# Patient Record
Sex: MALE | Race: BLACK OR AFRICAN AMERICAN | Employment: OTHER | URBAN - METROPOLITAN AREA
[De-identification: names, ages, dates, MRNs, and addresses within clinical notes are randomized per-mention and may not be internally consistent; named-entity substitution may affect disease eponyms.]

---

## 2019-06-02 ENCOUNTER — HOSPITAL ENCOUNTER (EMERGENCY)
Facility: CLINIC | Age: 84
Discharge: HOME | End: 2019-06-02
Attending: EMERGENCY MEDICINE | Admitting: EMERGENCY MEDICINE
Payer: MEDICARE

## 2019-06-02 ENCOUNTER — APPOINTMENT (OUTPATIENT)
Dept: GENERAL RADIOLOGY | Facility: CLINIC | Age: 84
End: 2019-06-02
Attending: EMERGENCY MEDICINE
Payer: MEDICARE

## 2019-06-02 ENCOUNTER — APPOINTMENT (OUTPATIENT)
Dept: CT IMAGING | Facility: CLINIC | Age: 84
End: 2019-06-02
Attending: EMERGENCY MEDICINE
Payer: MEDICARE

## 2019-06-02 VITALS
TEMPERATURE: 98 F | DIASTOLIC BLOOD PRESSURE: 83 MMHG | SYSTOLIC BLOOD PRESSURE: 131 MMHG | OXYGEN SATURATION: 94 % | RESPIRATION RATE: 15 BRPM

## 2019-06-02 DIAGNOSIS — H93.13 TINNITUS, BILATERAL: ICD-10-CM

## 2019-06-02 LAB
ANION GAP SERPL CALCULATED.3IONS-SCNC: 2 MMOL/L (ref 3–14)
BASOPHILS # BLD AUTO: 0 10E9/L (ref 0–0.2)
BASOPHILS NFR BLD AUTO: 0.2 %
BUN SERPL-MCNC: 18 MG/DL (ref 7–30)
CALCIUM SERPL-MCNC: 9.1 MG/DL (ref 8.5–10.1)
CHLORIDE SERPL-SCNC: 106 MMOL/L (ref 94–109)
CO2 SERPL-SCNC: 30 MMOL/L (ref 20–32)
CREAT SERPL-MCNC: 1.65 MG/DL (ref 0.66–1.25)
DIFFERENTIAL METHOD BLD: ABNORMAL
EOSINOPHIL # BLD AUTO: 0.1 10E9/L (ref 0–0.7)
EOSINOPHIL NFR BLD AUTO: 0.7 %
ERYTHROCYTE [DISTWIDTH] IN BLOOD BY AUTOMATED COUNT: 17.3 % (ref 10–15)
GFR SERPL CREATININE-BSD FRML MDRD: 36 ML/MIN/{1.73_M2}
GLUCOSE SERPL-MCNC: 145 MG/DL (ref 70–99)
HCT VFR BLD AUTO: 34.4 % (ref 40–53)
HGB BLD-MCNC: 10.5 G/DL (ref 13.3–17.7)
IMM GRANULOCYTES # BLD: 0.1 10E9/L (ref 0–0.4)
IMM GRANULOCYTES NFR BLD: 0.4 %
INR PPP: 3.39 (ref 0.86–1.14)
LYMPHOCYTES # BLD AUTO: 1 10E9/L (ref 0.8–5.3)
LYMPHOCYTES NFR BLD AUTO: 8.1 %
MCH RBC QN AUTO: 27 PG (ref 26.5–33)
MCHC RBC AUTO-ENTMCNC: 30.5 G/DL (ref 31.5–36.5)
MCV RBC AUTO: 88 FL (ref 78–100)
MONOCYTES # BLD AUTO: 1 10E9/L (ref 0–1.3)
MONOCYTES NFR BLD AUTO: 8.2 %
NEUTROPHILS # BLD AUTO: 10.1 10E9/L (ref 1.6–8.3)
NEUTROPHILS NFR BLD AUTO: 82.4 %
NRBC # BLD AUTO: 0 10*3/UL
NRBC BLD AUTO-RTO: 0 /100
PLATELET # BLD AUTO: 189 10E9/L (ref 150–450)
POTASSIUM SERPL-SCNC: 4.4 MMOL/L (ref 3.4–5.3)
RBC # BLD AUTO: 3.89 10E12/L (ref 4.4–5.9)
SODIUM SERPL-SCNC: 138 MMOL/L (ref 133–144)
TROPONIN I SERPL-MCNC: <0.015 UG/L (ref 0–0.04)
WBC # BLD AUTO: 12.3 10E9/L (ref 4–11)

## 2019-06-02 PROCEDURE — 25000128 H RX IP 250 OP 636: Performed by: EMERGENCY MEDICINE

## 2019-06-02 PROCEDURE — 93005 ELECTROCARDIOGRAM TRACING: CPT

## 2019-06-02 PROCEDURE — 84484 ASSAY OF TROPONIN QUANT: CPT | Performed by: EMERGENCY MEDICINE

## 2019-06-02 PROCEDURE — 85610 PROTHROMBIN TIME: CPT | Performed by: EMERGENCY MEDICINE

## 2019-06-02 PROCEDURE — 99285 EMERGENCY DEPT VISIT HI MDM: CPT | Mod: 25

## 2019-06-02 PROCEDURE — 71046 X-RAY EXAM CHEST 2 VIEWS: CPT

## 2019-06-02 PROCEDURE — 70498 CT ANGIOGRAPHY NECK: CPT

## 2019-06-02 PROCEDURE — 85025 COMPLETE CBC W/AUTO DIFF WBC: CPT | Performed by: EMERGENCY MEDICINE

## 2019-06-02 PROCEDURE — 80048 BASIC METABOLIC PNL TOTAL CA: CPT | Performed by: EMERGENCY MEDICINE

## 2019-06-02 PROCEDURE — 96360 HYDRATION IV INFUSION INIT: CPT | Mod: 59

## 2019-06-02 RX ORDER — IOPAMIDOL 755 MG/ML
500 INJECTION, SOLUTION INTRAVASCULAR ONCE
Status: COMPLETED | OUTPATIENT
Start: 2019-06-02 | End: 2019-06-02

## 2019-06-02 RX ADMIN — SODIUM CHLORIDE 80 ML: 9 INJECTION, SOLUTION INTRAVENOUS at 16:44

## 2019-06-02 RX ADMIN — IOPAMIDOL 70 ML: 755 INJECTION, SOLUTION INTRAVENOUS at 16:44

## 2019-06-02 ASSESSMENT — ENCOUNTER SYMPTOMS
WEAKNESS: 0
SHORTNESS OF BREATH: 0
TROUBLE SWALLOWING: 0
LIGHT-HEADEDNESS: 0
NUMBNESS: 0
CHILLS: 0
HEADACHES: 0
RHINORRHEA: 0
NECK PAIN: 0
DIZZINESS: 0
FEVER: 0

## 2019-06-02 NOTE — ED TRIAGE NOTES
"Pt arrives with caregiver for \"rushing noise in ears\". Pt noticed it first today, [facility] who [facility] for \"heart stuff\". Takes warfarin, unsure what for or what other medications. ABCs intact, denies chest pain/ SOB.   "

## 2019-06-02 NOTE — ED PROVIDER NOTES
History     Chief Complaint:  Tinnitus    HPI   Hubert Tamayo is a 89 year old male who presents to the ED for evaluation of tinnitus.  The patient reports sudden onset of bilateral tinnitus that began today around noon.  He describes it as fluctuating while he is talking, he notes that it sounds like his ears are underwater.  He notes that this mid pitched.  He denies ever having this in the past.  He denies that it is pulsatile.  He denies that it is associated with position change.  He states that [facility] and was told to present to [facility] for further evaluation.  He denies any vision changes, numbness or tingling, chest pain, shortness of breath, or difficulty swallowing.    Allergies:  Loxapine  Nifedipine  TMPSMX    Medications:    Albuterol  Amlodipine  Symbicort  Donepezil  Haloperidol  Metoprolol  Mirtazapine  Memetasone  Nitroglycerin  Olanzapine  Omeprazole  Tamsulosin  Sertraline  Tiotropium  Warfarin    Past Medical History:    Keratoconjuctivitis  Corneal opacity  Retinal hemorrhage    Past Surgical History:    Cataract removal     Family History:    family history is not on file.    Social History:  [facility]    Review of Systems   Constitutional: Negative for chills and fever.   HENT: Positive for tinnitus. Negative for congestion, ear pain, rhinorrhea and trouble swallowing.    Respiratory: Negative for shortness of breath.    Cardiovascular: Negative for chest pain.   Musculoskeletal: Negative for neck pain.   Neurological: Negative for dizziness, weakness, light-headedness, numbness and headaches.   All other systems reviewed and are negative.        Physical Exam     Patient Vitals for the past 24 hrs:   BP Temp Temp src Heart Rate Resp SpO2   06/02/19 1600 -- -- -- 68 15 --   06/02/19 1545 -- -- -- 68 -- --   06/02/19 1501 131/83 98  F (36.7  C) Oral 76 18 94 %        Physical Exam  Constitutional: Pleasant. Cooperative.  Eyes: Pupils equally round and reactive  HENT: Head is  normal in appearance. Oropharynx is normal with moist mucus membranes. No carotid bruits.  Cardiovascular: Regular rate and rhythm and without murmurs.  Respiratory: Normal respiratory effort, lungs are clear bilaterally.  GI: Abdomen is soft, non-tender, non-distended. No guarding, rebound, or rigidity.  Skin: Normal, without rash.  Neurologic: Cranial nerves II-XII intact, nl cognition, no focal deficits. Alert and oriented x 3. Normal  strength. Normal leg raise. Sensation to light touch intact throughout all 4 extremities. 5/5 strength with dorsiflexion and plantarflexion bilaterally. No pronator drift. Normal finger nose finger.   Psychiatric: Normal affect.  Nursing notes and vital signs reviewed.    Emergency Department Course   ECG:  Indication: Rushing noise in ears   Time: 1532  Vent. Rate 71 bpm. TX interval 172. QRS duration 78. QT/QTc 420/456. P-R-T axis 73 -64 -40.  Normal sinus rhythm. Left axis deviation. Nonspecific T wave abnormality. Abnormal ECG. Read by Vlad Mccurdy MD on 6/2/19.     Imaging:  XR Chest 2 Views   Preliminary Result   IMPRESSION: No acute disease.      CTA Head Neck with Contrast   Preliminary Result   IMPRESSION:    1. Patent arteries in the neck without evidence of dissection. Mild   atherosclerotic disease in the carotid arteries bilaterally without   significant stenosis by NASCET criteria.   2. Patent proximal major intracranial arteries without vascular   cutoff. No significant stenosis. No aneurysm identified.    3. Severe emphysematous changes in the visualized lung apices.              Laboratory:  Labs Ordered and Resulted from Time of ED Arrival Up to the Time of Departure from the ED   CBC WITH PLATELETS DIFFERENTIAL - Abnormal; Notable for the following components:       Result Value    WBC 12.3 (*)     RBC Count 3.89 (*)     Hemoglobin 10.5 (*)     Hematocrit 34.4 (*)     MCHC 30.5 (*)     RDW 17.3 (*)     Absolute Neutrophil 10.1 (*)     All other components  within normal limits   INR - Abnormal; Notable for the following components:    INR 3.39 (*)     All other components within normal limits   BASIC METABOLIC PANEL - Abnormal; Notable for the following components:    Anion Gap 2 (*)     Glucose 145 (*)     Creatinine 1.65 (*)     GFR Estimate 36 (*)     GFR Estimate If Black 42 (*)     All other components within normal limits   TROPONIN I      Interventions:  Medications   iopamidol (ISOVUE-370) solution 500 mL (70 mLs Intravenous Given 6/2/19 1644)   0.9% sodium chloride BOLUS (80 mLs Intravenous New Bag 6/2/19 1644)        Emergency Department Course:  Past medical records, nursing notes, and vitals reviewed.  I performed an exam of the patient and obtained history, as documented above.  I ordered the above labs and imaging.  1800 I discussed results with patient and caregiver.  Patient discharged to home.    Impression & Plan      Medical Decision Making:  Hubert Tamayo is a 89 year old male who presents today for evaluation of tinnitus.  The patient reports sudden onset of bilateral tinnitus that began today around noon.  He describes it as a fluctuating when he is talking, as though his ears are underwater.  See HPI above for additional details.  On evaluation, the patient's vitals are within normal limits.  The patient's physical exam is completely unremarkable.  No carotid bruits.  Normal neurologic exam.  Differential diagnosis includes carotid bruit, eustachian tube dysfunction, TMJ, and acoustic schwannoma, among others.  The above work-up was pursued.  The patient had a leukocytosis, however this is chronic for him on chart review.  BMP revealed elevated creatinine, although this is chronic for him as well.  INR was elevated at 3.39.  Troponin negative. EKG unremarkable. Chest x-ray was unremarkable.  CTA of head and neck revealed patent arteries in the neck.  No evidence of acoustic schwannoma.  Discussed with patient the unclear etiology of his symptoms.   TMJ and eustachian tube dysfunction are a possibility, however patient denies any history of jaw discomfort, and patient denies any recent URI.  On reevaluation, the patient reports that the symptoms have resolved.  Advised patient to follow-up with PCP in the next few days, and patient reports that he has an appointment scheduled for Wednesday, and I advised him to keep this.  In light of the patient's elevated INR, advised the patient to skip his dose tomorrow and call his PCP tomorrow to discuss this.  All questions answered.  Patient was discharged home in stable condition.    Diagnosis:    ICD-10-CM    1. Tinnitus, bilateral H93.13         Discharge Medications:     Medication List      There are no discharge medications for this visit.        Sree Coffman PA-C  [facility] ED  June 2, 2019        Sree Coffman PA-C  06/02/19 2037

## 2019-06-02 NOTE — ED AVS SNAPSHOT
[facility] Emergency Department  [address]  [facility] [address]  Phone:  [phone]  Fax:  [phone]                                    Hubert Tamayo   MRN: [id number]    Department:  [facility] Emergency Department   Date of Visit:  6/2/2019           After Visit Summary Signature Page    I have received my discharge instructions, and my questions have been answered. I have discussed any challenges I see with this plan with the nurse or doctor.    ..........................................................................................................................................  Patient/Patient Representative Signature      ..........................................................................................................................................  Patient Representative Print Name and Relationship to Patient    ..................................................               ................................................  Date                                   Time    ..........................................................................................................................................  Reviewed by Signature/Title    ...................................................              ..............................................  Date                                               Time          22EPIC Rev 08/18

## 2019-06-03 LAB — INTERPRETATION ECG - MUSE: NORMAL

## 2019-08-25 ENCOUNTER — TRANSFERRED RECORDS (OUTPATIENT)
Dept: HEALTH INFORMATION MANAGEMENT | Facility: CLINIC | Age: 84
End: 2019-08-25

## 2019-09-14 ENCOUNTER — APPOINTMENT (OUTPATIENT)
Dept: GENERAL RADIOLOGY | Facility: CLINIC | Age: 84
DRG: 552 | End: 2019-09-14
Attending: EMERGENCY MEDICINE
Payer: MEDICARE

## 2019-09-14 ENCOUNTER — APPOINTMENT (OUTPATIENT)
Dept: CT IMAGING | Facility: CLINIC | Age: 84
DRG: 552 | End: 2019-09-14
Attending: EMERGENCY MEDICINE
Payer: MEDICARE

## 2019-09-14 ENCOUNTER — HOSPITAL ENCOUNTER (EMERGENCY)
Facility: CLINIC | Age: 84
Discharge: HOME | DRG: 552 | End: 2019-09-14
Attending: EMERGENCY MEDICINE | Admitting: EMERGENCY MEDICINE
Payer: MEDICARE

## 2019-09-14 ENCOUNTER — NURSE TRIAGE (OUTPATIENT)
Dept: NURSING | Facility: CLINIC | Age: 84
End: 2019-09-14

## 2019-09-14 VITALS
OXYGEN SATURATION: 96 % | DIASTOLIC BLOOD PRESSURE: 86 MMHG | RESPIRATION RATE: 18 BRPM | TEMPERATURE: 98.1 F | SYSTOLIC BLOOD PRESSURE: 187 MMHG | HEART RATE: 60 BPM

## 2019-09-14 DIAGNOSIS — S32.009A LUMBAR TRANSVERSE PROCESS FRACTURE, CLOSED, INITIAL ENCOUNTER (H): ICD-10-CM
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
## 2019-09-14 DIAGNOSIS — D63.8 ANEMIA DUE TO CHRONIC ILLNESS: ICD-10-CM

## 2019-09-14 DIAGNOSIS — S20.222A CONTUSION OF LEFT SIDE OF BACK, INITIAL ENCOUNTER: ICD-10-CM

## 2019-09-14 DIAGNOSIS — Z79.01 WARFARIN ANTICOAGULATION: ICD-10-CM

## 2019-09-14 LAB
ANION GAP SERPL CALCULATED.3IONS-SCNC: 3 MMOL/L (ref 3–14)
BUN SERPL-MCNC: 25 MG/DL (ref 7–30)
CALCIUM SERPL-MCNC: 9.2 MG/DL (ref 8.5–10.1)
CHLORIDE SERPL-SCNC: 105 MMOL/L (ref 94–109)
CO2 SERPL-SCNC: 30 MMOL/L (ref 20–32)
CREAT SERPL-MCNC: 1.51 MG/DL (ref 0.66–1.25)
ERYTHROCYTE [DISTWIDTH] IN BLOOD BY AUTOMATED COUNT: 18.3 % (ref 10–15)
GFR SERPL CREATININE-BSD FRML MDRD: 40 ML/MIN/{1.73_M2}
GLUCOSE SERPL-MCNC: 115 MG/DL (ref 70–99)
HCT VFR BLD AUTO: 28.8 % (ref 40–53)
HGB BLD-MCNC: 8.7 G/DL (ref 13.3–17.7)
INR PPP: 4.91 (ref 0.86–1.14)
MCH RBC QN AUTO: 26.2 PG (ref 26.5–33)
MCHC RBC AUTO-ENTMCNC: 30.2 G/DL (ref 31.5–36.5)
MCV RBC AUTO: 87 FL (ref 78–100)
PLATELET # BLD AUTO: 199 10E9/L (ref 150–450)
POTASSIUM SERPL-SCNC: 4.5 MMOL/L (ref 3.4–5.3)
RBC # BLD AUTO: 3.32 10E12/L (ref 4.4–5.9)
SODIUM SERPL-SCNC: 138 MMOL/L (ref 133–144)
WBC # BLD AUTO: 9.1 10E9/L (ref 4–11)

## 2019-09-14 PROCEDURE — 85027 COMPLETE CBC AUTOMATED: CPT | Performed by: EMERGENCY MEDICINE

## 2019-09-14 PROCEDURE — 70450 CT HEAD/BRAIN W/O DYE: CPT

## 2019-09-14 PROCEDURE — 72131 CT LUMBAR SPINE W/O DYE: CPT

## 2019-09-14 PROCEDURE — 96374 THER/PROPH/DIAG INJ IV PUSH: CPT

## 2019-09-14 PROCEDURE — 72128 CT CHEST SPINE W/O DYE: CPT

## 2019-09-14 PROCEDURE — 71101 X-RAY EXAM UNILAT RIBS/CHEST: CPT | Mod: LT

## 2019-09-14 PROCEDURE — 80048 BASIC METABOLIC PNL TOTAL CA: CPT | Performed by: EMERGENCY MEDICINE

## 2019-09-14 PROCEDURE — 25000128 H RX IP 250 OP 636: Performed by: EMERGENCY MEDICINE

## 2019-09-14 PROCEDURE — 96376 TX/PRO/DX INJ SAME DRUG ADON: CPT

## 2019-09-14 PROCEDURE — 25000132 ZZH RX MED GY IP 250 OP 250 PS 637: Mod: GY | Performed by: EMERGENCY MEDICINE

## 2019-09-14 PROCEDURE — 99285 EMERGENCY DEPT VISIT HI MDM: CPT | Mod: 25

## 2019-09-14 PROCEDURE — 85610 PROTHROMBIN TIME: CPT | Performed by: EMERGENCY MEDICINE

## 2019-09-14 RX ORDER — TRAMADOL HYDROCHLORIDE 50 MG/1
50 TABLET ORAL ONCE
Status: COMPLETED | OUTPATIENT
Start: 2019-09-14 | End: 2019-09-14

## 2019-09-14 RX ORDER — HALOPERIDOL 1 MG/1
1 TABLET ORAL 4 TIMES DAILY
Status: ON HOLD | COMMUNITY
End: 2019-09-15

## 2019-09-14 RX ORDER — TAMSULOSIN HYDROCHLORIDE 0.4 MG/1
0.4 CAPSULE ORAL DAILY
COMMUNITY

## 2019-09-14 RX ORDER — TRAMADOL HYDROCHLORIDE 50 MG/1
50 TABLET ORAL EVERY 6 HOURS PRN
Qty: 15 TABLET | Refills: 0 | Status: ON HOLD | OUTPATIENT
Start: 2019-09-14 | End: 2019-09-15

## 2019-09-14 RX ORDER — DONEPEZIL HYDROCHLORIDE 10 MG/1
5 TABLET, FILM COATED ORAL DAILY
Status: ON HOLD | COMMUNITY
End: 2020-11-18

## 2019-09-14 RX ORDER — MORPHINE SULFATE 2 MG/ML
2 INJECTION, SOLUTION INTRAMUSCULAR; INTRAVENOUS
Status: DISCONTINUED | OUTPATIENT
Start: 2019-09-14 | End: 2019-09-14 | Stop reason: HOSPADM

## 2019-09-14 RX ADMIN — MORPHINE SULFATE 2 MG: 2 INJECTION, SOLUTION INTRAMUSCULAR; INTRAVENOUS at 06:58

## 2019-09-14 RX ADMIN — TRAMADOL HYDROCHLORIDE 50 MG: 50 TABLET, FILM COATED ORAL at 07:39

## 2019-09-14 RX ADMIN — MORPHINE SULFATE 2 MG: 2 INJECTION, SOLUTION INTRAMUSCULAR; INTRAVENOUS at 05:54

## 2019-09-14 ASSESSMENT — ENCOUNTER SYMPTOMS: ABDOMINAL PAIN: 0

## 2019-09-14 NOTE — ED TRIAGE NOTES
Pt states fell out of bed tonight while having nightmare. C/o back pain. Currently on coumadin. ABCs intact

## 2019-09-14 NOTE — TELEPHONE ENCOUNTER
Niece/Roselia was the caller,[phone].  She stated several times that Hubert needs to be in the hospital.  Hubert just got home. He was seen in the  ER early this morning. His pain is so severe he can't move.  He's on his side in bed with part of him hanging off the bed.    I told Roselia Gaspar needs to return to the hospital. She stated understanding and agreement.  Harper WILL RN [facility]

## 2019-09-14 NOTE — ED AVS SNAPSHOT
[facility] Emergency Department  [address]  [facility] [address]  Phone:  [phone]  Fax:  [phone]                                    Hubert Tamayo   MRN: [id number]    Department:  [facility] Emergency Department   Date of Visit:  9/14/2019           After Visit Summary Signature Page    I have received my discharge instructions, and my questions have been answered. I have discussed any challenges I see with this plan with the nurse or doctor.    ..........................................................................................................................................  Patient/Patient Representative Signature      ..........................................................................................................................................  Patient Representative Print Name and Relationship to Patient    ..................................................               ................................................  Date                                   Time    ..........................................................................................................................................  Reviewed by Signature/Title    ...................................................              ..............................................  Date                                               Time          22EPIC Rev 08/18

## 2019-09-14 NOTE — ED PROVIDER NOTES
History     Chief Complaint:  Fall    The history is provided by the patient. History limited by: dementia.      Hubert Tamayo is a 89 year old male with a history of paranoid schizophrenia, dementia, hypertension, hyperlipidemia, and arrhythmia (on coumadin) who presents for evaluation after an unwitnessed fall. The patient reports falling out of bed while having a nightmare tonight and complains of associated left sided back pain. The patient denies loosing consciousness, abdominal pain, or leg pain.    Allergies:  Loxapine  Nifedipine  Shellfish Allergy  Sulfa Drugs    Medications:    Coumadin  Albuterol  Amlodipine  Symbicort IN  Haldol  Toprol-XL  Remeron  Zyprexa  Omeprazole  Zoloft  Flomax  Spiriva     Past Medical History:    CAD  Dementia  Diabetes mellitus type 2  Hypertension  Osteoporosis  Paranoid schizophrenia  Pulmonary emphysema  Hyperlipidemia     Past Surgical History:    Cataract removal     Family History:    No past pertinent family history.    Social History:  The patient was accompanied to the ED by his niece (he lives with his niece).  Smoking Status: Former Smoker  Smokeless Tobacco: Never Used  Marital Status:       Review of Systems   Unable to perform ROS: Dementia   Gastrointestinal: Negative for abdominal pain.   Neurological: Negative for syncope.       Physical Exam     Patient Vitals for the past 24 hrs:   BP Temp Temp src Pulse Resp SpO2   09/14/19 0600  173/84 -- -- 56 -- 100 %   09/14/19 0545 173/94 -- -- 60 -- 100 %   09/14/19 0515 199/101 98.1  F (36.7  C) Temporal 62 18 99 %       Physical Exam  Nursing note and vitals reviewed.  Constitutional: Cooperative.   HENT:   Mouth/Throat: Mucous membranes are normal.   Full ROM of neck, No SP tenderness.  Cardiovascular: Normal rate, Irregularly irregular rhythm and normal heart sounds.  No murmur.  Pulmonary/Chest: Effort normal and breath sounds normal. No respiratory distress. No wheezes. No rales.   Abdominal: Soft. Normal  appearance and bowel sounds are normal. No distension. There is no tenderness. There is no rigidity and no guarding.   Musculoskeletal: Normal range of motion of extremities. Tenderness to his posterior lower left ribs. Tenderness in upper lumbar and lower thoracic spine.   Neurological: Alert. Normal strength.  Moving all extremities  Skin: Skin is warm and dry.   Psychiatric: Normal mood and affect.     Emergency Department Course     Imaging:  Radiographic findings were communicated with the patient who voiced understanding of the findings.    Head CT w/o contrast  IMPRESSION:  1.  No acute intracranial injury.    Lumbar spine CT w/o contrast  1.  Acute fractures of the left L2 and L3 transverse processes.    CT Thoracic Spine w/o Contrast  1.  Mild superior endplate depression at T9 appears chronic. No definite acute fracture.    Ribs XR, unilat 3 views + PA chest, left  IMPRESSION: Stable cardia mediastinal silhouette. Basilar fibroatelectasis. Atherosclerotic aorta. No visible, displaced left rib fracture.    Laboratory:  CBC: WBC: 9.1, HGB 8.7(L), PLT: 199  BMP: Glucose 115 (H), Creatinine 1.51 (H), GFR Estimate If Black 46 (L), o/w WNL    INR: 4.91 (H)    Interventions:  0554 Morphine 2 mg IV    Emergency Department Course:   Past medical records, nursing notes, and vitals reviewed.  0517: I performed an exam of the patient and obtained history, as documented above.    The patient was sent for a Head CT, Lumbar spine CT, CT Thoracic spine, and Ribs XR, unilat 3 views + PA chest, left while in the emergency department, results above.     IV was inserted and blood was drawn for laboratory testing, results above.        Impression & Plan      Medical Decision Making:  Hubert Tamayo is a 89 year old male with paranoid schizophrenia and dementia who presents after an unwitnessed low level fall from his bed. He denies loss of consciousness, but is on coumadin so we will perform Head CT imaging. He has diffuse mid to  lower back pain with tenderness to his left rib cage, so additional views have been obtained. He is hemodynamically stable at this time. INR is slightly supra therapeutic.  This can be monitored. Head CT negative.  C-spine cleared clinically. L2 and L3 Left TP fractures noted.  Planned for pain control with PCP follow up. No neurologic deficit noted.       Diagnosis:    ICD-10-CM   1. Fall, initial encounter W19.XXXA       2. Anemia due to chronic illness D63.8   3. Warfarin anticoagulation Z79.01   4. Contusion of left side of back, initial encounter S20.222A   5.      Lumbar transverse process fractures (L2 & L3), closed, initial encounter  (S32.009A)       Disposition:  discharged to home with Family member    Discharge Medications:  New Prescriptions    TRAMADOL (ULTRAM) 50 MG TABLET    Take 1 tablet (50 mg) by mouth every 6 hours as needed for pain     Ruma NEVES, am serving as a scribe on 9/14/2019 at 6:18 AM to personally document services performed by Bradford Robles MD based on my observations and the provider's statements to me.       Ruma Jaar  9/14/2019   [facility] EMERGENCY DEPARTMENT       Bradford Robles MD  09/14/19 0734       Bradford Robles MD  09/14/19 0734

## 2019-09-15 ENCOUNTER — HOSPITAL ENCOUNTER (INPATIENT)
Facility: CLINIC | Age: 84
LOS: 1 days | Discharge: NURSING, REHAB OR LONG-TERM CARE | DRG: 552 | End: 2019-09-18
Attending: EMERGENCY MEDICINE | Admitting: HOSPITALIST
Payer: MEDICARE

## 2019-09-15 DIAGNOSIS — S32.009A CLOSED FRACTURE OF TRANSVERSE PROCESS OF LUMBAR VERTEBRA, INITIAL ENCOUNTER (H): ICD-10-CM

## 2019-09-15 DIAGNOSIS — F20.0 PARANOID SCHIZOPHRENIA (H): Primary | ICD-10-CM

## 2019-09-15 DIAGNOSIS — I10 BENIGN ESSENTIAL HYPERTENSION: ICD-10-CM

## 2019-09-15 DIAGNOSIS — K59.00 CONSTIPATION, UNSPECIFIED CONSTIPATION TYPE: ICD-10-CM

## 2019-09-15 LAB
ANION GAP SERPL CALCULATED.3IONS-SCNC: 3 MMOL/L (ref 3–14)
BASOPHILS # BLD AUTO: 0 10E9/L (ref 0–0.2)
BASOPHILS NFR BLD AUTO: 0.3 %
BUN SERPL-MCNC: 22 MG/DL (ref 7–30)
CALCIUM SERPL-MCNC: 9.1 MG/DL (ref 8.5–10.1)
CHLORIDE SERPL-SCNC: 106 MMOL/L (ref 94–109)
CO2 SERPL-SCNC: 29 MMOL/L (ref 20–32)
CREAT SERPL-MCNC: 1.55 MG/DL (ref 0.66–1.25)
DIFFERENTIAL METHOD BLD: ABNORMAL
EOSINOPHIL # BLD AUTO: 0.1 10E9/L (ref 0–0.7)
EOSINOPHIL NFR BLD AUTO: 1.1 %
ERYTHROCYTE [DISTWIDTH] IN BLOOD BY AUTOMATED COUNT: 18.7 % (ref 10–15)
GFR SERPL CREATININE-BSD FRML MDRD: 39 ML/MIN/{1.73_M2}
GLUCOSE BLDC GLUCOMTR-MCNC: 97 MG/DL (ref 70–99)
GLUCOSE SERPL-MCNC: 90 MG/DL (ref 70–99)
HCT VFR BLD AUTO: 29.3 % (ref 40–53)
HGB BLD-MCNC: 9.2 G/DL (ref 13.3–17.7)
IMM GRANULOCYTES # BLD: 0.1 10E9/L (ref 0–0.4)
IMM GRANULOCYTES NFR BLD: 0.6 %
INR PPP: 2.81 (ref 0.86–1.14)
LYMPHOCYTES # BLD AUTO: 1.1 10E9/L (ref 0.8–5.3)
LYMPHOCYTES NFR BLD AUTO: 11 %
MCH RBC QN AUTO: 27 PG (ref 26.5–33)
MCHC RBC AUTO-ENTMCNC: 31.4 G/DL (ref 31.5–36.5)
MCV RBC AUTO: 86 FL (ref 78–100)
MONOCYTES # BLD AUTO: 0.8 10E9/L (ref 0–1.3)
MONOCYTES NFR BLD AUTO: 8.3 %
NEUTROPHILS # BLD AUTO: 7.8 10E9/L (ref 1.6–8.3)
NEUTROPHILS NFR BLD AUTO: 78.7 %
NRBC # BLD AUTO: 0 10*3/UL
NRBC BLD AUTO-RTO: 0 /100
PLATELET # BLD AUTO: 171 10E9/L (ref 150–450)
POTASSIUM SERPL-SCNC: 4.7 MMOL/L (ref 3.4–5.3)
RBC # BLD AUTO: 3.41 10E12/L (ref 4.4–5.9)
SODIUM SERPL-SCNC: 138 MMOL/L (ref 133–144)
WBC # BLD AUTO: 9.9 10E9/L (ref 4–11)

## 2019-09-15 PROCEDURE — 25000132 ZZH RX MED GY IP 250 OP 250 PS 637: Mod: GY | Performed by: EMERGENCY MEDICINE

## 2019-09-15 PROCEDURE — 00000146 ZZHCL STATISTIC GLUCOSE BY METER IP

## 2019-09-15 PROCEDURE — 85025 COMPLETE CBC W/AUTO DIFF WBC: CPT | Performed by: EMERGENCY MEDICINE

## 2019-09-15 PROCEDURE — 36415 COLL VENOUS BLD VENIPUNCTURE: CPT | Performed by: HOSPITALIST

## 2019-09-15 PROCEDURE — 85610 PROTHROMBIN TIME: CPT | Performed by: HOSPITALIST

## 2019-09-15 PROCEDURE — 96374 THER/PROPH/DIAG INJ IV PUSH: CPT

## 2019-09-15 PROCEDURE — G0378 HOSPITAL OBSERVATION PER HR: HCPCS

## 2019-09-15 PROCEDURE — 25000132 ZZH RX MED GY IP 250 OP 250 PS 637: Mod: GY | Performed by: HOSPITALIST

## 2019-09-15 PROCEDURE — 80048 BASIC METABOLIC PNL TOTAL CA: CPT | Performed by: HOSPITALIST

## 2019-09-15 PROCEDURE — 25000128 H RX IP 250 OP 636: Performed by: EMERGENCY MEDICINE

## 2019-09-15 PROCEDURE — 93005 ELECTROCARDIOGRAM TRACING: CPT

## 2019-09-15 PROCEDURE — 25000132 ZZH RX MED GY IP 250 OP 250 PS 637: Mod: GY | Performed by: PHYSICIAN ASSISTANT

## 2019-09-15 PROCEDURE — 99285 EMERGENCY DEPT VISIT HI MDM: CPT | Mod: 25

## 2019-09-15 PROCEDURE — 99220 ZZC INITIAL OBSERVATION CARE,LEVL III: CPT | Performed by: HOSPITALIST

## 2019-09-15 RX ORDER — AMOXICILLIN 250 MG
1 CAPSULE ORAL 2 TIMES DAILY PRN
Status: DISCONTINUED | OUTPATIENT
Start: 2019-09-15 | End: 2019-09-18 | Stop reason: HOSPADM

## 2019-09-15 RX ORDER — TAMSULOSIN HYDROCHLORIDE 0.4 MG/1
0.4 CAPSULE ORAL DAILY
Status: DISCONTINUED | OUTPATIENT
Start: 2019-09-16 | End: 2019-09-18 | Stop reason: HOSPADM

## 2019-09-15 RX ORDER — NICOTINE POLACRILEX 4 MG
15-30 LOZENGE BUCCAL
Status: DISCONTINUED | OUTPATIENT
Start: 2019-09-15 | End: 2019-09-18 | Stop reason: HOSPADM

## 2019-09-15 RX ORDER — FLUORIDE TOOTHPASTE
5-10 TOOTHPASTE DENTAL 4 TIMES DAILY PRN
Status: ON HOLD | COMMUNITY
End: 2020-06-27

## 2019-09-15 RX ORDER — TRAMADOL HYDROCHLORIDE 50 MG/1
50 TABLET ORAL EVERY 6 HOURS PRN
Status: DISCONTINUED | OUTPATIENT
Start: 2019-09-15 | End: 2019-09-18 | Stop reason: HOSPADM

## 2019-09-15 RX ORDER — ATORVASTATIN CALCIUM 40 MG/1
80 TABLET, FILM COATED ORAL DAILY
Status: DISCONTINUED | OUTPATIENT
Start: 2019-09-16 | End: 2019-09-18 | Stop reason: HOSPADM

## 2019-09-15 RX ORDER — HYDROCODONE BITARTRATE AND ACETAMINOPHEN 5; 325 MG/1; MG/1
1-2 TABLET ORAL EVERY 4 HOURS PRN
Status: DISCONTINUED | OUTPATIENT
Start: 2019-09-15 | End: 2019-09-16

## 2019-09-15 RX ORDER — HYDROMORPHONE HYDROCHLORIDE 1 MG/ML
0.2 INJECTION, SOLUTION INTRAMUSCULAR; INTRAVENOUS; SUBCUTANEOUS EVERY 6 HOURS PRN
Status: DISCONTINUED | OUTPATIENT
Start: 2019-09-15 | End: 2019-09-16

## 2019-09-15 RX ORDER — NYSTATIN 100000 U/G
CREAM TOPICAL 2 TIMES DAILY PRN
Status: ON HOLD | COMMUNITY
End: 2020-11-18

## 2019-09-15 RX ORDER — OLANZAPINE 5 MG/1
10 TABLET, ORALLY DISINTEGRATING ORAL 2 TIMES DAILY PRN
Status: DISCONTINUED | OUTPATIENT
Start: 2019-09-15 | End: 2019-09-18 | Stop reason: HOSPADM

## 2019-09-15 RX ORDER — EMOLLIENT BASE
CREAM (GRAM) TOPICAL DAILY
Status: ON HOLD | COMMUNITY
End: 2020-06-27

## 2019-09-15 RX ORDER — ONDANSETRON 2 MG/ML
4 INJECTION INTRAMUSCULAR; INTRAVENOUS EVERY 6 HOURS PRN
Status: DISCONTINUED | OUTPATIENT
Start: 2019-09-15 | End: 2019-09-18 | Stop reason: HOSPADM

## 2019-09-15 RX ORDER — OXYCODONE HYDROCHLORIDE 5 MG/1
5 TABLET ORAL ONCE
Status: COMPLETED | OUTPATIENT
Start: 2019-09-15 | End: 2019-09-15

## 2019-09-15 RX ORDER — WARFARIN SODIUM 2 MG/1
2 TABLET ORAL
Status: COMPLETED | OUTPATIENT
Start: 2019-09-15 | End: 2019-09-15

## 2019-09-15 RX ORDER — MORPHINE SULFATE 4 MG/ML
4 INJECTION, SOLUTION INTRAMUSCULAR; INTRAVENOUS ONCE
Status: COMPLETED | OUTPATIENT
Start: 2019-09-15 | End: 2019-09-15

## 2019-09-15 RX ORDER — DEXTROSE MONOHYDRATE 25 G/50ML
25-50 INJECTION, SOLUTION INTRAVENOUS
Status: DISCONTINUED | OUTPATIENT
Start: 2019-09-15 | End: 2019-09-18 | Stop reason: HOSPADM

## 2019-09-15 RX ORDER — NALOXONE HYDROCHLORIDE 0.4 MG/ML
.1-.4 INJECTION, SOLUTION INTRAMUSCULAR; INTRAVENOUS; SUBCUTANEOUS
Status: DISCONTINUED | OUTPATIENT
Start: 2019-09-15 | End: 2019-09-18 | Stop reason: HOSPADM

## 2019-09-15 RX ORDER — CLOPIDOGREL BISULFATE 75 MG/1
75 TABLET ORAL DAILY
Status: DISCONTINUED | OUTPATIENT
Start: 2019-09-16 | End: 2019-09-18 | Stop reason: HOSPADM

## 2019-09-15 RX ORDER — OLANZAPINE 10 MG/1
20 TABLET ORAL AT BEDTIME
Status: DISCONTINUED | OUTPATIENT
Start: 2019-09-15 | End: 2019-09-16

## 2019-09-15 RX ORDER — MOMETASONE FUROATE MONOHYDRATE 50 UG/1
2 SPRAY, METERED NASAL DAILY
COMMUNITY
End: 2021-01-23

## 2019-09-15 RX ORDER — BUDESONIDE AND FORMOTEROL FUMARATE DIHYDRATE 160; 4.5 UG/1; UG/1
2 AEROSOL RESPIRATORY (INHALATION) 2 TIMES DAILY
COMMUNITY
End: 2021-01-23

## 2019-09-15 RX ORDER — ACETAMINOPHEN 650 MG/1
650 SUPPOSITORY RECTAL EVERY 4 HOURS PRN
Status: DISCONTINUED | OUTPATIENT
Start: 2019-09-15 | End: 2019-09-15

## 2019-09-15 RX ORDER — ACETAMINOPHEN 325 MG/1
650 TABLET ORAL EVERY 4 HOURS PRN
Status: DISCONTINUED | OUTPATIENT
Start: 2019-09-15 | End: 2019-09-18 | Stop reason: HOSPADM

## 2019-09-15 RX ORDER — POLYETHYLENE GLYCOL 3350 17 G/17G
17 POWDER, FOR SOLUTION ORAL DAILY PRN
Status: DISCONTINUED | OUTPATIENT
Start: 2019-09-15 | End: 2019-09-18 | Stop reason: HOSPADM

## 2019-09-15 RX ORDER — BENZOCAINE/MENTHOL 6 MG-10 MG
LOZENGE MUCOUS MEMBRANE 2 TIMES DAILY
Status: ON HOLD | COMMUNITY
End: 2020-11-18

## 2019-09-15 RX ORDER — MORPHINE SULFATE 2 MG/ML
2 INJECTION, SOLUTION INTRAMUSCULAR; INTRAVENOUS EVERY 6 HOURS PRN
Status: CANCELLED | OUTPATIENT
Start: 2019-09-15

## 2019-09-15 RX ORDER — ATORVASTATIN CALCIUM 80 MG/1
80 TABLET, FILM COATED ORAL DAILY
Status: ON HOLD | COMMUNITY
End: 2020-06-27

## 2019-09-15 RX ORDER — IPRATROPIUM BROMIDE AND ALBUTEROL SULFATE 2.5; .5 MG/3ML; MG/3ML
3 SOLUTION RESPIRATORY (INHALATION) EVERY 4 HOURS PRN
Status: DISCONTINUED | OUTPATIENT
Start: 2019-09-15 | End: 2019-09-17

## 2019-09-15 RX ORDER — NITROGLYCERIN 0.4 MG/1
0.4 TABLET SUBLINGUAL EVERY 5 MIN PRN
COMMUNITY

## 2019-09-15 RX ORDER — DONEPEZIL HYDROCHLORIDE 5 MG/1
5 TABLET, FILM COATED ORAL DAILY
Status: DISCONTINUED | OUTPATIENT
Start: 2019-09-16 | End: 2019-09-18 | Stop reason: HOSPADM

## 2019-09-15 RX ORDER — CLOPIDOGREL BISULFATE 75 MG/1
75 TABLET ORAL DAILY
Status: ON HOLD | COMMUNITY
End: 2019-11-01

## 2019-09-15 RX ORDER — RISPERIDONE 0.5 MG/1
1 TABLET ORAL 2 TIMES DAILY PRN
Status: DISCONTINUED | OUTPATIENT
Start: 2019-09-15 | End: 2019-09-18 | Stop reason: HOSPADM

## 2019-09-15 RX ORDER — METOPROLOL TARTRATE 50 MG
50 TABLET ORAL 2 TIMES DAILY
Status: ON HOLD | COMMUNITY
End: 2019-10-29

## 2019-09-15 RX ORDER — ALBUTEROL SULFATE 90 UG/1
2 AEROSOL, METERED RESPIRATORY (INHALATION) EVERY 4 HOURS PRN
COMMUNITY

## 2019-09-15 RX ORDER — ONDANSETRON 4 MG/1
4 TABLET, ORALLY DISINTEGRATING ORAL EVERY 6 HOURS PRN
Status: DISCONTINUED | OUTPATIENT
Start: 2019-09-15 | End: 2019-09-18 | Stop reason: HOSPADM

## 2019-09-15 RX ORDER — AMOXICILLIN 250 MG
2 CAPSULE ORAL 2 TIMES DAILY PRN
Status: DISCONTINUED | OUTPATIENT
Start: 2019-09-15 | End: 2019-09-18 | Stop reason: HOSPADM

## 2019-09-15 RX ORDER — METOPROLOL TARTRATE 50 MG
50 TABLET ORAL 2 TIMES DAILY
Status: DISCONTINUED | OUTPATIENT
Start: 2019-09-15 | End: 2019-09-17

## 2019-09-15 RX ADMIN — OXYCODONE HYDROCHLORIDE 5 MG: 5 TABLET ORAL at 12:16

## 2019-09-15 RX ADMIN — HALOPERIDOL 2.5 MG: 1 TABLET ORAL at 21:24

## 2019-09-15 RX ADMIN — OLANZAPINE 20 MG: 10 TABLET, FILM COATED ORAL at 21:24

## 2019-09-15 RX ADMIN — METOPROLOL TARTRATE 50 MG: 50 TABLET ORAL at 20:22

## 2019-09-15 RX ADMIN — HYDROCODONE BITARTRATE AND ACETAMINOPHEN 2 TABLET: 5; 325 TABLET ORAL at 20:22

## 2019-09-15 RX ADMIN — WARFARIN SODIUM 2 MG: 2 TABLET ORAL at 20:22

## 2019-09-15 RX ADMIN — MORPHINE SULFATE 4 MG: 4 INJECTION INTRAVENOUS at 13:57

## 2019-09-15 RX ADMIN — HYDROCODONE BITARTRATE AND ACETAMINOPHEN 2 TABLET: 5; 325 TABLET ORAL at 16:25

## 2019-09-15 ASSESSMENT — ENCOUNTER SYMPTOMS
NUMBNESS: 0
BACK PAIN: 1

## 2019-09-15 ASSESSMENT — MIFFLIN-ST. JEOR: SCORE: 1319.89

## 2019-09-15 NOTE — PHARMACY-ANTICOAGULATION SERVICE
Pharmacy to dose warfarin for DVT/PETx.   Goal INR 2-3.    Home regimen 3 mg T,Th and 4 mg ROW.   Interacting meds: tramadol.  Clinical Pharmacy - Warfarin Dosing Consult     Pharmacy has been consulted to manage this patient s warfarin therapy.       INR   Date Value Ref Range Status   09/15/2019 2.81 (H) 0.86 - 1.14 Final   09/14/2019 4.91 (H) 0.86 - 1.14 Final       Recommend warfarin 2 mg today.  Pharmacy will monitor Hubert Tamayo daily and order warfarin doses to achieve specified goal.      Please contact pharmacy as soon as possible if the warfarin needs to be held for a procedure or if the warfarin goals change.

## 2019-09-15 NOTE — ED NOTES
[facility]  ED Nurse Handoff Report    Hubert Tamayo is a 89 year old male   ED Chief complaint: Back Pain  . ED Diagnosis:   Final diagnoses:   Closed fracture of transverse process of lumbar vertebra, initial encounter (H)     Allergies:   Allergies   Allergen Reactions     Crustaceans      Loxapine      Nifedipine      Shellfish Allergy      Sulfa Drugs      Sulfamethoxazole-Trimethoprim        Code Status: Full Code  Activity level - Baseline/Home:  Independent with a cane. Activity Level - Current:   Total Care. Lift room needed: Yes. Bariatric: No   Needed: No   Isolation: No. Infection: Not Applicable.     Vital Signs:   Vitals:    09/15/19 1119   BP: 138/73   Pulse: 75   Resp: 16   Temp: 97.7  F (36.5  C)   TempSrc: Oral   SpO2: 91%       Cardiac Rhythm:  ,      Pain level: 0-10 Pain Scale: 8  Patient confused: Yes. Patient Falls Risk: Yes.   Elimination Status: Has voided   Patient Report - Initial Complaint: Patient here from home by EMS for complaint of back pain. He was seen here yesterday for back pain as well. Per EMS report, he had upper back pain this morning and told his niece, who then gave him a total of 3 nitroglycerin tablets. Per EMS, his pain is in his lower back and he denies chest pain. They did an EKG, NSR. He has low back pain with movement on arrival. Focused Assessment: low back pain, unable to get up do to pain   Tests Performed: none. Abnormal Results: none.   Treatments provided: pain control  Family Comments: niece Anny here, he lives with her, good historian and supportive.  OBS brochure/video discussed/provided to patient:  Yes  ED Medications:   Medications   oxyCODONE (ROXICODONE) tablet 5 mg (5 mg Oral Given 9/15/19 1216)   morphine (PF) injection 4 mg (4 mg Intravenous Given 9/15/19 2727)     Drips infusing:  No  For the majority of the shift, the patient's behavior Yellow. Easily agitated, calls care staff racist and is demanding of cares.  Interventions performed were calm reassurance, frequent checks, consistency in care.     Severe Sepsis OR Septic Shock Diagnosis Present: No      ED Nurse Name/Phone Number: Angely Joseph RN,   1:59 PM  RECEIVING UNIT ED HANDOFF REVIEW    Above ED Nurse Handoff Report was reviewed: YES  Reviewed by: Tameka Ly, RN on September 15, 2019 at 2:38 PM

## 2019-09-15 NOTE — H&P
[facility]    History and Physical - Hospitalist Service       Date of Admission:  9/15/2019    Assessment & Plan   Hubert aTmayo is a 89 year old male with paranoid schizophrenia, CAD (details not known), CKD, HTN, DM, DVT, COPD, dementia admitted on 9/15/2019 with acute fractures of the left L2 and L3 transverse processes after a fall out of bed.    Back pain due to acute fractures of the left L2 and L3 transverse processes    - patient was in ED yesterday after a fall out of bed due to a nightmare    - pain was uncontrolled today    - ED already spoke to Dr. Rivers (Neurosurg), who felt a brace might help    - pain control: Tylenol, Norco, IV dilaudid    - will have orthotics assess for a soft brace    - PT milaal (was able to get to the bathroom this am, but subsequently not able to walk with EMS, has lifts in home for stairs)    Paranoid schizophrenia    - cont home Haldol    - will have PRN Haldol available tonight as well as PRN zyprexa and risperidone    CAD    - we do not know details    - patient and niece do not know    [facility]    - pharmacy currently reconciling meds    COPD    - stable    - pharmacy currently reconciling meds    DM    - diabetic diet    - pharmacy currently reconciling meds (does not look like patient is on meds)    - TID BS testing, no ISS    HTN   - pharmacy currently reconciling meds    DVT    - cont coumadin    CKD    - at baseline    Dementia    - niece confirms    Niece in room and updated    Full code: reviewed        Diet: diabetic  DVT Prophylaxis: Warfarin  Lebron Catheter: not present  Code Status: Full    Disposition Plan   Expected discharge: Tomorrow, recommended to prior living arrangement once seen by PT.  Entered: Todd Velarde MD 09/15/2019, 2:44 PM     The patient's care was discussed with the Bedside Nurse, Patient, Patient's Family and ED physician.    Todd Velarde MD  [facility]    ______________________________________________________________________    Chief Complaint   Back pain    History is obtained from the patient and his niece    History of Present Illness   Hubert Tamayo is a 89 year old male who lives in his own home with his neice with paranoid schizophrenia, CAD (details not known), CKD, HTN, DM, DVT, COPD, dementia admitted on 9/15/2019 with acute fractures of the left L2 and L3 transverse processes after a fall out of bed. Patient cannot tell me details about why he is here. Niece states yesterday am he fell out of bed because he was having a nightmare about the war and bugs were crawling on him, so he jumped out of bed and fell. He had back pain and came to the ED. He was sent home. Later in the day she called EMS because he was having back pain. He declined to come to the hospital. This am his pain was worse. He was able to walk to the bathroom, but then had too much pain to ambulate. EMS was called. They could not get his stair lift to work so they brought him down in a chair. He denies chest pain, sob, abdo pain, n/v/d. No recent URI or urinary symptoms. No fever or chills. At rest without moving he has no pain. Pain increases with movement. It is in his left low back. While I am interviewing him he also spends much of the time complaining [facility]. Of note the ED note states patient c/o chest pain. Niece and patient explain it was chest pain and that the niece thought initially it was chest pain so gave him nitroglycerin. No current chest pain.      Review of Systems    The 10 point Review of Systems is negative other than noted in the HPI or here.     Past Medical History    I have reviewed this patient's medical history and updated it with pertinent information if needed.   Past Medical History:   Diagnosis Date     CAD (coronary artery disease)      Dementia      Diabetes mellitus type II      Essential hypertension      Osteoporosis       Paranoid schizophrenia      Pulmonary emphysema        Past Surgical History   I have reviewed this patient's surgical history and updated it with pertinent information if needed.  Past Surgical History:   Procedure Laterality Date     CATARACT REMOVAL Bilateral        Social History   I have reviewed this patient's social history and updated it with pertinent information if needed.  Social History     Tobacco Use     Smoking status: Not on file   Substance Use Topics     Alcohol use: Not on file     Drug use: Not on file       Family History   I have reviewed this patient's family history and updated it with pertinent information if needed.   Reviewed with patient and his niece. Neither could tell me family history    Prior to Admission Medications   Prior to Admission Medications   Prescriptions Last Dose Informant Patient Reported? Taking?   ALBUTEROL IN   Yes No   Budesonide-Formoterol Fumarate (SYMBICORT IN)   Yes No   Donepezil HCl (ARICEPT PO)   Yes No   Metoprolol Succinate (TOPROL XL PO)   Yes No   Mirtazapine (REMERON PO)   Yes No   OLANZapine (ZYPREXA PO)   Yes No   Omeprazole (PRILOSEC PO)   Yes No   Sertraline HCl (ZOLOFT PO)   Yes No   Sig: Take by mouth daily   Tiotropium Bromide Monohydrate (SPIRIVA HANDIHALER IN)   Yes No   WARFARIN SODIUM   Yes No   amLODIPine Besylate (NORVASC PO)   Yes No   haloperidol (HALDOL) 1 MG tablet   Yes No   Sig: Take 1 mg by mouth 4 times daily   tamsulosin (FLOMAX) 0.4 MG capsule   Yes No   Sig: Take 0.4 mg by mouth daily   traMADol (ULTRAM) 50 MG tablet   No No   Sig: Take 1 tablet (50 mg) by mouth every 6 hours as needed for pain      Facility-Administered Medications: None     Allergies   Allergies   Allergen Reactions     Crustaceans      Loxapine      Nifedipine      Shellfish Allergy      Sulfa Drugs      Sulfamethoxazole-Trimethoprim        Physical Exam   Vital Signs: Temp: 97.7  F (36.5  C) Temp src: Oral BP: 138/73 Pulse: 75 Heart Rate: 75 Resp: 16 SpO2: 91 %  O2 Device: None (Room air)    Weight: 0 lbs 0 oz    Constitutional: awake, alert, cooperative, no apparent distress, and appears stated age  Eyes: Lids and lashes normal, pupils equal, round and reactive to light, extra ocular muscles intact, sclera clear, conjunctiva normal  ENT: Normocephalic, without obvious abnormality, atraumatic, sinuses nontender on palpation, external ears without lesions, oral pharynx with moist mucous membranes, tonsils without erythema or exudates, gums normal and good dentition.  Respiratory: No increased work of breathing, good air exchange, clear to auscultation bilaterally, no crackles or wheezing  Cardiovascular: Normal apical impulse, regular rate and rhythm, normal S1 and S2, no S3 or S4, and no murmur noted  GI: No scars, normal bowel sounds, soft, non-distended, non-tender, no masses palpated, no hepatosplenomegally  Skin: no bruising or bleeding  Musculoskeletal: has low left sided back tenderness  Neurologic: can tell me he is in the hospital States he cannot remember the details as to why he is here    Data   Data reviewed today: I reviewed all medications, new labs and imaging results over the last 24 hours. I personally reviewed the EKG tracing showing NSR, no st/t changes.    Most Recent 3 CBC's:  Recent Labs   Lab Test 09/15/19  1359 09/14/19  0555 06/02/19  1605   WBC 9.9 9.1 12.3*   HGB 9.2* 8.7* 10.5*   MCV 86 87 88    199 189     Most Recent 3 BMP's:  Recent Labs   Lab Test 09/14/19  0555 06/02/19  1605    138   POTASSIUM 4.5 4.4   CHLORIDE 105 106   CO2 30 30   BUN 25 18   CR 1.51* 1.65*   ANIONGAP 3 2*   RAMON 9.2 9.1   * 145*     Most Recent 2 LFT's:No lab results found.  Most Recent 3 Troponin's:  Recent Labs   Lab Test 06/02/19  1605   TROPI <0.015     No results found for this or any previous visit (from the past 24 hour(s)).

## 2019-09-15 NOTE — ED NOTES
Bed: ED17  Expected date: 9/15/19  Expected time: 11:06 AM  Means of arrival: Ambulance  Comments:  Mala [phone] - 89 M, back pain

## 2019-09-15 NOTE — ED PROVIDER NOTES
History     Chief Complaint:  Back Pain      HPI   Hubert Tamayo is a 89 year old male with history of pulmonary embolism, paranoid schizophrenia, dementia, HTN, HLD, and arrhythmia on coumadin who presents to the emergency department today for evaluation of back pain. Per chart review, the patient was seen here in the ED following an unwitnessed fall yesterday morning around 0500. The patient fell out of bed after having a nightmare and injured his left sided back, pertinent imaging shown below. Here, the patient complains of the pain in his lower back. His niece states that he is unable to sit up because of the pain, ambulate, and that the patient cries out for pain. Patient did have chest pain as well but took 3 nitroglycerin which helped. He denies any chest pain here in the ED or any numbness in the feet.     Lumbar spine CT w/o contrast  1.  Acute fractures of the left L2 and L3 transverse processes.    Allergies:  Loxapine  Nifedipine  Shellfish Allergy  Sulfa Drugs     Medications:    Coumadin  Albuterol  Amlodipine  Symbicort IN  Haldol  Toprol-XL  Remeron  Zyprexa  Omeprazole  Zoloft  Flomax  Spiriva      Past Medical History:    CAD  Dementia  Diabetes mellitus type 2  Hypertension  Osteoporosis  Paranoid schizophrenia  Pulmonary emphysema  Hyperlipidemia      Past Surgical History:    Cataract removal      Family History:    No past pertinent family history.     Social History:  The patient was accompanied to the ED by his niece (he lives with his niece).  Smoking Status: Former Smoker  Smokeless Tobacco: Never Used  Marital Status:           Review of Systems   Cardiovascular: Negative for chest pain.   Musculoskeletal: Positive for back pain.   Neurological: Negative for numbness.   All other systems reviewed and are negative.    Physical Exam     Patient Vitals for the past 24 hrs:   BP Temp Temp src Pulse Heart Rate Resp SpO2   09/15/19 1119 138/73 97.7  F (36.5  C) Oral 75 75 16 91 %         Physical Exam  Constitutional: Vital signs reviewed as above.   Head: No external signs of trauma noted.  Eyes: Pupils are equal, round, and reactive to light.   Neck: No JVD noted  Cardiovascular: Normal rate, regular rhythm and normal heart sounds.  No murmur heard. Equal B/L peripheral pulses.  Pulmonary/Chest: Effort normal and breath sounds normal. No respiratory distress. Patient has no wheezes. Patient has no rales.   Gastrointestinal: Soft. There is no tenderness.   Musculoskeletal/Extremities: There is low back pain (L>R). No edema noted. Normal tone.  Neurological: Patient is alert. Knows name and birthday.   Skin: Skin is warm and dry. There is no diaphoresis noted.   Psychiatric: The patient appears calm.    Emergency Department Course     ECG:  ECG taken at 1128, ECG read at 1132  Normal sinus rhythm  Left axis deviation  Nonspecific T wave abormality  Abnormal ECG  No significant change compared to EKG dated 6/2/2019  Rate 72 bpm. TX interval 170 ms. QRS duration 76 ms. QT/QTc 414/453 ms. P-R-T axes 65 -52 -30.    Interventions:  1216 Roxicodone 5 mg PO    Emergency Department Course:    1113 Nursing notes and vitals reviewed.    1120 I performed an exam of the patient as documented above.     1128 EKG obtained as noted above.     1148 I spoke with Dr. Rivers of the Neurology service from [facility] regarding patient's presentation, findings, and plan of care.  If he notes that these fractures typically do not need bracing.     1310 Patient rechecked and updated.      1348 I spoke with Dr. Velarde of the Hospitalist service from [facility] regarding patient's presentation, findings, and plan of care.     I discussed the treatment plan with the patient. They expressed understanding of this plan and consented to admission. I discussed the patient with Dr. Velarde, who will admit the patient to a monitored bed for further evaluation and treatment.     Impression & Plan      Medical Decision  Making:  Hubert Tamayo is a 89 year old male who presents to the emergency department today for evaluation of back pain.  The patient had fallen and was seen here earlier and was able to be discharged with Ultram.  He states this is not working for his pain.  Neurosurgery stated that back bracing usually is not needed for these patients.  Given the patient's continued discomfort I wonder if an orthotics consult and discussion of bracing might be beneficial however.  Given his lack of good pain control in the ED he was then placed in observation for further monitoring and care.    Diagnosis:    ICD-10-CM    1. Closed fracture of transverse process of lumbar vertebra, initial encounter (H) S32.009A      Disposition:   The patient is admitted into the care of Dr. Velarde.     Scribe Disclosure:  Hang NEVES, am serving as a scribe at 11:17 AM on 9/15/2019 to document services personally performed by Milad Kaminski DO based on my observations and the provider's statements to me.     [facility] EMERGENCY DEPARTMENT       Milad Kaminski DO  09/15/19 1543

## 2019-09-15 NOTE — ED TRIAGE NOTES
Patient here from home by EMS for complaint of back pain. He was seen here yesterday for back pain as well. Per EMS report, he had upper back pain this morning and told his niece, who then gave him a total of 3 nitroglycerin tablets. Per EMS, his pain is in his lower back and he denies chest pain. They did an EKG, NSR. He has low back pain with movement on arrival.

## 2019-09-15 NOTE — PROGRESS NOTES
ROOM # [id number]    Living Situation (if not independent, order SW consult): home with niece (bernardo moved in 1 week ago)  Facility name: NA  : [facility] [phone]    Activity level at baseline: indep with cane and walker  Activity level on admit: Ax2/lift assist      Patient registered to observation; given Patient Bill of Rights; given the opportunity to ask questions about observation status and their plan of care.  Patient has been oriented to the observation room, bathroom and call light is in place.    Discussed discharge goals and expectations with patient/family.

## 2019-09-16 LAB
ANION GAP SERPL CALCULATED.3IONS-SCNC: 4 MMOL/L (ref 3–14)
BASOPHILS # BLD AUTO: 0 10E9/L (ref 0–0.2)
BASOPHILS NFR BLD AUTO: 0.3 %
BUN SERPL-MCNC: 25 MG/DL (ref 7–30)
CALCIUM SERPL-MCNC: 9.2 MG/DL (ref 8.5–10.1)
CHLORIDE SERPL-SCNC: 106 MMOL/L (ref 94–109)
CO2 SERPL-SCNC: 28 MMOL/L (ref 20–32)
CREAT SERPL-MCNC: 1.63 MG/DL (ref 0.66–1.25)
DIFFERENTIAL METHOD BLD: ABNORMAL
EOSINOPHIL # BLD AUTO: 0.2 10E9/L (ref 0–0.7)
EOSINOPHIL NFR BLD AUTO: 2.2 %
ERYTHROCYTE [DISTWIDTH] IN BLOOD BY AUTOMATED COUNT: 18.6 % (ref 10–15)
GFR SERPL CREATININE-BSD FRML MDRD: 36 ML/MIN/{1.73_M2}
GLUCOSE BLDC GLUCOMTR-MCNC: 122 MG/DL (ref 70–99)
GLUCOSE SERPL-MCNC: 91 MG/DL (ref 70–99)
HCT VFR BLD AUTO: 28.1 % (ref 40–53)
HGB BLD-MCNC: 8.4 G/DL (ref 13.3–17.7)
IMM GRANULOCYTES # BLD: 0 10E9/L (ref 0–0.4)
IMM GRANULOCYTES NFR BLD: 0.4 %
INR PPP: 2.32 (ref 0.86–1.14)
INTERPRETATION ECG - MUSE: NORMAL
LYMPHOCYTES # BLD AUTO: 2.1 10E9/L (ref 0.8–5.3)
LYMPHOCYTES NFR BLD AUTO: 18.9 %
MCH RBC QN AUTO: 25.8 PG (ref 26.5–33)
MCHC RBC AUTO-ENTMCNC: 29.9 G/DL (ref 31.5–36.5)
MCV RBC AUTO: 87 FL (ref 78–100)
MONOCYTES # BLD AUTO: 1.1 10E9/L (ref 0–1.3)
MONOCYTES NFR BLD AUTO: 9.8 %
NEUTROPHILS # BLD AUTO: 7.4 10E9/L (ref 1.6–8.3)
NEUTROPHILS NFR BLD AUTO: 68.4 %
NRBC # BLD AUTO: 0 10*3/UL
NRBC BLD AUTO-RTO: 0 /100
PLATELET # BLD AUTO: 193 10E9/L (ref 150–450)
POTASSIUM SERPL-SCNC: 4.4 MMOL/L (ref 3.4–5.3)
RBC # BLD AUTO: 3.25 10E12/L (ref 4.4–5.9)
SODIUM SERPL-SCNC: 138 MMOL/L (ref 133–144)
WBC # BLD AUTO: 10.9 10E9/L (ref 4–11)

## 2019-09-16 PROCEDURE — 00000146 ZZHCL STATISTIC GLUCOSE BY METER IP

## 2019-09-16 PROCEDURE — G0378 HOSPITAL OBSERVATION PER HR: HCPCS

## 2019-09-16 PROCEDURE — 25000128 H RX IP 250 OP 636: Performed by: HOSPITALIST

## 2019-09-16 PROCEDURE — 25000132 ZZH RX MED GY IP 250 OP 250 PS 637: Mod: GY | Performed by: PHYSICIAN ASSISTANT

## 2019-09-16 PROCEDURE — 85025 COMPLETE CBC W/AUTO DIFF WBC: CPT | Performed by: HOSPITALIST

## 2019-09-16 PROCEDURE — 25000132 ZZH RX MED GY IP 250 OP 250 PS 637: Mod: GY | Performed by: HOSPITALIST

## 2019-09-16 PROCEDURE — 36415 COLL VENOUS BLD VENIPUNCTURE: CPT | Performed by: HOSPITALIST

## 2019-09-16 PROCEDURE — 96375 TX/PRO/DX INJ NEW DRUG ADDON: CPT

## 2019-09-16 PROCEDURE — 80048 BASIC METABOLIC PNL TOTAL CA: CPT | Performed by: HOSPITALIST

## 2019-09-16 PROCEDURE — 85610 PROTHROMBIN TIME: CPT | Performed by: HOSPITALIST

## 2019-09-16 RX ORDER — LIDOCAINE 4 G/G
1 PATCH TOPICAL
Status: DISCONTINUED | OUTPATIENT
Start: 2019-09-17 | End: 2019-09-16

## 2019-09-16 RX ORDER — OLANZAPINE 10 MG/1
10 TABLET ORAL AT BEDTIME
Status: DISCONTINUED | OUTPATIENT
Start: 2019-09-16 | End: 2019-09-18 | Stop reason: HOSPADM

## 2019-09-16 RX ORDER — HYDROCODONE BITARTRATE AND ACETAMINOPHEN 5; 325 MG/1; MG/1
1 TABLET ORAL EVERY 4 HOURS PRN
Status: DISCONTINUED | OUTPATIENT
Start: 2019-09-16 | End: 2019-09-18 | Stop reason: HOSPADM

## 2019-09-16 RX ORDER — ACETAMINOPHEN 500 MG
1000 TABLET ORAL 3 TIMES DAILY
Status: DISCONTINUED | OUTPATIENT
Start: 2019-09-16 | End: 2019-09-16

## 2019-09-16 RX ORDER — HALOPERIDOL 1 MG/1
2 TABLET ORAL 2 TIMES DAILY
Status: DISCONTINUED | OUTPATIENT
Start: 2019-09-16 | End: 2019-09-18 | Stop reason: HOSPADM

## 2019-09-16 RX ORDER — WARFARIN SODIUM 3 MG/1
3 TABLET ORAL
Status: COMPLETED | OUTPATIENT
Start: 2019-09-16 | End: 2019-09-16

## 2019-09-16 RX ORDER — LIDOCAINE 4 G/G
1 PATCH TOPICAL
Status: DISCONTINUED | OUTPATIENT
Start: 2019-09-16 | End: 2019-09-18 | Stop reason: HOSPADM

## 2019-09-16 RX ORDER — OXYCODONE HYDROCHLORIDE 5 MG/1
5 TABLET ORAL EVERY 4 HOURS PRN
Status: DISCONTINUED | OUTPATIENT
Start: 2019-09-16 | End: 2019-09-16

## 2019-09-16 RX ADMIN — SODIUM CHLORIDE 1000 ML: 9 INJECTION, SOLUTION INTRAVENOUS at 13:27

## 2019-09-16 RX ADMIN — OLANZAPINE 10 MG: 10 TABLET, FILM COATED ORAL at 22:03

## 2019-09-16 RX ADMIN — ATORVASTATIN CALCIUM 80 MG: 40 TABLET, FILM COATED ORAL at 08:48

## 2019-09-16 RX ADMIN — HYDROCODONE BITARTRATE AND ACETAMINOPHEN 2 TABLET: 5; 325 TABLET ORAL at 12:44

## 2019-09-16 RX ADMIN — METOPROLOL TARTRATE 50 MG: 50 TABLET ORAL at 08:48

## 2019-09-16 RX ADMIN — ACETAMINOPHEN 1000 MG: 500 TABLET, FILM COATED ORAL at 14:37

## 2019-09-16 RX ADMIN — HALOPERIDOL 2 MG: 1 TABLET ORAL at 20:07

## 2019-09-16 RX ADMIN — SERTRALINE HYDROCHLORIDE 150 MG: 100 TABLET ORAL at 08:48

## 2019-09-16 RX ADMIN — DONEPEZIL HYDROCHLORIDE 5 MG: 5 TABLET ORAL at 08:48

## 2019-09-16 RX ADMIN — LIDOCAINE 1 PATCH: 560 PATCH PERCUTANEOUS; TOPICAL; TRANSDERMAL at 16:08

## 2019-09-16 RX ADMIN — ACETAMINOPHEN 650 MG: 325 TABLET, FILM COATED ORAL at 22:03

## 2019-09-16 RX ADMIN — HYDROCODONE BITARTRATE AND ACETAMINOPHEN 2 TABLET: 5; 325 TABLET ORAL at 04:00

## 2019-09-16 RX ADMIN — TAMSULOSIN HYDROCHLORIDE 0.4 MG: 0.4 CAPSULE ORAL at 08:48

## 2019-09-16 RX ADMIN — METOPROLOL TARTRATE 50 MG: 50 TABLET ORAL at 20:07

## 2019-09-16 RX ADMIN — ONDANSETRON HYDROCHLORIDE 4 MG: 2 INJECTION, SOLUTION INTRAMUSCULAR; INTRAVENOUS at 09:28

## 2019-09-16 RX ADMIN — CLOPIDOGREL BISULFATE 75 MG: 75 TABLET ORAL at 08:47

## 2019-09-16 RX ADMIN — HYDROCODONE BITARTRATE AND ACETAMINOPHEN 1 TABLET: 5; 325 TABLET ORAL at 22:03

## 2019-09-16 RX ADMIN — WARFARIN SODIUM 3 MG: 3 TABLET ORAL at 18:50

## 2019-09-16 RX ADMIN — HALOPERIDOL 2.5 MG: 1 TABLET ORAL at 08:47

## 2019-09-16 NOTE — PROGRESS NOTES
Patient was able to sit and stand for the corset fitting.  I used an extension panel with the orthosis.  Simón TIJERINA.

## 2019-09-16 NOTE — PLAN OF CARE
"PT: Pt and niece declining therapy despite education and encouragement. Pt and niece reporting too painful to even \"move his arms\" at this point. Will re-attempt as able, MD updated on refusal.   "

## 2019-09-16 NOTE — PLAN OF CARE
PRIMARY DIAGNOSIS: ACUTE PAIN DUE FRACTURE L2/L3  OUTPATIENT/OBSERVATION GOALS TO BE MET BEFORE DISCHARGE:  1. Pain Status: Improved-controlled with oral pain medications.    2. Return to near baseline physical activity: Yes    3. Cleared for discharge by consultants (if involved): No    Discharge Planner Nurse   Safe discharge environment identified: Yes  Barriers to discharge: No       Entered by: Naheed Delgadillo 09/16/2019   VSS stable and on room air. Patient was up SBA to the bathroom this morning. Patient alert and oriented x 4. Mod carb diet. Orthotics brought soft brace this morning and gave patient instruction on how to wear and when. IV is saline locked. Patient became nauseous after eating breakfast IV Zofran given, relief shown after antiemetic. Will continue to monitor and provide cares.   Please review provider order for any additional goals. Nurse to notify provider when observation goals have been met and patient is ready for discharge.

## 2019-09-16 NOTE — PROGRESS NOTES
[facility]    Medicine Progress Note - Hospitalist Service       Date of Admission:  9/15/2019  Assessment & Plan      Hubert Tamayo is a 89 year old male with paranoid schizophrenia, CAD (details not known), CKD, HTN, DM, DVT, COPD, dementia admitted on 9/15/2019 with acute fractures of the left L2 and L3 transverse processes after a fall out of bed.     Back pain due to acute fractures of the left L2 and L3 transverse processes    - patient was in ED Saturday after a fall out of bed due to a nightmare    - pain was uncontrolled and was admitted    - ED had already spoke to Dr. Rivers (Neurosurg), who felt a brace might help    - pain control: Tylenol, Norco, IV dilaudid: patient is sedated today. discontinue dilaudid    - will have orthotics assess for a soft brace: has seen patient and brace has been given    - PT eval: this am walked independently. Later in am was unable to ambulate and had to be lowered to floor by staff (was also sedated)    Sedation    - patient today seems sedated    - according to home caregiver patient has had issues with sedation in the past and his meds were lowered. He then had issues with aggressive behaviorm, so his meds have been titrated back up    - I will decrease his pm olanzapine and his scheduled haldol a little (20mg>>15mg and 2.5mg>>2mg)    Vomiting    - x 1 after breakfast    - will try lunch    - IVF until eating    Paranoid schizophrenia    - cont home Haldol    - will have PRN Haldol available tonight as well as PRN zyprexa and risperidone     CAD    - we do not know details ([facility])    - cont Plavix    - patient and niece do not know     COPD    - stable    - holding home symbicort    - prn nebs     DM    - diabetic diet    - not on meds    - TID BS testing, no ISS     HTN   - pharmacy currently reconciling meds     DVT    - cont coumadin     CKD    - at baseline     Dementia    - cont aricept    Home care giver was in  room and updated. Niece updated in person. Called son who is POA: left a message. Number in chart is wrong. Should be: [phone]    Diet: Moderate Consistent CHO Diet    DVT Prophylaxis: Warfarin  Lebron Catheter: not present  Code Status: Full Code      Disposition Plan   Expected discharge: 1-2 days, recommended to unclear once able to ambulate.  Entered: Todd Velarde MD 09/16/2019, 11:26 AM       The patient's care was discussed with the Bedside Nurse, Patient and Patient's Family.    Todd Velarde MD  Hospitalist Service  [facility]    ______________________________________________________________________    Interval History   Patient in bed. Sleepy today. Answers questions and then falls asleep. Denies chest pain, sob. States has back pain and pain in his side. Ate breakfast today but vomited afterwards    Data reviewed today: I reviewed all medications, new labs and imaging results over the last 24 hours. I personally reviewed no images or EKG's today.    Physical Exam   Vital Signs: Temp: 97.6  F (36.4  C) Temp src: Oral BP: 124/67   Heart Rate: 57 Resp: 18 SpO2: 92 % O2 Device: None (Room air)    Weight: 151 lbs 1.6 oz  Constitutional: sleepy. arouseable  Eyes: Lids and lashes normal, pupils equal, round and reactive to light, extra ocular muscles intact, sclera clear, conjunctiva normal  ENT: Normocephalic, without obvious abnormality, atraumatic, sinuses nontender on palpation, external ears without lesions, oral pharynx with moist mucous membranes, tonsils without erythema or exudates, gums normal and good dentition.  Respiratory: No increased work of breathing, good air exchange, clear to auscultation bilaterally, no crackles or wheezing  Cardiovascular: Normal apical impulse, regular rate and rhythm, normal S1 and S2, no S3 or S4, and no murmur noted  GI: No scars, normal bowel sounds, soft, non-distended, non-tender, no masses palpated, no hepatosplenomegally  Skin: no bruising  or bleeding  Musculoskeletal: no lower extremity pitting edema present    Data   Recent Labs   Lab 09/16/19  0623 09/15/19  1623 09/15/19  1603 09/15/19  1359 09/14/19  0555   WBC 10.9  --   --  9.9 9.1   HGB 8.4*  --   --  9.2* 8.7*   MCV 87  --   --  86 87     --   --  171 199   INR 2.32*  --  2.81*  --  4.91*    138  --   --  138   POTASSIUM 4.4 4.7  --   --  4.5   CHLORIDE 106 106  --   --  105   CO2 28 29  --   --  30   BUN 25 22  --   --  25   CR 1.63* 1.55*  --   --  1.51*   ANIONGAP 4 3  --   --  3   RAMON 9.2 9.1  --   --  9.2   GLC 91 90  --   --  115*     No results found for this or any previous visit (from the past 24 hour(s)).

## 2019-09-16 NOTE — PLAN OF CARE
PRIMARY DIAGNOSIS: ACUTE PAIN DUE FRACTURE L2/L3  OUTPATIENT/OBSERVATION GOALS TO BE MET BEFORE DISCHARGE:  1. Pain Status: Improved-controlled with oral pain medications.     2. Return to near baseline physical activity: Yes     3. Cleared for discharge by consultants (if involved): No     Discharge Planner Nurse   Safe discharge environment identified: Yes  Barriers to discharge: No       Entered by: Naheed Delgadillo 09/16/2019     VSS stable and on room air. Patient was up SBA to the bathroom this morning. Patient alert and oriented x 4. Mod carb diet. Orthotics brought soft brace this morning and gave patient instruction on how to wear and when. Patient will have some medication adjustments. Will continue to monitor and provide cares.     Please review provider order for any additional goals. Nurse to notify provider when observation goals have been met and patient is ready for discharge.

## 2019-09-16 NOTE — PLAN OF CARE
"PRIMARY DIAGNOSIS: ACUTE PAIN, Left Rib Fracture, L2 L3 fracture   OUTPATIENT/OBSERVATION GOALS TO BE MET BEFORE DISCHARGE:  1. Pain Status: Improved-controlled with oral pain medications.    2. Return to near baseline physical activity: No    3. Cleared for discharge by consultants (if involved): No    Blood pressure 106/55, pulse 75, temperature 97.9  F (36.6  C), temperature source Oral, resp. rate 18, height 1.727 m (5' 8\"), weight 68.5 kg (151 lb 1.6 oz), SpO2 90 %.    VSS.  LS clear. Patient c/o of low left sided back pain, improved after Norco, 2 tablets given x two.  Patient tolerating  Modified Carbohydrate evening meal tray.  Repeat INR 2.81 at 16:23 PM.  Scheduled Coumadin given.   FSG check per order at 21: 30 pm  97.  Patient attempting to use urinal, however patient incontinent.  Plan:  Pain control, PT to see, continue to provide supportive cares.         Discharge Planner Nurse   Safe discharge environment identified: No, PT to see and make appropriate recommendation  Barriers to discharge: Yes, unless medically cleared       Entered by: Karen M. Lesch 09/15/2019 9:28 PM     Please review provider order for any additional goals.   Nurse to notify provider when observation goals have been met and patient is ready for discharge.  "

## 2019-09-16 NOTE — PLAN OF CARE
Went in to walk patient in the rice. Got patient up to the bedside patient became very weak. Gait belt was on patient took a few steps. Patient backed up against bedside and became very weak. Nurse and NST both were holding gait belt and patient up, patient was lowered to the ground as we could no longer support his weight. Patient was placed in bed with ceiling lift and sling.

## 2019-09-16 NOTE — PLAN OF CARE
PRIMARY DIAGNOSIS: L2 & L3 Fx  OUTPATIENT/OBSERVATION GOALS TO BE MET BEFORE DISCHARGE:  1. Pain Status: Improved-controlled with oral pain medications.    2. Return to near baseline physical activity: No    3. Cleared for discharge by consultants (if involved): No    Discharge Planner Nurse   Safe discharge environment identified: No  Barriers to discharge: Yes       Entered by: Rafi Landry 09/16/2019 5:01 AM     Please review provider order for any additional goals.   Nurse to notify provider when observation goals have been met and patient is ready for discharge.  Temp: 97.5  F (36.4  C) Temp src: Oral BP: (!) 144/71 Pulse: 75 Heart Rate: 62 Resp: 18 SpO2: 90 % O2 Device: None (Room air)    Pt A&Ox4. Pt reports pain at a level of 8/10, 2 tabs narco given per request. When nurse returned to give pain medications the patient was sleeping. Pt used bedside urinal with assistance, however it appears the Pt had a episode of incontinence. Pt denies N/V/D, numbness or tingling. Plan: pain management, PT consult in AM, discharge in AM pending PT assessment.

## 2019-09-16 NOTE — PLAN OF CARE
PRIMARY DIAGNOSIS: L2 & L3 Fx  OUTPATIENT/OBSERVATION GOALS TO BE MET BEFORE DISCHARGE:  1. Pain Status: Improved-controlled with oral pain medications.    2. Return to near baseline physical activity: No    3. Cleared for discharge by consultants (if involved): No    Discharge Planner Nurse   Safe discharge environment identified: No  Barriers to discharge: Yes       Entered by: Rafi Landry 09/16/2019 4:47 AM     Please review provider order for any additional goals.   Nurse to notify provider when observation goals have been met and patient is ready for discharge.  Temp: 97.5  F (36.4  C) Temp src: Oral BP: (!) 144/71 Pulse: 75 Heart Rate: 62 Resp: 18 SpO2: 90 % O2 Device: None (Room air)    Pt A&Ox4. Pt reports pain at a level of 8/10, 2 tabs narco given per request. Pt used bedside urinal with assistance, however it appears the Pt had a episode of incontinence. Pt denies N/V/D, numbness or tingling. Plan: pain management, PT consult in AM, discharge in AM pending PT assessment

## 2019-09-16 NOTE — PHARMACY-ADMISSION MEDICATION HISTORY
Admission medication history interview status for this patient is complete. See [facility] admission navigator for allergy information, prior to admission medications and immunization status.     Medication history interview source(s):Patient  Medication history resources (including written lists, pill bottles, clinic record): [facility]  [facility]    Changes made to PTA medication list:  Added: cholecalciferol, clopidogrel, Vanicream, hydrocortisone 1% cream, Nasonex spray, nystatin cream, vaseline for dry lips,   Deleted: amlodipine, mirtazapine, omeprazole, tramadol  Changed: every remaining entry clarified or changed for formulation / dose / regimen (Symbicort, donepezil, haloperidol, metoprolol, olanzapine, sertraline, Spiriva, warfarin)    Actions taken by pharmacist (provider contacted, etc):None     Additional medication history information:   - Med list deemed up to date. However, it was not possible after multiple efforts to determine last doses of any meds. Pt w/ dementia, and family (niece Anny) did not have information regarding what particular meds pt takes in AM vs PM.   - It could reasonably be presumed last dose warfarin was yesterday, 9/14 pm    Medication reconciliation/reorder completed by provider prior to medication history? Yes    Do you take OTC medications (eg tylenol, ibuprofen, fish oil, eye/ear drops, etc)? N(Y/N)    For patients on insulin therapy: N (Y/N)    Prior to Admission medications    Medication Sig Last Dose Taking? Auth Provider   albuterol (PROAIR HFA/PROVENTIL HFA/VENTOLIN HFA) 108 (90 Base) MCG/ACT inhaler Inhale 2 puffs into the lungs every 4 hours as needed for shortness of breath / dyspnea or wheezing Unknown at Unknown time  Unknown, Entered By History   artificial saliva (BIOTENE DRY MOUTHWASH) LIQD liquid Swish and spit 5-10 mLs in mouth 4 times daily as needed for dry mouth Unknown at Unknown time  Unknown, Entered By History   atorvastatin (LIPITOR) 80  MG tablet Take 80 mg by mouth daily Unknown at Unknown time  Unknown, Entered By History   budesonide-formoterol (SYMBICORT) 160-4.5 MCG/ACT Inhaler Inhale 2 puffs into the lungs 2 times daily Unknown at Unknown time  Unknown, Entered By History   cholecalciferol 1000 units TABS Take 1,000 Units by mouth daily Unknown at Unknown time  Unknown, Entered By History   clopidogrel (PLAVIX) 75 MG tablet Take 75 mg by mouth daily Unknown at Unknown time  Unknown, Entered By History   Donepezil HCl (ARICEPT PO) Take 5 mg by mouth daily  Unknown at Unknown time  Reported, Patient   emollient (VANICREAM) external cream Apply topically daily Apply to dry skin Unknown at Unknown time  Unknown, Entered By History   haloperidol (HALDOL) 2.5 mg half-tab Take 2.5 mg by mouth 2 times daily Unknown at Unknown time  Unknown, Entered By History   hydrocortisone (CORTAID) 1 % external cream Apply topically 2 times daily Apply thin layer to itchy scaling on left inner elbow and nose Unknown at Unknown time  Unknown, Entered By History   metoprolol tartrate (LOPRESSOR) 50 MG tablet Take 50 mg by mouth 2 times daily Unknown at Unknown time  Unknown, Entered By History   mometasone (NASONEX) 50 MCG/ACT nasal spray Spray 2 sprays into both nostrils daily as needed (allergy symptoms / runny nose) Unknown at Unknown time  Unknown, Entered By History   nitroGLYcerin (NITROSTAT) 0.4 MG sublingual tablet Place 0.4 mg under the tongue every 5 minutes as needed for chest pain For chest pain place 1 tablet under the tongue every 5 minutes for 3 doses. If symptoms persist 5 minutes after 1st dose call 911. Unknown at Unknown time  Unknown, Entered By History   nystatin (MYCOSTATIN) 561924 UNIT/GM external cream Apply topically 2 times daily as needed (Intertrigo) Apply thin layer to groin folds Unknown at Unknown time  Unknown, Entered By History   OLANZapine (ZYPREXA PO) Take 20 mg by mouth At Bedtime  Unknown at Unknown time  Reported, Patient    Petrolatum OINT Externally apply topically 4 times daily as needed (dry lips) Unknown at Unknown time  Unknown, Entered By History   Sertraline HCl (ZOLOFT PO) Take 150 mg by mouth daily  Unknown at Unknown time  Reported, Patient   tamsulosin (FLOMAX) 0.4 MG capsule Take 0.4 mg by mouth daily Unknown at Unknown time  Reported, Patient   Tiotropium Bromide Monohydrate (SPIRIVA HANDIHALER IN) Inhale 1 capsule into the lungs daily  Unknown at Unknown time  Reported, Patient   WARFARIN SODIUM 3 mg T,Thu and 4 mg all other days, or as directed by anticoagulation clinic Unknown at Unknown time  Reported, Patient

## 2019-09-17 ENCOUNTER — APPOINTMENT (OUTPATIENT)
Dept: PHYSICAL THERAPY | Facility: CLINIC | Age: 84
DRG: 552 | End: 2019-09-17
Attending: HOSPITALIST
Payer: MEDICARE

## 2019-09-17 PROBLEM — S32.009A LUMBAR TRANSVERSE PROCESS FRACTURE (H): Status: ACTIVE | Noted: 2019-09-17

## 2019-09-17 LAB
GLUCOSE BLDC GLUCOMTR-MCNC: 87 MG/DL (ref 70–99)
INR PPP: 2.05 (ref 0.86–1.14)

## 2019-09-17 PROCEDURE — G0378 HOSPITAL OBSERVATION PER HR: HCPCS

## 2019-09-17 PROCEDURE — 12000011 ZZH R&B MS OVERFLOW

## 2019-09-17 PROCEDURE — 40000274 ZZH STATISTIC RCP CONSULT EA 30 MIN

## 2019-09-17 PROCEDURE — 36415 COLL VENOUS BLD VENIPUNCTURE: CPT | Performed by: HOSPITALIST

## 2019-09-17 PROCEDURE — 97161 PT EVAL LOW COMPLEX 20 MIN: CPT | Mod: GP | Performed by: PHYSICAL THERAPIST

## 2019-09-17 PROCEDURE — 00000146 ZZHCL STATISTIC GLUCOSE BY METER IP

## 2019-09-17 PROCEDURE — 99232 SBSQ HOSP IP/OBS MODERATE 35: CPT | Performed by: HOSPITALIST

## 2019-09-17 PROCEDURE — 25000132 ZZH RX MED GY IP 250 OP 250 PS 637: Mod: GY | Performed by: PHYSICIAN ASSISTANT

## 2019-09-17 PROCEDURE — 85610 PROTHROMBIN TIME: CPT | Performed by: HOSPITALIST

## 2019-09-17 PROCEDURE — 25000132 ZZH RX MED GY IP 250 OP 250 PS 637: Mod: GY | Performed by: HOSPITALIST

## 2019-09-17 PROCEDURE — 99207 ZZC CDG-CODE CATEGORY CHANGED: CPT | Performed by: HOSPITALIST

## 2019-09-17 RX ORDER — IPRATROPIUM BROMIDE AND ALBUTEROL SULFATE 2.5; .5 MG/3ML; MG/3ML
3 SOLUTION RESPIRATORY (INHALATION)
Status: DISCONTINUED | OUTPATIENT
Start: 2019-09-17 | End: 2019-09-17

## 2019-09-17 RX ORDER — WARFARIN SODIUM 2 MG/1
4 TABLET ORAL
Status: COMPLETED | OUTPATIENT
Start: 2019-09-17 | End: 2019-09-17

## 2019-09-17 RX ORDER — TIOTROPIUM BROMIDE 18 UG/1
18 CAPSULE ORAL; RESPIRATORY (INHALATION) DAILY
Status: DISCONTINUED | OUTPATIENT
Start: 2019-09-17 | End: 2019-09-17

## 2019-09-17 RX ORDER — BUDESONIDE AND FORMOTEROL FUMARATE DIHYDRATE 160; 4.5 UG/1; UG/1
2 AEROSOL RESPIRATORY (INHALATION) 2 TIMES DAILY
Status: DISCONTINUED | OUTPATIENT
Start: 2019-09-17 | End: 2019-09-18 | Stop reason: HOSPADM

## 2019-09-17 RX ORDER — IPRATROPIUM BROMIDE AND ALBUTEROL SULFATE 2.5; .5 MG/3ML; MG/3ML
3 SOLUTION RESPIRATORY (INHALATION) EVERY 4 HOURS PRN
Status: DISCONTINUED | OUTPATIENT
Start: 2019-09-17 | End: 2019-09-18 | Stop reason: HOSPADM

## 2019-09-17 RX ADMIN — HYDROCODONE BITARTRATE AND ACETAMINOPHEN 1 TABLET: 5; 325 TABLET ORAL at 15:44

## 2019-09-17 RX ADMIN — SERTRALINE HYDROCHLORIDE 150 MG: 100 TABLET ORAL at 09:59

## 2019-09-17 RX ADMIN — WARFARIN SODIUM 4 MG: 2 TABLET ORAL at 19:19

## 2019-09-17 RX ADMIN — HYDROCODONE BITARTRATE AND ACETAMINOPHEN 1 TABLET: 5; 325 TABLET ORAL at 20:24

## 2019-09-17 RX ADMIN — DONEPEZIL HYDROCHLORIDE 5 MG: 5 TABLET ORAL at 09:59

## 2019-09-17 RX ADMIN — OLANZAPINE 10 MG: 10 TABLET, FILM COATED ORAL at 22:12

## 2019-09-17 RX ADMIN — ATORVASTATIN CALCIUM 80 MG: 40 TABLET, FILM COATED ORAL at 09:58

## 2019-09-17 RX ADMIN — CLOPIDOGREL BISULFATE 75 MG: 75 TABLET ORAL at 09:58

## 2019-09-17 RX ADMIN — TAMSULOSIN HYDROCHLORIDE 0.4 MG: 0.4 CAPSULE ORAL at 09:59

## 2019-09-17 RX ADMIN — HALOPERIDOL 2 MG: 1 TABLET ORAL at 09:59

## 2019-09-17 RX ADMIN — Medication 12.5 MG: at 20:23

## 2019-09-17 RX ADMIN — HALOPERIDOL 2 MG: 1 TABLET ORAL at 20:24

## 2019-09-17 NOTE — PLAN OF CARE
PRIMARY DIAGNOSIS: ACUTE PAIN DUE FRACTURE L2/L3  OUTPATIENT/OBSERVATION GOALS TO BE MET BEFORE DISCHARGE:  1. Pain Status: Improved-controlled with oral pain medications.     2. Return to near baseline physical activity: Yes     3. Cleared for discharge by consultants (if involved): No     Discharge Planner Nurse   Safe discharge environment identified: Yes  Barriers to discharge: No     VSS. Assist x1 with walker. Patient has been up twice to the bathroom with assistance of two and walker and gait belt, also wearing soft brace while up. Patient is up in the recliner with chair pad alarm on. Applied cream to patients arms, legs and hands and feet for dry and scaly skin. Held metoprolol due to low heart rate and soft BP this morning. Patient has been alert and pleasant all morning. Will continue to monitor and provide cares.      Please review provider order for any additional goals. Nurse to notify provider when observation goals have been met and patient is ready for discharge.

## 2019-09-17 NOTE — PLAN OF CARE
Family has called and visited today multiple times referring to a when patient was lowered to the ground. It has been explained numerous times by 3 different nurses that he did not fall. He was gently lowered to the ground when he was weak.

## 2019-09-17 NOTE — PROGRESS NOTES
SW following for discharge planning. Voicemail left for Stephanie Bowles, co-director for [facility], [phone], to verify service connection and benefits for SNF. PT has evaluated pt and recommended TCU. Awaiting return call. [facility] contracted SNF list available to provide to pt/family. SW to continue following.     1600: Voicemail left for [facility] to discuss discharge recommendations and facility preferences for TCU that [facility]. Awaiting return call.

## 2019-09-17 NOTE — PROGRESS NOTES
09/17/19 1135   Quick Adds   Type of Visit Initial PT Evaluation   Living Environment   Lives With other relative(s)   Living Arrangements house   Home Accessibility stairs to enter home;stairs within home   Number of Stairs, Main Entrance 2   Number of Stairs, Within Home, Primary   (full flight)   Transportation Anticipated family or friend will provide   Living Environment Comment Pt reports completing stairs at baseline, but pt is not accurate historian. Unsure if he completes/or how.    Self-Care   Usual Activity Tolerance moderate   Current Activity Tolerance fair   Regular Exercise No   Equipment Currently Used at Home walker, rolling   Activity/Exercise/Self-Care Comment also owns a cane   Functional Level Prior   Ambulation 1-->assistive equipment   Transferring 1-->assistive equipment   Fall history within last six months yes   Number of times patient has fallen within last six months 2   Which of the above functional risks had a recent onset or change? fall history;ambulation;transferring;toileting;bathing;dressing   General Information   Onset of Illness/Injury or Date of Surgery - Date 09/15/19   Referring Physician Todd Velarde MD   Patient/Family Goals Statement Discharge home   Pertinent History of Current Problem (include personal factors and/or comorbidities that impact the POC) Pt admitted 2/2 a fall resulting in L transverse spinous fractures.    Precautions/Limitations fall precautions;spinal precautions   Weight-Bearing Status - LLE full weight-bearing   Weight-Bearing Status - RLE full weight-bearing   General Observations Pt in bedside recliner, agreeable to very minimal mobility   Cognitive Status Examination   Orientation person;place   Level of Consciousness alert   Follows Commands and Answers Questions 75% of the time   Personal Safety and Judgment at risk behaviors demonstrated   Memory impaired   Pain Assessment   Patient Currently in Pain Yes, see Vital Sign flowsheet  "  Integumentary/Edema   Integumentary/Edema no deficits were identifed   Posture    Posture Forward head position;Protracted shoulders   Range of Motion (ROM)   ROM Comment WFL   Strength   Strength Comments Decreased functional strength noted by pt requiring assist for basic transfers   Bed Mobility   Bed Mobility Comments Not assessed as session begins/ends in chair   Transfer Skills   Transfer Comments sit<>stand with Viky   Gait   Gait Comments Viky with FWW   Balance   Balance Comments Requires B UE support for dynamic mobility, somewhat unsteady even with support   Coordination   Coordination no deficits were identified   Muscle Tone   Muscle Tone no deficits were identified   General Therapy Interventions   Planned Therapy Interventions bed mobility training;gait training;ROM;strengthening;stretching;transfer training;home program guidelines;progressive activity/exercise   Clinical Impression   Criteria for Skilled Therapeutic Intervention yes, treatment indicated   PT Diagnosis Impaired functional mobility   Influenced by the following impairments Pain, weakness, impaired balance, cognition   Functional limitations due to impairments Difficulty with bed mobility, transfers, ambulation, stairs   Clinical Presentation Stable/Uncomplicated   Clinical Presentation Rationale medically stable   Clinical Decision Making (Complexity) Low complexity   Therapy Frequency 5x/week   Predicted Duration of Therapy Intervention (days/wks) 5 days   Anticipated Discharge Disposition Transitional Care Facility   Risk & Benefits of therapy have been explained Yes   Patient, Family & other staff in agreement with plan of care Yes   [facility] [employer] TM \"6 Clicks\"   2016, Trustees of [facility], under license to [employer].  All rights reserved.   6 Clicks Short Forms Basic Mobility Inpatient Short Form   [facility] [employer]  \"6 Clicks\" V.2 Basic Mobility Inpatient Short Form   1. Turning from your back to " your side while in a flat bed without using bedrails? 2 - A Lot   2. Moving from lying on your back to sitting on the side of a flat bed without using bedrails? 2 - A Lot   3. Moving to and from a bed to a chair (including a wheelchair)? 3 - A Little   4. Standing up from a chair using your arms (e.g., wheelchair, or bedside chair)? 3 - A Little   5. To walk in hospital room? 3 - A Little   6. Climbing 3-5 steps with a railing? 2 - A Lot   Basic Mobility Raw Score (Score out of 24.Lower scores equate to lower levels of function) 15   Total Evaluation Time   Total Evaluation Time (Minutes) 8

## 2019-09-17 NOTE — PLAN OF CARE
PRIMARY DIAGNOSIS: ACUTE PAIN DUE FRACTURE L2/L3  OUTPATIENT/OBSERVATION GOALS TO BE MET BEFORE DISCHARGE:  1. Pain Status: Improved-controlled with oral pain medications.     2. Return to near baseline physical activity: Yes     3. Cleared for discharge by consultants (if involved): No     Discharge Planner Nurse   Safe discharge environment identified: Yes  Barriers to discharge: No       Entered by: Naheed Delgadillo 09/16/2019      VSS stable and on room air. Patient was up SBA to the bathroom this morning. Patient alert and oriented x 4. Mod carb diet. Patient needed help eating supper. IVF running at 100/hr. Patient denied physical therapy earlier today. Will continue to monitor and provide cares.     Please review provider order for any additional goals. Nurse to notify provider when observation goals have been met and patient is ready for discharge.

## 2019-09-17 NOTE — PLAN OF CARE
PRIMARY DIAGNOSIS: ACUTE PAIN DUE FRACTURE L2/L3  OUTPATIENT/OBSERVATION GOALS TO BE MET BEFORE DISCHARGE:  1. Pain Status: Improved-controlled with oral pain medications.     2. Return to near baseline physical activity: Yes     3. Cleared for discharge by consultants (if involved): No     Discharge Planner Nurse   Safe discharge environment identified: Yes  Barriers to discharge: No      VSS. Assist x1 with walker. Denies pain at rest but worse with movement. Tolerating diet, denies nausea at this time. IVF infusing. Family requesting for pt to received nebs and ensure drinks BID. PT will reassess in AM. Will continue to monitor.        Please review provider order for any additional goals. Nurse to notify provider when observation goals have been met and patient is ready for discharge.

## 2019-09-17 NOTE — PLAN OF CARE
"PT: Orders received. PT evaluation completed. Pt reports living in a house. Pt ambulates with either a walker or cane at baseline (per his report). Pt denies falls at home, but admitted 2/2 a fall and had an assisted fall during this admission. Pt receives services at home (RN, PCA, etc). Pt somewhat confused during subjective history, unsure of accuracy. Ex: When this writer asks \"do you live in a house, condo, or apartment?\" the pt replies \"I live here\". Quickly reorients to place with assist.     Discharge Planner PT   Patient plan for discharge: Home  Current status: Pt initially declining PT at this time as he is \"resting\". Pt does note he wants to reposition in chair, agreeable to minimal session in order to reposition in recliner. Pt completes sit<>stand with Viky and cues for technique. Pt ambulates 10' with use of FWW and close CGA/Viky with cues for walker management. Pt with pain while turning around, cued to reduce twisting while turning to reduce pain. Pt demonstrates impaired balance with all dynamic mobility. Pt returns to sitting in bedside chair at end of session, all needs in reach. Alarm on. Pt refusing further mobility at this time.   Barriers to return to prior living situation: Requires Ax1-2 for all basic mobility skills, high falls risk/impaired balance, cognition, lacks insight into own deficits  Recommendations for discharge: TCU  Rationale for recommendations: Pt is not currently at baseline for mobility, and is unsafe to discharge home. With continued PT in the TCU setting pt is likely to improve safety and indep with mobility, therefore reducing his likelihood of future falls and hospitalizations.         Entered by: Ximena Allen 09/17/2019 11:18 AM       "

## 2019-09-17 NOTE — PROGRESS NOTES
Hospitalist Cross Cover    Asked by my colleague Dr. Velarde to change this patient to inpatient status. He may remain on the current unit at this time.    Katalina Cash PA-C  Hospitalist [facility]  Pager: [phone]

## 2019-09-17 NOTE — PLAN OF CARE
PRIMARY DIAGNOSIS: ACUTE PAIN DUE FRACTURE L2/L3  OUTPATIENT/OBSERVATION GOALS TO BE MET BEFORE DISCHARGE:  1. Pain Status: Improved-controlled with oral pain medications.     2. Return to near baseline physical activity: Yes     3. Cleared for discharge by consultants (if involved): No     Discharge Planner Nurse   Safe discharge environment identified: Yes  Barriers to discharge: No      VSS. Assist x1 with walker. PRN tylenol and norco x1 given for pain. Tolerating diet, denies nausea at this time. IVF infusing. PT will reassess in AM. Will continue to monitor.        Please review provider order for any additional goals. Nurse to notify provider when observation goals have been met and patient is ready for discharge.

## 2019-09-17 NOTE — PROGRESS NOTES
"[facility] RCAT     Date: 9-  Admission Dx:fell  Pulmonary History copd  Home Nebulizer/MDI Use: Duoneb prn  Home Oxygen: n/a  Acuity Level (RCAT flow sheet): 4  Aerosol Therapy initiated: Duoneb Q4 prn      Pulmonary Hygiene initiated: encourage deep breathing and coughing    Volume Expansion initiated: incentive spirometry      Current Oxygen Requirements:Room Air  Current SpO2: cdob=834%    Re-evaluation date: 9/20/19    Patient Education:importance of deep breathing and coughing      See \"RT Assessments\" flow sheet for patient assessment scoring and Acuity Level Details.           "

## 2019-09-17 NOTE — PLAN OF CARE
PRIMARY DIAGNOSIS: ACUTE PAIN DUE FRACTURE L2/L3  OUTPATIENT/OBSERVATION GOALS TO BE MET BEFORE DISCHARGE:  1. Pain Status: Improved-controlled with oral pain medications.     2. Return to near baseline physical activity: Yes     3. Cleared for discharge by consultants (if involved): No     Discharge Planner Nurse   Safe discharge environment identified: Yes  Barriers to discharge: No      VSS. Assist x1 with walker. PRN tylenol given for back pain. Tolerating diet, denies nausea at this time. IVF infusing. PT will reassess in AM. Will continue to monitor.        Please review provider order for any additional goals. Nurse to notify provider when observation goals have been met and patient is ready for discharge.

## 2019-09-17 NOTE — PROGRESS NOTES
[facility]    Medicine Progress Note - Hospitalist Service       Date of Admission:  9/15/2019  Assessment & Plan      Hubert Tamayo is a 89 year old male with paranoid schizophrenia, CAD (details not known), CKD, HTN, DM, DVT, COPD, dementia admitted on 9/15/2019 with acute fractures of the left L2 and L3 transverse processes after a fall out of bed.     Back pain due to acute fractures of the left L2 and L3 transverse processes    - patient was in ED Saturday after a fall out of bed due to a nightmare    - pain was uncontrolled and was admitted    - ED had already spoke to Dr. Rivers (Neurosurg), who felt a brace might help    - pain control: Tylenol, Norco:  patient had no pain this am and no pain with ambulation. Had some sharp pain with quick turns that would resolve quickly   - Has soft brace     - PT eval: TCU    Sedation    - patient yesterday seemed sedated    - according to home caregiver patient has had issues with sedation in the past and his meds were lowered. He then had issues with aggressive behavior, so his meds have been titrated back up    - I decreased his pm olanzapine from 20mg to 10mg and his scheduled haldol from 2.5mg to 2mg    Vomiting    - x 1 after breakfast yesterday    -has been tolerating PO    - likely was due to taking pills without food (son states this happens)    Paranoid schizophrenia    - cont home Haldol (lower dose)    - will have PRN Haldol available tonight as well as PRN zyprexa and risperidone     CAD    - we do not know details ([facility])    - cont Plavix    - can follow-up as outpt       COPD    - stable    - ordered home inhalers, can have niece bring them    - prn nebs    - will also schedule nebs as per son request (BID)     DM    - diabetic diet    - not on meds    - TID BS testing, no ISS     HTN   - cont on lower dose metoprolol (decreased to 12.5mg BID from 50mg BID)     DVT    - cont coumadin     CKD    - at  "baseline     Dementia    - cont aricept    I spoke with his son today for an extended period of time. Reviewed his hospital stay and plan. Answered all questions.   I spoke with his niece on the phone who is angry because she thinks he fell in the hospital. He did not fall while here. I explained this to her. Yesterday he was lowered to the ground because he did not want to walk with the staff after walking independently earlier in the am.   Addendum: I saw the patient again. I asked if he told his niece he had pain in his side. He said \"yes\". He denies any pain currently. He has no tenderness on palpation. He states the pain is gone.    Diet: Moderate Consistent CHO Diet  Snacks/Supplements Pediatric: Ensure Plus; Between Meals    DVT Prophylaxis: Warfarin  Lebron Catheter: not present  Code Status: Full Code      Disposition Plan   Expected discharge: 1-2 days, recommended to unclear once able to ambulate.  Entered: Todd Velarde MD 09/17/2019, 2:27 PM       The patient's care was discussed with the Bedside Nurse, Patient and Patient's Family.    Todd Velarde MD  Hospitalist Service  [facility]    ______________________________________________________________________    Interval History   Patient ambulated to bathroom. Initially got out of bed on his own. I was there in the room with him. We ambulated with him in the halls. He denied any pain. He was able to ambulate, but was unsteady. He told me he was ambulating at his baseline. He did have some pain with quick turning that would resolve quickly. He denies chest pain, sob, abdo pain, n/v/d    Data reviewed today: I reviewed all medications, new labs and imaging results over the last 24 hours. I personally reviewed no images or EKG's today.    Physical Exam   Vital Signs: Temp: 98.1  F (36.7  C) Temp src: Oral BP: 128/62 Pulse: 64 Heart Rate: 63 Resp: 18 SpO2: 91 % O2 Device: None (Room air) Oxygen Delivery: 1 LPM  Weight: 151 lbs 1.6 " oz  Constitutional: sleepy. arouseable  Eyes: Lids and lashes normal, pupils equal, round and reactive to light, extra ocular muscles intact, sclera clear, conjunctiva normal  ENT: Normocephalic, without obvious abnormality, atraumatic, sinuses nontender on palpation, external ears without lesions, oral pharynx with moist mucous membranes, tonsils without erythema or exudates, gums normal and good dentition.  Respiratory: No increased work of breathing, good air exchange, clear to auscultation bilaterally, no crackles or wheezing  Cardiovascular: Normal apical impulse, regular rate and rhythm, normal S1 and S2, no S3 or S4, and no murmur noted  GI: No scars, normal bowel sounds, soft, non-distended, non-tender, no masses palpated, no hepatosplenomegally  Skin: no bruising or bleeding  Musculoskeletal: no lower extremity pitting edema present    Data   Recent Labs   Lab 09/17/19  0615 09/16/19  0623 09/15/19  1623 09/15/19  1603 09/15/19  1359 09/14/19  0555   WBC  --  10.9  --   --  9.9 9.1   HGB  --  8.4*  --   --  9.2* 8.7*   MCV  --  87  --   --  86 87   PLT  --  193  --   --  171 199   INR 2.05* 2.32*  --  2.81*  --  4.91*   NA  --  138 138  --   --  138   POTASSIUM  --  4.4 4.7  --   --  4.5   CHLORIDE  --  106 106  --   --  105   CO2  --  28 29  --   --  30   BUN  --  25 22  --   --  25   CR  --  1.63* 1.55*  --   --  1.51*   ANIONGAP  --  4 3  --   --  3   RAMON  --  9.2 9.1  --   --  9.2   GLC  --  91 90  --   --  115*     No results found for this or any previous visit (from the past 24 hour(s)).

## 2019-09-18 VITALS
TEMPERATURE: 97.9 F | SYSTOLIC BLOOD PRESSURE: 133 MMHG | HEART RATE: 64 BPM | HEIGHT: 68 IN | OXYGEN SATURATION: 93 % | WEIGHT: 151.1 LBS | DIASTOLIC BLOOD PRESSURE: 74 MMHG | BODY MASS INDEX: 22.9 KG/M2 | RESPIRATION RATE: 18 BRPM

## 2019-09-18 LAB
ANION GAP SERPL CALCULATED.3IONS-SCNC: 3 MMOL/L (ref 3–14)
BASOPHILS # BLD AUTO: 0 10E9/L (ref 0–0.2)
BASOPHILS NFR BLD AUTO: 0.2 %
BUN SERPL-MCNC: 27 MG/DL (ref 7–30)
CALCIUM SERPL-MCNC: 9.5 MG/DL (ref 8.5–10.1)
CHLORIDE SERPL-SCNC: 107 MMOL/L (ref 94–109)
CO2 SERPL-SCNC: 29 MMOL/L (ref 20–32)
CREAT SERPL-MCNC: 1.59 MG/DL (ref 0.66–1.25)
DIFFERENTIAL METHOD BLD: ABNORMAL
EOSINOPHIL # BLD AUTO: 0.2 10E9/L (ref 0–0.7)
EOSINOPHIL NFR BLD AUTO: 2.1 %
ERYTHROCYTE [DISTWIDTH] IN BLOOD BY AUTOMATED COUNT: 18.7 % (ref 10–15)
GFR SERPL CREATININE-BSD FRML MDRD: 38 ML/MIN/{1.73_M2}
GLUCOSE BLDC GLUCOMTR-MCNC: 103 MG/DL (ref 70–99)
GLUCOSE BLDC GLUCOMTR-MCNC: 94 MG/DL (ref 70–99)
GLUCOSE SERPL-MCNC: 91 MG/DL (ref 70–99)
HCT VFR BLD AUTO: 26.5 % (ref 40–53)
HGB BLD-MCNC: 8.2 G/DL (ref 13.3–17.7)
IMM GRANULOCYTES # BLD: 0 10E9/L (ref 0–0.4)
IMM GRANULOCYTES NFR BLD: 0.3 %
INR PPP: 1.95 (ref 0.86–1.14)
LYMPHOCYTES # BLD AUTO: 1.5 10E9/L (ref 0.8–5.3)
LYMPHOCYTES NFR BLD AUTO: 16.1 %
MCH RBC QN AUTO: 26.6 PG (ref 26.5–33)
MCHC RBC AUTO-ENTMCNC: 30.9 G/DL (ref 31.5–36.5)
MCV RBC AUTO: 86 FL (ref 78–100)
MONOCYTES # BLD AUTO: 1.1 10E9/L (ref 0–1.3)
MONOCYTES NFR BLD AUTO: 11.9 %
NEUTROPHILS # BLD AUTO: 6.5 10E9/L (ref 1.6–8.3)
NEUTROPHILS NFR BLD AUTO: 69.4 %
NRBC # BLD AUTO: 0 10*3/UL
NRBC BLD AUTO-RTO: 0 /100
PLATELET # BLD AUTO: 174 10E9/L (ref 150–450)
POTASSIUM SERPL-SCNC: 4.1 MMOL/L (ref 3.4–5.3)
RBC # BLD AUTO: 3.08 10E12/L (ref 4.4–5.9)
SODIUM SERPL-SCNC: 139 MMOL/L (ref 133–144)
WBC # BLD AUTO: 9.4 10E9/L (ref 4–11)

## 2019-09-18 PROCEDURE — 36415 COLL VENOUS BLD VENIPUNCTURE: CPT | Performed by: HOSPITALIST

## 2019-09-18 PROCEDURE — 25000132 ZZH RX MED GY IP 250 OP 250 PS 637: Mod: GY | Performed by: PHYSICIAN ASSISTANT

## 2019-09-18 PROCEDURE — 82947 ASSAY GLUCOSE BLOOD QUANT: CPT | Performed by: HOSPITALIST

## 2019-09-18 PROCEDURE — 80048 BASIC METABOLIC PNL TOTAL CA: CPT | Performed by: HOSPITALIST

## 2019-09-18 PROCEDURE — 85025 COMPLETE CBC W/AUTO DIFF WBC: CPT | Performed by: HOSPITALIST

## 2019-09-18 PROCEDURE — 99239 HOSP IP/OBS DSCHRG MGMT >30: CPT | Performed by: HOSPITALIST

## 2019-09-18 PROCEDURE — 25000132 ZZH RX MED GY IP 250 OP 250 PS 637: Mod: GY | Performed by: HOSPITALIST

## 2019-09-18 PROCEDURE — 00000146 ZZHCL STATISTIC GLUCOSE BY METER IP

## 2019-09-18 PROCEDURE — 85610 PROTHROMBIN TIME: CPT | Performed by: HOSPITALIST

## 2019-09-18 RX ORDER — POLYETHYLENE GLYCOL 3350 17 G/17G
17 POWDER, FOR SOLUTION ORAL DAILY PRN
Status: ON HOLD | DISCHARGE
Start: 2019-09-18 | End: 2019-11-12

## 2019-09-18 RX ORDER — HALOPERIDOL 2 MG/1
2 TABLET ORAL 2 TIMES DAILY
Qty: 40 TABLET | Refills: 0 | Status: ON HOLD | OUTPATIENT
Start: 2019-09-18 | End: 2019-11-12

## 2019-09-18 RX ORDER — AMOXICILLIN 250 MG
1 CAPSULE ORAL 2 TIMES DAILY PRN
Status: ON HOLD | DISCHARGE
Start: 2019-09-18 | End: 2019-11-12

## 2019-09-18 RX ORDER — METOPROLOL TARTRATE 25 MG/1
25 TABLET, FILM COATED ORAL 2 TIMES DAILY
Status: DISCONTINUED | OUTPATIENT
Start: 2019-09-18 | End: 2019-09-18 | Stop reason: HOSPADM

## 2019-09-18 RX ORDER — SERTRALINE HYDROCHLORIDE 100 MG/1
150 TABLET, FILM COATED ORAL DAILY
Qty: 30 TABLET | Refills: 0 | Status: SHIPPED | OUTPATIENT
Start: 2019-09-18

## 2019-09-18 RX ORDER — METOPROLOL TARTRATE 50 MG
25 TABLET ORAL 2 TIMES DAILY
Status: ON HOLD | DISCHARGE
Start: 2019-09-18 | End: 2019-11-01

## 2019-09-18 RX ORDER — HYDROCODONE BITARTRATE AND ACETAMINOPHEN 5; 325 MG/1; MG/1
1 TABLET ORAL EVERY 4 HOURS PRN
Qty: 20 TABLET | Refills: 0 | Status: ON HOLD | OUTPATIENT
Start: 2019-09-18 | End: 2019-11-12

## 2019-09-18 RX ORDER — OLANZAPINE 10 MG/1
10 TABLET ORAL AT BEDTIME
Qty: 20 TABLET | Refills: 0 | Status: ON HOLD | OUTPATIENT
Start: 2019-09-18 | End: 2019-11-12

## 2019-09-18 RX ORDER — LIDOCAINE 4 G/G
1 PATCH TOPICAL
Status: ON HOLD | DISCHARGE
Start: 2019-09-18 | End: 2019-11-01

## 2019-09-18 RX ADMIN — METOPROLOL TARTRATE 25 MG: 25 TABLET ORAL at 09:17

## 2019-09-18 RX ADMIN — HALOPERIDOL 2 MG: 1 TABLET ORAL at 09:18

## 2019-09-18 RX ADMIN — HYDROCODONE BITARTRATE AND ACETAMINOPHEN 1 TABLET: 5; 325 TABLET ORAL at 00:08

## 2019-09-18 RX ADMIN — ATORVASTATIN CALCIUM 80 MG: 40 TABLET, FILM COATED ORAL at 09:17

## 2019-09-18 RX ADMIN — DONEPEZIL HYDROCHLORIDE 5 MG: 5 TABLET ORAL at 09:18

## 2019-09-18 RX ADMIN — CLOPIDOGREL BISULFATE 75 MG: 75 TABLET ORAL at 09:17

## 2019-09-18 RX ADMIN — HYDROCODONE BITARTRATE AND ACETAMINOPHEN 1 TABLET: 5; 325 TABLET ORAL at 13:59

## 2019-09-18 RX ADMIN — OLANZAPINE 10 MG: 5 TABLET, ORALLY DISINTEGRATING ORAL at 00:08

## 2019-09-18 RX ADMIN — TAMSULOSIN HYDROCHLORIDE 0.4 MG: 0.4 CAPSULE ORAL at 09:17

## 2019-09-18 RX ADMIN — SERTRALINE HYDROCHLORIDE 150 MG: 100 TABLET ORAL at 09:17

## 2019-09-18 ASSESSMENT — ACTIVITIES OF DAILY LIVING (ADL)
RETIRED_COMMUNICATION: 0-->UNDERSTANDS/COMMUNICATES WITHOUT DIFFICULTY
RETIRED_EATING: 0-->INDEPENDENT
BATHING: 1-->ASSISTIVE EQUIPMENT
TOILETING: 1-->ASSISTIVE EQUIPMENT
DRESS: 1-->ASSISTIVE EQUIPMENT
COGNITION: 2 - DIFFICULTY WITH ORGANIZING THOUGHTS
SWALLOWING: 0-->SWALLOWS FOODS/LIQUIDS WITHOUT DIFFICULTY

## 2019-09-18 NOTE — PROGRESS NOTES
Discharge Planner   Discharge Plans in progress: [facility], prvt room, no additional fee.  Barriers to discharge plan: None anticipated.  Follow up plan: [facility]. Updated pt and left vm for chemo [facility] with update. Spoke with kira Glass via phone to update her on discharge plans. PAS being completed. Orders to be faxed. SW to continue following. [facility] WC transport arranged for 1400 today.       Entered by: Krystle Penny 09/18/2019 12:04 PM

## 2019-09-18 NOTE — PLAN OF CARE
Physical Therapy Discharge Summary     Reason for therapy discharge:    Discharged to transitional care facility.     Progress towards therapy goal(s). See goals on Care Plan in [facility] electronic health record for goal details.  Goals not met.  Barriers to achieving goals:   needing min A for mobility items     Therapy recommendation(s):    Continued therapy is recommended.  Rationale/Recommendations:  Pt is below baseline with functional mobility and strength and would benefit from continued PT to progress skills.

## 2019-09-18 NOTE — PROGRESS NOTES
Your information has been submitted on September 18th, 2019 at 01:21:19 PM CDT. The confirmation number is [id number]

## 2019-09-18 NOTE — PLAN OF CARE
VSS w/ ex htn. Pt disoriented x 2, anxious during shift. Pt attempted to get out of bed without calling x 2. Incontinent of urine x 1, voided in urinal x 1. Voiding adequately. Pt received PRN norco for pain in L hip which was somewhat effective. Pt confused- redirectable with verbal redirection. Discharge plan pending.

## 2019-09-18 NOTE — PHARMACY-ANTICOAGULATION SERVICE
Clinical Pharmacy- Warfarin Discharge Note      Anticoagulation Dose History     Recent Dosing and Labs Latest Ref Rng & Units 11/21/2010 6/2/2019 9/14/2019 9/15/2019 9/16/2019 9/17/2019 9/18/2019    Warfarin 2 mg - - - - 2 mg - 4 mg -    Warfarin 3 mg - - - - - 3 mg - -    INR 0.86 - 1.14 2.76(H) 3.39(H) 4.91(H) 2.81(H) 2.32(H) 2.05(H) 1.95(H)        Patient admitted with warfarin as pta med.   Agree with plan to discharge on  pta dose with frequent monitoring of INR   Note INR was elevated on admission .      The patient should have an INR checked daily.

## 2019-09-18 NOTE — PLAN OF CARE
RN - BP improved post increased metoprolol dose. Other vitals stable. Pt denying pain throughout the morning. Primarily sleeping throughout the morning. Tolerating PO intake. Voiding via condom catheter. No concerns with impulsiveness and behavior. Pt receiving discharge orders to TCU. Orders faxed. Pt discharged and transported by [facility] at 1400.    Per pt request pt did receive 1x norco prior to departure - will be communicated to discharging TCU.    *1408 - Messaged left with [facility] admissions director regarding receiving pain medication.

## 2019-09-18 NOTE — PLAN OF CARE
Vital signs:  Temp: 97.6  F (36.4  C) Temp src: Oral BP: (!) 173/81 Pulse: 64 Heart Rate: 77 Resp: 20 SpO2: 98 %    Neuros: unable to determine. confused  GI: bowel sounds active  : Incontinent  SKIN: Intact  Activity: Assist of 2 wagner stedady  Diet: Moderate carb  Pain:  shoulder pain , taking norco  Plan: discharge 1-2 days TCU

## 2019-09-18 NOTE — CONSULTS
Care Transition Initial Assessment - SW     Met with: Patient. Spoke with son Aldair via phone.   Active Problems:    Closed fracture of transverse process of lumbar vertebra (H)    Lumbar transverse process fracture (H)       DATA  Lives With: facility resident   Living Arrangements: house  Quality of Family Relationships: involved  Description of Support System: Supportive, Involved  Who is your support system?: Children  Support Assessment: Lacks necessary supervision and assistance.   Identified issues/concerns regarding health management: Pt admitted under observation 9/15 for a lumbar vertebra fracture. Flipped to inpt 9/17. Pt resides with a niece, and has reportedly lived with her for approximately one week. Per MD discussion with family, pt has some services at home. At baseline, pt ambulates with a cane or a walker. PT evaluated pt and recommended TCU. Voicemail left for Stephanie Bowles, [facility] co-director for nursing home [facility] contracts, to verify SNF benefits. Spoke with son Aldair via phone who is agreeable to pt discharging to TCU. Referrals sent to VA contracted facilities: [facility], [facility], [facility], and [facility].        Quality of Family Relationships: involved  Transportation Anticipated: family or friend will provide    ASSESSMENT  Cognitive Status:  Disoriented and confused.  Concerns to be addressed: Discharge planning. TCU referrals sent. Awaiting determination of SNF benefits through [facility].      PLAN  Financial costs for the patient includes: TBD.  Patient given options and choices for discharge: Yes.  Patient/family is agreeable to the plan?  Yes: Family agreeable.   Transportation/person available to transport on day of discharge  is TBD.  Patient Goals and Preferences: Return home.  Patient anticipates discharging to: TCU-facility TBD. Referrals sent. PAS needed. Transport TBD. SW to continue following.     1100: Spoke with Stephanie from the [facility] who  confirms that pt is 100% service connected and has SNF benefits. Awaiting calls from TCU facilities to determine bed availability.

## 2019-09-18 NOTE — DISCHARGE SUMMARY
[facility]  Hospitalist Discharge Summary       Date of Admission:  9/15/2019  Date of Discharge:  9/18/2019  Discharging Provider: Todd Velarde MD      Discharge Diagnoses   Fall with l2/l3 transverse processes fractures    Follow-ups Needed After Discharge   Follow-up Appointments     Follow Up and recommended labs and tests      Follow up with residential physician.  The following labs/tests are   recommended: INR check daily until therapeutic.  Follow up with primary care provider in 7-10 days.  No follow up labs or   test are needed. Patient will need to review all medications and dosages   with primary care provider to assess for behavior issues and sedation             Unresulted Labs Ordered in the Past 30 Days of this Admission     No orders found from 8/16/2019 to 9/16/2019.      These results will be followed up by NA    Discharge Disposition   Discharged to short-term care facility  Condition at discharge: Stable    Hospital Course   Hubert Tamayo is a 89 year old male who lives in his own home with his neice with paranoid schizophrenia, CAD (details not known), CKD, HTN, DM, DVT, COPD, dementia admitted on 9/15/2019 with acute fractures of the left L2 and L3 transverse processes after a fall out of bed. Patient cannot tell me details about why he is here. Niece states yesterday am he fell out of bed because he was having a nightmare about the war and bugs were crawling on him, so he jumped out of bed and fell. He had back pain and came to the ED. He was sent home. Later in the day she called EMS because he was having back pain. He declined to come to the hospital. This am his pain was worse. He was able to walk to the bathroom, but then had too much pain to ambulate. EMS was called. They could not get his stair lift to work so they brought him down in a chair. He denies chest pain, sob, abdo pain, n/v/d. No recent URI or urinary symptoms. No fever or chills. At rest without moving he  has no pain. Pain increases with movement. It is in his left low back. While I am interviewing him he also spends much of the time complaining to me about how the [facility] is racist. Of note the ED note states patient c/o chest pain. Niece and patient explain it was chest pain and that the niece thought initially it was chest pain so gave him nitroglycerin. No current chest pain.    Patient was admitted to the hospital. He actually had minimal complaints of pain. None at rest. Would get sharp stabs of pain on occasion with movement. Controlled with Norco. I was able to get some information from the VA. The patient is 100% service connected and has a lot of home services in place. His providers all see him in his home and he has home care/social work that follow with him. Apparently he is always unhappy with his [facility] care. They have tried to send him to rehab before and have tried to help him change his living situation in the past, but he has always refuse.   Here he was seen by PT who recommends TCU. Patient had two events in the hospital where once he had just ambulated earlier in the am and then when was being taken for another walk stated he could not do it and tried to sit. Staff had to slowly lower him to the floor. THere was no trauma. Last night his bed alarm went off and when staff entered the room patient was facing the bed with his hands on the bed and was in the process of lowering himself to his knees. He does not remember this event today. He currently denies any pain. Had breakfast this am.  Patient has also had issues with sedation while here. I had spoken to her home care provider. Apparently in the past his providers have tried to decrease the doses of some of his meds due to sedation, but the result was an increase in his behavioral issues. THerefore they had increased his med doses again. While here he was often sedated as well. I have decreased his haldol, seroquel and zyprexa while he has been here. I  have indicated that further titration of his meds can be done as an outpt. I had left messages and/or spoken to his son daily and have kept his niece up to date as well. All questions answered.      Consultations This Hospital Stay   ORTHOSIS BRACE IP CONSULT  PHARMACY TO DOSE WARFARIN  PHARMACY TO DOSE WARFARIN  PHYSICAL THERAPY ADULT IP CONSULT  SOCIAL WORK IP CONSULT  PHYSICAL THERAPY ADULT IP CONSULT  OCCUPATIONAL THERAPY ADULT IP CONSULT    Code Status   Full Code    Time Spent on this Encounter   I, Todd Velarde MD, personally saw the patient today and spent greater than 30 minutes discharging this patient.       Todd Velarde MD  [facility]  ______________________________________________________________________    Physical Exam   Vital Signs: Temp: 98  F (36.7  C) Temp src: Oral BP: 115/63 Pulse: 64 Heart Rate: 75 Resp: 18 SpO2: 93 % O2 Device: None (Room air)    Weight: 151 lbs 1.6 oz  Constitutional: asleep. Arouses to voice and answers questions  Eyes: Lids and lashes normal, pupils equal, round and reactive to light, extra ocular muscles intact, sclera clear, conjunctiva normal  ENT: Normocephalic, without obvious abnormality, atraumatic, sinuses nontender on palpation, external ears without lesions, oral pharynx with moist mucous membranes, tonsils without erythema or exudates, gums normal and good dentition.  Respiratory: No increased work of breathing, good air exchange, clear to auscultation bilaterally, no crackles or wheezing  Cardiovascular: Normal apical impulse, regular rate and rhythm, normal S1 and S2, no S3 or S4, and no murmur noted  GI: No scars, normal bowel sounds, soft, non-distended, non-tender, no masses palpated, no hepatosplenomegally  Skin: no bruising or bleeding  Musculoskeletal: no lower extremity pitting edema present  I palpate all over his left side/flank without tenderness. No tenderness/swelling of knees       Primary Care Physician   Saige  Green    Discharge Orders      General info for SNF    Length of Stay Estimate: Short Term Care: Estimated # of Days 31-90  Condition at Discharge: Stable  Level of care:skilled   Rehabilitation Potential: Fair  Admission H&P remains valid and up-to-date: Yes  Recent Chemotherapy: N/A  Use Nursing Home Standing Orders: Yes     Mantoux instructions    Give two-step Mantoux (PPD) Per Facility Policy Yes     Reason for your hospital stay    Fall out of bed from nightmare with subsequent fractures of L2/L3 transverse processes on left     Follow Up and recommended labs and tests    Follow up with [facility] physician.  The following labs/tests are recommended: INR check daily until therapeutic.  Follow up with primary care provider in 7-10 days.  No follow up labs or test are needed. Patient will need to review all medications and dosages with primary care provider to assess for behavior issues and sedation     Activity - Up with nursing assistance     Additional Discharge Instructions    Wear soft lumbar brace when ambulating     Glucose monitor nursing POCT    Before meals and at bedtime     Full Code     Physical Therapy Adult Consult    Evaluate and treat as clinically indicated.    Reason:  Weakness, transverse process fractures     Occupational Therapy Adult Consult    Evaluate and treat as clinically indicated.    Reason:  Weakness, transverse process fractures     Fall precautions     Advance Diet as Tolerated    Follow this diet upon discharge: Moderate consistent carbohydrate (5215-0065 pearl / 4-6 CHO units per meal)       Significant Results and Procedures   Most Recent 3 CBC's:  Recent Labs   Lab Test 09/18/19  0645 09/16/19  0623 09/15/19  1359   WBC 9.4 10.9 9.9   HGB 8.2* 8.4* 9.2*   MCV 86 87 86    193 171     Most Recent 3 BMP's:  Recent Labs   Lab Test 09/18/19  0645 09/16/19  0623 09/15/19  1623    138 138   POTASSIUM 4.1 4.4 4.7   CHLORIDE 107 106 106   CO2 29 28 29   BUN 27 25 22   CR  1.59* 1.63* 1.55*   ANIONGAP 3 4 3   RAMON 9.5 9.2 9.1   GLC 91 91 90     Most Recent 2 LFT's:No lab results found.,   Results for orders placed or performed during the hospital encounter of 09/14/19   Head CT w/o contrast    Narrative    EXAM: CT HEAD W/O CONTRAST  LOCATION: [facility]  DATE/TIME: 9/14/2019 6:15 AM    INDICATION: Fall.  COMPARISON: None.  TECHNIQUE: Routine without IV contrast. Multiplanar reformats. Dose reduction techniques were used.    FINDINGS:  INTRACRANIAL CONTENTS: No intracranial hemorrhage, extraaxial collection, or mass effect.  No CT evidence of acute infarct. Mild to moderate presumed chronic small vessel ischemic changes. Mild generalized volume loss. No hydrocephalus.     VISUALIZED ORBITS/SINUSES/MASTOIDS: No intraorbital abnormality. Mucosal thickening primarily involving the ethmoid air cells. No middle ear or mastoid effusion.    BONES/SOFT TISSUES: No acute abnormality.      Impression    IMPRESSION:  1.  No acute intracranial injury.   Ribs XR, unilat 3 views + PA chest,  left    Narrative    EXAM: XR RIBS and CHEST LT 3VW  LOCATION: [facility]  DATE/TIME: 9/14/2019 6:39 AM    INDICATION: Fall, posterior left rib pain.  COMPARISON: 6/2/2019      Impression    IMPRESSION: Stable cardia mediastinal silhouette. Basilar fibroatelectasis. Atherosclerotic aorta. No visible, displaced left rib fracture.   Lumbar spine CT w/o contrast    Addendum: 9/17/2019    Additional history: Traumatic injury from fall.      Narrative    EXAM: CT THORACIC SPINE WITHOUT CONTRAST, CT LUMBAR SPINE WITHOUT CONTRAST  LOCATION: [facility]  DATE/TIME: 09/14/2019, 6:12 AM    INDICATION: See the clinical information for interpreting provider.  COMPARISON: Chest x-ray 06/02/2019, chest CT 10/25/2008, abdomen and pelvis CT 07/05/2006.  TECHNIQUE: Routine without IV contrast. Multiplanar reformats.  Dose reduction techniques were used.     FINDINGS:  CT THORACIC SPINE:  Normal thoracic vertebral body alignment. No subluxation. There is a mild superior endplate depression at T9 without definite acute fracture line that is suspected to be chronic. No acute thoracic spine fracture is evident. Mild   degenerative changes. Grossly adequate spinal canal and neural foramina by CT. Hiatal hernia. Coronary artery atherosclerotic calcification. Emphysema.    CT LUMBAR SPINE: Five lumbar vertebral body heights. Normal vertebral body heights and alignment. No subluxation. Acute slightly displaced fractures of the left L2 and L3 transverse processes. Mild degenerative changes without high-grade spinal canal or   neural foraminal narrowing.      Impression    IMPRESSION:  CT THORACIC SPINE:  1.  Mild superior endplate depression at T9 appears chronic. No definite acute fracture.    CT LUMBAR SPINE:  1.  Acute fractures of the left L2 and L3 transverse processes.       CT Thoracic Spine w/o Contrast    Addendum: 9/17/2019    Additional history: Traumatic injury from fall.      Narrative    EXAM: CT THORACIC SPINE WITHOUT CONTRAST, CT LUMBAR SPINE WITHOUT CONTRAST  LOCATION: [facility]  DATE/TIME: 09/14/2019, 6:12 AM    INDICATION: See the clinical information for interpreting provider.  COMPARISON: Chest x-ray 06/02/2019, chest CT 10/25/2008, abdomen and pelvis CT 07/05/2006.  TECHNIQUE: Routine without IV contrast. Multiplanar reformats.  Dose reduction techniques were used.     FINDINGS:  CT THORACIC SPINE: Normal thoracic vertebral body alignment. No subluxation. There is a mild superior endplate depression at T9 without definite acute fracture line that is suspected to be chronic. No acute thoracic spine fracture is evident. Mild   degenerative changes. Grossly adequate spinal canal and neural foramina by CT. Hiatal hernia. Coronary artery atherosclerotic calcification. Emphysema.    CT LUMBAR SPINE: Five lumbar vertebral body heights. Normal vertebral body heights and  alignment. No subluxation. Acute slightly displaced fractures of the left L2 and L3 transverse processes. Mild degenerative changes without high-grade spinal canal or   neural foraminal narrowing.      Impression    IMPRESSION:  CT THORACIC SPINE:  1.  Mild superior endplate depression at T9 appears chronic. No definite acute fracture.    CT LUMBAR SPINE:  1.  Acute fractures of the left L2 and L3 transverse processes.             Discharge Medications   Current Discharge Medication List      START taking these medications    Details   HYDROcodone-acetaminophen (NORCO) 5-325 MG tablet Take 1 tablet by mouth every 4 hours as needed for moderate to severe pain  Qty: 20 tablet, Refills: 0    Associated Diagnoses: Closed fracture of transverse process of lumbar vertebra, initial encounter (H)      Lidocaine (LIDOCARE) 4 % Patch Place 1 patch onto the skin q12h (2 syringes) for 2 doses To prevent lidocaine toxicity, patient should be patch free for 12 hrs daily.    Associated Diagnoses: Closed fracture of transverse process of lumbar vertebra, initial encounter (H)      polyethylene glycol (MIRALAX/GLYCOLAX) packet Take 17 g by mouth daily as needed for constipation    Associated Diagnoses: Constipation, unspecified constipation type      senna-docusate (SENOKOT-S/PERICOLACE) 8.6-50 MG tablet Take 1 tablet by mouth 2 times daily as needed for constipation    Associated Diagnoses: Constipation, unspecified constipation type         CONTINUE these medications which have CHANGED    Details   haloperidol (HALDOL) 2 MG tablet Take 1 tablet (2 mg) by mouth 2 times daily  Qty: 40 tablet, Refills: 0    Associated Diagnoses: Paranoid schizophrenia (H)      metoprolol tartrate (LOPRESSOR) 50 MG tablet Take 0.5 tablets (25 mg) by mouth 2 times daily    Associated Diagnoses: Benign essential hypertension      OLANZapine (ZYPREXA) 10 MG tablet Take 1 tablet (10 mg) by mouth At Bedtime  Qty: 20 tablet, Refills: 0    Associated  Diagnoses: Paranoid schizophrenia (H)      sertraline (ZOLOFT) 100 MG tablet Take 1.5 tablets (150 mg) by mouth daily  Qty: 30 tablet, Refills: 0    Associated Diagnoses: Paranoid schizophrenia (H)         CONTINUE these medications which have NOT CHANGED    Details   albuterol (PROAIR HFA/PROVENTIL HFA/VENTOLIN HFA) 108 (90 Base) MCG/ACT inhaler Inhale 2 puffs into the lungs every 4 hours as needed for shortness of breath / dyspnea or wheezing    Comments: Pharmacy may dispense brand covered by insurance (Proair, or proventil or ventolin or generic albuterol inhaler)      artificial saliva (BIOTENE DRY MOUTHWASH) LIQD liquid Swish and spit 5-10 mLs in mouth 4 times daily as needed for dry mouth      atorvastatin (LIPITOR) 80 MG tablet Take 80 mg by mouth daily      budesonide-formoterol (SYMBICORT) 160-4.5 MCG/ACT Inhaler Inhale 2 puffs into the lungs 2 times daily      cholecalciferol 1000 units TABS Take 1,000 Units by mouth daily      clopidogrel (PLAVIX) 75 MG tablet Take 75 mg by mouth daily      Donepezil HCl (ARICEPT PO) Take 5 mg by mouth daily       emollient (VANICREAM) external cream Apply topically daily Apply to dry skin      hydrocortisone (CORTAID) 1 % external cream Apply topically 2 times daily Apply thin layer to itchy scaling on left inner elbow and nose      mometasone (NASONEX) 50 MCG/ACT nasal spray Spray 2 sprays into both nostrils daily as needed (allergy symptoms / runny nose)      nitroGLYcerin (NITROSTAT) 0.4 MG sublingual tablet Place 0.4 mg under the tongue every 5 minutes as needed for chest pain For chest pain place 1 tablet under the tongue every 5 minutes for 3 doses. If symptoms persist 5 minutes after 1st dose call 911.      nystatin (MYCOSTATIN) 497099 UNIT/GM external cream Apply topically 2 times daily as needed (Intertrigo) Apply thin layer to groin folds      Petrolatum OINT Externally apply topically 4 times daily as needed (dry lips)      tamsulosin (FLOMAX) 0.4 MG capsule  Take 0.4 mg by mouth daily      Tiotropium Bromide Monohydrate (SPIRIVA HANDIHALER IN) Inhale 1 capsule into the lungs daily       WARFARIN SODIUM 3 mg T,Thu and 4 mg all other days, or as directed by anticoagulation clinic         STOP taking these medications       traMADol (ULTRAM) 50 MG tablet Comments:   Reason for Stopping:             Allergies   Allergies   Allergen Reactions     Crustaceans      Loxapine      Nifedipine      Shellfish Allergy      Sulfa Drugs      Sulfamethoxazole-Trimethoprim

## 2019-09-18 NOTE — SIGNIFICANT EVENT
FALL:  Pt bed alarm went on and staff went in and found pt on his knees by his bedside. No injury noted. VSS. Pain meds administered. Post fall huddle completed. Supervisor notified. MD paged  Pt alarm went on. Staff went in and found pt kneeling on the floor by the bed side. No injury. Assisted back to bed with 3 persons. VSS except BP elevated. Pain meds administered. Pt comfortable.

## 2019-10-01 ENCOUNTER — TRANSFERRED RECORDS (OUTPATIENT)
Dept: HEALTH INFORMATION MANAGEMENT | Facility: CLINIC | Age: 84
End: 2019-10-01

## 2019-10-29 ENCOUNTER — APPOINTMENT (OUTPATIENT)
Dept: GENERAL RADIOLOGY | Facility: CLINIC | Age: 84
DRG: 811 | End: 2019-10-29
Attending: EMERGENCY MEDICINE
Payer: MEDICARE

## 2019-10-29 ENCOUNTER — HOSPITAL ENCOUNTER (INPATIENT)
Facility: CLINIC | Age: 84
LOS: 3 days | Discharge: HOME | DRG: 811 | End: 2019-11-01
Attending: EMERGENCY MEDICINE | Admitting: INTERNAL MEDICINE
Payer: MEDICARE

## 2019-10-29 ENCOUNTER — TRANSFERRED RECORDS (OUTPATIENT)
Dept: HEALTH INFORMATION MANAGEMENT | Facility: CLINIC | Age: 84
End: 2019-10-29

## 2019-10-29 DIAGNOSIS — D64.9 ANEMIA, UNSPECIFIED TYPE: ICD-10-CM

## 2019-10-29 DIAGNOSIS — I21.4 NSTEMI (NON-ST ELEVATED MYOCARDIAL INFARCTION) (H): Primary | ICD-10-CM

## 2019-10-29 DIAGNOSIS — R19.5 GUAIAC POSITIVE STOOLS: ICD-10-CM

## 2019-10-29 DIAGNOSIS — R06.02 SHORTNESS OF BREATH: ICD-10-CM

## 2019-10-29 DIAGNOSIS — R07.2 PRECORDIAL PAIN: ICD-10-CM

## 2019-10-29 PROBLEM — R07.9 CHEST PAIN: Status: ACTIVE | Noted: 2019-10-29

## 2019-10-29 LAB
ANION GAP SERPL CALCULATED.3IONS-SCNC: 4 MMOL/L (ref 3–14)
ANION GAP SERPL CALCULATED.3IONS-SCNC: ABNORMAL MMOL/L (ref 3–14)
ANISOCYTOSIS BLD QL SMEAR: ABNORMAL
BASOPHILS # BLD AUTO: 0 10E9/L (ref 0–0.2)
BASOPHILS NFR BLD AUTO: 0.2 %
BUN SERPL-MCNC: 24 MG/DL (ref 7–30)
BUN SERPL-MCNC: ABNORMAL MG/DL (ref 7–30)
BURR CELLS BLD QL SMEAR: SLIGHT
CALCIUM SERPL-MCNC: 9.2 MG/DL (ref 8.5–10.1)
CALCIUM SERPL-MCNC: ABNORMAL MG/DL (ref 8.5–10.1)
CHLORIDE SERPL-SCNC: 109 MMOL/L (ref 94–109)
CHLORIDE SERPL-SCNC: ABNORMAL MMOL/L (ref 94–109)
CO2 SERPL-SCNC: 27 MMOL/L (ref 20–32)
CO2 SERPL-SCNC: ABNORMAL MMOL/L (ref 20–32)
CREAT SERPL-MCNC: 1.64 MG/DL (ref 0.66–1.25)
CREAT SERPL-MCNC: ABNORMAL MG/DL (ref 0.66–1.25)
DIFFERENTIAL METHOD BLD: ABNORMAL
EOSINOPHIL # BLD AUTO: 0.1 10E9/L (ref 0–0.7)
EOSINOPHIL NFR BLD AUTO: 1 %
ERYTHROCYTE [DISTWIDTH] IN BLOOD BY AUTOMATED COUNT: 22.6 % (ref 10–15)
GFR SERPL CREATININE-BSD FRML MDRD: 36 ML/MIN/{1.73_M2}
GFR SERPL CREATININE-BSD FRML MDRD: 41 ML/MIN/{1.73_M2}
GLUCOSE SERPL-MCNC: 93 MG/DL (ref 70–99)
GLUCOSE SERPL-MCNC: ABNORMAL MG/DL (ref 70–99)
HCT VFR BLD AUTO: 25.2 % (ref 40–53)
HEMOCCULT STL QL: POSITIVE
HGB BLD-MCNC: 7.1 G/DL (ref 13.3–17.7)
HYPOCHROMIA BLD QL: PRESENT
IMM GRANULOCYTES # BLD: 0.1 10E9/L (ref 0–0.4)
IMM GRANULOCYTES NFR BLD: 0.7 %
INR PPP: >10 (ref 0.86–1.14)
LYMPHOCYTES # BLD AUTO: 1.6 10E9/L (ref 0.8–5.3)
LYMPHOCYTES NFR BLD AUTO: 15.3 %
MAGNESIUM SERPL-MCNC: 2.2 MG/DL (ref 1.6–2.3)
MCH RBC QN AUTO: 25.4 PG (ref 26.5–33)
MCHC RBC AUTO-ENTMCNC: 28.2 G/DL (ref 31.5–36.5)
MCV RBC AUTO: 90 FL (ref 78–100)
MICROCYTES BLD QL SMEAR: PRESENT
MONOCYTES # BLD AUTO: 0.9 10E9/L (ref 0–1.3)
MONOCYTES NFR BLD AUTO: 8.6 %
NEUTROPHILS # BLD AUTO: 7.9 10E9/L (ref 1.6–8.3)
NEUTROPHILS NFR BLD AUTO: 74.2 %
NRBC # BLD AUTO: 0 10*3/UL
NRBC BLD AUTO-RTO: 0 /100
NT-PROBNP SERPL-MCNC: 944 PG/ML (ref 0–1800)
OVALOCYTES BLD QL SMEAR: ABNORMAL
PLATELET # BLD AUTO: 144 10E9/L (ref 150–450)
PLATELET # BLD EST: ABNORMAL 10*3/UL
POIKILOCYTOSIS BLD QL SMEAR: ABNORMAL
POTASSIUM SERPL-SCNC: 3.8 MMOL/L (ref 3.4–5.3)
POTASSIUM SERPL-SCNC: ABNORMAL MMOL/L (ref 3.4–5.3)
RBC # BLD AUTO: 2.79 10E12/L (ref 4.4–5.9)
RBC INCLUSIONS BLD: ABNORMAL
SODIUM SERPL-SCNC: 140 MMOL/L (ref 133–144)
SODIUM SERPL-SCNC: ABNORMAL MMOL/L (ref 133–144)
TROPONIN I SERPL-MCNC: 0.02 UG/L (ref 0–0.04)
TROPONIN I SERPL-MCNC: 0.25 UG/L (ref 0–0.04)
WBC # BLD AUTO: 10.6 10E9/L (ref 4–11)

## 2019-10-29 PROCEDURE — 25000128 H RX IP 250 OP 636: Performed by: INTERNAL MEDICINE

## 2019-10-29 PROCEDURE — 99221 1ST HOSP IP/OBS SF/LOW 40: CPT | Mod: AI | Performed by: INTERNAL MEDICINE

## 2019-10-29 PROCEDURE — 25000125 ZZHC RX 250: Performed by: INTERNAL MEDICINE

## 2019-10-29 PROCEDURE — 93005 ELECTROCARDIOGRAM TRACING: CPT

## 2019-10-29 PROCEDURE — 84484 ASSAY OF TROPONIN QUANT: CPT | Performed by: EMERGENCY MEDICINE

## 2019-10-29 PROCEDURE — 83880 ASSAY OF NATRIURETIC PEPTIDE: CPT | Performed by: EMERGENCY MEDICINE

## 2019-10-29 PROCEDURE — 85025 COMPLETE CBC W/AUTO DIFF WBC: CPT | Performed by: EMERGENCY MEDICINE

## 2019-10-29 PROCEDURE — 40000275 ZZH STATISTIC RCP TIME EA 10 MIN

## 2019-10-29 PROCEDURE — 86923 COMPATIBILITY TEST ELECTRIC: CPT | Performed by: EMERGENCY MEDICINE

## 2019-10-29 PROCEDURE — 94640 AIRWAY INHALATION TREATMENT: CPT

## 2019-10-29 PROCEDURE — 86901 BLOOD TYPING SEROLOGIC RH(D): CPT | Performed by: EMERGENCY MEDICINE

## 2019-10-29 PROCEDURE — 71046 X-RAY EXAM CHEST 2 VIEWS: CPT

## 2019-10-29 PROCEDURE — 93010 ELECTROCARDIOGRAM REPORT: CPT | Performed by: INTERNAL MEDICINE

## 2019-10-29 PROCEDURE — 99285 EMERGENCY DEPT VISIT HI MDM: CPT | Mod: 25

## 2019-10-29 PROCEDURE — 86900 BLOOD TYPING SEROLOGIC ABO: CPT | Performed by: EMERGENCY MEDICINE

## 2019-10-29 PROCEDURE — G0378 HOSPITAL OBSERVATION PER HR: HCPCS

## 2019-10-29 PROCEDURE — 12000011 ZZH R&B MS OVERFLOW

## 2019-10-29 PROCEDURE — 99207 ZZC CDG-CODE CATEGORY CHANGED: CPT | Performed by: INTERNAL MEDICINE

## 2019-10-29 PROCEDURE — 25000132 ZZH RX MED GY IP 250 OP 250 PS 637: Mod: GY | Performed by: INTERNAL MEDICINE

## 2019-10-29 PROCEDURE — 84484 ASSAY OF TROPONIN QUANT: CPT | Performed by: INTERNAL MEDICINE

## 2019-10-29 PROCEDURE — 82272 OCCULT BLD FECES 1-3 TESTS: CPT | Performed by: EMERGENCY MEDICINE

## 2019-10-29 PROCEDURE — 25000132 ZZH RX MED GY IP 250 OP 250 PS 637: Mod: GY | Performed by: EMERGENCY MEDICINE

## 2019-10-29 PROCEDURE — 85610 PROTHROMBIN TIME: CPT | Performed by: INTERNAL MEDICINE

## 2019-10-29 PROCEDURE — 86850 RBC ANTIBODY SCREEN: CPT | Performed by: EMERGENCY MEDICINE

## 2019-10-29 PROCEDURE — 83735 ASSAY OF MAGNESIUM: CPT | Performed by: EMERGENCY MEDICINE

## 2019-10-29 PROCEDURE — 40000274 ZZH STATISTIC RCP CONSULT EA 30 MIN

## 2019-10-29 PROCEDURE — 36415 COLL VENOUS BLD VENIPUNCTURE: CPT | Performed by: EMERGENCY MEDICINE

## 2019-10-29 PROCEDURE — 96374 THER/PROPH/DIAG INJ IV PUSH: CPT

## 2019-10-29 PROCEDURE — 80048 BASIC METABOLIC PNL TOTAL CA: CPT | Performed by: EMERGENCY MEDICINE

## 2019-10-29 PROCEDURE — 25000125 ZZHC RX 250: Performed by: EMERGENCY MEDICINE

## 2019-10-29 PROCEDURE — 96375 TX/PRO/DX INJ NEW DRUG ADDON: CPT

## 2019-10-29 PROCEDURE — 36415 COLL VENOUS BLD VENIPUNCTURE: CPT | Performed by: INTERNAL MEDICINE

## 2019-10-29 RX ORDER — VITAMIN B COMPLEX
1000 TABLET ORAL DAILY
Status: DISCONTINUED | OUTPATIENT
Start: 2019-10-29 | End: 2019-11-01 | Stop reason: HOSPADM

## 2019-10-29 RX ORDER — FLUTICASONE PROPIONATE 50 MCG
2 SPRAY, SUSPENSION (ML) NASAL DAILY PRN
Status: DISCONTINUED | OUTPATIENT
Start: 2019-10-29 | End: 2019-11-01 | Stop reason: HOSPADM

## 2019-10-29 RX ORDER — ACETAMINOPHEN 325 MG/1
650 TABLET ORAL EVERY 4 HOURS PRN
Status: DISCONTINUED | OUTPATIENT
Start: 2019-10-29 | End: 2019-11-01 | Stop reason: HOSPADM

## 2019-10-29 RX ORDER — OLANZAPINE 10 MG/1
10 TABLET ORAL AT BEDTIME
Status: DISCONTINUED | OUTPATIENT
Start: 2019-10-29 | End: 2019-11-01 | Stop reason: HOSPADM

## 2019-10-29 RX ORDER — METOPROLOL TARTRATE 25 MG/1
25 TABLET, FILM COATED ORAL 2 TIMES DAILY
Status: DISCONTINUED | OUTPATIENT
Start: 2019-10-29 | End: 2019-11-01

## 2019-10-29 RX ORDER — FLUORIDE TOOTHPASTE
5-10 TOOTHPASTE DENTAL 4 TIMES DAILY PRN
Status: DISCONTINUED | OUTPATIENT
Start: 2019-10-29 | End: 2019-11-01 | Stop reason: HOSPADM

## 2019-10-29 RX ORDER — AMOXICILLIN 250 MG
1 CAPSULE ORAL 2 TIMES DAILY PRN
Status: DISCONTINUED | OUTPATIENT
Start: 2019-10-29 | End: 2019-11-01 | Stop reason: HOSPADM

## 2019-10-29 RX ORDER — ACETAMINOPHEN 650 MG/1
650 SUPPOSITORY RECTAL EVERY 4 HOURS PRN
Status: DISCONTINUED | OUTPATIENT
Start: 2019-10-29 | End: 2019-11-01 | Stop reason: HOSPADM

## 2019-10-29 RX ORDER — HYDRALAZINE HYDROCHLORIDE 20 MG/ML
10 INJECTION INTRAMUSCULAR; INTRAVENOUS EVERY 6 HOURS PRN
Status: DISCONTINUED | OUTPATIENT
Start: 2019-10-29 | End: 2019-11-01 | Stop reason: HOSPADM

## 2019-10-29 RX ORDER — ATORVASTATIN CALCIUM 40 MG/1
80 TABLET, FILM COATED ORAL DAILY
Status: DISCONTINUED | OUTPATIENT
Start: 2019-10-29 | End: 2019-10-30

## 2019-10-29 RX ORDER — HYDROMORPHONE HYDROCHLORIDE 1 MG/ML
0.2 INJECTION, SOLUTION INTRAMUSCULAR; INTRAVENOUS; SUBCUTANEOUS
Status: DISCONTINUED | OUTPATIENT
Start: 2019-10-29 | End: 2019-11-01 | Stop reason: HOSPADM

## 2019-10-29 RX ORDER — NALOXONE HYDROCHLORIDE 0.4 MG/ML
.1-.4 INJECTION, SOLUTION INTRAMUSCULAR; INTRAVENOUS; SUBCUTANEOUS
Status: DISCONTINUED | OUTPATIENT
Start: 2019-10-29 | End: 2019-11-01 | Stop reason: HOSPADM

## 2019-10-29 RX ORDER — BUDESONIDE AND FORMOTEROL FUMARATE DIHYDRATE 160; 4.5 UG/1; UG/1
2 AEROSOL RESPIRATORY (INHALATION) 2 TIMES DAILY
Status: DISCONTINUED | OUTPATIENT
Start: 2019-10-29 | End: 2019-11-01 | Stop reason: HOSPADM

## 2019-10-29 RX ORDER — DONEPEZIL HYDROCHLORIDE 5 MG/1
5 TABLET, FILM COATED ORAL DAILY
Status: DISCONTINUED | OUTPATIENT
Start: 2019-10-29 | End: 2019-11-01 | Stop reason: HOSPADM

## 2019-10-29 RX ORDER — ONDANSETRON 4 MG/1
4 TABLET, ORALLY DISINTEGRATING ORAL EVERY 6 HOURS PRN
Status: DISCONTINUED | OUTPATIENT
Start: 2019-10-29 | End: 2019-11-01 | Stop reason: HOSPADM

## 2019-10-29 RX ORDER — IPRATROPIUM BROMIDE AND ALBUTEROL SULFATE 2.5; .5 MG/3ML; MG/3ML
3 SOLUTION RESPIRATORY (INHALATION) EVERY 4 HOURS PRN
Status: DISCONTINUED | OUTPATIENT
Start: 2019-10-29 | End: 2019-11-01 | Stop reason: HOSPADM

## 2019-10-29 RX ORDER — METOPROLOL TARTRATE 25 MG/1
25 TABLET, FILM COATED ORAL 2 TIMES DAILY
Status: ON HOLD | COMMUNITY
End: 2019-11-01

## 2019-10-29 RX ORDER — IPRATROPIUM BROMIDE AND ALBUTEROL SULFATE 2.5; .5 MG/3ML; MG/3ML
3 SOLUTION RESPIRATORY (INHALATION)
Status: DISCONTINUED | OUTPATIENT
Start: 2019-10-29 | End: 2019-10-29

## 2019-10-29 RX ORDER — ASPIRIN 81 MG/1
324 TABLET, CHEWABLE ORAL ONCE
Status: COMPLETED | OUTPATIENT
Start: 2019-10-29 | End: 2019-10-29

## 2019-10-29 RX ORDER — HYDROMORPHONE HYDROCHLORIDE 1 MG/ML
0.2 INJECTION, SOLUTION INTRAMUSCULAR; INTRAVENOUS; SUBCUTANEOUS ONCE
Status: COMPLETED | OUTPATIENT
Start: 2019-10-29 | End: 2019-10-29

## 2019-10-29 RX ORDER — CLOPIDOGREL BISULFATE 75 MG/1
75 TABLET ORAL DAILY
Status: DISCONTINUED | OUTPATIENT
Start: 2019-10-29 | End: 2019-10-29

## 2019-10-29 RX ORDER — NITROGLYCERIN 0.4 MG/1
0.4 TABLET SUBLINGUAL EVERY 5 MIN PRN
Status: DISCONTINUED | OUTPATIENT
Start: 2019-10-29 | End: 2019-11-01 | Stop reason: HOSPADM

## 2019-10-29 RX ORDER — ONDANSETRON 2 MG/ML
4 INJECTION INTRAMUSCULAR; INTRAVENOUS EVERY 6 HOURS PRN
Status: DISCONTINUED | OUTPATIENT
Start: 2019-10-29 | End: 2019-11-01 | Stop reason: HOSPADM

## 2019-10-29 RX ORDER — AMOXICILLIN 250 MG
2 CAPSULE ORAL 2 TIMES DAILY PRN
Status: DISCONTINUED | OUTPATIENT
Start: 2019-10-29 | End: 2019-11-01 | Stop reason: HOSPADM

## 2019-10-29 RX ORDER — IPRATROPIUM BROMIDE AND ALBUTEROL SULFATE 2.5; .5 MG/3ML; MG/3ML
3 SOLUTION RESPIRATORY (INHALATION) ONCE
Status: COMPLETED | OUTPATIENT
Start: 2019-10-29 | End: 2019-10-29

## 2019-10-29 RX ORDER — BENZONATATE 100 MG/1
100 CAPSULE ORAL 3 TIMES DAILY PRN
Status: DISCONTINUED | OUTPATIENT
Start: 2019-10-29 | End: 2019-11-01 | Stop reason: HOSPADM

## 2019-10-29 RX ADMIN — ACETAMINOPHEN 650 MG: 325 TABLET, FILM COATED ORAL at 20:36

## 2019-10-29 RX ADMIN — DONEPEZIL HYDROCHLORIDE 5 MG: 5 TABLET ORAL at 20:36

## 2019-10-29 RX ADMIN — ASPIRIN 81 MG 324 MG: 81 TABLET ORAL at 16:44

## 2019-10-29 RX ADMIN — FAMOTIDINE 20 MG: 10 INJECTION INTRAVENOUS at 18:58

## 2019-10-29 RX ADMIN — METOPROLOL TARTRATE 25 MG: 25 TABLET ORAL at 20:36

## 2019-10-29 RX ADMIN — IPRATROPIUM BROMIDE AND ALBUTEROL SULFATE 3 ML: .5; 3 SOLUTION RESPIRATORY (INHALATION) at 20:21

## 2019-10-29 RX ADMIN — PHYTONADIONE 5 MG: 10 INJECTION, EMULSION INTRAMUSCULAR; INTRAVENOUS; SUBCUTANEOUS at 20:37

## 2019-10-29 RX ADMIN — ATORVASTATIN CALCIUM 80 MG: 40 TABLET, FILM COATED ORAL at 20:36

## 2019-10-29 RX ADMIN — HYDROMORPHONE HYDROCHLORIDE 0.2 MG: 1 INJECTION, SOLUTION INTRAMUSCULAR; INTRAVENOUS; SUBCUTANEOUS at 22:40

## 2019-10-29 RX ADMIN — OLANZAPINE 10 MG: 10 TABLET, FILM COATED ORAL at 22:06

## 2019-10-29 RX ADMIN — IPRATROPIUM BROMIDE AND ALBUTEROL SULFATE 3 ML: .5; 3 SOLUTION RESPIRATORY (INHALATION) at 14:59

## 2019-10-29 RX ADMIN — MELATONIN 1000 UNITS: at 20:36

## 2019-10-29 ASSESSMENT — MIFFLIN-ST. JEOR: SCORE: 1283.14

## 2019-10-29 ASSESSMENT — ENCOUNTER SYMPTOMS
COUGH: 1
RHINORRHEA: 1
SHORTNESS OF BREATH: 1

## 2019-10-29 NOTE — ED TRIAGE NOTES
"[place] [facility] [place]/mucus, productive cough, exertional SOB and chest wall pain for 2 days. Per EMS, pt took 5 of his own sublingual nitroglycerin for his chest wall pain before [place] [facility] Lungs \"junkie\" per meds, sating well on RA. Pt given duo neb by meds, pt reports improvement in breathing. Pt denies CP at this time. EKG SR. No IV established. Pt A&O x4. ABC intact.     "

## 2019-10-29 NOTE — ED PROVIDER NOTES
History     Chief Complaint:  Shortness of Breath    HPI  Hubert Tamayo is a 89 year old anticoagulated male with a history of CAD, dementia, and diabetes who presents to the emergency department today for evaluation of exertional shortness of breath. He is brought in by EMS with this shortness of breath, a productive cough, and chest wall pain which have been occurring for 2 days and have remained constant. Per EMS he took 5 of his own sublingual nitroglycerin for his chest discomfort which did help. He was given a Duoneb en route and reports improved breathing from this. Currently he denies any chest pain and states it is more of a discomfort. The patient reports baseline rhinorrhea which has not changed. He denies history of asthma or COPD but is prescribed inhalers and has h/o emphysema.  Quit smoking 1954.     Allergies:  Loxapine  Nifedipine  Sulfa Drugs  Sulfamethoxazole-Trimethoprim    Medications:    Albuterol   Atorvastatin   Symbicort  Cholecalciferol   Plavix  Aricept   Haldol  Norco  Lopressor  Nasonex  Nitrostat  Mycostatin  Zyprexa  Miralax  Senokot  Zoloft  Flomax  Spiriva handihaler   Warfarin    Past Medical History:    CAD  Dementia  Diabetes type 2  HTN  Osteoporosis  Paranoid schizophrenia  Pulmonary emphysema   Closed fracture of transverse process of lumbar     Past Surgical History:    Cataract removal     Family History:    History reviewed. No pertinent family history.     Social History:  The patient arrives alone  PCP: Saige Cortez  Marital Status:  [2]  Lives in a senior facility but no nurse help    Review of Systems   HENT: Positive for rhinorrhea.    Respiratory: Positive for cough and shortness of breath.    Cardiovascular: Positive for chest pain.        (resolving)   All other systems reviewed and are negative.    Physical Exam     Patient Vitals for the past 24 hrs:   BP Pulse SpO2   10/29/19 1436 -- -- 100 %   10/29/19 1435 137/76 77 100 %     Physical Exam  Eyes:                Sclera white; Pupils are equal and round  ENT:                External ears and nares normal  CV:                  Rate as above with regular rhythm                           Symmetric radial pulses                          No BLE edema  Resp:               Bibasilar crackles, no wheeze  GI:                   Abdomen is soft, non-tender  MS:                  Moves all extremities  Skin:                Warm and dry  Neuro:             Speech is normal and fluent. Alert.    Emergency Department Course     ECG:  ECG taken at 1440, ECG read at 1445  Normal sinus rhythm. ST & T wave abnormality, consider anterolateral ischemia. TWI also present at OSH, not present 9/15/19.   Abnormal ECG  Rate 75 bpm. TX interval 156 ms. QRS duration 82 ms. QT/QTc 400/446 ms. P-R-T axes 56 -17 -53.    Imaging:  Radiology findings were communicated with the patient who voiced understanding of the findings.    X-ray Chest, 2 views:  Trace atelectasis and/or fibrosis at the lung bases. There  are no acute infiltrates. The cardiac silhouette is not enlarged.  Pulmonary vasculature is unremarkable.  Result per radiology.     Laboratory:  Laboratory findings were communicated with the patient who voiced understanding of the findings.    CBC: WBC 10.6, HGB 7.1 (L),  (L)   BMP in progress  BNP: 944  Magnesium: 2.2  1521Troponin:  <0.015   Guaiac positive    Interventions:  1459 Duoneb, 3 mL, nebulization     Emergency Department Course:  Past medical records, nursing notes, and vitals reviewed.  1438: I performed an exam of the patient and obtained history, as documented above.     IV was inserted and blood was drawn for laboratory testing, results above.    1530: I rechecked the patient. Explained findings to patient.    I personally reviewed the laboratory/imaging results with the Patient and answered all related questions prior to signout    Report called to Dr. Lu.    Impression & Plan      Medical Decision Making:  Hubert JACOBS  Roni is a 89 year old male who presents to the emergency department today for evaluation of progressive shortness of breath and chest discomfort. He has some bibasilar crackles on exam with concern for CHF. Duoneb was tried pre-hospital and again here, and he states the second did not improve his symptoms.  With no wheezing I do not think this is clearly emphysema and steroids were not ordered.  EKG showed no evidence of dysrhythmia or ischemia. Chest XR was negative for pleural effusion, pneumothorax, or pulmonary edema. Blood work was notable for more than 1g drop in hemoglobin to 7.1.  Guaiac checked and returned positive on brown stool.  I had tried to start discussing transfusion but patient was fixated on what he should eat to fix it.  Exertional symptoms seem multi-factorial and CAD remains on the differential.  Hospitalist aware.    Diagnosis:    ICD-10-CM    1. Shortness of breath R06.02 CBC with platelets differential     Basic metabolic panel     Occult blood stool     ABO/Rh type and screen   2. Precordial pain R07.2    3. Anemia, unspecified type D64.9    4. Guaiac positive stools R19.5        Disposition:   Signed out to Dr. Conti    Discharge Medications:     Medication List      ASK your doctor about these medications    Lidocaine 4 % Patch  Commonly known as:  LIDOCARE  1 patch, Transdermal, .q12h chest tube alteplase (2 syringes), To prevent lidocaine toxicity, patient should be patch free for 12 hrs daily.  Ask about: Should I take this medication?            Scribe Disclosure:  I, Skylar Greenfeng, am serving as a scribe at 2:38 PM on 10/29/2019 to document services personally performed by Cecille Ralph MD based on my observations and the provider's statements to me.    [facility] EMERGENCY DEPARTMENT       Cecille Ralph MD  10/29/19 9630

## 2019-10-29 NOTE — H&P
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    1. Chest pain  2. SOB/cough  3. Possible bronchitis  4. Hx of COPD on inhalers/ emphysema  5. Hypertension  6. Chronically anticoagulated state on warfarin for hx of DVT  7. Recent back pain hospitalization with  acute  L2 and L3 transverse fractures  8. Hx of dementia  9. Paranoid schizophrenia  10. Worsening anemia of chronic disease  11. Hx of CAD    [place] under observation.  Serial troponin  Cardiac telemonitor  Received ASA,continued on plavix.  Also on warfarin. Watch for bleeding tendencies given worsening hgb of 7.1 today.  No indication yet for PRBC transfusion.  Check INR.  Warfarin as per Pharm protocol.  Resume metoprolol and statins.  On zyprexa/donapezil  As needed anti-tussive  Resume COPD medications. No steroids yet, did not hear any prominent wheezes      Code status: Full code as expressed by patient and family    Prophylaxis: on warfarin  Disposition: home in 1-2 days          Chief Complaint:   Cough, chest discomfort, SOB of 1- 2days duration       Source of Information:   Patient with poor to fair reliability with hx of dementia, niece/ health care giver at home with fair reliability  Discussion with ED physician  Review of E chart records         History of Present Illness:   Hubert Tamayo is a 89 year old male with hx of CAD, hypertension, COPD on inhalers, Dementia,  paranoid schizophrenia, recent hospitalization for acute  L2 and L3 transverse fractures, 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 No recent fall events, family endorses no changes on his baseline mental state, no nausea/vomiting or diarrhea. He denies any bleeding tendencies such as hematuria, melena nor BRBPR.  Upon presentation in the ED he is not hypoxic, afebrile, stable hemodynamics and no wheezing heard.  Further investigation showed worsening anemia at 7.1 g, EKG showed NSR, non specific ST changes at lateral leads. Troponin normal but elevated at 0.024. he has hx of CKD.  He was provided with ASA and duoneb.  During the time of my exam Hubert remained very pleasant, conversant and denies any new complaints.          Past Medical History:     Past Medical History:   Diagnosis Date     CAD (coronary artery disease)      Dementia      Diabetes mellitus type II      Essential hypertension      Osteoporosis      Paranoid schizophrenia      Pulmonary emphysema              Past Surgical History:     Past Surgical History:   Procedure Laterality Date     CATARACT REMOVAL Bilateral              Social History:     Social History     Tobacco Use     Smoking status: Not on file   Substance Use Topics     Alcohol use: Not on file             Family History:   Family history was fully reviewed and non-contributory in this case.         Allergies:     Allergies   Allergen Reactions     Crustaceans      Loxapine      Nifedipine      Shellfish Allergy      Sulfa Drugs      Sulfamethoxazole-Trimethoprim      Trimethoprim              Medications:     Prior to Admission medications    Medication Sig Last Dose Taking? Auth Provider   albuterol (PROAIR HFA/PROVENTIL HFA/VENTOLIN HFA) 108 (90 Base) MCG/ACT inhaler Inhale 2 puffs into the lungs every 4 hours as needed  for shortness of breath / dyspnea or wheezing   Unknown, Entered By History   artificial saliva (BIOTENE DRY MOUTHWASH) LIQD liquid Swish and spit 5-10 mLs in mouth 4 times daily as needed for dry mouth   Unknown, Entered By History   atorvastatin (LIPITOR) 80 MG tablet Take 80 mg by mouth daily   Unknown, Entered By History   budesonide-formoterol (SYMBICORT) 160-4.5 MCG/ACT Inhaler Inhale 2 puffs into the lungs 2 times daily   Unknown, Entered By History   cholecalciferol 1000 units TABS Take 1,000 Units by mouth daily   Unknown, Entered By History   clopidogrel (PLAVIX) 75 MG tablet Take 75 mg by mouth daily   Unknown, Entered By History   Donepezil HCl (ARICEPT PO) Take 5 mg by mouth daily    Reported, Patient   emollient (VANICREAM) external cream Apply topically daily Apply to dry skin   Unknown, Entered By History   haloperidol (HALDOL) 2 MG tablet Take 1 tablet (2 mg) by mouth 2 times daily   Todd Velarde MD   HYDROcodone-acetaminophen (NORCO) 5-325 MG tablet Take 1 tablet by mouth every 4 hours as needed for moderate to severe pain   Todd Velarde MD   hydrocortisone (CORTAID) 1 % external cream Apply topically 2 times daily Apply thin layer to itchy scaling on left inner elbow and nose   Unknown, Entered By History   metoprolol tartrate (LOPRESSOR) 50 MG tablet Take 0.5 tablets (25 mg) by mouth 2 times daily   Todd Velarde MD   mometasone (NASONEX) 50 MCG/ACT nasal spray Spray 2 sprays into both nostrils daily as needed (allergy symptoms / runny nose)   Unknown, Entered By History   nitroGLYcerin (NITROSTAT) 0.4 MG sublingual tablet Place 0.4 mg under the tongue every 5 minutes as needed for chest pain For chest pain place 1 tablet under the tongue every 5 minutes for 3 doses. If symptoms persist 5 minutes after 1st dose call 911.   Unknown, Entered By History   nystatin (MYCOSTATIN) 690127 UNIT/GM external cream Apply topically 2 times daily as needed (Intertrigo) Apply thin layer to groin  folds   Unknown, Entered By History   OLANZapine (ZYPREXA) 10 MG tablet Take 1 tablet (10 mg) by mouth At Bedtime   Todd Velarde MD   Petrolatum OINT Externally apply topically 4 times daily as needed (dry lips)   Unknown, Entered By History   polyethylene glycol (MIRALAX/GLYCOLAX) packet Take 17 g by mouth daily as needed for constipation   Todd Velarde MD   senna-docusate (SENOKOT-S/PERICOLACE) 8.6-50 MG tablet Take 1 tablet by mouth 2 times daily as needed for constipation   Todd Velarde MD   sertraline (ZOLOFT) 100 MG tablet Take 1.5 tablets (150 mg) by mouth daily   Todd Velarde MD   tamsulosin (FLOMAX) 0.4 MG capsule Take 0.4 mg by mouth daily   Reported, Patient   Tiotropium Bromide Monohydrate (SPIRIVA HANDIHALER IN) Inhale 1 capsule into the lungs daily    Reported, Patient   WARFARIN SODIUM 3 mg T,Thu and 4 mg all other days, or as directed by anticoagulation clinic   Reported, Patient             Review of Systems:   A Comprehensive greater than 10 system review of systems was carried out.  Pertinent positives and negatives are noted above.  Otherwise negative for contributory information.           Physical Exam:   Blood pressure (!) 147/83, pulse 67, temperature 97.7  F (36.5  C), temperature source Oral, resp. rate 19, SpO2 95 %.  Wt Readings from Last 1 Encounters:   09/15/19 68.5 kg (151 lb 1.6 oz)     Exam:  GENERAL: No apparent distress. Awake, alert, and fully oriented.  HEENT: Normocephalic, atraumatic. Extraocular movements intact.  CARDIOVASCULAR: Regular rate and rhythm, S1 and S2, no pedal edema, no JVD  PULMONARY: Clear to auscultation, no wheezes, crackles  ABDOMINAL: Soft, non-tender, non-distended. Bowel sounds normoactive. No hepatosplenomegaly.  EXTREMITIES: No cyanosis or clubbing.   NEUROLOGICAL: CN 2-12 grossly intact, awake and alert x3, spontaneous and coherent speech. no focal neurological deficits.  DERMATOLOGICAL: No rash, ulcer, ecchymoses, jaundice.  Psych:  not agitation, not combative, pleasant mood           Data:   EKG:  As described above    Imaging:  Recent Results (from the past 48 hour(s))   XR Chest 2 Views    Narrative    CHEST TWO VIEWS 10/29/2019 3:35 PM     HISTORY: Short of breath with exertion.    COMPARISON: September 14, 2019       Impression    IMPRESSION: Trace atelectasis and/or fibrosis at the lung bases. There  are no acute infiltrates. The cardiac silhouette is not enlarged.  Pulmonary vasculature is unremarkable.    ALEX AGUIRRE MD       Labs:  No results for input(s): CULT in the last 168 hours.  Recent Labs   Lab 10/29/19  1521   WBC 10.6   HGB 7.1*   HCT 25.2*   MCV 90   *     Recent Labs   Lab 10/29/19  1521   NA Canceled, Test credited   POTASSIUM Canceled, Test credited   CHLORIDE Canceled, Test credited   CO2 Canceled, Test credited   ANIONGAP Not Calculated   GLC Canceled, Test credited   BUN Canceled, Test credited   CR Canceled, Test credited   GFRESTIMATED 41*   GFRESTBLACK 47*   RAMON Canceled, Test credited   MAG 2.2     Recent Labs   Lab 10/29/19  1521   GLC Canceled, Test credited     No results for input(s): INR in the last 168 hours.  No results for input(s): CHOL, HDL, LDL, TRIG, CHOLHDLRATIO in the last 168 hours.  Recent Labs   Lab 10/29/19  1521   TROPI 0.024     No results for input(s): COLOR, APPEARANCE, URINEGLC, URINEBILI, URINEKETONE, SG, UBLD, URINEPH, PROTEIN, UROBILINOGEN, NITRITE, LEUKEST, RBCU, WBCU in the last 168 hours.

## 2019-10-29 NOTE — PLAN OF CARE
ROOM # 229    Living Situation (if not independent, order SW consult):  Facility name:Lives at home with niece  : [place] (son)     Activity level at baseline: Independent with walker  Activity level on admit: SBA with walker and gait belt      Patient registered to observation; given Patient Bill of Rights; given the opportunity to ask questions about observation status and their plan of care.  Patient has been oriented to the observation room, bathroom and call light is in place.    Discussed discharge goals and expectations with patient/family.

## 2019-10-29 NOTE — PHARMACY-ADMISSION MEDICATION HISTORY
Admission medication history interview status for this patient is complete. See [facility] admission navigator for allergy information, prior to admission medications and immunization status.     Medication history interview source(s):Family and Caregiver - niece Anny is primary caregiver  Medication history resources (including written lists, pill bottles, clinic record): SureScripts, previous admit notes, Care Everywhere  [facility]    Changes made to PTA medication list:  Added: None  Deleted: None  Changed: None    Actions taken by pharmacist (provider contacted, etc):None     Additional medication history information: Per patient's niece, Anny, there have been no changes to his medication list since last admit (9/15/19) and patient last had his medication on 10/28/19.    Medication reconciliation/reorder completed by provider prior to medication history?  Yes     Do you take OTC medications (eg tylenol, ibuprofen, fish oil, eye/ear drops, etc)? Yes     Prior to Admission medications    Medication Sig Last Dose Taking? Auth Provider   albuterol (PROAIR HFA/PROVENTIL HFA/VENTOLIN HFA) 108 (90 Base) MCG/ACT inhaler Inhale 2 puffs into the lungs every 4 hours as needed for shortness of breath / dyspnea or wheezing Past Month at Unknown time Yes Unknown, Entered By History   artificial saliva (BIOTENE DRY MOUTHWASH) LIQD liquid Swish and spit 5-10 mLs in mouth 4 times daily as needed for dry mouth Past Month at Unknown time Yes Unknown, Entered By History   atorvastatin (LIPITOR) 80 MG tablet Take 80 mg by mouth daily 10/28/2019 at pm Yes Unknown, Entered By History   budesonide-formoterol (SYMBICORT) 160-4.5 MCG/ACT Inhaler Inhale 2 puffs into the lungs 2 times daily 10/28/2019 at Unknown time Yes Unknown, Entered By History   cholecalciferol 1000 units TABS Take 1,000 Units by mouth daily 10/28/2019 at am Yes Unknown, Entered By History   clopidogrel (PLAVIX) 75 MG tablet Take 75 mg by mouth daily  10/28/2019 at Unknown time Yes Unknown, Entered By History   Donepezil HCl (ARICEPT PO) Take 5 mg by mouth daily  10/28/2019 at Unknown time Yes Reported, Patient   emollient (VANICREAM) external cream Apply topically daily Apply to dry skin Past Month at Unknown time Yes Unknown, Entered By History   haloperidol (HALDOL) 2 MG tablet Take 1 tablet (2 mg) by mouth 2 times daily  Yes Todd Velarde MD   HYDROcodone-acetaminophen (NORCO) 5-325 MG tablet Take 1 tablet by mouth every 4 hours as needed for moderate to severe pain  Yes Todd Velarde MD   hydrocortisone (CORTAID) 1 % external cream Apply topically 2 times daily Apply thin layer to itchy scaling on left inner elbow and nose Past Month at Unknown time Yes Unknown, Entered By History   metoprolol tartrate (LOPRESSOR) 25 MG tablet Take 25 mg by mouth 2 times daily 10/28/2019 at Unknown time Yes Unknown, Entered By History   metoprolol tartrate (LOPRESSOR) 50 MG tablet Take 0.5 tablets (25 mg) by mouth 2 times daily  Yes Todd Velarde MD   nitroGLYcerin (NITROSTAT) 0.4 MG sublingual tablet Place 0.4 mg under the tongue every 5 minutes as needed for chest pain For chest pain place 1 tablet under the tongue every 5 minutes for 3 doses. If symptoms persist 5 minutes after 1st dose call 911. 10/29/2019 at Unknown time Yes Unknown, Entered By History   nystatin (MYCOSTATIN) 150087 UNIT/GM external cream Apply topically 2 times daily as needed (Intertrigo) Apply thin layer to groin folds Past Month at Unknown time Yes Unknown, Entered By History   OLANZapine (ZYPREXA) 10 MG tablet Take 1 tablet (10 mg) by mouth At Bedtime  Yes Todd Velarde MD   Petrolatum OINT Externally apply topically 4 times daily as needed (dry lips) Past Month at Unknown time Yes Unknown, Entered By History   polyethylene glycol (MIRALAX/GLYCOLAX) packet Take 17 g by mouth daily as needed for constipation  Yes Todd Velarde MD   senna-docusate (SENOKOT-S/PERICOLACE) 8.6-50 MG  tablet Take 1 tablet by mouth 2 times daily as needed for constipation  Yes Todd Velarde MD   sertraline (ZOLOFT) 100 MG tablet Take 1.5 tablets (150 mg) by mouth daily  Yes Todd Velarde MD   tamsulosin (FLOMAX) 0.4 MG capsule Take 0.4 mg by mouth daily 10/28/2019 at Unknown time Yes Reported, Patient   Tiotropium Bromide Monohydrate (SPIRIVA HANDIHALER IN) Inhale 1 capsule into the lungs daily  10/28/2019 at Unknown time Yes Reported, Patient   WARFARIN SODIUM 3 mg T,Thu and 4 mg all other days, or as directed by anticoagulation clinic 10/28/2019 at Unknown time Yes Reported, Patient   mometasone (NASONEX) 50 MCG/ACT nasal spray Spray 2 sprays into both nostrils daily as needed (allergy symptoms / runny nose) More than a month at Unknown time  Unknown, Entered By History

## 2019-10-29 NOTE — ED NOTES
Bed: ED19  Expected date: 10/29/19  Expected time: 2:23 PM  Means of arrival: Ambulance  Comments:  A [id number]

## 2019-10-29 NOTE — ED NOTES
[facility]  ED Nurse Handoff Report    Hubert Tamayo is a 89 year old male   ED Chief complaint: Shortness of Breath  . ED Diagnosis:   Final diagnoses:   Shortness of breath   Precordial pain   Anemia, unspecified type     Allergies:   Allergies   Allergen Reactions     Crustaceans      Loxapine      Nifedipine      Shellfish Allergy      Sulfa Drugs      Sulfamethoxazole-Trimethoprim      Trimethoprim        Code Status: Full Code  Activity level - Baseline/Home:  Independent. With walker Activity Level - Current:   Stand by Assist. Lift room needed: No. Bariatric: No   Needed: No   Isolation: No. Infection: Not Applicable.     Vital Signs:   Vitals:    10/29/19 1436 10/29/19 1500 10/29/19 1600 10/29/19 1638   BP:  126/67 (!) 147/83    Pulse:  73 67    Resp:  15 19    Temp:    97.7  F (36.5  C)   TempSrc:    Oral   SpO2: 100% 97% 95%        Cardiac Rhythm:  ,      Pain level:    Patient confused: No. Patient Falls Risk: Yes.   Elimination Status: Has voided   Patient Report - Initial Complaint: SOB. Focused Assessment:  Hubert Tamayo is a 89 year old anticoagulated male with a history of CAD, dementia, and diabetes who presents to the emergency department today for evaluation of exertional shortness of breath. He is brought in by EMS with this shortness of breath, a productive cough, and chest wall pain which have been occurring for 2 days and have remained constant. Per EMS he took 5 of his own sublingual nitroglycerin for his chest wall pain which did help. He was given a Duoneb en route and reports improved breathing from this. Currently he denies any chest pain and states it is more of a discomfort. The patient reports baseline rhinorrhea which has not changed. He denies history of asthma or COPD but is prescribed inhalers and has h/o emphysema.  Quit smoking 1954.   Tests Performed:   Labs Ordered and Resulted from Time of ED Arrival Up to the Time of Departure from the ED   BASIC  METABOLIC PANEL - Abnormal; Notable for the following components:       Result Value    GFR Estimate 41 (*)     GFR Estimate If Black 47 (*)     All other components within normal limits   CBC WITH PLATELETS DIFFERENTIAL - Abnormal; Notable for the following components:    RBC Count 2.79 (*)     Hemoglobin 7.1 (*)     Hematocrit 25.2 (*)     MCH 25.4 (*)     MCHC 28.2 (*)     RDW 22.6 (*)     Platelet Count 144 (*)     All other components within normal limits   OCCULT BLOOD STOOL - Abnormal; Notable for the following components:    Occult Blood Positive (*)     All other components within normal limits   MAGNESIUM   NT PROBNP INPATIENT   TROPONIN I   BASIC METABOLIC PANEL   PERIPHERAL IV CATHETER   ABO/RH TYPE AND SCREEN     XR Chest 2 Views   Final Result   IMPRESSION: Trace atelectasis and/or fibrosis at the lung bases. There   are no acute infiltrates. The cardiac silhouette is not enlarged.   Pulmonary vasculature is unremarkable.      ALEX AGUIRRE MD        Treatments provided: see MAR  Family Comments: Niece at bedside  OBS brochure/video discussed/provided to patient:  Yes  ED Medications:   Medications   ipratropium - albuterol 0.5 mg/2.5 mg/3 mL (DUONEB) neb solution 3 mL (3 mLs Nebulization Given 10/29/19 2932)   aspirin (ASA) chewable tablet 324 mg (324 mg Oral Given 10/29/19 1644)     Drips infusing:  No  For the majority of the shift, the patient's behavior Green. Interventions performed were none.     Severe Sepsis OR Septic Shock Diagnosis Present: No      ED Nurse Name/Phone Number: Angely English RN,   4:46 PM    RECEIVING UNIT ED HANDOFF REVIEW    Above ED Nurse Handoff Report was reviewed: Yes  Reviewed by: Stephanie Conner, JOHNNY on October 29, 2019 at 5:35 PM   Did you vocera the ED RN:

## 2019-10-30 ENCOUNTER — APPOINTMENT (OUTPATIENT)
Dept: CARDIOLOGY | Facility: CLINIC | Age: 84
DRG: 811 | End: 2019-10-30
Attending: INTERNAL MEDICINE
Payer: MEDICARE

## 2019-10-30 LAB
ABO + RH BLD: NORMAL
ABO + RH BLD: NORMAL
BLD GP AB SCN SERPL QL: NORMAL
BLD PROD TYP BPU: NORMAL
BLD UNIT ID BPU: 0
BLD UNIT ID BPU: 0
BLOOD BANK CMNT PATIENT-IMP: NORMAL
BLOOD PRODUCT CODE: NORMAL
BLOOD PRODUCT CODE: NORMAL
BPU ID: NORMAL
BPU ID: NORMAL
ERYTHROCYTE [DISTWIDTH] IN BLOOD BY AUTOMATED COUNT: 18.5 % (ref 10–15)
HCT VFR BLD AUTO: 22.9 % (ref 40–53)
HGB BLD-MCNC: 6.8 G/DL (ref 13.3–17.7)
INR PPP: 3.08 (ref 0.86–1.14)
INTERPRETATION ECG - MUSE: NORMAL
MCH RBC QN AUTO: 25 PG (ref 26.5–33)
MCHC RBC AUTO-ENTMCNC: 29.7 G/DL (ref 31.5–36.5)
MCV RBC AUTO: 84 FL (ref 78–100)
NUM BPU REQUESTED: 2
PLATELET # BLD AUTO: 173 10E9/L (ref 150–450)
RBC # BLD AUTO: 2.72 10E12/L (ref 4.4–5.9)
SPECIMEN EXP DATE BLD: NORMAL
TRANSFUSION STATUS PATIENT QL: NORMAL
TROPONIN I SERPL-MCNC: 10.77 UG/L (ref 0–0.04)
TROPONIN I SERPL-MCNC: 4.04 UG/L (ref 0–0.04)
TROPONIN I SERPL-MCNC: 6.2 UG/L (ref 0–0.04)
TROPONIN I SERPL-MCNC: 7.67 UG/L (ref 0–0.04)
TROPONIN I SERPL-MCNC: 8.22 UG/L (ref 0–0.04)
TROPONIN I SERPL-MCNC: 9.36 UG/L (ref 0–0.04)
WBC # BLD AUTO: 11 10E9/L (ref 4–11)

## 2019-10-30 PROCEDURE — 99221 1ST HOSP IP/OBS SF/LOW 40: CPT | Mod: 25 | Performed by: INTERNAL MEDICINE

## 2019-10-30 PROCEDURE — 25000132 ZZH RX MED GY IP 250 OP 250 PS 637: Mod: GY | Performed by: INTERNAL MEDICINE

## 2019-10-30 PROCEDURE — 93306 TTE W/DOPPLER COMPLETE: CPT

## 2019-10-30 PROCEDURE — 85610 PROTHROMBIN TIME: CPT | Performed by: INTERNAL MEDICINE

## 2019-10-30 PROCEDURE — 84484 ASSAY OF TROPONIN QUANT: CPT | Performed by: INTERNAL MEDICINE

## 2019-10-30 PROCEDURE — P9016 RBC LEUKOCYTES REDUCED: HCPCS | Performed by: EMERGENCY MEDICINE

## 2019-10-30 PROCEDURE — 12000011 ZZH R&B MS OVERFLOW

## 2019-10-30 PROCEDURE — 99233 SBSQ HOSP IP/OBS HIGH 50: CPT | Performed by: INTERNAL MEDICINE

## 2019-10-30 PROCEDURE — 93306 TTE W/DOPPLER COMPLETE: CPT | Mod: 26 | Performed by: INTERNAL MEDICINE

## 2019-10-30 PROCEDURE — 36415 COLL VENOUS BLD VENIPUNCTURE: CPT | Performed by: INTERNAL MEDICINE

## 2019-10-30 PROCEDURE — 85027 COMPLETE CBC AUTOMATED: CPT | Performed by: INTERNAL MEDICINE

## 2019-10-30 RX ORDER — ASPIRIN 325 MG
325 TABLET ORAL ONCE
Status: COMPLETED | OUTPATIENT
Start: 2019-10-30 | End: 2019-10-30

## 2019-10-30 RX ORDER — ASPIRIN 325 MG
325 TABLET ORAL DAILY
Status: DISCONTINUED | OUTPATIENT
Start: 2019-10-30 | End: 2019-10-30

## 2019-10-30 RX ORDER — WARFARIN SODIUM 1 MG/1
1 TABLET ORAL
Status: COMPLETED | OUTPATIENT
Start: 2019-10-30 | End: 2019-10-30

## 2019-10-30 RX ORDER — ASPIRIN 81 MG/1
81 TABLET, CHEWABLE ORAL DAILY
Status: DISCONTINUED | OUTPATIENT
Start: 2019-10-31 | End: 2019-11-01

## 2019-10-30 RX ORDER — ATORVASTATIN CALCIUM 20 MG/1
20 TABLET, FILM COATED ORAL EVERY EVENING
Status: DISCONTINUED | OUTPATIENT
Start: 2019-10-30 | End: 2019-10-30

## 2019-10-30 RX ORDER — ATORVASTATIN CALCIUM 40 MG/1
40 TABLET, FILM COATED ORAL EVERY EVENING
Status: DISCONTINUED | OUTPATIENT
Start: 2019-10-30 | End: 2019-11-01 | Stop reason: HOSPADM

## 2019-10-30 RX ADMIN — ASPIRIN 325 MG ORAL TABLET 325 MG: 325 PILL ORAL at 14:08

## 2019-10-30 RX ADMIN — METOPROLOL TARTRATE 25 MG: 25 TABLET ORAL at 08:36

## 2019-10-30 RX ADMIN — SENNOSIDES AND DOCUSATE SODIUM 2 TABLET: 8.6; 5 TABLET ORAL at 14:34

## 2019-10-30 RX ADMIN — OLANZAPINE 10 MG: 10 TABLET, FILM COATED ORAL at 21:58

## 2019-10-30 RX ADMIN — ATORVASTATIN CALCIUM 40 MG: 40 TABLET, FILM COATED ORAL at 19:46

## 2019-10-30 RX ADMIN — METOPROLOL TARTRATE 25 MG: 25 TABLET ORAL at 19:46

## 2019-10-30 RX ADMIN — MELATONIN 1000 UNITS: at 08:36

## 2019-10-30 RX ADMIN — WARFARIN SODIUM 1 MG: 1 TABLET ORAL at 18:18

## 2019-10-30 RX ADMIN — DONEPEZIL HYDROCHLORIDE 5 MG: 5 TABLET ORAL at 19:46

## 2019-10-30 ASSESSMENT — ACTIVITIES OF DAILY LIVING (ADL)
RETIRED_EATING: 0-->INDEPENDENT
DRESS: 0-->INDEPENDENT
FALL_HISTORY_WITHIN_LAST_SIX_MONTHS: NO
COGNITION: 1 - ATTENTION OR MEMORY DEFICITS
TOILETING: 1-->ASSISTIVE EQUIPMENT
AMBULATION: 1-->ASSISTIVE EQUIPMENT
TRANSFERRING: 1-->ASSISTIVE EQUIPMENT
SWALLOWING: 0-->SWALLOWS FOODS/LIQUIDS WITHOUT DIFFICULTY
RETIRED_COMMUNICATION: 2-->DIFFICULTY SPEAKING (NOT RELATED TO LANGUAGE BARRIER)
BATHING: 2-->ASSISTIVE PERSON

## 2019-10-30 NOTE — PLAN OF CARE
VSS, A/O. Assist x1 with walker and gait belt. Pt has been somnolent today, more alert as day went on. Denies CP/dizziness/nausea.  Reported to niece today that he was having chest pain but denied when I went to assess him. Some RAMESH when ambulating to bathroom.  2 units PRBC ordered for hgb 6.8 today.  Transfused 1 unit. Troponin elevated, ordered q4h. Trops trending down now, 8.221 at 1430.  Cardiology consulted, recommending conservative management at this time.        3486 text page to MD: Order to transfuse 2 units of blood but per order only 1 unit was prepared. Can you please confirm 2nd unit should be given and will need prepare order. Please advise. thank you     2787 spoke to MD, confirmed 2 units to be transfused, will add prepare order.

## 2019-10-30 NOTE — PROGRESS NOTES
"SP[facility]UAL [facility] Progress Note  [facility] Obs 2nd floor    SH consult for assistance with ACP/HCD conversation.   Chart reviewed, indicating pt has hx of dementia. Oriented pt, Hubert, to role of SH and he welcomes visit.   Hubert shares that he is \"almost blind and very tired.\" He acknowledges request for HCD information noting \"I want as much as I can get.\" He then fell asleep and did not wake to voice. I touched his hand and he woke back up briefly, but after sharing that he is tired, fell back asleep. Unable to assess cognition and appropriateness for HCD conversation at this time. Per his request, left written information bedside.     Updated nursing regarding above. Will attempt to f/u again tomorrow to re-assess for HCD conversation.     SH remains available.     SUSAN Hernandez.  Staff    Pager #[phone]   Pronouns: he/him/his    "

## 2019-10-30 NOTE — PROGRESS NOTES
Notified of troponin value of > 4 without chest pain. Continue present management. Hold heparin due to markedly elevated INR.

## 2019-10-30 NOTE — PLAN OF CARE
PRIMARY DIAGNOSIS: CHEST PAIN/SOB  OUTPATIENT/OBSERVATION GOALS TO BE MET BEFORE DISCHARGE:  1. Ruled out acute coronary syndrome (negative or stable Troponin):  Yes  2. Pain Status: Improved-controlled with oral pain medications.  3. Appropriate provocative testing performed: N/A  - Stress Test Procedure: N/A  - Interpretation of cardiac rhythm per telemetry tech: SR/ Sinus tach.    4. Cleared by Consultants (if applicable):N/A  5. Return to near baseline physical activity: Yes  Discharge Planner Nurse   Safe discharge environment identified: No  Barriers to discharge: Yes- elevated INR, CP, SOB       Entered by: Juan Abdi 10/29/2019 9:49 PM     Please review provider order for any additional goals.   Nurse to notify provider when observation goals have been met and patient is ready for discharge.

## 2019-10-30 NOTE — PROGRESS NOTES
I was informed regarding supratherapeutic INR of more than 10.  No bleeding symptoms  Hold warfarin  Hold Plavix  No further aspirin for now  Administer 5 mg of oral vitamin K as per recommendations from warfarin reversal protocol  Daily INR

## 2019-10-30 NOTE — PROGRESS NOTES
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    On admit pt was noted to have anemia in the setting of supratherapeutic INR.  Since admit troponin has elevated to 9 c/w NSTEMI.  Pt denies CP; states he does have RAMESH in the recent past.  Denies hematemsis or melena    Plans today  Cards consult - ? Med management  Start asa  Continue metoprolol and statin  INR has been corrected and is 3 today  2 unit PRBC tx  TTE ordered  Positive stool guaiac; not surprising with INR >10 on admit.  No active blood loss this admit.  Can f/u with primary MD to discuss further work up for this   I attempted to contact son Aldair today - did not answer phone but I did leave brief message on POC and provided him call back number to discuss plans further       1. NSTEMI   2. SOB likely related to above and progressive anemia  3. Possible bronchitis  4. Hx of COPD on inhalers/ emphysema  5. Hypertension  6. Chronically anticoagulated state on warfarin for hx of DVT with supratherapeutic INR on admit; now corrected  7. Recent back pain hospitalization with  acute  L2 and L3 transverse fractures  8. Hx of dementia  9. Paranoid schizophrenia  10. Worsening anemia of chronic disease  11. Hx of CAD         DVT Prophylaxis: Warfarin  Code Status: Full Code  Discharge Dispo: home  Estimated Disch Date / # of Days until Disch: 2-3 days likely        Interval History (Subjective):      Denies CP.  Having RAMESH.  Denies melena           "         Physical Exam:      Last Vital Signs:  BP (!) 155/88   Pulse 78   Temp 96.6  F (35.9  C) (Oral)   Resp 16   Ht 1.727 m (5' 8\")   Wt 64.9 kg (143 lb)   SpO2 96%   BMI 21.74 kg/m      No intake or output data in the 24 hours ending 10/30/19 1015    Constitutional: Awake, alert, cooperative, no apparent distress   Respiratory: Clear to auscultation bilaterally, no crackles or wheezing   Cardiovascular: Regular rate and rhythm, normal S1 and S2, and no murmur noted   Abdomen: Normal bowel sounds, soft, non-distended, non-tender   Skin: No rashes, no cyanosis, dry to touch   Neuro: Alert and oriented x3, no weakness, numbness, memory loss   Extremities: No edema, normal range of motion   Other(s):        All other systems: Negative          Medications:      All current medications were reviewed with changes reflected in problem list.         Data:      All new lab and imaging data was reviewed.   Labs:  Recent Labs   Lab 10/30/19  0618 10/29/19  1521   WBC 11.0 10.6   HGB 6.8* 7.1*   HCT 22.9* 25.2*   MCV 84 90    144*      Imaging:   Recent Results (from the past 24 hour(s))   XR Chest 2 Views    Narrative    CHEST TWO VIEWS 10/29/2019 3:35 PM     HISTORY: Short of breath with exertion.    COMPARISON: September 14, 2019       Impression    IMPRESSION: Trace atelectasis and/or fibrosis at the lung bases. There  are no acute infiltrates. The cardiac silhouette is not enlarged.  Pulmonary vasculature is unremarkable.    ALEX AGUIRRE MD      "

## 2019-10-30 NOTE — PLAN OF CARE
PRIMARY DIAGNOSIS: CHEST PAIN  OUTPATIENT/OBSERVATION GOALS TO BE MET BEFORE DISCHARGE:  1. Ruled out acute coronary syndrome (negative or stable Troponin):  No  2. Pain Status: Pain free.  3. Appropriate provocative testing performed: Yes  - Stress Test Procedure: Echo  - Interpretation of cardiac rhythm per telemetry tech: SR    4. Cleared by Consultants (if applicable):No  5. Return to near baseline physical activity: Yes  Discharge Planner Nurse   Safe discharge environment identified: Yes  Barriers to discharge: No       Entered by: Stephanie Conner 10/30/2019 8:43 AM     Please review provider order for any additional goals.   Nurse to notify provider when observation goals have been met and patient is ready for discharge.  VSS except BP slightly elevated, scheduled meds given.  Somnolent this am. Hgb 6.8. Pt denies CP/dizziness/SOB/nausea at this time.  Trops q4h.  Cardiology consulted.  Assist x1 w/walker and gait belt.   INR improved today 3.08.  Echo done this am. Tele- SR w/ST depression/inverted Ts.     5576 text page to MD: Hgb down to 6.8 td from 7.1.  Last trop with morning labs was 9.355. Pt asymptomatic, somnolent this am. INR improved at 3.08. Any new orders? Please advise. Thank you

## 2019-10-30 NOTE — PHARMACY-ADMISSION MEDICATION HISTORY
Admission medication history interview status for this patient is complete. See [facility] admission navigator for allergy information, prior to admission medications and immunization status.     Medication history interview source(s):Family and Caregiver - niece Anny is primary caregiver  Medication history resources (including written lists, pill bottles, clinic record): SureScripts, previous admit notes, Care Everywhere  [facility]    Changes made to PTA medication list:  Added: None  Deleted: None  Changed: None    Actions taken by pharmacist (provider contacted, etc):None     Additional medication history information: Per patient's niece, Anny, there have been no changes to his medication list since last admit (9/15/19) and patient last had his medication on 10/28/19.    Medication reconciliation/reorder completed by provider prior to medication history?  Yes     Do you take OTC medications (eg tylenol, ibuprofen, fish oil, eye/ear drops, etc)? Yes     Prior to Admission medications    Medication Sig Last Dose Taking? Auth Provider   albuterol (PROAIR HFA/PROVENTIL HFA/VENTOLIN HFA) 108 (90 Base) MCG/ACT inhaler Inhale 2 puffs into the lungs every 4 hours as needed for shortness of breath / dyspnea or wheezing Past Month at Unknown time Yes Unknown, Entered By History   artificial saliva (BIOTENE DRY MOUTHWASH) LIQD liquid Swish and spit 5-10 mLs in mouth 4 times daily as needed for dry mouth Past Month at Unknown time Yes Unknown, Entered By History   atorvastatin (LIPITOR) 80 MG tablet Take 80 mg by mouth daily 10/28/2019 at pm Yes Unknown, Entered By History   budesonide-formoterol (SYMBICORT) 160-4.5 MCG/ACT Inhaler Inhale 2 puffs into the lungs 2 times daily 10/28/2019 at Unknown time Yes Unknown, Entered By History   cholecalciferol 1000 units TABS Take 1,000 Units by mouth daily 10/28/2019 at am Yes Unknown, Entered By History   clopidogrel (PLAVIX) 75 MG tablet Take 75 mg by mouth daily  10/28/2019 at Unknown time Yes Unknown, Entered By History   Donepezil HCl (ARICEPT PO) Take 5 mg by mouth daily  10/28/2019 at Unknown time Yes Reported, Patient   emollient (VANICREAM) external cream Apply topically daily Apply to dry skin Past Month at Unknown time Yes Unknown, Entered By History   haloperidol (HALDOL) 2 MG tablet Take 1 tablet (2 mg) by mouth 2 times daily  Yes Todd Velarde MD   HYDROcodone-acetaminophen (NORCO) 5-325 MG tablet Take 1 tablet by mouth every 4 hours as needed for moderate to severe pain  Yes Todd Velarde MD   hydrocortisone (CORTAID) 1 % external cream Apply topically 2 times daily Apply thin layer to itchy scaling on left inner elbow and nose Past Month at Unknown time Yes Unknown, Entered By History   metoprolol tartrate (LOPRESSOR) 25 MG tablet Take 25 mg by mouth 2 times daily 10/28/2019 at Unknown time Yes Unknown, Entered By History   metoprolol tartrate (LOPRESSOR) 50 MG tablet Take 0.5 tablets (25 mg) by mouth 2 times daily  Yes Todd Velarde MD   nitroGLYcerin (NITROSTAT) 0.4 MG sublingual tablet Place 0.4 mg under the tongue every 5 minutes as needed for chest pain For chest pain place 1 tablet under the tongue every 5 minutes for 3 doses. If symptoms persist 5 minutes after 1st dose call 911. 10/29/2019 at Unknown time Yes Unknown, Entered By History   nystatin (MYCOSTATIN) 979795 UNIT/GM external cream Apply topically 2 times daily as needed (Intertrigo) Apply thin layer to groin folds Past Month at Unknown time Yes Unknown, Entered By History   OLANZapine (ZYPREXA) 10 MG tablet Take 1 tablet (10 mg) by mouth At Bedtime  Yes Todd Velarde MD   Petrolatum OINT Externally apply topically 4 times daily as needed (dry lips) Past Month at Unknown time Yes Unknown, Entered By History   polyethylene glycol (MIRALAX/GLYCOLAX) packet Take 17 g by mouth daily as needed for constipation  Yes Todd Velarde MD   senna-docusate (SENOKOT-S/PERICOLACE) 8.6-50 MG  tablet Take 1 tablet by mouth 2 times daily as needed for constipation  Yes Todd Velarde MD   sertraline (ZOLOFT) 100 MG tablet Take 1.5 tablets (150 mg) by mouth daily  Yes Todd Velarde MD   tamsulosin (FLOMAX) 0.4 MG capsule Take 0.4 mg by mouth daily 10/28/2019 at Unknown time Yes Reported, Patient   Tiotropium Bromide Monohydrate (SPIRIVA HANDIHALER IN) Inhale 1 capsule into the lungs daily  10/28/2019 at Unknown time Yes Reported, Patient   WARFARIN SODIUM 3 mg T,Thu and 4 mg all other days, or as directed by anticoagulation clinic 10/28/2019 at Unknown time Yes Reported, Patient   mometasone (NASONEX) 50 MCG/ACT nasal spray Spray 2 sprays into both nostrils daily as needed (allergy symptoms / runny nose) More than a month at Unknown time  Unknown, Entered By History

## 2019-10-30 NOTE — CONSULTS
CTS identifies pt as high risk due to Elevated DANIELE. Patient was admitted on 10/29/19 with chest pain. Patient has a history of COPD, CAD, HTN, Diabetes, dementia, Paranoid schizophrenia, and CKD.   Patient has had one hospital admission within the previous six months in September after a fall resulting in a fracture. Patient was discharged to a TCU at that time. Patient has been seen in the ED 3 times within the last 6 months.   Patient lives at home with his niece. [facility].     Hospital follow up appointment was not scheduled for the patient at this time.    No handoff will be given to PCP clinic care coordinator at discharge as no gap in care has been identified.      CM will continue to follow patient for any additional discharge needs.     Frida LINRN  Care Transition Services  [facility]   [phone]

## 2019-10-30 NOTE — PLAN OF CARE
PRIMARY DIAGNOSIS: CHEST PAIN  OUTPATIENT/OBSERVATION GOALS TO BE MET BEFORE DISCHARGE:  1. Ruled out acute coronary syndrome (negative or stable Troponin):  No  2. Pain Status: Pain free.  3. Appropriate provocative testing performed: N/A  - Stress Test Procedure: N/A  - Interpretation of cardiac rhythm per telemetry tech: SR 60s    4. Cleared by Consultants (if applicable):N/A  5. Return to near baseline physical activity: Yes  Discharge Planner Nurse   Safe discharge environment identified: No  Barriers to discharge: Yes- troponin elevated.    Pt troponin elevated at 4.040- MD was notified and pt not having any current CP. No further events overnight.         Entered by: Juan Abdi 10/30/2019 3:56 AM     Please review provider order for any additional goals.   Nurse to notify provider when observation goals have been met and patient is ready for discharge.

## 2019-10-30 NOTE — CONSULTS
Consult Date:  10/30/2019      Hubert is an 89-year-old gentleman with past medical history significant for paranoid schizophrenia, dementia, coronary artery disease, hypertension, COPD, diabetes mellitus, chronic renal insufficiency, distant history of a DVT for which he has been on chronic warfarin therapy and a recent L2-L5 fracture.  The patient was brought to the emergency room yesterday because of complaints of coughing spells, chest discomfort and shortness of breath.  He reportedly took 4 or 5 nitroglycerin at home, which appeared to improve his symptoms.  In the emergency room, he was found to be profoundly anemic with hemoglobin dropping all the way to 6.8, although hemoglobin as of last month was in the 9 range.  INR was greater than 10.  Troponins returned at 9.  EKG demonstrated nonspecific ST-T wave changes anteriorly and laterally.  An echocardiogram demonstrated ejection fraction of 55% -60% without focal wall motion abnormalities or any significant valvular pathology.      It appears, through the patient, who is extremely tangential and not a very reliable historian, cannot keep him on track and I do not know how reliable he is even when he is discussing things.  He comes with his niece who has been living with him for the last month.  Between them, he reports that he has had some chest pressure, chest discomfort intermittently over the last month, for which he takes nitroglycerin.  Over the last 3 days, this got significantly worse and he was having more problems with shortness of breath.  He notes no change in his stools.  No palpitations.  It is very hard to pin him down and get a reliable history.      He knows no details of any past cardiac history, although his chart refers to a history of coronary artery disease.  It is referred to multiple times and previous [facility] admissions and is found in Care Everywhere, but again no details or cardiac testing are found.      PAST MEDICAL HISTORY:   Significant for:   1.  Coronary artery disease, without details.   2.  Dementia.   3.  Paranoid schizophrenia.   4.  Diabetes mellitus.   5.  Essential hypertension.   6.  Osteoporosis.   7.  History of deep venous thrombosis, on chronic warfarin therapy.   8.  Emphysema.      PAST SURGICAL HISTORY:  Includes cataract surgery.      HABITS:  He quit smoking in .  Prior to that, about a pack and a half a day for 20 years.  He also stopped drinking all alcohol years ago.      FAMILY HISTORY:  Appears to be significant for his mother dying of a myocardial infarction at age 89.  Father  at 73 of unknown reasons.      MEDICATIONS:  On admission, include:   1.  Haldol 2 mg b.i.d.   2.  Norco p.r.n. q.4 hours    3.  Cortaid 1% external cream, apply twice a day.   4.  Lidocaine patch q.12 hours.   5.  Metoprolol tartrate 25 mg b.i.d.   6.  Nitroglycerin 0.4 mg sublingual p.r.n.   7.  Mycostatin applied topically b.i.d.   8.  MiraLax 17 grams by mouth daily p.r.n.   9.  Senokot 1 tablet b.i.d. p.r.n.   10.  Zoloft 100 mg 1-1/2 tablets daily.   11.  Flomax 0.4 mg daily.   12.  Spiriva inhaler p.r.n.   13.  Warfarin, reportedly 3 mg Tuesday, Thursday and 4 mg other days of the week.      ALLERGIES:  INCLUDE LOXAPINE, NIFEDIPINE, SULFA DRUGS, TRIMETHOPRIM.      REVIEW OF SYSTEMS:  Not reliable.      PHYSICAL EXAMINATION:   GENERAL:  Demonstrates an elderly gentleman in no acute distress.   HEENT:  Pupils are equal and round.  Sclerae anicteric, conjunctivae not injected.  Oral mucosa is pink and moist without lesion or cyanosis.   NECK:  Supple.  He has no jugular venous distention, no carotid bruits.   CHEST:  Clear to auscultation.   CARDIAC:  Reveals regular rate and rhythm.  I hear no murmur, rub or gallop.   ABDOMEN:  Soft, nontender with normoactive bowel sounds.   EXTREMITIES:  Without edema, he has 1+ pulses.   SKIN:  Warm and dry without clubbing, cyanosis or rash.   NEUROLOGIC:  Appears to be nonfocal other  than he is very tangential.      LABORATORY DATA:  As outlined above.      ASSESSMENT AND PLAN:  Veda has had angina that appears to be chronic; however, clearly appears to have become more unstable over the last 3 days, I suspect in parallel to his worsening anemia.  He is receiving blood right now and appears to be more comfortable.  As stated, echocardiogram appears to demonstrate ejection fraction of 55% -60% without focal wall motion abnormalities.  He only has grade I diastolic dysfunction.  He has no significant valvular pathology.  Pulmonary pressures are estimated to be 28 mmHg plus the right atrial pressure.      At this time, I would treat this conservatively, given his guaiac-positive stools and anemia.  He has been started on metoprolol and aspirin.  I would plan a conservative course.  [facility] will [facility] obtain [facility].  I will discuss with the patient and family whether we want to consider coronary angiography, but at this time, given his GI bleed, I do not think we want to pursue any stenting because this would require dual antiplatelet therapy, which he very well may not tolerate.  We will see how he does with blood transfusion and empiric medical management.  Blood pressure is more than adequate, so I will start him on Imdur.      I will check a fasting lipid profile and start him on a statin.     Does he even need anticoagulation with his history of DVTs?  And if so should he be on a NOAC instead of warfarin.     Further evaluation and treatment depends upon the above results.         STEPHANIE MARQUEZ MD, [facility]             D: 10/30/2019   T: 10/30/2019   MT: KATIE      Name:     VEDA RUSHING   MRN:      [id number]        Account:       [id number]   :      1929           Consult Date:  10/30/2019      Document: [id number]

## 2019-10-30 NOTE — PROGRESS NOTES
"Former[facility] RCAT  Date: October 29, 2019  Admission Dx: Chest Pain  Pulmonary History: COPD  Home Nebulizer/MDI Use: Symbicort BID, Albuterol MDI prn  Home Oxygen: None  Acuity Level (RCAT flow sheet): Level 4    Aerosol Therapy initiated: Duoneb prn    Pulmonary Hygiene initiated: Coughing and deep breathing techniques    Volume Expansion initiated: Incentive spirometer    Current Oxygen Requirements: None (room air)  Current SpO2: 98%    Re-evaluation date: 11/1/2019    Patient Education: Education was provided on RCAT process. Will continue to do education with patient.    See \"RT Assessments\" flow sheet for patient assessment scoring and Acuity Level Details.     Power Morejon, RT on 10/29/2019 at 8:40 PM      "

## 2019-10-30 NOTE — PROGRESS NOTES
Addendum:    I was informed about increasing troponin levels. Ruled in for NSTEMI  Earlier had some chest discomfort.  No IV heparin given supra therapeutic INR. Given vitamin K earlier.  Received ASA in ED. Likely had plavix at home.  Hgb at 7  Repeat Hgb in AM.  Echo. Formal cardiology evaluation  NTG SL prn  Low threshold for nitro drip if with increasing chest pain.  Metoprolol given. On Lipitor of 80 mg

## 2019-10-30 NOTE — PHARMACY-ANTICOAGULATION SERVICE
Clinical Pharmacy - Warfarin Dosing Consult     Pharmacy has been consulted to manage this patient s warfarin therapy.  Indication: DVT/ PE Treatment  Therapy Goal: INR 2-3  Warfarin Prior to Admission: Yes  Warfarin PTA Regimen: 3 mg T,Thu and 4 mg all other days, or as directed    INR   Date Value Ref Range Status   10/30/2019 3.08 (H) 0.86 - 1.14 Final   10/29/2019 >10.00 (HH) 0.86 - 1.14 Final     Comment:     Critical Value called to and read back by  KRZYSZTOF ARRIAGA (OBS) 10.29.19 AT 2007 BY GALILEO         No warfarin dose given 10/29, INR > 10.   Pharmacy will monitor Hubert Tamayo daily and order warfarin doses to achieve specified goal.      Please contact pharmacy as soon as possible if the warfarin needs to be held for a procedure or if the warfarin goals change.

## 2019-10-31 LAB
ANION GAP SERPL CALCULATED.3IONS-SCNC: 4 MMOL/L (ref 3–14)
BASOPHILS # BLD AUTO: 0 10E9/L (ref 0–0.2)
BASOPHILS NFR BLD AUTO: 0.4 %
BUN SERPL-MCNC: 25 MG/DL (ref 7–30)
CALCIUM SERPL-MCNC: 9.2 MG/DL (ref 8.5–10.1)
CHLORIDE SERPL-SCNC: 109 MMOL/L (ref 94–109)
CHOLEST SERPL-MCNC: 77 MG/DL
CO2 SERPL-SCNC: 25 MMOL/L (ref 20–32)
CREAT SERPL-MCNC: 1.4 MG/DL (ref 0.66–1.25)
DIFFERENTIAL METHOD BLD: ABNORMAL
EOSINOPHIL # BLD AUTO: 0.3 10E9/L (ref 0–0.7)
EOSINOPHIL NFR BLD AUTO: 2.6 %
ERYTHROCYTE [DISTWIDTH] IN BLOOD BY AUTOMATED COUNT: 16.6 % (ref 10–15)
GFR SERPL CREATININE-BSD FRML MDRD: 44 ML/MIN/{1.73_M2}
GLUCOSE SERPL-MCNC: 93 MG/DL (ref 70–99)
HCT VFR BLD AUTO: 31.1 % (ref 40–53)
HDLC SERPL-MCNC: 42 MG/DL
HGB BLD-MCNC: 10 G/DL (ref 13.3–17.7)
IMM GRANULOCYTES # BLD: 0.1 10E9/L (ref 0–0.4)
IMM GRANULOCYTES NFR BLD: 0.6 %
INR PPP: 1.23 (ref 0.86–1.14)
INTERPRETATION ECG - MUSE: NORMAL
LDLC SERPL CALC-MCNC: 14 MG/DL
LYMPHOCYTES # BLD AUTO: 1.9 10E9/L (ref 0.8–5.3)
LYMPHOCYTES NFR BLD AUTO: 18.2 %
MCH RBC QN AUTO: 27 PG (ref 26.5–33)
MCHC RBC AUTO-ENTMCNC: 32.2 G/DL (ref 31.5–36.5)
MCV RBC AUTO: 84 FL (ref 78–100)
MONOCYTES # BLD AUTO: 1.4 10E9/L (ref 0–1.3)
MONOCYTES NFR BLD AUTO: 13.6 %
NEUTROPHILS # BLD AUTO: 6.6 10E9/L (ref 1.6–8.3)
NEUTROPHILS NFR BLD AUTO: 64.6 %
NONHDLC SERPL-MCNC: 35 MG/DL
NRBC # BLD AUTO: 0 10*3/UL
NRBC BLD AUTO-RTO: 0 /100
PLATELET # BLD AUTO: 168 10E9/L (ref 150–450)
POTASSIUM SERPL-SCNC: 4.4 MMOL/L (ref 3.4–5.3)
RBC # BLD AUTO: 3.71 10E12/L (ref 4.4–5.9)
SODIUM SERPL-SCNC: 138 MMOL/L (ref 133–144)
TRIGL SERPL-MCNC: 103 MG/DL
TROPONIN I SERPL-MCNC: 4.32 UG/L (ref 0–0.04)
TROPONIN I SERPL-MCNC: 5.25 UG/L (ref 0–0.04)
WBC # BLD AUTO: 10.2 10E9/L (ref 4–11)

## 2019-10-31 PROCEDURE — 85610 PROTHROMBIN TIME: CPT | Performed by: INTERNAL MEDICINE

## 2019-10-31 PROCEDURE — 25000132 ZZH RX MED GY IP 250 OP 250 PS 637: Mod: GY | Performed by: INTERNAL MEDICINE

## 2019-10-31 PROCEDURE — 80061 LIPID PANEL: CPT | Performed by: INTERNAL MEDICINE

## 2019-10-31 PROCEDURE — 80048 BASIC METABOLIC PNL TOTAL CA: CPT | Performed by: INTERNAL MEDICINE

## 2019-10-31 PROCEDURE — 85025 COMPLETE CBC W/AUTO DIFF WBC: CPT | Performed by: INTERNAL MEDICINE

## 2019-10-31 PROCEDURE — 25000128 H RX IP 250 OP 636: Performed by: INTERNAL MEDICINE

## 2019-10-31 PROCEDURE — 36415 COLL VENOUS BLD VENIPUNCTURE: CPT | Performed by: INTERNAL MEDICINE

## 2019-10-31 PROCEDURE — 99232 SBSQ HOSP IP/OBS MODERATE 35: CPT | Performed by: INTERNAL MEDICINE

## 2019-10-31 PROCEDURE — 12000000 ZZH R&B MED SURG/OB

## 2019-10-31 PROCEDURE — 84484 ASSAY OF TROPONIN QUANT: CPT | Performed by: INTERNAL MEDICINE

## 2019-10-31 PROCEDURE — 99233 SBSQ HOSP IP/OBS HIGH 50: CPT | Performed by: INTERNAL MEDICINE

## 2019-10-31 RX ORDER — ISOSORBIDE MONONITRATE 30 MG/1
30 TABLET, EXTENDED RELEASE ORAL DAILY
Status: DISCONTINUED | OUTPATIENT
Start: 2019-10-31 | End: 2019-11-01 | Stop reason: HOSPADM

## 2019-10-31 RX ORDER — AMLODIPINE BESYLATE 5 MG/1
5 TABLET ORAL DAILY
Status: DISCONTINUED | OUTPATIENT
Start: 2019-10-31 | End: 2019-11-01 | Stop reason: HOSPADM

## 2019-10-31 RX ADMIN — ISOSORBIDE MONONITRATE 30 MG: 30 TABLET, EXTENDED RELEASE ORAL at 13:57

## 2019-10-31 RX ADMIN — RIVAROXABAN 15 MG: 15 TABLET, FILM COATED ORAL at 17:35

## 2019-10-31 RX ADMIN — MELATONIN 1000 UNITS: at 10:21

## 2019-10-31 RX ADMIN — ASPIRIN 81 MG 81 MG: 81 TABLET ORAL at 10:20

## 2019-10-31 RX ADMIN — METOPROLOL TARTRATE 25 MG: 25 TABLET ORAL at 10:21

## 2019-10-31 RX ADMIN — ATORVASTATIN CALCIUM 40 MG: 40 TABLET, FILM COATED ORAL at 21:26

## 2019-10-31 RX ADMIN — SENNOSIDES AND DOCUSATE SODIUM 2 TABLET: 8.6; 5 TABLET ORAL at 21:26

## 2019-10-31 RX ADMIN — METOPROLOL TARTRATE 25 MG: 25 TABLET ORAL at 21:26

## 2019-10-31 RX ADMIN — OLANZAPINE 10 MG: 10 TABLET, FILM COATED ORAL at 21:26

## 2019-10-31 RX ADMIN — OMEPRAZOLE 20 MG: 20 CAPSULE, DELAYED RELEASE ORAL at 10:21

## 2019-10-31 RX ADMIN — DONEPEZIL HYDROCHLORIDE 5 MG: 5 TABLET ORAL at 21:27

## 2019-10-31 RX ADMIN — SENNOSIDES AND DOCUSATE SODIUM 2 TABLET: 8.6; 5 TABLET ORAL at 13:38

## 2019-10-31 RX ADMIN — HYDRALAZINE HYDROCHLORIDE 10 MG: 20 INJECTION INTRAMUSCULAR; INTRAVENOUS at 10:47

## 2019-10-31 ASSESSMENT — ACTIVITIES OF DAILY LIVING (ADL)
ADLS_ACUITY_SCORE: 26
ADLS_ACUITY_SCORE: 25
ADLS_ACUITY_SCORE: 25

## 2019-10-31 NOTE — CONSULTS
"CLINICAL NUTRITION SERVICES  -  ASSESSMENT NOTE      MALNUTRITION:  % Weight Loss:  Weight loss does not meet criteria for malnutrition --> 5% w/in 1.5 months  % Intake: Unable to determine given limited nutrition history provided, at risk given degree of wt loss PTA  Subcutaneous Fat Loss:  Orbital region mild depletion and Upper arm region moderate depletion  Muscle Loss:  Temporal region mild to moderate depletion, Clavicle bone region moderate depletion, Acromion bone region moderate depletion, Dorsal hand region moderate depletion, Patellar region mild to moderate depletion, Anterior thigh region mild to moderate depletion and Posterior calf region mild to moderate depletion  Fluid Retention:  None noted    Malnutrition Diagnosis: Non-Severe malnutrition  In Context of:  Chronic illness or disease        REASON FOR ASSESSMENT  Hubert Tamayo is a 89 year old male seen by Registered Dietitian for Admission Nutrition Risk Screen for reduced oral intake over the last month.      NUTRITION HISTORY  - Information obtained from patient, most chart.  Patient awoke to sound of voice, though falling back asleep during assessment, constantly reporting he is \"tired\".    - Patient with a h/o COPD, dementia, paranoid schizophrenia, DMII (diet controlled), recent admit for fractures.  - Admit 2/2 SOB, also found to have NSTEMI.  - Care Coordinator documentation reveals patient lives at home with his niece.    - Patient describes eating less than usual PTA d/t decreased appetite and being tired.  Unable to provide complete nutrition history at this time (?baseline diet - many missing teeth, usual number of meals/day).   - He does report consuming Ensure at home, likes strawberry or chocolate.  - Shellfish allergy interfaced with meal ordering system.      CURRENT NUTRITION ORDERS  Diet Order:     Regular    Current Intake/Tolerance:  Limited PO trends given recent admit.  Reports he did not yet eat breakfast this AM.  " "      NUTRITION FOCUSED PHYSICAL ASSESSMENT FOR DIAGNOSING MALNUTRITION)  Completed:  Yes Full assessment         Observed:    Muscle wasting (refer to documentation in Malnutrition section) and Subcutaneous fat loss (refer to documentation in Malnutrition section)    Obtained from Chart/Interdisciplinary Team:  Forgetful    ANTHROPOMETRICS  Height: 5' 8\"  Weight: 143#  Body mass index is 21.74 kg/m .  Weight Status:  Normal BMI  IBW: 154#  % IBW: 93%  Weight History:  Wt Readings from Last 10 Encounters:   10/29/19 64.9 kg (143 lb)   09/15/19 68.5 kg (151 lb 1.6 oz)   - 5% wt loss over the past 1.5 months?  Accuracy of admit wt?  - UBW closer to 150-155# per review of care everywhere trending.      LABS  Labs reviewed:  No results found for: A1C    MEDICATIONS  Medications reviewed      ASSESSED NUTRITION NEEDS PER APPROVED PRACTICE GUIDELINES:    Dosing Weight 64.9 kg  Estimated Energy Needs: 7460-0193+ kcals (25-30+ Kcal/Kg)  Justification: repletion  Estimated Protein Needs: 78-97+ grams protein (1.2-1.5+ g pro/Kg)  Justification: Repletion and preservation of lean body mass  Estimated Fluid Needs: per MD    MALNUTRITION:  % Weight Loss:  Weight loss does not meet criteria for malnutrition --> 5% w/in 1.5 months  % Intake: Unable to determine given limited nutrition history provided, at risk given degree of wt loss PTA  Subcutaneous Fat Loss:  Orbital region mild depletion and Upper arm region moderate depletion  Muscle Loss:  Temporal region mild to moderate depletion, Clavicle bone region moderate depletion, Acromion bone region moderate depletion, Dorsal hand region moderate depletion, Patellar region mild to moderate depletion, Anterior thigh region mild to moderate depletion and Posterior calf region mild to moderate depletion  Fluid Retention:  None noted    Malnutrition Diagnosis: Non-Severe malnutrition  In Context of:  Chronic illness or disease    NUTRITION DIAGNOSIS:  Predicted suboptimal nutrient " intake (energy/protein) related to degree of wt loss PTA, meets malnutrition criteria based on NFPE, potential for decline in PO intakes during admit.      NUTRITION INTERVENTIONS  Recommendations / Nutrition Prescription  Continue diet as ordered.  Self-selection of soft solids in the setting of poor dentition as able.  Need for diet downgrade per MD/RN discretion.    Add Boost BID between meals.      Implementation  Nutrition education: Provided education on addition of boost as above.    Medical Food Supplement: As above.      Nutrition Goals  Patient to consume % of meals or supplements TID.      MONITORING AND EVALUATION:  Progress towards goals will be monitored and evaluated per protocol and Practice Guidelines            Julia Glasgow RD, LD  Clinical Dietitian  3rd floor/ICU: [phone]  All other floors: [phone]  Weekend/holiday: [phone]

## 2019-10-31 NOTE — PROGRESS NOTES
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     On admit pt was noted to have anemia in the setting of supratherapeutic INR.  Since admit troponin has elevated to 9 c/w NSTEMI.  Pt denies CP; states he does have RAMESH in the recent past.  Denies hematemsis or melena     Plans today  Start xarelto - suspect will be safer alternative that warfarin given he presented with INR > 10  Stop warfarin  PPI in setting of anemia and AC use  Recommend that pt/family review with his primary MD to see if AC can be stopped for distant DVT hx  Med management of NSTEMI - d/w Dr. Mendoza today and appreciate his assistance  Anticipate discharge tomorrow  I called and talked with pt son Kumar today and updated him on POC and likely discharge tomorrow       1. NSTEMI   2. SOB likely related to above and progressive anemia.  resolved  4. Hx of COPD on inhalers/ emphysema  5. Hypertension  6. Chronically anticoagulated state on warfarin for hx of DVT with supratherapeutic INR on admit; now corrected.  Replacing with NOAC  7. Recent back pain hospitalization with  acute  L2 and L3 transverse fractures  8. Hx of dementia  9. Paranoid schizophrenia  10. Worsening anemia of chronic disease  11. Hx of CAD      I called and updated son Kumar today.  I also spoke with Dr. Mendoza of cardiology today     DVT Prophylaxis: Warfarin  Code Status: Full Code  Discharge Dispo: home  Estimated Disch Date / # of Days until Disch:  "tomorrow           Interval History (Subjective):      Pt feels well.  No complaints.  No cp/sob.  No abd pain.  Has chronic swallow difficulty related to distant XRT therapy that is unchanged.                    Physical Exam:      Last Vital Signs:  /69 (BP Location: Right arm)   Pulse 59   Temp 95  F (35  C) (Oral)   Resp 20   Ht 1.727 m (5' 8\")   Wt 64.9 kg (143 lb)   SpO2 93%   BMI 21.74 kg/m        Intake/Output Summary (Last 24 hours) at 10/31/2019 1400  Last data filed at 10/31/2019 1324  Gross per 24 hour   Intake 1362.5 ml   Output --   Net 1362.5 ml       Constitutional: Awake, alert, cooperative, no apparent distress   Respiratory: Clear to auscultation bilaterally, no crackles or wheezing   Cardiovascular: Regular rate and rhythm, normal S1 and S2, and no murmur noted   Abdomen: Normal bowel sounds, soft, non-distended, non-tender   Skin: No rashes, no cyanosis, dry to touch   Neuro: Alert and oriented x3, no weakness, numbness, memory loss   Extremities: No edema, normal range of motion   Other(s):        All other systems: Negative          Medications:      All current medications were reviewed with changes reflected in problem list.         Data:      All new lab and imaging data was reviewed.   Labs:  Recent Labs   Lab 10/31/19  0611   WBC 10.2   HGB 10.0*   HCT 31.1*   MCV 84         Imaging:   No results found for this or any previous visit (from the past 24 hour(s)).   "

## 2019-10-31 NOTE — PHARMACY
Anticoagulation coverage check.  Patient has no prescription insurance, likely because [facility] uses the [facility] to [facility].    Xarelto and Eliquis are both on the VA formulary, and patient could get these at the [facility] pharmacy if his [facility] physician ordered them.    Discharge Pharmacy can provide 1 month free of either of these at discharge.  In addition, we can provide a free 10 day supply of any other discharge meds (excluding OTCs) he may need, in accordance with our [facility] contract.      TAYLOR Jauregui, Pharmacy Technician/Liaison, Discharge Pharmacy [phone]

## 2019-10-31 NOTE — PLAN OF CARE
"BP (!) 167/91 (BP Location: Left arm)   Pulse 59   Temp 97.5  F (36.4  C) (Oral)   Resp 16   Ht 1.727 m (5' 8\")   Wt 64.9 kg (143 lb)   SpO2 98%   BMI 21.74 kg/m        Neuro: Ex. Orientated x3. Unable to tell me the situation.   Cardiac: WNL. Tele on.   Lungs: Ex. Diminished. Fine Crackles. Productive infrequent cough.   GI: WNL   : WNL   Pain: Pain Free.   IV: SL.   Meds: Tolerating PO meds.   Labs/tests: See results review.   Diet: Regular diet.   Activity: Assist of 1 with gait belt and walker.   Misc: Cardiology consult. Called son Aldair regarding [facility] records.   Plan: Transferred to room 301. Family updated.     Will continue to monitor and provide cares.    "

## 2019-10-31 NOTE — PLAN OF CARE
"  Inpatient Progress Note:    /73 (BP Location: Left arm)   Pulse 79   Temp 97.6  F (36.4  C) (Oral)   Resp 18   Ht 1.727 m (5' 8\")   Wt 64.9 kg (143 lb)   SpO2 97%   BMI 21.74 kg/m         Orientation: A x O x 3, forgetful to time  Neuro:   Pain status: Denied pain  Activity: Assist of 1 with walker and gait belt, up to bathroom, needs bed alarm on, forgetful  Peripheral edema: None  Resp: Productive, intermittent cough, has blood tinged sputum  Cardiac: ST depression with inverted T waves, 60's.  GI: WNL  : WNL, up to BR and voided x3     Skin: None  LDA: R arm PIV, Saline locked  Infusions: Finished 2nd unit of PRBC's   Pertinent Labs: BMP, CBS and Lipids in the AM, Trop trending down 5.249, rechecking every 4 hours   .  Diet: Regular  Consults: Cardiology and Hospitalists following  Discharge Plan: Discharge home with Niece when ready      Son Aldair called. Wanted to make sure he was a FULL code status while admitted. Also stated he has to eat  Breakfast with his morning medications or he will vomit.   Jason Ling RN         "

## 2019-10-31 NOTE — PLAN OF CARE
VSS. Tele - SR w/ ST depression and inverted Ts. Denies pain. Parameters added for norvasc. Up SBA with walker. Plan: possibly discharge home tomorrow.

## 2019-10-31 NOTE — PROGRESS NOTES
[facility]  Cardiology Progress Note    Date of Service (when I saw the patient): 10/31/2019    Summary: Hubert Tamayo is a 89 year old male with history of paranoid schizophrenia, dementia, coronary artery disease, hypertension, COPD, diabetes mellitus, chronic renal insufficiency, distant history of a DVT for which he has been on chronic warfarin therapy and a recent L2-L5 fracture.  He presented to ER on 10/29/2019 due to coughing spells, chest discomfort and shortness of breath.  He reportedly took 4 or 5 nitroglycerin at home, which appeared to improve his symptoms.  In the emergency room, he was found to be profoundly anemic with hemoglobin dropping all the way to 6.8, although hemoglobin as of last month was in the 9 range.  INR was greater than 10.  Troponins returned at 9.  EKG demonstrated nonspecific ST-T wave changes anteriorly and laterally.  An echocardiogram demonstrated ejection fraction of 55% -60% without focal wall motion abnormalities or any significant valvular pathology.        Interval History   He says he is tired, but no complaints of chest pain.        Assessment & Plan   Elevated troponin  - Chronic angina.  CAD history is mentioned in the record, but no details known.  Trying to get [facility] records  - Now more unstable over the last 3 days, in the setting of his worsening anemia/GIB with hgb down to 6.8  - Trop to 10.7, now downward trending  - Echocardiogram appears to demonstrate ejection fraction of 55% -60% without focal wall motion abnormalities.  He only has grade I diastolic dysfunction.  He has no significant valvular pathology.  Pulmonary pressures are estimated to be 28 mmHg plus the right atrial pressure.   - For now, we are treating this conservatively given his guaiac-positive stools and anemia and dementia.  He has been transfused and hgb up to 10.  He has no chest pain.  Would continue to treat conservatively.   - Continue ASA, metoprolol, statin.  Will add Imdur 30  mg  - If he has recurrent anginal sxs after transfusion and on meds, we could consider cardiac cath      H/o DVTs  Now with GIB  - Does he even need anticoagulation with his history of DVTs?  And if so should he be on a NOAC instead of warfarin.  Defer this to primary team      Rashaad Crook PA-C      Patient Active Problem List   Diagnosis     Closed fracture of transverse process of lumbar vertebra (H)     Lumbar transverse process fracture (H)     Chest pain     NSTEMI (non-ST elevated myocardial infarction) (H)       Physical Exam   Temp: 97.5  F (36.4  C) Temp src: Oral BP: (!) 167/91 Pulse: 59 Heart Rate: 72 Resp: 16 SpO2: 98 % O2 Device: None (Room air)    Vitals:    10/29/19 1814   Weight: 64.9 kg (143 lb)     Vital Signs with Ranges  Temp:  [96.3  F (35.7  C)-98.5  F (36.9  C)] 97.5  F (36.4  C)  Pulse:  [59-79] 59  Heart Rate:  [70-82] 72  Resp:  [16-24] 16  BP: (117-167)/(53-91) 167/91  SpO2:  [95 %-99 %] 98 %  I/O last 3 completed shifts:  In: 882.5 [P.O.:240]  Out: -     Constitutional: NAD.   Respiratory: CTAB.   Cardiovascular: RRR, s1s2, no sig murmur  GI: soft, BS+  Skin: warm, no rashes  Musculoskeletal: Moving all extremities  Neurologic: Alert   Neuropsychiatric: Normal affect       Data   Results for orders placed or performed during the hospital encounter of 10/29/19 (from the past 24 hour(s))   Care Coordinator IP Consult    Narrative    Frida Montemayor RN     10/30/2019  1:25 PM  CTS identifies pt as high risk due to Elevated DANIELE. Patient was   admitted on 10/29/19 with chest pain. Patient has a history of   COPD, CAD, HTN, Diabetes, dementia, Paranoid schizophrenia, and   CKD.   Patient has had one hospital admission within the previous six   months in September after a fall resulting in a fracture. Patient   was discharged to a TCU at that time. Patient has been seen in   the ED 3 times within the last 6 months.   Patient lives at home with his niece. Patient has benefits   through the  [facility].     Hospital follow up appointment was not scheduled for the patient   at this time.    No handoff will be given to PCP clinic care coordinator at   discharge as no gap in care has been identified.      CM will continue to follow patient for any additional discharge   needs.     Frida SMITH  [facility]   [phone]     Troponin I   Result Value Ref Range    Troponin I ES 8.221 (HH) 0.000 - 0.045 ug/L   Troponin I   Result Value Ref Range    Troponin I ES 7.665 (HH) 0.000 - 0.045 ug/L   Troponin I   Result Value Ref Range    Troponin I ES 6.200 (HH) 0.000 - 0.045 ug/L   Troponin I   Result Value Ref Range    Troponin I ES 5.249 (HH) 0.000 - 0.045 ug/L   INR   Result Value Ref Range    INR 1.23 (H) 0.86 - 1.14   Troponin I   Result Value Ref Range    Troponin I ES 4.319 (HH) 0.000 - 0.045 ug/L   Lipid Profile   Result Value Ref Range    Cholesterol 77 <200 mg/dL    Triglycerides 103 <150 mg/dL    HDL Cholesterol 42 >39 mg/dL    LDL Cholesterol Calculated 14 <100 mg/dL    Non HDL Cholesterol 35 <130 mg/dL   CBC with platelets differential   Result Value Ref Range    WBC 10.2 4.0 - 11.0 10e9/L    RBC Count 3.71 (L) 4.4 - 5.9 10e12/L    Hemoglobin 10.0 (L) 13.3 - 17.7 g/dL    Hematocrit 31.1 (L) 40.0 - 53.0 %    MCV 84 78 - 100 fl    MCH 27.0 26.5 - 33.0 pg    MCHC 32.2 31.5 - 36.5 g/dL    RDW 16.6 (H) 10.0 - 15.0 %    Platelet Count 168 150 - 450 10e9/L    Diff Method Automated Method     % Neutrophils 64.6 %    % Lymphocytes 18.2 %    % Monocytes 13.6 %    % Eosinophils 2.6 %    % Basophils 0.4 %    % Immature Granulocytes 0.6 %    Nucleated RBCs 0 0 /100    Absolute Neutrophil 6.6 1.6 - 8.3 10e9/L    Absolute Lymphocytes 1.9 0.8 - 5.3 10e9/L    Absolute Monocytes 1.4 (H) 0.0 - 1.3 10e9/L    Absolute Eosinophils 0.3 0.0 - 0.7 10e9/L    Absolute Basophils 0.0 0.0 - 0.2 10e9/L    Abs Immature Granulocytes 0.1 0 - 0.4 10e9/L    Absolute Nucleated RBC 0.0     Basic metabolic panel   Result Value Ref Range    Sodium 138 133 - 144 mmol/L    Potassium 4.4 3.4 - 5.3 mmol/L    Chloride 109 94 - 109 mmol/L    Carbon Dioxide 25 20 - 32 mmol/L    Anion Gap 4 3 - 14 mmol/L    Glucose 93 70 - 99 mg/dL    Urea Nitrogen 25 7 - 30 mg/dL    Creatinine 1.40 (H) 0.66 - 1.25 mg/dL    GFR Estimate 44 (L) >60 mL/min/[1.73_m2]    GFR Estimate If Black 51 (L) >60 mL/min/[1.73_m2]    Calcium 9.2 8.5 - 10.1 mg/dL     Echo 10/30/19:  Interpretation Summary  Left ventricular systolic function is normal.  The visual ejection fraction is estimated at 55-60%.  The study was technically difficult. Subcostal views were not interpretable.  There is no comparison study available.      Recent Labs   Lab 10/31/19  0611 10/31/19  0158 10/30/19  2239  10/30/19  0618  10/29/19  1925 10/29/19  1637 10/29/19  1521   WBC 10.2  --   --   --  11.0  --   --   --  10.6   HGB 10.0*  --   --   --  6.8*  --   --   --  7.1*   MCV 84  --   --   --  84  --   --   --  90     --   --   --  173  --   --   --  144*   INR 1.23*  --   --   --  3.08*  --  >10.00*  --   --      --   --   --   --   --   --  140 Canceled, Test credited   POTASSIUM 4.4  --   --   --   --   --   --  3.8 Canceled, Test credited   CHLORIDE 109  --   --   --   --   --   --  109 Canceled, Test credited   CO2 25  --   --   --   --   --   --  27 Canceled, Test credited   BUN 25  --   --   --   --   --   --  24 Canceled, Test credited   CR 1.40*  --   --   --   --   --   --  1.64* Canceled, Test credited   ANIONGAP 4  --   --   --   --   --   --  4 Not Calculated   RAMON 9.2  --   --   --   --   --   --  9.2 Canceled, Test credited   GLC 93  --   --   --   --   --   --  93 Canceled, Test credited   TROPI 4.319* 5.249* 6.200*   < > 9.355*   < >  --   --  0.024    < > = values in this interval not displayed.       Medications     Warfarin Therapy Reminder         aspirin  81 mg Oral Daily     atorvastatin  40 mg Oral QPM      budesonide-formoterol  2 puff Inhalation BID     donepezil  5 mg Oral Daily     metoprolol tartrate  25 mg Oral BID     OLANZapine  10 mg Oral At Bedtime     omeprazole  20 mg Oral QAM AC     Vitamin D3  1,000 Units Oral Daily

## 2019-10-31 NOTE — PLAN OF CARE
Presentation/Diagnosis: Pt admitted 10/29 due to SOB and chest discomfort. Pt diagnosed with NSTEMI. Pt INR 10 on admit. Pt given vitamin K and 2 units RBCs since admission.   History: CAD, dementia, emphysema, paranoid schizophrenia, DM 2, HTN, osteoporosis and DVT  Labs/Protocols: K: 4.4, Creatinine: 1.4, Hgb: 10 (up from 6.8-10/30). Platelets: 168, INR: 1.23  Vitals: BP elevated, PRN hydralazine for SBP>160 or DBP >110, given once this shift. Other VSS on RA. Pt denies pain.   Cardiac: Troponin peak: 10.765-10/30, troponin today: 4.319. Echo done 10/30: EF: 55-60%. Cardiology following. Imdur and norvasc started today.   Telemetry: SR with ST depression and inverted Ts.   Respiratory: Diminished lung sounds with fine crackles. Dyspnea on exertion. Productive cough.  Neuro: Alert. Oriented to person and place. Disoriented to time and situation. Forgetful, but cooperative with cares. Impulsive, alarms in place for safety.  GI/: WDL ex pt reports not having BM for a few days. PRN senna given this shift.   Skin: Bruising from venipuncture.  LDAs: PIV to R arm, SL.   Diet: Regular diet. Per pt, pt has had difficult swallowing since 1958. Soft foods/liquids preferred.   Activity: Ax1 with GB and FWW   Teaching: Pt educated on plan of care. Pt son Kumar updated per MD.   Plan: Continue to monitor overnight. Plan for discharge tomorrow. Start Eliquis (discontinue coumadin) tonight.     Will continue to monitor.

## 2019-11-01 VITALS
DIASTOLIC BLOOD PRESSURE: 61 MMHG | HEART RATE: 59 BPM | RESPIRATION RATE: 20 BRPM | OXYGEN SATURATION: 96 % | SYSTOLIC BLOOD PRESSURE: 113 MMHG | TEMPERATURE: 96.1 F | HEIGHT: 68 IN | WEIGHT: 143 LBS | BODY MASS INDEX: 21.67 KG/M2

## 2019-11-01 LAB
BILIRUB DIRECT SERPL-MCNC: 0.1 MG/DL (ref 0–0.2)
BILIRUB SERPL-MCNC: 0.6 MG/DL (ref 0.2–1.3)
CREAT SERPL-MCNC: 1.68 MG/DL (ref 0.66–1.25)
GFR SERPL CREATININE-BSD FRML MDRD: 35 ML/MIN/{1.73_M2}
HGB BLD-MCNC: 8.6 G/DL (ref 13.3–17.7)

## 2019-11-01 PROCEDURE — 25000132 ZZH RX MED GY IP 250 OP 250 PS 637: Mod: GY | Performed by: INTERNAL MEDICINE

## 2019-11-01 PROCEDURE — 99238 HOSP IP/OBS DSCHRG MGMT 30/<: CPT | Performed by: INTERNAL MEDICINE

## 2019-11-01 PROCEDURE — 85018 HEMOGLOBIN: CPT | Performed by: INTERNAL MEDICINE

## 2019-11-01 PROCEDURE — 82247 BILIRUBIN TOTAL: CPT | Performed by: INTERNAL MEDICINE

## 2019-11-01 PROCEDURE — 82248 BILIRUBIN DIRECT: CPT | Performed by: INTERNAL MEDICINE

## 2019-11-01 PROCEDURE — 36415 COLL VENOUS BLD VENIPUNCTURE: CPT | Performed by: INTERNAL MEDICINE

## 2019-11-01 PROCEDURE — 99232 SBSQ HOSP IP/OBS MODERATE 35: CPT | Performed by: INTERNAL MEDICINE

## 2019-11-01 PROCEDURE — 82565 ASSAY OF CREATININE: CPT | Performed by: INTERNAL MEDICINE

## 2019-11-01 RX ORDER — AMLODIPINE BESYLATE 5 MG/1
5 TABLET ORAL DAILY
Qty: 30 TABLET | Refills: 1 | Status: ON HOLD | OUTPATIENT
Start: 2019-11-02 | End: 2019-11-12

## 2019-11-01 RX ORDER — CLOPIDOGREL BISULFATE 75 MG/1
75 TABLET ORAL DAILY
Status: ON HOLD | OUTPATIENT
Start: 2019-11-01 | End: 2020-11-18

## 2019-11-01 RX ORDER — CLOPIDOGREL BISULFATE 75 MG/1
75 TABLET ORAL DAILY
Status: SHIPPED | OUTPATIENT
Start: 2019-11-01 | End: 2019-11-01

## 2019-11-01 RX ORDER — METOPROLOL TARTRATE 50 MG
50 TABLET ORAL 2 TIMES DAILY
Status: DISCONTINUED | OUTPATIENT
Start: 2019-11-01 | End: 2019-11-01 | Stop reason: HOSPADM

## 2019-11-01 RX ORDER — METOPROLOL TARTRATE 50 MG
50 TABLET ORAL 2 TIMES DAILY
Qty: 60 TABLET | Refills: 1 | Status: SHIPPED | OUTPATIENT
Start: 2019-11-01

## 2019-11-01 RX ADMIN — AMLODIPINE BESYLATE 5 MG: 5 TABLET ORAL at 08:13

## 2019-11-01 RX ADMIN — Medication 1 MG: at 00:59

## 2019-11-01 RX ADMIN — METOPROLOL TARTRATE 25 MG: 25 TABLET ORAL at 08:13

## 2019-11-01 RX ADMIN — ISOSORBIDE MONONITRATE 30 MG: 30 TABLET, EXTENDED RELEASE ORAL at 08:13

## 2019-11-01 RX ADMIN — OMEPRAZOLE 20 MG: 20 CAPSULE, DELAYED RELEASE ORAL at 08:13

## 2019-11-01 RX ADMIN — ASPIRIN 81 MG 81 MG: 81 TABLET ORAL at 08:13

## 2019-11-01 RX ADMIN — MELATONIN 1000 UNITS: at 08:13

## 2019-11-01 ASSESSMENT — ACTIVITIES OF DAILY LIVING (ADL)
ADLS_ACUITY_SCORE: 25

## 2019-11-01 NOTE — PHARMACY-ANTICOAGULATION SERVICE
Clinical Pharmacy- Warfarin Discharge Note    Warfarin was discontinued and new anticoagulant started. Xarelto was started.  Patient no longer on warfarin    Warfarin PTA Regimen: 3 mg T,Thu and 4 mg all other days, or as directed      Anticoagulation Dose History     Recent Dosing and Labs Latest Ref Rng & Units 9/15/2019 9/16/2019 9/17/2019 9/18/2019 10/29/2019 10/30/2019 10/31/2019    Warfarin 1 mg - - - - - - 1 mg -    Warfarin 2 mg - 2 mg - 4 mg - - - -    Warfarin 3 mg - - 3 mg - - - - -    INR 0.86 - 1.14 2.81(H) 2.32(H) 2.05(H) 1.95(H) >10.00(HH) 3.08(H) 1.23(H)

## 2019-11-01 NOTE — PLAN OF CARE
VSS. Tele - SR with ST depression and inverted Ts. Denies pain. Tolerating diet. BM this morning. Up SBA. Plan: discharging this afternoon home with niece.

## 2019-11-01 NOTE — PLAN OF CARE
VS stable. RAMESH. Diminished LS with crackles. No complaint of pain.  Alert to person and place.  Tele is sinus rhythm with ST depression and  inverted T waves. Slept on and off throughout the night.

## 2019-11-01 NOTE — PROGRESS NOTES
AVS reviewed with patient and niece\. Questions asked and answered at this time. IV and telemetry removed. Belongings packed up and given to patient. Discharge medications sent home with patient. Patient discharged home with niece.

## 2019-11-01 NOTE — PLAN OF CARE
RN from [id number]:  VSS. Remains on tele. Oriented ex forgetful. Reg diet. Up with SBA. Voiding adequately. No bowel movement since before admission, feels constipated, prn senna given. Parameters added to scheduled metoprolol. Continues to have productive cough with tan/white sputum. Plan for home possibly tomorrow.

## 2019-11-01 NOTE — PROGRESS NOTES
Pt seen and examined.  Feels well.  No complaints.  No CP.  Wants to leave the hospital    Transitioned from warfarin to NOAC.      Home today    Addendum:  I attempted to call pt son Kumar but no answer and I was unable to leave a voicemail message

## 2019-11-01 NOTE — PROGRESS NOTES
[facility]  Cardiology Progress Note    Shawn Mendoza MD   11/01/2019          Assessment and Plan:   Hubert Tamayo is a 89 year old male with history of paranoid schizophrenia, dementia, coronary artery disease, hypertension, COPD, diabetes mellitus, chronic renal insufficiency, distant history of a DVT for which he has been on chronic warfarin therapy and a recent L2-L5 fracture.  He presented to ER on 10/29/2019 due to coughing spells, chest discomfort and shortness of breath.  He reportedly took 4 or 5 nitroglycerin at home, which appeared to improve his symptoms.  In the emergency room, he was found to be profoundly anemic with hemoglobin dropping all the way to 6.8, although hemoglobin as of last month was in the 9 range.  INR was greater than 10.  Troponins returned at 9.  EKG demonstrated nonspecific ST-T wave changes anteriorly and laterally.  An echocardiogram demonstrated ejection fraction of 55% -60% without focal wall motion abnormalities or any significant valvular pathology.     It appears patient had a non-ST segment elevation myocardial infarction secondary to profound anemia.  It appears he has chronic stable exertional angina.  Given his guaiac positive stools and his recent bleed I think we need to pursue conservative management.  Fortunately he appears to be working.  Patient relates he has not had chest discomfort since his transfusion.  I have empirically started Imdur.  If he does have breakthrough chest pain we could consider coronary angiography.  Although I would be hesitant with a recent bleed.  Granted this in the face of an INR greater than 10.  Although hemoglobin dropped again today to 8.6.     Blood pressure is not well controlled I have added amlodipine 5 mg daily.  I will increase metoprolol to 50 twice daily.     I question whether he needs to be on chronic anticoagulation with his history of DVTs.  I do not have any details.  If he is going to start on a  "NOAC I would discontinue aspirin.    I will sign off at this time.  [facility]  [facility] with [facility].             Interval History:   Patient remains pain-free and anxious for discharge.              Medications:       ACE/ARB/ARNI NOT PRESCRIBED       ASPIRIN NOT PRESCRIBED         amLODIPine  5 mg Oral Daily     atorvastatin  40 mg Oral QPM     budesonide-formoterol  2 puff Inhalation BID     donepezil  5 mg Oral Daily     isosorbide mononitrate  30 mg Oral Daily     metoprolol tartrate  50 mg Oral BID     OLANZapine  10 mg Oral At Bedtime     omeprazole  20 mg Oral QAM AC     rivaroxaban ANTICOAGULANT  15 mg Oral Daily with supper     Vitamin D3  1,000 Units Oral Daily                   Physical Exam:   Blood pressure 113/61, pulse 59, temperature 96.1  F (35.6  C), temperature source Oral, resp. rate 20, height 1.727 m (5' 8\"), weight 64.9 kg (143 lb), SpO2 96 %.  Vitals:    10/29/19 1814   Weight: 64.9 kg (143 lb)     Vital Signs with Ranges  Temp:  [96.1  F (35.6  C)-98.2  F (36.8  C)] 96.1  F (35.6  C)  Heart Rate:  [67-92] 67  Resp:  [20] 20  BP: (104-150)/(59-86) 113/61  SpO2:  [94 %-98 %] 96 %  I/O's Last 24 hours  I/O last 3 completed shifts:  In: 720 [P.O.:720]  Out: -        General:  Patient comfortable, in no apparent distress.  Awake, alert,.  Neck:  No JVD, no carotid bruits.  Lungs:  Clear to auscultation bilaterally.  Cardiac:  Regular rate and rhythm, no murmurs, rub, or gallops.  Abdomen:  Soft, nontender.  Extremities:  Without edema.  2+ pulses.  Skin:  Warm, dry.  Neurologic: Nonfocal         Data:        Recent Labs   Lab Test 11/01/19  0623 10/31/19  0611 10/29/19  1637 10/29/19  1521   NA  --  138 140 Canceled, Test credited   POTASSIUM  --  4.4 3.8 Canceled, Test credited   CHLORIDE  --  109 109 Canceled, Test credited   CO2  --  25 27 Canceled, Test credited   BUN  --  25 24 Canceled, Test credited   CR 1.68* 1.40* 1.64* Canceled, " Test credited   ANIONGAP  --  4 4 Not Calculated   RAMON  --  9.2 9.2 Canceled, Test credited   GLC  --  93 93 Canceled, Test credited   GFRESTIMATED 35* 44* 36* 41*   GFRESTBLACK 41* 51* 42* 47*   NTBNPI  --   --   --  944     Recent Labs   Lab Test 11/01/19  0623 10/31/19  0611 10/30/19  0618   HGB 8.6* 10.0* 6.8*     No lab results found.  Recent Labs   Lab Test 10/31/19  0611 10/31/19  0158 10/30/19  2239   TROPI 4.319* 5.249* 6.200*     No lab results found.    Invalid input(s): [id number]  Recent Labs   Lab Test 10/31/19  0611   CHOL 77   HDL 42   LDL 14   TRIG 103

## 2019-11-02 NOTE — DISCHARGE SUMMARY
Admit Date:     10/29/2019   Discharge Date:     11/01/2019      PRINCIPAL FINAL DIAGNOSES:   1.  Non-ST elevation myocardial infarction.  Suspect related to anemia.   2.  Anemia with hemoglobin less than 7 on presentation.  This was in the setting of a supratherapeutic INR of greater than 10.  The patient received 2 units packed red blood cell transfusion this admission.   3.  Supratherapeutic INR on presentation.  INR was greater than 10.  Patient was taking warfarin prior to admission.  It was changed to Xarelto on discharge from the hospital.   4.  Distant history of deep venous thrombosis for which he was taking warfarin as above.      PAST MEDICAL HISTORY:   1.  History of chronic obstructive pulmonary disease.   2.  Coronary artery disease.   3.  Hypertension.   4.  Lumbar fracture.   5.  Dementia history.   6.  Paranoid schizophrenia.   7.  Anemia history.   8.  Chronic kidney disease.  Baseline creatinine appears to be near 1.6.      PRINCIPAL PROCEDURES THIS ADMISSION:   1.  Cardiology consultation.   2.  Echocardiogram showing normal ejection fraction 55-60%.  Regional wall motion abnormalities could not be excluded due to limited visualization.   3.  Chest x-ray showing no acute findings.   4.  Serial troponin enzymes.  Troponin level elma to 10.      REASON FOR ADMISSION:  Please see dictated history and physical.  In brief, Mr. Tamayo is a pleasant 89-year-old male with a history of coronary artery disease, COPD, dementia, schizophrenia and history of DVT for which he takes warfarin.  [facility]  [facility] to [facility] ER for evaluation in the setting of chest pain and shortness of breath.  On presentation, he was noted to be anemic with a hemoglobin of 6.8 and had a slight troponin elevation.  INR was greater than 10 on presentation.  He was admitted to the Hospitalist Service.      HOSPITAL COURSE:   1.  Non-ST elevation myocardial infarction:  The patient was seen by Cardiology.   Cardiology felt that the patient's myocardial infarction likely in part was related to his anemia.  He was transfused 2 units packed red blood cell transfusion.  Chest pain symptoms resolved.  Troponin got as high as 10.  Cardiology recommended medical management therapy at this point.  Given his INR was greater than 10 on presentation, we opted to change him to Xarelto, feeling that this would be a safer medication for him in the setting of a supratherapeutic INR on admission.  Cardiology also recommended stopping his aspirin in the setting of his anemia.  He is on chronic Plavix therapy, which was continued in the setting of his coronary artery disease history.  I also did start him on a PPI in the setting of his anemia.  His stool guaiac was positive, although not surprising in the setting of his severely elevated INR on presentation.  However, there was no evidence of any active GI bleeding while hospitalized.  This patient was actually constipated while in the hospital.      I spoke to the patient's son, Kumar, while the patient was hospitalized and updated him on plan of care and medication change from warfarin to Xarelto, which he was in agreement with.  The patient can follow up with the [facility] for further refills of Xarelto.  Would also question if perhaps anticoagulation could be discontinued given the distant history of DVT.  I brought up the family, but they would like to continue anticoagulation for now, until they can further discuss that with his primary care provider at the [facility].      The patient was discharged today.      DISCHARGE MEDICATIONS:   1.  Albuterol as needed.   2.  Amlodipine 5 mg daily.  New medication this admission.   3.  Aricept 5 mg daily.   4.  Artificial saliva as needed.   5.  Atorvastatin 80 mg daily.   6.  Symbicort 2 puffs twice a day.   7.  Cholecalciferol.   8.  Clopidogrel 75 mg daily.   9.  Emollient external cream as needed.   10.  Flomax 0.4 mg  daily.   11.  Haldol 2 mg twice a day.   12.  Norco 1 tablet every 4 hours as needed for pain.   13.  Hydrocortisone cream.   14.  Metoprolol 50 mg twice a day.  Dose increased from 25 mg previously.   15.  Nasonex.   16.  Nitroglycerin as needed.   17.  Nystatin.   18.  Olanzapine 10 mg at bedtime.   19.  Omeprazole 20 mg daily.   20.  MiraLax 17 grams daily as needed for constipation.   21.  Xarelto 15 mg daily.  New medication this admission, replaces warfarin.   22.  Senokot 1 tablet twice a day.   23.  Zoloft 150 mg daily.   24.  Spiriva capsule inhaled daily.      FOLLOWUP INSTRUCTIONS:   1.  Medication changes include warfarin and aspirin being discontinued.  Xarelto was started, which replaces warfarin.  Warfarin was stopped as INR level was greater than 10 on presentation.  We feel Xarelto will be safer medication for you to take going forward.  Aspirin was stopped due to anemia and to avoid bleeding.  For now, continue Plavix as you were taking at home.   2.  [facility] in 1-[facility] weeks.  Recommend to recheck a hemoglobin at that visit, which was 8.5 on discharge from the hospital.  You can obtain Xarelto refills through [facility] at the [facility].   3.  Discuss with primary MD to see if anticoagulation may be discontinued given a distant history of DVT.      The patient was examined on day of discharge.         ALESSANDRA CHANG MD             D: 2019   T: 2019   MT: MIKA      Name:     VEDA RUSHING   MRN:      [id number]        Account:        [id number]   :      1929           Admit Date:     10/29/2019                                  Discharge Date: 2019      Document: [id number]       cc: Saige Cortez NP

## 2019-11-04 ENCOUNTER — CARE COORDINATION (OUTPATIENT)
Dept: CARDIOLOGY | Facility: CLINIC | Age: 84
End: 2019-11-04

## 2019-11-04 ENCOUNTER — TRANSFERRED RECORDS (OUTPATIENT)
Dept: HEALTH INFORMATION MANAGEMENT | Facility: CLINIC | Age: 84
End: 2019-11-04

## 2019-11-04 NOTE — PROGRESS NOTES
Patient was evaluated by cardiology while inpatient for coughing spells, anemia, elevated troponin. Called and spoke with patient and his care giver to discuss any post hospital d/c questions , review medication changes, and confirm f/u appts. Reviewed new medications and meds that changed. Patient denied any SOB, chest pain, or light headedness. Advised pt and care giver to arrange follow up with [facility].. Patient advised to call clinic with any cardiac related questions or concerns. Patient verbalized understanding and agreed with plan.     Cardiology Hospital Progress Note 11/1/19:    Hubert Tamayo is a 89 year old male with history of paranoid schizophrenia, dementia, coronary artery disease, hypertension, COPD, diabetes mellitus, chronic renal insufficiency, distant history of a DVT for which he has been on chronic warfarin therapy and a recent L2-L5 fracture.  He presented to ER on 10/29/2019 due to coughing spells, chest discomfort and shortness of breath.  He reportedly took 4 or 5 nitroglycerin at home, which appeared to improve his symptoms.  In the emergency room, he was found to be profoundly anemic with hemoglobin dropping all the way to 6.8, although hemoglobin as of last month was in the 9 range.  INR was greater than 10.  Troponins returned at 9.  EKG demonstrated nonspecific ST-T wave changes anteriorly and laterally.  An echocardiogram demonstrated ejection fraction of 55% -60% without focal wall motion abnormalities or any significant valvular pathology.      It appears patient had a non-ST segment elevation myocardial infarction secondary to profound anemia.  It appears he has chronic stable exertional angina.  Given his guaiac positive stools and his recent bleed I think we need to pursue conservative management.  Fortunately he appears to be working.  Patient relates he has not had chest discomfort since his transfusion.  I have empirically started Imdur.  If he does have  breakthrough chest pain we could consider coronary angiography.  Although I would be hesitant with a recent bleed.  Granted this in the face of an INR greater than 10.  Although hemoglobin dropped again today to 8.6.     Blood pressure is not well controlled I have added amlodipine 5 mg daily.  I will increase metoprolol to 50 twice daily.     I question whether he needs to be on chronic anticoagulation with his history of DVTs.  I do not have any details.  If he is going to start on a NOAC I would discontinue aspirin.     I will sign off at this time.  [facility]  He can follow-up with them.

## 2019-11-10 ENCOUNTER — APPOINTMENT (OUTPATIENT)
Dept: CT IMAGING | Facility: CLINIC | Age: 84
DRG: 177 | End: 2019-11-10
Attending: EMERGENCY MEDICINE
Payer: MEDICARE

## 2019-11-10 ENCOUNTER — HOSPITAL ENCOUNTER (INPATIENT)
Facility: CLINIC | Age: 84
LOS: 8 days | Discharge: HOME HEALTH | DRG: 177 | End: 2019-11-18
Attending: EMERGENCY MEDICINE | Admitting: INTERNAL MEDICINE
Payer: MEDICARE

## 2019-11-10 DIAGNOSIS — J18.9 HCAP (HEALTHCARE-ASSOCIATED PNEUMONIA): ICD-10-CM

## 2019-11-10 DIAGNOSIS — Z79.01 ANTICOAGULATED: ICD-10-CM

## 2019-11-10 DIAGNOSIS — J18.9 PNEUMONIA OF RIGHT LOWER LOBE DUE TO INFECTIOUS ORGANISM: ICD-10-CM

## 2019-11-10 DIAGNOSIS — J96.01 ACUTE RESPIRATORY FAILURE WITH HYPOXIA (H): ICD-10-CM

## 2019-11-10 LAB
ANION GAP SERPL CALCULATED.3IONS-SCNC: 6 MMOL/L (ref 3–14)
BASOPHILS # BLD AUTO: 0 10E9/L (ref 0–0.2)
BASOPHILS NFR BLD AUTO: 0.1 %
BUN SERPL-MCNC: 23 MG/DL (ref 7–30)
CALCIUM SERPL-MCNC: 9.7 MG/DL (ref 8.5–10.1)
CHLORIDE SERPL-SCNC: 104 MMOL/L (ref 94–109)
CO2 BLDCOV-SCNC: 27 MMOL/L (ref 21–28)
CO2 SERPL-SCNC: 24 MMOL/L (ref 20–32)
CREAT BLD-MCNC: 1.7 MG/DL (ref 0.66–1.25)
CREAT SERPL-MCNC: 1.64 MG/DL (ref 0.66–1.25)
DIFFERENTIAL METHOD BLD: ABNORMAL
EOSINOPHIL # BLD AUTO: 0 10E9/L (ref 0–0.7)
EOSINOPHIL NFR BLD AUTO: 0.1 %
ERYTHROCYTE [DISTWIDTH] IN BLOOD BY AUTOMATED COUNT: 17 % (ref 10–15)
GFR SERPL CREATININE-BSD FRML MDRD: 36 ML/MIN/{1.73_M2}
GFR SERPL CREATININE-BSD FRML MDRD: 38 ML/MIN/{1.73_M2}
GLUCOSE BLDC GLUCOMTR-MCNC: 177 MG/DL (ref 70–99)
GLUCOSE SERPL-MCNC: 193 MG/DL (ref 70–99)
HCT VFR BLD AUTO: 31.6 % (ref 40–53)
HGB BLD-MCNC: 9.3 G/DL (ref 13.3–17.7)
IMM GRANULOCYTES # BLD: 0.2 10E9/L (ref 0–0.4)
IMM GRANULOCYTES NFR BLD: 0.8 %
INR PPP: 1.77 (ref 0.86–1.14)
LACTATE BLD-SCNC: 2 MMOL/L (ref 0.7–2.1)
LYMPHOCYTES # BLD AUTO: 0.6 10E9/L (ref 0.8–5.3)
LYMPHOCYTES NFR BLD AUTO: 3.1 %
MCH RBC QN AUTO: 26.2 PG (ref 26.5–33)
MCHC RBC AUTO-ENTMCNC: 29.4 G/DL (ref 31.5–36.5)
MCV RBC AUTO: 89 FL (ref 78–100)
MONOCYTES # BLD AUTO: 1.5 10E9/L (ref 0–1.3)
MONOCYTES NFR BLD AUTO: 8.2 %
NEUTROPHILS # BLD AUTO: 16.6 10E9/L (ref 1.6–8.3)
NEUTROPHILS NFR BLD AUTO: 87.7 %
NRBC # BLD AUTO: 0 10*3/UL
NRBC BLD AUTO-RTO: 0 /100
NT-PROBNP SERPL-MCNC: 976 PG/ML (ref 0–1800)
PCO2 BLDV: 51 MM HG (ref 40–50)
PH BLDV: 7.34 PH (ref 7.32–7.43)
PLATELET # BLD AUTO: 161 10E9/L (ref 150–450)
PO2 BLDV: 19 MM HG (ref 25–47)
POTASSIUM SERPL-SCNC: 4.5 MMOL/L (ref 3.4–5.3)
RBC # BLD AUTO: 3.55 10E12/L (ref 4.4–5.9)
SAO2 % BLDV FROM PO2: 27 %
SODIUM SERPL-SCNC: 134 MMOL/L (ref 133–144)
TROPONIN I SERPL-MCNC: <0.015 UG/L (ref 0–0.04)
WBC # BLD AUTO: 18.9 10E9/L (ref 4–11)

## 2019-11-10 PROCEDURE — 96375 TX/PRO/DX INJ NEW DRUG ADDON: CPT

## 2019-11-10 PROCEDURE — 85025 COMPLETE CBC W/AUTO DIFF WBC: CPT | Performed by: EMERGENCY MEDICINE

## 2019-11-10 PROCEDURE — 99285 EMERGENCY DEPT VISIT HI MDM: CPT | Mod: 25

## 2019-11-10 PROCEDURE — 99223 1ST HOSP IP/OBS HIGH 75: CPT | Mod: AI | Performed by: INTERNAL MEDICINE

## 2019-11-10 PROCEDURE — 96365 THER/PROPH/DIAG IV INF INIT: CPT | Mod: 59

## 2019-11-10 PROCEDURE — 12000000 ZZH R&B MED SURG/OB

## 2019-11-10 PROCEDURE — 82565 ASSAY OF CREATININE: CPT

## 2019-11-10 PROCEDURE — 85610 PROTHROMBIN TIME: CPT | Performed by: EMERGENCY MEDICINE

## 2019-11-10 PROCEDURE — 25000132 ZZH RX MED GY IP 250 OP 250 PS 637: Mod: GY | Performed by: INTERNAL MEDICINE

## 2019-11-10 PROCEDURE — 83605 ASSAY OF LACTIC ACID: CPT

## 2019-11-10 PROCEDURE — 93005 ELECTROCARDIOGRAM TRACING: CPT

## 2019-11-10 PROCEDURE — 82803 BLOOD GASES ANY COMBINATION: CPT

## 2019-11-10 PROCEDURE — 25000128 H RX IP 250 OP 636: Performed by: EMERGENCY MEDICINE

## 2019-11-10 PROCEDURE — 25000125 ZZHC RX 250: Performed by: EMERGENCY MEDICINE

## 2019-11-10 PROCEDURE — 71260 CT THORAX DX C+: CPT

## 2019-11-10 PROCEDURE — 83880 ASSAY OF NATRIURETIC PEPTIDE: CPT | Performed by: EMERGENCY MEDICINE

## 2019-11-10 PROCEDURE — 80048 BASIC METABOLIC PNL TOTAL CA: CPT | Performed by: EMERGENCY MEDICINE

## 2019-11-10 PROCEDURE — 74177 CT ABD & PELVIS W/CONTRAST: CPT

## 2019-11-10 PROCEDURE — 00000146 ZZHCL STATISTIC GLUCOSE BY METER IP

## 2019-11-10 PROCEDURE — 96367 TX/PROPH/DG ADDL SEQ IV INF: CPT

## 2019-11-10 PROCEDURE — 87040 BLOOD CULTURE FOR BACTERIA: CPT | Performed by: EMERGENCY MEDICINE

## 2019-11-10 PROCEDURE — 84484 ASSAY OF TROPONIN QUANT: CPT | Performed by: EMERGENCY MEDICINE

## 2019-11-10 RX ORDER — ATORVASTATIN CALCIUM 40 MG/1
80 TABLET, FILM COATED ORAL DAILY
Status: DISCONTINUED | OUTPATIENT
Start: 2019-11-11 | End: 2019-11-18 | Stop reason: HOSPADM

## 2019-11-10 RX ORDER — METOPROLOL TARTRATE 50 MG
50 TABLET ORAL 2 TIMES DAILY
Status: DISCONTINUED | OUTPATIENT
Start: 2019-11-10 | End: 2019-11-18 | Stop reason: HOSPADM

## 2019-11-10 RX ORDER — AMLODIPINE BESYLATE 5 MG/1
5 TABLET ORAL DAILY
Status: DISCONTINUED | OUTPATIENT
Start: 2019-11-11 | End: 2019-11-18 | Stop reason: HOSPADM

## 2019-11-10 RX ORDER — AMOXICILLIN 250 MG
2 CAPSULE ORAL 2 TIMES DAILY PRN
Status: DISCONTINUED | OUTPATIENT
Start: 2019-11-10 | End: 2019-11-18 | Stop reason: HOSPADM

## 2019-11-10 RX ORDER — HALOPERIDOL 1 MG/1
2 TABLET ORAL 2 TIMES DAILY
Status: DISCONTINUED | OUTPATIENT
Start: 2019-11-10 | End: 2019-11-12

## 2019-11-10 RX ORDER — NICOTINE POLACRILEX 4 MG
15-30 LOZENGE BUCCAL
Status: DISCONTINUED | OUTPATIENT
Start: 2019-11-10 | End: 2019-11-16

## 2019-11-10 RX ORDER — MAGNESIUM SULFATE HEPTAHYDRATE 40 MG/ML
4 INJECTION, SOLUTION INTRAVENOUS EVERY 4 HOURS PRN
Status: DISCONTINUED | OUTPATIENT
Start: 2019-11-10 | End: 2019-11-18 | Stop reason: HOSPADM

## 2019-11-10 RX ORDER — DONEPEZIL HYDROCHLORIDE 5 MG/1
5 TABLET, FILM COATED ORAL DAILY
Status: DISCONTINUED | OUTPATIENT
Start: 2019-11-10 | End: 2019-11-18 | Stop reason: HOSPADM

## 2019-11-10 RX ORDER — ONDANSETRON 2 MG/ML
4 INJECTION INTRAMUSCULAR; INTRAVENOUS EVERY 6 HOURS PRN
Status: DISCONTINUED | OUTPATIENT
Start: 2019-11-10 | End: 2019-11-18 | Stop reason: HOSPADM

## 2019-11-10 RX ORDER — BISACODYL 10 MG
10 SUPPOSITORY, RECTAL RECTAL DAILY PRN
Status: DISCONTINUED | OUTPATIENT
Start: 2019-11-10 | End: 2019-11-18 | Stop reason: HOSPADM

## 2019-11-10 RX ORDER — NALOXONE HYDROCHLORIDE 0.4 MG/ML
.1-.4 INJECTION, SOLUTION INTRAMUSCULAR; INTRAVENOUS; SUBCUTANEOUS
Status: DISCONTINUED | OUTPATIENT
Start: 2019-11-10 | End: 2019-11-18 | Stop reason: HOSPADM

## 2019-11-10 RX ORDER — PROCHLORPERAZINE 25 MG
12.5 SUPPOSITORY, RECTAL RECTAL EVERY 12 HOURS PRN
Status: DISCONTINUED | OUTPATIENT
Start: 2019-11-10 | End: 2019-11-18 | Stop reason: HOSPADM

## 2019-11-10 RX ORDER — PROCHLORPERAZINE MALEATE 5 MG
5 TABLET ORAL EVERY 6 HOURS PRN
Status: DISCONTINUED | OUTPATIENT
Start: 2019-11-10 | End: 2019-11-18 | Stop reason: HOSPADM

## 2019-11-10 RX ORDER — TIOTROPIUM BROMIDE 18 UG/1
18 CAPSULE ORAL; RESPIRATORY (INHALATION) DAILY
Status: DISCONTINUED | OUTPATIENT
Start: 2019-11-11 | End: 2019-11-11 | Stop reason: CLARIF

## 2019-11-10 RX ORDER — OLANZAPINE 2.5 MG/1
10 TABLET, FILM COATED ORAL AT BEDTIME
Status: DISCONTINUED | OUTPATIENT
Start: 2019-11-10 | End: 2019-11-12

## 2019-11-10 RX ORDER — AMOXICILLIN 250 MG
1 CAPSULE ORAL 2 TIMES DAILY PRN
Status: DISCONTINUED | OUTPATIENT
Start: 2019-11-10 | End: 2019-11-10

## 2019-11-10 RX ORDER — LIDOCAINE 40 MG/G
CREAM TOPICAL
Status: DISCONTINUED | OUTPATIENT
Start: 2019-11-10 | End: 2019-11-18 | Stop reason: HOSPADM

## 2019-11-10 RX ORDER — POLYETHYLENE GLYCOL 3350 17 G/17G
17 POWDER, FOR SOLUTION ORAL DAILY PRN
Status: DISCONTINUED | OUTPATIENT
Start: 2019-11-10 | End: 2019-11-18 | Stop reason: HOSPADM

## 2019-11-10 RX ORDER — TAMSULOSIN HYDROCHLORIDE 0.4 MG/1
0.4 CAPSULE ORAL DAILY
Status: DISCONTINUED | OUTPATIENT
Start: 2019-11-11 | End: 2019-11-18 | Stop reason: HOSPADM

## 2019-11-10 RX ORDER — ACETAMINOPHEN 325 MG/1
650 TABLET ORAL EVERY 4 HOURS PRN
Status: DISCONTINUED | OUTPATIENT
Start: 2019-11-10 | End: 2019-11-18 | Stop reason: HOSPADM

## 2019-11-10 RX ORDER — POTASSIUM CHLORIDE 1.5 G/1.58G
20-40 POWDER, FOR SOLUTION ORAL
Status: DISCONTINUED | OUTPATIENT
Start: 2019-11-10 | End: 2019-11-18 | Stop reason: HOSPADM

## 2019-11-10 RX ORDER — CLOPIDOGREL BISULFATE 75 MG/1
75 TABLET ORAL DAILY
Status: DISCONTINUED | OUTPATIENT
Start: 2019-11-11 | End: 2019-11-12

## 2019-11-10 RX ORDER — NITROGLYCERIN 0.4 MG/1
0.4 TABLET SUBLINGUAL EVERY 5 MIN PRN
Status: DISCONTINUED | OUTPATIENT
Start: 2019-11-10 | End: 2019-11-18 | Stop reason: HOSPADM

## 2019-11-10 RX ORDER — POTASSIUM CHLORIDE 29.8 MG/ML
20 INJECTION INTRAVENOUS
Status: DISCONTINUED | OUTPATIENT
Start: 2019-11-10 | End: 2019-11-18 | Stop reason: HOSPADM

## 2019-11-10 RX ORDER — BUDESONIDE AND FORMOTEROL FUMARATE DIHYDRATE 160; 4.5 UG/1; UG/1
2 AEROSOL RESPIRATORY (INHALATION) 2 TIMES DAILY
Status: DISCONTINUED | OUTPATIENT
Start: 2019-11-10 | End: 2019-11-11 | Stop reason: CLARIF

## 2019-11-10 RX ORDER — POTASSIUM CL/LIDO/0.9 % NACL 10MEQ/0.1L
10 INTRAVENOUS SOLUTION, PIGGYBACK (ML) INTRAVENOUS
Status: DISCONTINUED | OUTPATIENT
Start: 2019-11-10 | End: 2019-11-18 | Stop reason: HOSPADM

## 2019-11-10 RX ORDER — MORPHINE SULFATE 4 MG/ML
4 INJECTION, SOLUTION INTRAMUSCULAR; INTRAVENOUS ONCE
Status: COMPLETED | OUTPATIENT
Start: 2019-11-10 | End: 2019-11-10

## 2019-11-10 RX ORDER — DEXTROSE MONOHYDRATE 25 G/50ML
25-50 INJECTION, SOLUTION INTRAVENOUS
Status: DISCONTINUED | OUTPATIENT
Start: 2019-11-10 | End: 2019-11-16

## 2019-11-10 RX ORDER — IOPAMIDOL 755 MG/ML
500 INJECTION, SOLUTION INTRAVASCULAR ONCE
Status: COMPLETED | OUTPATIENT
Start: 2019-11-10 | End: 2019-11-10

## 2019-11-10 RX ORDER — ONDANSETRON 4 MG/1
4 TABLET, ORALLY DISINTEGRATING ORAL EVERY 6 HOURS PRN
Status: DISCONTINUED | OUTPATIENT
Start: 2019-11-10 | End: 2019-11-18 | Stop reason: HOSPADM

## 2019-11-10 RX ORDER — POTASSIUM CHLORIDE 1500 MG/1
20-40 TABLET, EXTENDED RELEASE ORAL
Status: DISCONTINUED | OUTPATIENT
Start: 2019-11-10 | End: 2019-11-18 | Stop reason: HOSPADM

## 2019-11-10 RX ORDER — FLUORIDE TOOTHPASTE
5-10 TOOTHPASTE DENTAL 4 TIMES DAILY PRN
Status: DISCONTINUED | OUTPATIENT
Start: 2019-11-10 | End: 2019-11-17

## 2019-11-10 RX ORDER — AMOXICILLIN 250 MG
1 CAPSULE ORAL 2 TIMES DAILY PRN
Status: DISCONTINUED | OUTPATIENT
Start: 2019-11-10 | End: 2019-11-18 | Stop reason: HOSPADM

## 2019-11-10 RX ORDER — POTASSIUM CHLORIDE 7.45 MG/ML
10 INJECTION INTRAVENOUS
Status: DISCONTINUED | OUTPATIENT
Start: 2019-11-10 | End: 2019-11-18 | Stop reason: HOSPADM

## 2019-11-10 RX ORDER — ALBUTEROL SULFATE 0.83 MG/ML
3 SOLUTION RESPIRATORY (INHALATION)
Status: DISCONTINUED | OUTPATIENT
Start: 2019-11-10 | End: 2019-11-18 | Stop reason: HOSPADM

## 2019-11-10 RX ORDER — HYDROCODONE BITARTRATE AND ACETAMINOPHEN 5; 325 MG/1; MG/1
1 TABLET ORAL EVERY 4 HOURS PRN
Status: DISCONTINUED | OUTPATIENT
Start: 2019-11-10 | End: 2019-11-18 | Stop reason: HOSPADM

## 2019-11-10 RX ORDER — VANCOMYCIN HYDROCHLORIDE 1 G/200ML
1000 INJECTION, SOLUTION INTRAVENOUS ONCE
Status: COMPLETED | OUTPATIENT
Start: 2019-11-10 | End: 2019-11-10

## 2019-11-10 RX ADMIN — METOPROLOL TARTRATE 50 MG: 50 TABLET, FILM COATED ORAL at 22:56

## 2019-11-10 RX ADMIN — TAZOBACTAM SODIUM AND PIPERACILLIN SODIUM 3.38 G: 375; 3 INJECTION, SOLUTION INTRAVENOUS at 19:44

## 2019-11-10 RX ADMIN — OLANZAPINE 10 MG: 2.5 TABLET, FILM COATED ORAL at 22:56

## 2019-11-10 RX ADMIN — DONEPEZIL HYDROCHLORIDE 5 MG: 5 TABLET ORAL at 22:56

## 2019-11-10 RX ADMIN — VANCOMYCIN HYDROCHLORIDE 1000 MG: 1 INJECTION, SOLUTION INTRAVENOUS at 20:53

## 2019-11-10 RX ADMIN — IOPAMIDOL 72 ML: 755 INJECTION, SOLUTION INTRAVENOUS at 17:22

## 2019-11-10 RX ADMIN — MORPHINE SULFATE 4 MG: 4 INJECTION INTRAVENOUS at 19:01

## 2019-11-10 RX ADMIN — SODIUM CHLORIDE 58 ML: 9 INJECTION, SOLUTION INTRAVENOUS at 17:22

## 2019-11-10 RX ADMIN — HALOPERIDOL 2 MG: 1 TABLET ORAL at 22:56

## 2019-11-10 ASSESSMENT — ENCOUNTER SYMPTOMS
FEVER: 0
COUGH: 1
DIARRHEA: 1
FLANK PAIN: 1
SHORTNESS OF BREATH: 1

## 2019-11-10 NOTE — ED TRIAGE NOTES
Pt here with c/o CP, SOB and coughing up blood. Pt admitted 10/29 for NSTEMI. States he's had this CP and SOB since discharge. Hasn't followed up with cardiology yet. ABC intact. A&O X4.

## 2019-11-10 NOTE — ED PROVIDER NOTES
"  History     Chief Complaint:  Chest Pain     The history is provided by the patient, a relative and medical records. The history is limited by the condition of the patient (Dementia, schizophrenia).      Hubert Tamayo is a 89 year old male with history of schizophrenia, CAD, and DVT who presents with chest pain and shortness of breath. Nine days ago, Hubert was discharged after a five day admission at this hospital for an NSTEMI thought to be due to anemia and supratherapeutic INR. He received transfusions and INR was reversed and he was discharged home (where he lives with his niece). His Coumadin was changed to Xarelto. He has been compliant with his medications per his niece who administers these.    Since he has been home, he has not been doing well. He has had worsening chest congestion, shortness of breath, and cough particularly over the last couple of days and has not have much relief with home nebulizers. He has been weaker than normal and had intermittent episodes centralized chest pain that \"come on suddenly.\" Last night he also began complaining of right sided upper abdominal/flank pain. He also had hemoptysis at home with thick, red tinged yellow sputum upon arrival per nursing. His niece reports a dark brown diarrheal bowel movement last night without jono blood or melena. He has had no fever.    Hubert has no chest pain currently but is more concerned with the right \"side pain\".    Allergies:  Crustaceans  Loxapine  Nifedipine  Shellfish allergy  Sulfa drugs  Sulfamethoxazole-trimethoprim  Trimethoprim      Medications:    Albuterol  Norvasc  Lipitor  Symbicort inhaler  Xarelto  Aricept  Plavix  Prilosec  Lopressor  Senokot  Zyprexa  Haldol  Zoloft     Past Medical History:    CAD  DVT  Dementia  Type 2 diabetes  Hypertension  Osteoporosis  Paranoid schizophrenia  Pulmonary emphysema  NSTEMI    Past Surgical History:    Cataract removal - bilateral     Family History:    No past pertinent family " history.    Social History:  Former tobacco user.  Presents with his niece with whom he lives.  Marital Status:  Single      Review of Systems   Unable to perform ROS: Dementia   Constitutional: Negative for fever.   HENT: Positive for congestion.    Respiratory: Positive for cough and shortness of breath.    Cardiovascular: Positive for chest pain.   Gastrointestinal: Positive for abdominal distention (right upper abdomen) and diarrhea. Negative for anal bleeding and blood in stool.   Genitourinary: Positive for flank pain.   Neurological: Positive for weakness (generalized).   All other systems reviewed and are negative.    Physical Exam     Patient Vitals for the past 24 hrs:   BP Temp Temp src Pulse Heart Rate Resp SpO2   11/10/19 1900 (!) 176/97 -- -- 80 -- -- 98 %   11/10/19 1827 -- -- -- -- -- -- 100 %   11/10/19 1800 122/60 -- -- 62 -- -- 100 %   11/10/19 1645 114/56 -- -- 72 74 23 100 %   11/10/19 1643 -- -- -- -- 75 25 99 %   11/10/19 1630 122/64 -- -- 78 79 18 --   11/10/19 1600 (!) 149/73 -- -- 93 87 28 98 %   11/10/19 1557 -- -- -- -- -- -- 90 %   11/10/19 1554 (!) 160/77 97.8  F (36.6  C) Oral -- 97 20 (!) 86 %     Physical Exam  General: Well-developed and well-nourished. Well appearing elderly  man. Cooperative.  Head:  Atraumatic.  Eyes:  Conjunctivae, lids, and sclerae are normal.  ENT:    Normal nose. Moist mucous membranes.  Neck:  Supple. Normal range of motion.  CV:  Regular rate and rhythm. Normal heart sounds with no murmurs, rubs, or gallops detected.  Resp:  No respiratory distress.  Diminished breath sounds without wheezing, rales, or rhonchi.  GI:  Soft. Non-distended. Non-tender including no right upper quadrant tenderness.  Negative Navarrete's.    MS:  Normal ROM. No bilateral lower extremity edema.  Skin:  Warm. Non-diaphoretic. No pallor.  Neuro:  Awake and alert. Normal strength.  Psych: Normal mood and affect. Normal speech.  Vitals reviewed.    Emergency Department  Course   EKG  Indication: Chest Pain  Time: 1556  Rate 94 bpm. ND interval 164. QRS duration 74. QT/QTc 354/440.   Normal sinus rhythm. Left axis deviation. Nonspecific ST and T wave abnormalities.   No acute ST changes.  Anterolateral ST depressions have improved when compared to prior, dated 10/29/19.    Imaging:  CT Chest/Abdomen/Pelvis with IV contrast:   1.  Subpleural consolidation in the right lower lobe with a small right pleural effusion. Increased interstitial change in the subpleural lung bases. Underlying emphysematous change.  2.  Cholelithiasis.  3.  Prostatic enlargement. Diffuse urinary bladder wall thickening likely due to outlet obstruction.  4.  No appendicitis.  5.  Colonic diverticula without CT evidence for diverticulitis.  6.  Subacute to chronic left L1-L3 transverse process fractures.  7.  Moderate-sized esophageal hiatal hernia. As per radiology.    Laboratory:  CBC: WBC: 18.9 (H), HGB: 9.3 (L), PLT: 161  BMP: Glucose 193 (H), Creatinine: 1.64 (H), GFR Estimate: 42 (L), o/w WNL     BNP: 976  INR: 1.77 (H)    1630 Troponin: <0.015    1628 Creatinine POCT: Creatinine: 1.7 (H), GFR Estimate: 46 (L)  1629 ISTAT gases Lactate cabrera POCT: PCO2: 51 (H), PO2: 19 (L), o/w WNL    Blood Culture x2: Pending    Interventions:  1901 Morphine 4 mg IV  1944 Zosyn 3.375 g IV   2053 Vancocin 1,000 mg IV at 200 mL/hr    Emergency Department Course:  Nursing notes and vitals reviewed. 1600 I performed an exam of the patient as documented above.     IV inserted. Medicine administered as documented above. Blood drawn. This was sent to the lab for further testing, results above.    The patient was sent for a chest/abdomen/pelvis CT while in the emergency department, findings above.     EKG obtained in the ED, see results above.     1920 I rechecked the patient and discussed the results of his workup thus far. The patient is compliant with plan for admission. His niece is no longer at bedside. He thinks the morphine  helped his right sided pain.     2009  I consulted with Dr. Bal of the hospitalist service. He is in agreement to accept the patient for admission.    Findings and plan explained to the patient who consents to admission. Discussed the patient with Dr. Bal, who will admit the patient to a med/surg bed for further monitoring, evaluation, and treatment.    Impression & Plan    Medical Decision Making:  Hubert is a 89-year-old man who was recently admitted for NSTEMI thought to be due to anemia with a supratherapeutic INR.  He felt improved after transfusion and he was transitioned from Coumadin to Xarelto upon discharge.  However, since discharge he has had persistent intermittent shortness of breath and chest pain, though symptoms seem to worsen over the last couple of days with cough, which his niece describes as jono hemoptysis though nursing staff here visualize this as thick yellow sputum with light pink tinge.  He also complains of right-sided abdominal pain, which he poorly localizes, without fever.  He appears well on exam with diminished breath sounds and hypoxia to 86% on RA responding nicelt to 2L NC.  I am unable to reproduce any abdominal pain.  However, given his advanced age and complaints of pain as well as chest symptoms, he was sent for CT scan of the chest, abdomen, and pelvis.  This reveals a right lower lobe consolidation with associated small pleural effusion.  Because he has been recently hospitalized this is most consistent with a healthcare associated pneumonia.  There is no acute findings within the abdomen and I suspect his right-sided abdominal pain is related to his pneumonia.  This is likely the cause for his blood-tinged sputum as well. Blood cultures were obtained and he was treated with vancomycin and Zosyn. Lactate is normal at 2.0 and he has no hypotension or evidence of severe sepsis.  He does have leukocytosis of 18.9.  No other significant cause for shortness of breath and  hypoxia is identified including EKG without arrhythmias or ischemic changes. Indeed, ST depressions are improved compared to prior.  Troponin is also undetectable.  Hemoglobin is stable at 9.3 and INR has improved since last visit at 1.77.  Creatinine of 1.64 is his baseline and he has no significant electrolyte disturbances.  He does not appear volume overloaded on exam without peripheral edema and BNP is normal.  PE is considered highly unlikely as he has been compliant with Xarelto.  He will require admission for IV antibiotics for his pneumonia and, in addition, he is still requiring oxygen as his initial room air O2 saturation was only 86%.  I updated Hubert on the results and plan for admission and answered all his questions.  He is much more comfortable after a dose of morphine for his right-sided pain and is amenable to admission.  All questions answered.  I discussed the patient's case with the , hospitalist, who accepts admission and has no further orders.    Diagnosis:    ICD-10-CM    1. Acute respiratory failure with hypoxia (H) J96.01 Blood culture     Blood culture   2. HCAP (healthcare-associated pneumonia) J18.9    3. Anticoagulated Z79.01    4. Pneumonia of right lower lobe due to infectious organism (H) J18.1        Disposition:  Admitted to Dr. Bal    Scribe Disclosure:  I, Sofía Medina, am serving as a scribe on 11/10/2019 at 4:13 PM to personally document services performed by Florence Garcia MD based on my observations and the provider's statements to me.     Sofía Medina  11/10/2019   [facility] EMERGENCY DEPARTMENT       Florence Garcia MD  11/11/19 0117

## 2019-11-10 NOTE — LETTER
Transition Communication Hand-off for Care Transitions to Next Level of Care Provider    Name: Hubert Tamayo  : 1929  MRN #: [id number]  Primary Care Provider: Saige Cortez  Primary Care MD Name: Dr Coleman  Primary Clinic: [facility] [address] [place] [address]  Primary Care Clinic Name: [facility]  Reason for Hospitalization:  Acute respiratory failure with hypoxia (H) [J96.01]  Admit Date/Time: 11/10/2019  3:46 PM  Discharge Date:    Payor Source: Payor: MEDICARE / Plan: MEDICARE / Product Type: Medicare /     Readmission Assessment Measure (DANIELE) Risk Score/category: elevated    Reason for Communication Hand-off Referral: Admission diagnoses: PN    Discharge Plan: home with continued services    Discharge Needs Assessment:  Needs      Most Recent Value   Equipment Currently Used at Home  walker, rolling, cane, straight [reports use of walker in home and cane in community]   # of Referrals Placed by [facility]  Homecare, Communication hand-offs to next level of Care Providers        Follow-up plan:  No future appointments.    Any outstanding tests or procedures:        Referrals     Future Labs/Procedures    Medication Therapy Management Referral     Process Instructions:        This referral will be filtered to a centralized scheduling office at [facility] Therapy [facility] and the patient will receive a call to schedule an appointment at a [facility] location most convenient for them.    Comments:    MTM referral reason            Patient had a hospital or ED visit in last 6 months and has more than 10   PTA or Discharge medications       This service is designed to help you get the most from your medications.  A specially trained pharmacist will work closely with you and your doctors  to solve any problems related to your medications and to help you get the   best results from taking them.      The Medication Therapy Management staff will call you to schedule an  appointment.                     Key Recommendations:  FYI of pt admission:  CTS following for Elevated DANIELE and PNA diagnosis. Per chart review from last admission, pt is [facility]% [facility]. Pt was last admitted from 10/29-11/1 with NSTEMI and is now admitted with PNA. He has had 3 admits and 3 ED visits in the last 6 months. He has a history of paranoid schitzophrenia, COPD, HTN, DM, Dementia, MI. He is currently being treated with IV Vanco and zosyn. Met with pt, kira Mccormick and  from [facility] regarding plan of care. They confirm that pt has 24/7 care at home with his services.     discharge recommendations are: Pt has orders for discharge home today with kira Mccormick and resumption of home services.   1. [facility] [phone] has been notified that pt will be discharging home on 11/18. They will continue to follow for home services.     2. Call placed to Chuyita TURNER [phone] home care nurse. Discharge orders were faxed to her at fax # [phone] and [facility] will [facility] as prior to admission.     3. [facility] [phone] was notified of pt discharge and will resume services for personal care    He was discharged with new antibiotics. All other meds remain the same. He should continue to be followed closely by [facility] in his home.    Cherry Gardner RN    AVS/Discharge Summary is the source of truth; this is a helpful guide for improved communication of patient story

## 2019-11-10 NOTE — LETTER
Key Recommendations:  FYI of pt admission:  CTS following for Elevated DANIELE and PNA diagnosis. Per chart review from last admission, pt is 100% service [facility]. Pt was last admitted from 10/29-11/1 with NSTEMI and is now admitted with PNA. He has had 3 admits and 3 ED visits in the last 6 months. He has a history of paranoid schitzophrenia, COPD, HTN, DM, Dementia, MI. He is currently being treated with IV Vanco and zosyn. Met with pt, kira Mccormick and  from [facility] regarding plan of care. They confirm that pt has 24/7 care at home with his services. Anticipate pt will need PT/OT recommendations prior to discharge.    [facility].  discharge recommendations are:    Cherry Gardner RN    AVS/Discharge Summary is the source of truth; this is a helpful guide for improved communication of patient story

## 2019-11-11 LAB
ANION GAP SERPL CALCULATED.3IONS-SCNC: 5 MMOL/L (ref 3–14)
BUN SERPL-MCNC: 23 MG/DL (ref 7–30)
CALCIUM SERPL-MCNC: 9.4 MG/DL (ref 8.5–10.1)
CHLORIDE SERPL-SCNC: 104 MMOL/L (ref 94–109)
CO2 SERPL-SCNC: 24 MMOL/L (ref 20–32)
CREAT SERPL-MCNC: 1.64 MG/DL (ref 0.66–1.25)
ERYTHROCYTE [DISTWIDTH] IN BLOOD BY AUTOMATED COUNT: 17 % (ref 10–15)
GFR SERPL CREATININE-BSD FRML MDRD: 36 ML/MIN/{1.73_M2}
GLUCOSE BLDC GLUCOMTR-MCNC: 109 MG/DL (ref 70–99)
GLUCOSE BLDC GLUCOMTR-MCNC: 113 MG/DL (ref 70–99)
GLUCOSE BLDC GLUCOMTR-MCNC: 137 MG/DL (ref 70–99)
GLUCOSE BLDC GLUCOMTR-MCNC: 137 MG/DL (ref 70–99)
GLUCOSE BLDC GLUCOMTR-MCNC: 160 MG/DL (ref 70–99)
GLUCOSE SERPL-MCNC: 102 MG/DL (ref 70–99)
HCT VFR BLD AUTO: 27.7 % (ref 40–53)
HGB BLD-MCNC: 8.4 G/DL (ref 13.3–17.7)
INTERPRETATION ECG - MUSE: NORMAL
MCH RBC QN AUTO: 26.5 PG (ref 26.5–33)
MCHC RBC AUTO-ENTMCNC: 30.3 G/DL (ref 31.5–36.5)
MCV RBC AUTO: 87 FL (ref 78–100)
PLATELET # BLD AUTO: 151 10E9/L (ref 150–450)
POTASSIUM SERPL-SCNC: 4.4 MMOL/L (ref 3.4–5.3)
RBC # BLD AUTO: 3.17 10E12/L (ref 4.4–5.9)
SODIUM SERPL-SCNC: 133 MMOL/L (ref 133–144)
VANCOMYCIN SERPL-MCNC: 7.3 MG/L
WBC # BLD AUTO: 16.7 10E9/L (ref 4–11)

## 2019-11-11 PROCEDURE — 40000275 ZZH STATISTIC RCP TIME EA 10 MIN

## 2019-11-11 PROCEDURE — 36415 COLL VENOUS BLD VENIPUNCTURE: CPT | Performed by: INTERNAL MEDICINE

## 2019-11-11 PROCEDURE — 00000146 ZZHCL STATISTIC GLUCOSE BY METER IP

## 2019-11-11 PROCEDURE — 80202 ASSAY OF VANCOMYCIN: CPT | Performed by: INTERNAL MEDICINE

## 2019-11-11 PROCEDURE — 80048 BASIC METABOLIC PNL TOTAL CA: CPT | Performed by: INTERNAL MEDICINE

## 2019-11-11 PROCEDURE — 94640 AIRWAY INHALATION TREATMENT: CPT | Mod: 76

## 2019-11-11 PROCEDURE — 40000274 ZZH STATISTIC RCP CONSULT EA 30 MIN

## 2019-11-11 PROCEDURE — 25000128 H RX IP 250 OP 636: Performed by: INTERNAL MEDICINE

## 2019-11-11 PROCEDURE — 99232 SBSQ HOSP IP/OBS MODERATE 35: CPT | Performed by: INTERNAL MEDICINE

## 2019-11-11 PROCEDURE — 27211406 ZZ H KIT CATH IV 18 OR 20G CM, POWERGLIDE W MAX BARRIER

## 2019-11-11 PROCEDURE — 25000125 ZZHC RX 250: Performed by: INTERNAL MEDICINE

## 2019-11-11 PROCEDURE — 85027 COMPLETE CBC AUTOMATED: CPT | Performed by: INTERNAL MEDICINE

## 2019-11-11 PROCEDURE — 36569 INSJ PICC 5 YR+ W/O IMAGING: CPT

## 2019-11-11 PROCEDURE — 25000132 ZZH RX MED GY IP 250 OP 250 PS 637: Mod: GY | Performed by: INTERNAL MEDICINE

## 2019-11-11 PROCEDURE — 94640 AIRWAY INHALATION TREATMENT: CPT

## 2019-11-11 PROCEDURE — 12000000 ZZH R&B MED SURG/OB

## 2019-11-11 RX ORDER — LIDOCAINE 40 MG/G
CREAM TOPICAL
Status: DISCONTINUED | OUTPATIENT
Start: 2019-11-11 | End: 2019-11-11

## 2019-11-11 RX ORDER — IPRATROPIUM BROMIDE AND ALBUTEROL SULFATE 2.5; .5 MG/3ML; MG/3ML
3 SOLUTION RESPIRATORY (INHALATION)
Status: DISCONTINUED | OUTPATIENT
Start: 2019-11-11 | End: 2019-11-17

## 2019-11-11 RX ORDER — VANCOMYCIN HYDROCHLORIDE 1 G/200ML
1000 INJECTION, SOLUTION INTRAVENOUS ONCE
Status: COMPLETED | OUTPATIENT
Start: 2019-11-11 | End: 2019-11-11

## 2019-11-11 RX ADMIN — HALOPERIDOL 2 MG: 1 TABLET ORAL at 10:38

## 2019-11-11 RX ADMIN — IPRATROPIUM BROMIDE AND ALBUTEROL SULFATE 3 ML: .5; 3 SOLUTION RESPIRATORY (INHALATION) at 20:47

## 2019-11-11 RX ADMIN — IPRATROPIUM BROMIDE AND ALBUTEROL SULFATE 3 ML: .5; 3 SOLUTION RESPIRATORY (INHALATION) at 07:56

## 2019-11-11 RX ADMIN — IPRATROPIUM BROMIDE AND ALBUTEROL SULFATE 3 ML: .5; 3 SOLUTION RESPIRATORY (INHALATION) at 15:35

## 2019-11-11 RX ADMIN — SENNOSIDES AND DOCUSATE SODIUM 1 TABLET: 8.6; 5 TABLET ORAL at 15:07

## 2019-11-11 RX ADMIN — APIXABAN 5 MG: 5 TABLET, FILM COATED ORAL at 21:45

## 2019-11-11 RX ADMIN — IPRATROPIUM BROMIDE AND ALBUTEROL SULFATE 3 ML: .5; 3 SOLUTION RESPIRATORY (INHALATION) at 11:30

## 2019-11-11 RX ADMIN — TAZOBACTAM SODIUM AND PIPERACILLIN SODIUM 3.38 G: 375; 3 INJECTION, SOLUTION INTRAVENOUS at 17:25

## 2019-11-11 RX ADMIN — OMEPRAZOLE 20 MG: 20 CAPSULE, DELAYED RELEASE ORAL at 10:39

## 2019-11-11 RX ADMIN — AMLODIPINE BESYLATE 5 MG: 5 TABLET ORAL at 10:37

## 2019-11-11 RX ADMIN — GUAIFENESIN 10 ML: 100 SOLUTION ORAL at 17:25

## 2019-11-11 RX ADMIN — TAZOBACTAM SODIUM AND PIPERACILLIN SODIUM 3.38 G: 375; 3 INJECTION, SOLUTION INTRAVENOUS at 22:06

## 2019-11-11 RX ADMIN — METOPROLOL TARTRATE 50 MG: 50 TABLET, FILM COATED ORAL at 10:38

## 2019-11-11 RX ADMIN — TAZOBACTAM SODIUM AND PIPERACILLIN SODIUM 3.38 G: 375; 3 INJECTION, SOLUTION INTRAVENOUS at 10:39

## 2019-11-11 RX ADMIN — TAMSULOSIN HYDROCHLORIDE 0.4 MG: 0.4 CAPSULE ORAL at 10:38

## 2019-11-11 RX ADMIN — VANCOMYCIN HYDROCHLORIDE 1000 MG: 1 INJECTION, SOLUTION INTRAVENOUS at 22:55

## 2019-11-11 RX ADMIN — ATORVASTATIN CALCIUM 80 MG: 40 TABLET, FILM COATED ORAL at 10:38

## 2019-11-11 RX ADMIN — DONEPEZIL HYDROCHLORIDE 5 MG: 5 TABLET ORAL at 21:45

## 2019-11-11 RX ADMIN — CLOPIDOGREL BISULFATE 75 MG: 75 TABLET ORAL at 17:25

## 2019-11-11 RX ADMIN — HALOPERIDOL 2 MG: 1 TABLET ORAL at 21:44

## 2019-11-11 RX ADMIN — METOPROLOL TARTRATE 50 MG: 50 TABLET, FILM COATED ORAL at 21:45

## 2019-11-11 RX ADMIN — SERTRALINE HYDROCHLORIDE 150 MG: 100 TABLET ORAL at 10:38

## 2019-11-11 RX ADMIN — OLANZAPINE 10 MG: 2.5 TABLET, FILM COATED ORAL at 21:44

## 2019-11-11 ASSESSMENT — ENCOUNTER SYMPTOMS
ANAL BLEEDING: 0
BLOOD IN STOOL: 0
ABDOMINAL DISTENTION: 1
WEAKNESS: 1

## 2019-11-11 ASSESSMENT — ACTIVITIES OF DAILY LIVING (ADL)
ADLS_ACUITY_SCORE: 30
ADLS_ACUITY_SCORE: 30
ADLS_ACUITY_SCORE: 28
ADLS_ACUITY_SCORE: 30
ADLS_ACUITY_SCORE: 28
ADLS_ACUITY_SCORE: 28

## 2019-11-11 NOTE — CONSULTS
Care Transition Initial Assessment - RN        Met with: Patient.  DATA   Active Problems:    HCAP (healthcare-associated pneumonia)    Acute respiratory failure with hypoxia (H)       Cognitive Status: awake, alert and oriented.  Primary Care Clinic Name: [facility]  Primary Care MD Name: Dr Coleman  Contact information and PCP information verified: Yes  Lives With: other relative(s)(Nibebeto Mccormick)      Quality of Family Relationships: helpful, involved, supportive  Description of Support System: Supportive, Involved   Who is your support system?: Other (specify)(niece Anny and care services)   Support Assessment: Adequate family and caregiver support   Insurance concerns: No Insurance issues identified  ASSESSMENT  Patient currently receives the following services:  Pt lives in his own home in [place]. His niece, Anny, lives with him in his home. He receives services that provide him 24/7 care at home.   His services include:   1. [facility] [phone] for home services 10hrs/week. They provide assistance with homemaking, cooking, cleaning, transportation.    2. [facility] [phone] for personal care services. He receives bathing assistance 3x week for 2hrs. Please contact Sydney at above number to update her on discharge date so pt can get continued services.    3. [facility] team [facility] for RN/SW services. Call placed and message left with [facility]'s SW [facility], Lali [phone]. Awaiting return call.       Identified issues/concerns regarding health management: family requesting additional hours for personal care services     CTS following for Elevated DANIELE and PNA diagnosis. Per chart review from last admission, pt is 100% service connected with [facility]. Pt was last admitted from 10/29-11/1 with NSTEMI and is now admitted with PNA. He has had 3 admits and 3 ED visits in the last 6 months. He has a history of paranoid schitzophrenia, COPD, HTN, DM, Dementia, MI. He is  currently being treated with IV Vanco and zosyn. Met with pt, kira Mccormick and  from [facility] regarding plan of care. Services above were verified. They confirm that pt has 24/7 care at home with his services. Anticipate pt will need PT/OT recommendations prior to discharge. Pt has supportive family. Anny confirms she will be available to transport pt back home on discharge. [facility]. Anny will schedule follow up as recommended. She states the [facility] sets up transportation to and from appts.    PLAN  Patient/family is agreeable to the plan?  Yes  Patient anticipates discharging to home with current services.        Patient anticipates needs for home equipment: No  Transportation/person available to transport on day of discharge  is kira Mccormick.    Plan/Disposition: Home pending PT consult recommendation  Appointments: Kira will schedule follow up as recommended      Care  (CTS) will continue to follow as needed. [facility] will be [facility] to [facility] on discharge. Will cont to follow for discharge plan. Will follow for PT/OT recommendations for discharge.    Cherry Gardner RN BSN CM  Inpatient Care Coordination  [facility]  [phone]

## 2019-11-11 NOTE — PLAN OF CARE
"/54   Pulse 65   Temp 97.5  F (36.4  C) (Oral)   Resp 16   SpO2 100%     -Admitted patient  -VSS  -Patient alert but forgetful, also rambles. Somewhat paranoid thinking, saying things like \"I will never trust a [facility] doctor\"  -Very dry skin, lotion given  -Activity is lift currently, we used sliding board to get him to the bed.  -Talked to son, Aldair Tamayo who explained that his father must have breakfast before taking any morning medications or he could throw up.  -Turkey sandwich and ice cream given  -Vancomycin dose complete.  -2 RN skin check complete with Theresa TARIQ RN  --Skin looks good, except dryness.    "

## 2019-11-11 NOTE — PROVIDER NOTIFICATION
Paged md: flyer unable to obtain IV access, can we get order for vascular access consult on day shift?

## 2019-11-11 NOTE — H&P
[facility]  History and Physical   Hospitalist Service    Derek Bal MD    Hubert Tamayo MRN# [id number]   YOB: 1929 Age: 89 year old      Date of Admission:  11/10/2019           Assessment and Plan:   Hubert Tamayo is a 89-year-old man with history of paranoid schizophrenia, COPD, hypertension, osteoporosis, type 2 diabetes, dementia, remote history of DVT for which he is on chronic anticoagulation, chronic kidney disease with baseline creatinine of approximately 1.6, cataracts with surgical treatment, and recent admission here from 10/29 through 11/1 for non-ST elevation myocardial infarction due to anemia.  At the time of this recent admission he was on Coumadin.  He presented with INR greater than 10 and anemia with hemoglobin of 6.8.  He had a non-ST elevation myocardial infarction that was thought to be related to severe anemia.  He was treated with transfusion of 2 units of red blood cells.  He was seen by cardiology.  Prior to admission Coumadin was changed to Xarelto prior to discharge.  After discharge Hubert initially did okay.  After a day or 2 at home, however, he began to develop shortness of breath.  He presented to the emergency department today with progressive shortness of breath, chest discomfort, cough, and blood tinged sputum.  Emergency department evaluation showed hypoxia with oxygen saturations of 86% on room air.  CBC showed white blood cell count of 18.9 and hemoglobin of 9.3.  Basic metabolic panel showed creatinine of 1.64 which seems to be near baseline.  Glucose was 193.  Lactic acid was 2.  Troponin was negative.  BNP was normal at 976.  CT of chest, abdomen, and pelvis was obtained and showed right lower lung consolidation with associated pleural effusion.  Gallstones were noted in the gallbladder. Colonic diverticulosis without diverticulitis was noted.  There were fractures of the left transverse processes of vertebrae L1 through L3.  Hiatal hernia  was noted.  Hubert was given Zosyn and vancomycin for healthcare associated pneumonia.  He was given supplemental oxygen.  I was asked to admit him to the hospital for further cares.    Problem list:    1.  Acute hypoxic respiratory failure.  This is likely due to right lower lung healthcare associated pneumonia.  Supplemental oxygen will be provided weaned as tolerated.  Pneumonia will be treated as discussed below.    2.  Healthcare associated pneumonia with associated chest discomfort and leukocytosis.  As noted above, Hubert was hospitalized here from 10/29/2019 through 11/1/2019.  Continue Zosyn and vancomycin as started in the emergency department.  Monitor clinically.  Repeat CBC tomorrow.    3.  Chest discomfort, likely due to pneumonia.  Troponin was normal.  EKG showed no ischemic changes.  Pneumonia was noted on CT.    4.  Recent hospitalization for non-ST elevation myocardial infarction due to severe anemia related to supratherapeutic INR.    5.  Blood tinged sputum.  This is likely due to pneumonia.  Monitor.  Continue prior to admission Plavix and Xarelto for now.  If hemoglobin drops significantly or if there is jono hemoptysis then Plavix and Xarelto should be held.    6.  Incidental finding of gallstones.    7.  Incidental finding of diverticulosis without diverticulitis.    8.  Incidental finding of hiatal hernia.    9.  Type 2 diabetes.  This appears to be diet controlled.  NovoLog sliding scale insulin will be available for use as needed.    10.  COPD without acute exacerbation.  Continue prior to admission Symbicort and Spiriva inhalers.  Albuterol nebs will be available for use as needed.    11.  Paranoid schizophrenia.  Continue prior to admission olanzapine and Haldol.    12.  Dementia.  Continue prior to admission Aricept.    13.  Remote history of DVT.  He was chronically anticoagulated with Coumadin until a week and a half ago.  He is been on Xarelto for about a week after presenting with  supratherapeutic INR, severe anemia, and non-ST elevation myocardial infarction.  Continue Xarelto for now.  Outpatient primary care follow-up once discharged to determine if ongoing anticoagulation is needed.    14.  Hypertension.  Continue prior to admission amlodipine and metoprolol.    Full code  Xarelto will cover DVT prophylaxis  Disposition: Admit as inpatient as 2 nights of hospitalization are expected             Code Status:   Full Code         Primary Care Physician:   Saige Cortez [phone]         Chief Complaint:   Shortness of breath, cough with bloody sputum, and chest discomfort    History is obtained from Hubert, his niece, Dr. Garcia, and the medical record         History of Present Illness:   Hubert Tamayo is a 89-year-old man with history of paranoid schizophrenia, COPD, hypertension, osteoporosis, type 2 diabetes, dementia, remote history of DVT for which he is on chronic anticoagulation, chronic kidney disease with baseline creatinine of approximately 1.6, cataracts with surgical treatment, and recent admission here from 10/29 through 11/1 for non-ST elevation myocardial infarction due to anemia.  At the time of this recent admission he was on Coumadin.  He presented with INR greater than 10 and anemia with hemoglobin of 6.8.  He had a non-ST elevation myocardial infarction that was thought to be related to severe anemia.  He was treated with transfusion of 2 units of red blood cells.  He was seen by cardiology.  Prior to admission Coumadin was changed to Xarelto prior to discharge.  After discharge Hubert initially did okay.  After a day or 2 at home, however, he began to develop shortness of breath.  He presented to the emergency department today with progressive shortness of breath, chest discomfort, cough, and blood tinged sputum.  Emergency department evaluation showed hypoxia with oxygen saturations of 86% on room air.  CBC showed white blood cell count of 18.9 and hemoglobin of 9.3.   Basic metabolic panel showed creatinine of 1.64 which seems to be near baseline.  Glucose was 193.  Lactic acid was 2.  Troponin was negative.  BNP was normal at 976.  CT of chest, abdomen, and pelvis was obtained and showed right lower lung consolidation with associated pleural effusion.  Gallstones were noted in the gallbladder. Colonic diverticulosis without diverticulitis was noted.  There were fractures of the left transverse processes of vertebrae L1 through L3.  Hiatal hernia was noted.  Hubert was given Zosyn and vancomycin for healthcare associated pneumonia.  He was given supplemental oxygen.  I was asked to admit him to the hospital for further cares.         Past Medical History:     Patient Active Problem List   Diagnosis     Closed fracture of transverse process of lumbar vertebra (H)     Lumbar transverse process fracture (H)     Chest pain     NSTEMI (non-ST elevated myocardial infarction) (H)     HCAP (healthcare-associated pneumonia)     Acute respiratory failure with hypoxia (H)      Past Medical History:   Diagnosis Date     CAD (coronary artery disease)      Dementia (H)      Diabetes mellitus type II      Essential hypertension      Osteoporosis      Paranoid schizophrenia      Pulmonary emphysema              Past Surgical History:     Past Surgical History:   Procedure Laterality Date     CATARACT REMOVAL Bilateral             Home Medications:     Prior to Admission medications    Medication Sig Last Dose Taking? Auth Provider   albuterol (PROAIR HFA/PROVENTIL HFA/VENTOLIN HFA) 108 (90 Base) MCG/ACT inhaler Inhale 2 puffs into the lungs every 4 hours as needed for shortness of breath / dyspnea or wheezing   Unknown, Entered By History   amLODIPine (NORVASC) 5 MG tablet Take 1 tablet (5 mg) by mouth daily   Vamshi Fischer MD   artificial saliva (BIOTENE DRY MOUTHWASH) LIQD liquid Swish and spit 5-10 mLs in mouth 4 times daily as needed for dry mouth   Unknown, Entered By History    atorvastatin (LIPITOR) 80 MG tablet Take 80 mg by mouth daily   Unknown, Entered By History   budesonide-formoterol (SYMBICORT) 160-4.5 MCG/ACT Inhaler Inhale 2 puffs into the lungs 2 times daily   Unknown, Entered By History   cholecalciferol 1000 units TABS Take 1,000 Units by mouth daily   Unknown, Entered By History   clopidogrel (PLAVIX) 75 MG tablet Take 1 tablet (75 mg) by mouth daily   Vamshi Fischer MD   Donepezil HCl (ARICEPT PO) Take 5 mg by mouth daily    Reported, Patient   emollient (VANICREAM) external cream Apply topically daily Apply to dry skin   Unknown, Entered By History   haloperidol (HALDOL) 2 MG tablet Take 1 tablet (2 mg) by mouth 2 times daily   Todd Velarde MD   HYDROcodone-acetaminophen (NORCO) 5-325 MG tablet Take 1 tablet by mouth every 4 hours as needed for moderate to severe pain   Todd Velarde MD   hydrocortisone (CORTAID) 1 % external cream Apply topically 2 times daily Apply thin layer to itchy scaling on left inner elbow and nose   Unknown, Entered By History   metoprolol tartrate (LOPRESSOR) 50 MG tablet Take 1 tablet (50 mg) by mouth 2 times daily   Vamshi Fischer MD   mometasone (NASONEX) 50 MCG/ACT nasal spray Spray 2 sprays into both nostrils daily as needed (allergy symptoms / runny nose)   Unknown, Entered By History   nitroGLYcerin (NITROSTAT) 0.4 MG sublingual tablet Place 0.4 mg under the tongue every 5 minutes as needed for chest pain For chest pain place 1 tablet under the tongue every 5 minutes for 3 doses. If symptoms persist 5 minutes after 1st dose call 911.   Unknown, Entered By History   nystatin (MYCOSTATIN) 338190 UNIT/GM external cream Apply topically 2 times daily as needed (Intertrigo) Apply thin layer to groin folds   Unknown, Entered By History   OLANZapine (ZYPREXA) 10 MG tablet Take 1 tablet (10 mg) by mouth At Bedtime   Todd Velarde MD   omeprazole (PRILOSEC) 20 MG DR capsule Take 1 capsule (20 mg) by mouth every morning  (before breakfast)   Vamshi Fischer MD   Petrolatum OINT Externally apply topically 4 times daily as needed (dry lips)   Unknown, Entered By History   polyethylene glycol (MIRALAX/GLYCOLAX) packet Take 17 g by mouth daily as needed for constipation   Todd Velarde MD   rivaroxaban ANTICOAGULANT (XARELTO) 15 MG TABS tablet Take 1 tablet (15 mg) by mouth daily (with dinner)   Vamshi Fischer MD   senna-docusate (SENOKOT-S/PERICOLACE) 8.6-50 MG tablet Take 1 tablet by mouth 2 times daily as needed for constipation   Todd Velarde MD   sertraline (ZOLOFT) 100 MG tablet Take 1.5 tablets (150 mg) by mouth daily   Todd Velarde MD   tamsulosin (FLOMAX) 0.4 MG capsule Take 0.4 mg by mouth daily   Reported, Patient   Tiotropium Bromide Monohydrate (SPIRIVA HANDIHALER IN) Inhale 1 capsule into the lungs daily    Reported, Patient            Allergies:     Allergies   Allergen Reactions     Crustaceans      Loxapine      Nifedipine      Shellfish Allergy      Sulfa Drugs      Sulfamethoxazole-Trimethoprim      Trimethoprim             Social History:     Social History     Tobacco Use     Smoking status: Former Smoker     Packs/day: 0.00     Smokeless tobacco: Never Used   Substance Use Topics     Alcohol use: Not Currently             Family History:   History reviewed. No pertinent family history.           Review of Systems:   The 10 point Review of Systems is negative other than as noted in the HPI.           Physical Exam:   Blood pressure 122/66, pulse 65, temperature 97.8  F (36.6  C), temperature source Oral, resp. rate 23, SpO2 100 %.  0 lbs 0 oz      GENERAL: Pleasant and cooperative. No acute distress.  EYES: Pupils equal and round. No scleral erythema or icterus.  ENT: External ears are normal without deformity. Posterior oropharynx is without erythem, swelling, or exudate.  NECK: Supple. No masses or swelling. No tenderness. Thyroid is normal without mass or tenderness.  CHEST: Clear to  auscultation. Normal breath sounds. No retractions.   CV: Regular rate and rhythm. No JVD. Pulses normal.  ABDOMEN: Bowel sounds present. No tenderness. No masses or hernia.  EXTREMETIES: No clubbing, cyanosis, or ischemia.  SKIN: Warm and dry to touch. No wounds or rashes.  NEUROLOGIC: Strength and sensation are normal. Deep tendon reflexes are normal. Cranial nerves are normal.             Data:   All new lab and imaging data was reviewed.     Results for orders placed or performed during the hospital encounter of 11/10/19 (from the past 24 hour(s))   EKG 12 lead   Result Value Ref Range    Interpretation ECG Click View Image link to view waveform and result    Creatinine POCT   Result Value Ref Range    Creatinine 1.7 (H) 0.66 - 1.25 mg/dL    GFR Estimate 38 (L) >60 mL/min/[1.73_m2]    GFR Estimate If Black 46 (L) >60 mL/min/[1.73_m2]   ISTAT gases lactate cabrera POCT   Result Value Ref Range    Ph Venous 7.34 7.32 - 7.43 pH    PCO2 Venous 51 (H) 40 - 50 mm Hg    PO2 Venous 19 (L) 25 - 47 mm Hg    Bicarbonate Venous 27 21 - 28 mmol/L    O2 Sat Venous 27 %    Lactic Acid 2.0 0.7 - 2.1 mmol/L   Blood culture   Result Value Ref Range    Specimen Description Blood Left Arm     Special Requests Aerobic and anaerobic bottles received     Culture Micro PENDING    CBC with platelets differential   Result Value Ref Range    WBC 18.9 (H) 4.0 - 11.0 10e9/L    RBC Count 3.55 (L) 4.4 - 5.9 10e12/L    Hemoglobin 9.3 (L) 13.3 - 17.7 g/dL    Hematocrit 31.6 (L) 40.0 - 53.0 %    MCV 89 78 - 100 fl    MCH 26.2 (L) 26.5 - 33.0 pg    MCHC 29.4 (L) 31.5 - 36.5 g/dL    RDW 17.0 (H) 10.0 - 15.0 %    Platelet Count 161 150 - 450 10e9/L    Diff Method Automated Method     % Neutrophils 87.7 %    % Lymphocytes 3.1 %    % Monocytes 8.2 %    % Eosinophils 0.1 %    % Basophils 0.1 %    % Immature Granulocytes 0.8 %    Nucleated RBCs 0 0 /100    Absolute Neutrophil 16.6 (H) 1.6 - 8.3 10e9/L    Absolute Lymphocytes 0.6 (L) 0.8 - 5.3 10e9/L     Absolute Monocytes 1.5 (H) 0.0 - 1.3 10e9/L    Absolute Eosinophils 0.0 0.0 - 0.7 10e9/L    Absolute Basophils 0.0 0.0 - 0.2 10e9/L    Abs Immature Granulocytes 0.2 0 - 0.4 10e9/L    Absolute Nucleated RBC 0.0    INR   Result Value Ref Range    INR 1.77 (H) 0.86 - 1.14   Basic metabolic panel   Result Value Ref Range    Sodium 134 133 - 144 mmol/L    Potassium 4.5 3.4 - 5.3 mmol/L    Chloride 104 94 - 109 mmol/L    Carbon Dioxide 24 20 - 32 mmol/L    Anion Gap 6 3 - 14 mmol/L    Glucose 193 (H) 70 - 99 mg/dL    Urea Nitrogen 23 7 - 30 mg/dL    Creatinine 1.64 (H) 0.66 - 1.25 mg/dL    GFR Estimate 36 (L) >60 mL/min/[1.73_m2]    GFR Estimate If Black 42 (L) >60 mL/min/[1.73_m2]    Calcium 9.7 8.5 - 10.1 mg/dL   Troponin I   Result Value Ref Range    Troponin I ES <0.015 0.000 - 0.045 ug/L   Nt probnp inpatient (BNP)   Result Value Ref Range    N-Terminal Pro BNP Inpatient 976 0 - 1,800 pg/mL   Blood culture   Result Value Ref Range    Specimen Description Blood Right Arm     Special Requests Aerobic and anaerobic bottles received     Culture Micro PENDING    CT Chest/Abdomen/Pelvis w Contrast    Narrative    EXAM: CT CHEST/ABDOMEN/PELVIS W CONTRAST  LOCATION: [facility]  DATE/TIME: 11/10/2019 5:19 PM    INDICATION: Hemoptysis and hypoxia.  COMPARISON: None.  TECHNIQUE: CT scan of the chest, abdomen, and pelvis was performed without IV contrast. Multiplanar reformats were obtained. Dose reduction techniques were used.   CONTRAST: None.    FINDINGS:   LUNGS AND PLEURA: Emphysema. Linear atelectasis in the right upper lobe. Small right pleural effusion with subpleural consolidation and interstitial change within the right lower lobe. Mild reticular interstitial change in both lower lobes.    MEDIASTINUM/AXILLAE: Coronary artery calcification. Moderate-sized esophageal hiatal hernia.    HEPATOBILIARY: Small hepatic cysts. Gallstones.    PANCREAS: Normal.    SPLEEN: Normal.    ADRENAL GLANDS:  Normal.    KIDNEYS/BLADDER: Simple cyst right kidney.    BOWEL: Normal appendix. Colonic diverticula without CT evidence for diverticulitis.    LYMPH NODES: Normal.    VASCULATURE: 2.8 cm infrarenal abdominal aortic ectasia.    PELVIC ORGANS: Prostatic enlargement. Urinary bladder wall thickening likely due to outlet obstruction.    OTHER: None.    MUSCULOSKELETAL: Subacute to old left L1-L3 transverse process fractures.      Impression    IMPRESSION:  1.  Subpleural consolidation in the right lower lobe with a small right pleural effusion. Increased interstitial change in the subpleural lung bases. Underlying emphysematous change.    2.  Cholelithiasis.    3.  Prostatic enlargement. Diffuse urinary bladder wall thickening likely due to outlet obstruction.    4.  No appendicitis.    5.  Colonic diverticula without CT evidence for diverticulitis.    6.  Subacute to chronic left L1-L3 transverse process fractures.    7.  Moderate-sized esophageal hiatal hernia.

## 2019-11-11 NOTE — PROGRESS NOTES
"[facility] RCAT      Date: 11/11/2019     Admission Dx: Community acquired pneumonia     Pulmonary History: COPD     Home Nebulizer/MDI Use: Budesonide MDI BID, Tiotropium Once Daily      Home Oxygen: None     Acuity Level (RCAT flow sheet): 3     Aerosol Therapy initiated: Duoneb QID, Albuterol Q2 prn     Pulmonary Hygiene initiated: Deep breathe and cough TID     Volume Expansion initiated: Incentive Spirometry TID     Current Oxygen Requirements: 1L     Current SpO2: 94%     Re-evaluation date: 11/14/2019     Patient Education: Practiced deep breathing and coughing techniques     See \"RT Assessments\" flow sheet for patient assessment scoring and Acuity Level Details.                                          "

## 2019-11-11 NOTE — PHARMACY-VANCOMYCIN DOSING SERVICE
Pharmacy Vancomycin Initial Note  Date of Service November 10, 2019  Patient's  1929  89 year old, male    Indication: Healthcare-Associated Pneumonia    Current estimated CrCl = Estimated Creatinine Clearance: 27.5 mL/min (A) (based on SCr of 1.64 mg/dL (H)).    Creatinine for last 3 days  11/10/2019:  4:30 PM Creatinine 1.64 mg/dL    Recent Vancomycin Level(s) for last 3 days  No results found for requested labs within last 72 hours.      Vancomycin IV Administrations (past 72 hours)                   vancomycin (VANCOCIN) 1000 mg in dextrose 5% 200 mL PREMIX (mg) 1,000 mg New Bag 11/10/19 2053                Nephrotoxins and other renal medications (From now, onward)    Start     Dose/Rate Route Frequency Ordered Stop    19 0100  piperacillin-tazobactam (ZOSYN) infusion 3.375 g      3.375 g  100 mL/hr over 30 Minutes Intravenous EVERY 6 HOURS 11/10/19 2139      11/10/19 220  vancomycin place carvalho - receiving intermittent dosing      1 each Intravenous SEE ADMIN INSTRUCTIONS 11/10/19 220            Contrast Orders - past 72 hours (72h ago, onward)    Start     Dose/Rate Route Frequency Ordered Stop    11/10/19 1720  iopamidol (ISOVUE-370) solution 500 mL      500 mL Intravenous ONCE 11/10/19 1719 11/10/19 1722                Plan:  1.  Start vancomycin  1000 mg IV once in ED. Due to Poor RF intermittent dosing for now  2.  Goal Trough Level: 15-20 mg/L   3.  Pharmacy will check trough levels as appropriate in 1-3 Days.    4. Serum creatinine levels will be ordered daily for the first week of therapy and at least twice weekly for subsequent weeks.    5. Dorsey method utilized to dose vancomycin therapy: Intermittent dosing    Vikash Parnell [facility]

## 2019-11-11 NOTE — PLAN OF CARE
A&Ox4 but very forgetful. Up - Ax1 with a gaitbelt and walker. Abx - Zosyn. Midline in left arm. /113. Denies pain. Possible discharge in 2 days. Updated kira Glass.

## 2019-11-11 NOTE — PROGRESS NOTES
[facility]  Hospitalist Progress Note  Patient Name: Hubert Tamayo    MRN: [id number]  Provider: Derek Bal MD  11/11/19    Initial presenting complaint/issue to hospital (Diagnosis): shortness of breath         Assessment and Plan:      Hubert Tamayo is a 89-year-old man with history of paranoid schizophrenia, COPD, hypertension, osteoporosis, type 2 diabetes, dementia, remote history of DVT for which he is on chronic anticoagulation, chronic kidney disease with baseline creatinine of approximately 1.6, cataracts with surgical treatment, and recent admission here from 10/29 through 11/1 for non-ST elevation myocardial infarction due to anemia.  At the time of this recent admission he was on Coumadin.  He presented with INR greater than 10 and anemia with hemoglobin of 6.8.  He had a non-ST elevation myocardial infarction that was thought to be related to severe anemia.  He was treated with transfusion of 2 units of red blood cells.  He was seen by cardiology.  Prior to admission Coumadin was changed to Xarelto prior to discharge.  After discharge Hubert initially did okay.  After a day or 2 at home, however, he began to develop shortness of breath.  He presented to the emergency department on 11/10/19 with progressive shortness of breath, chest discomfort, cough, and blood tinged sputum.  Emergency department evaluation showed hypoxia with oxygen saturations of 86% on room air.  CBC showed white blood cell count of 18.9 and hemoglobin of 9.3.  Basic metabolic panel showed creatinine of 1.64 which seems to be near baseline.  Glucose was 193.  Lactic acid was 2.  Troponin was negative.  BNP was normal at 976.  CT of chest, abdomen, and pelvis was obtained and showed right lower lung consolidation with associated pleural effusion.  Gallstones were noted in the gallbladder. Colonic diverticulosis without diverticulitis was noted.  There were fractures of the left transverse processes of vertebrae L1  through L3.  Hiatal hernia was noted.  Hubert was given Zosyn and vancomycin for healthcare associated pneumonia.  He was given supplemental oxygen.  He was admitted to the hospital for further cares.     Problem list:     1.  Acute hypoxic respiratory failure.  This is likely due to right lower lung healthcare associated pneumonia. Clinically better today- has weaned off of oxygen, WBC is lower, and he is less short of breath. Hubert still feels weak.       2.  Healthcare associated pneumonia with associated chest discomfort and leukocytosis.  As noted above, Hubert was hospitalized here from 10/29/2019 through 11/1/2019.  Continue Zosyn and vancomycin. Monitor clinically.  Repeat CBC tomorrow.     3.  Chest discomfort, likely due to pneumonia.  Troponin was normal.  EKG showed no ischemic changes.  Pneumonia was noted on CT.     4.  Recent hospitalization for non-ST elevation myocardial infarction due to severe anemia related to supratherapeutic INR.     5.  Blood tinged sputum.  This is likely due to pneumonia.  Monitor.  Continue prior to admission Plavix and Xarelto for now.  If hemoglobin drops significantly or if there is jono hemoptysis then Plavix and Xarelto should be held.     6.  Incidental finding of gallstones.     7.  Incidental finding of diverticulosis without diverticulitis.     8.  Incidental finding of hiatal hernia.     9.  Type 2 diabetes.  This appears to be diet controlled.  NovoLog sliding scale insulin will be available for use as needed.     10.  COPD without acute exacerbation.  Continue prior to admission Symbicort and Spiriva inhalers.  Albuterol nebs are available for use as needed.     11.  Paranoid schizophrenia.  Continue prior to admission olanzapine and Haldol.     12.  Dementia.  Continue prior to admission Aricept.     13.  Remote history of DVT.  He was chronically anticoagulated with Coumadin until a week and a half prior to presentation.  He had been on Xarelto for about a week  after presenting with supratherapeutic INR, severe anemia, and non-ST elevation myocardial infarction.  Continue Xarelto for now.  Outpatient primary care follow-up once discharged to determine if ongoing anticoagulation is needed.    ADDENDUM: discussed with Pharmacy. [facility] With CKD this is better than Xarelto so will change Xarelto to Eliquis 5 mg po BID.     14.  Hypertension.  Continue prior to admission amlodipine and metoprolol.    15.  Deconditioning. PT consult.      Full code  Xarelto or Eliquis will cover DVT prophylaxis  Disposition: Continue inpatient cares- likely here for 2-3 more days.         Interval History:      Feeling a little better. Still weak. Still some cough but less short of breath.                   Physical Exam:      Last Vital Signs:  /61 (BP Location: Left arm)   Pulse 65   Temp 99.2  F (37.3  C) (Oral)   Resp 18   SpO2 92%     Intake/Output Summary (Last 24 hours) at 11/11/2019 1620  Last data filed at 11/11/2019 0400  Gross per 24 hour   Intake 740 ml   Output 250 ml   Net 490 ml       GENERAL:  Comfortable. Cooperative.  PSYCH: pleasant, oriented, No acute distress.  EYES: PERRLA, Normal conjunctiva.  HEART:  Regular rate and rhythm. No JVD. Pulses normal. No edema.  LUNGS:  Clear to auscultation, normal Respiratory effort.  ABDOMEN:  Soft, no hepatosplenomegaly, normal bowel sounds.  EXTREMETIES: No clubbing, cyanosis or ischemia  SKIN:  Dry to touch, No rash.           Medications:      All current medications were reviewed.         Data:      All new lab and imaging data was reviewed.   Labs:  Recent Labs   Lab 11/10/19  1641 11/10/19  1630   CULT No growth after 18 hours No growth after 18 hours          Lab Results   Component Value Date     11/11/2019     11/10/2019     10/31/2019    Lab Results   Component Value Date    CHLORIDE 104 11/11/2019    CHLORIDE 104 11/10/2019    CHLORIDE 109 10/31/2019    Lab Results    Component Value Date    BUN 23 11/11/2019    BUN 23 11/10/2019    BUN 25 10/31/2019      Lab Results   Component Value Date    POTASSIUM 4.4 11/11/2019    POTASSIUM 4.5 11/10/2019    POTASSIUM 4.4 10/31/2019    Lab Results   Component Value Date    CO2 24 11/11/2019    CO2 24 11/10/2019    CO2 25 10/31/2019    Lab Results   Component Value Date    CR 1.64 11/11/2019    CR 1.64 11/10/2019    CR 1.68 11/01/2019        Recent Labs   Lab 11/11/19  0720 11/10/19  1630   WBC 16.7* 18.9*   HGB 8.4* 9.3*   HCT 27.7* 31.6*   MCV 87 89    161

## 2019-11-11 NOTE — ED NOTES
[facility]  ED Nurse Handoff Report    Hubert Tamayo is a 89 year old male   ED Chief complaint: Shortness of Breath and Chest Pain  . ED Diagnosis:   Final diagnoses:   None     Allergies:   Allergies   Allergen Reactions     Crustaceans      Loxapine      Nifedipine      Shellfish Allergy      Sulfa Drugs      Sulfamethoxazole-Trimethoprim      Trimethoprim        Code Status: Full Code  Activity level - Baseline/Home:  Stand by Assist. Activity Level - Current:   Assist X 2. Lift room needed: No. Bariatric: No   Needed: No   Isolation: No. Infection: Not Applicable.     Vital Signs:   Vitals:    11/10/19 1630 11/10/19 1643 11/10/19 1645 11/10/19 1800   BP: 122/64  114/56 122/60   Pulse: 78  72 62   Resp: 18 25 23    Temp:       TempSrc:       SpO2:  99% 100% 100%       Cardiac Rhythm:  ,   Cardiac  Cardiac Rhythm: Normal sinus rhythm  Pain level: 0-10 Pain Scale: 2  Patient confused: Yes., minimal according to niece baselinePatient Falls Risk: No.   Elimination Status: has voided and BM in ed , voids at baseline, did not in ED  Patient Report - Initial Complaint: SOB, CP. Focused Assessment:   15:54 ED Triage Notes Filed Pt here with c/o CP, SOB and coughing up blood. Pt admitted 10/29 for NSTEMI. States he's had this CP and SOB since discharge. Hasn't followed up with cardiology yet. ABC intact. A&O X4.      16:50 Gastrointestinal Gastrointestinal - GI WDL: -WDL except (C/O RLQ to R lateral side pain) JJ      16:50 Respiratory Respiratory - Respiratory WDL: -WDL except (foaming white/pink tinged sputum, productive. Frequent loose cough, fair. LS Diminished) JJ     16:49 Cardiac Cardiac - Cardiac WDL: -WDL except (C/O CP intermittent, HX MI, anemia with transfusion. C/O SOB. )  Review of Systems (Cardiac) - Cardiovascular Symptoms/Conditions: chest pain; hypertension; myocardial infarction   Chest Pain Assessment - Precipitating Factors: unknown   Cardiac Monitoring - EKG Monitoring: Yes   Cardiac Regularity: Regular  Cardiac Rhythm: NSR        Denies O2 use at home. 86% RA on arrival. O2 initiated 2 LPM and O2 increased mid 90s. Pt A/O but some confusion, pt mumbles and is hard to understand.  Tests Performed: labs, imaging. Abnormal Results:   Labs Ordered and Resulted from Time of ED Arrival Up to the Time of Departure from the ED   CBC WITH PLATELETS DIFFERENTIAL - Abnormal; Notable for the following components:       Result Value    WBC 18.9 (*)     RBC Count 3.55 (*)     Hemoglobin 9.3 (*)     Hematocrit 31.6 (*)     MCH 26.2 (*)     MCHC 29.4 (*)     RDW 17.0 (*)     Absolute Neutrophil 16.6 (*)     Absolute Lymphocytes 0.6 (*)     Absolute Monocytes 1.5 (*)     All other components within normal limits   INR - Abnormal; Notable for the following components:    INR 1.77 (*)     All other components within normal limits   BASIC METABOLIC PANEL - Abnormal; Notable for the following components:    Glucose 193 (*)     Creatinine 1.64 (*)     GFR Estimate 36 (*)     GFR Estimate If Black 42 (*)     All other components within normal limits   CREATININE POCT - Abnormal; Notable for the following components:    Creatinine 1.7 (*)     GFR Estimate 38 (*)     GFR Estimate If Black 46 (*)     All other components within normal limits   ISTAT  GASES LACTATE ERROL POCT - Abnormal; Notable for the following components:    PCO2 Venous 51 (*)     PO2 Venous 19 (*)     All other components within normal limits   TROPONIN I   NT PROBNP INPATIENT   PERIPHERAL IV CATHETER   PULSE OXIMETRY NURSING   CARDIAC CONTINUOUS MONITORING   ISTAT CREATININE NURSING POCT   ISTAT CG4 GASES LACTATE ERROL NURSING POCT   BLOOD CULTURE   BLOOD CULTURE     CT Chest/Abdomen/Pelvis w Contrast   Final Result   IMPRESSION:   1.  Subpleural consolidation in the right lower lobe with a small right pleural effusion. Increased interstitial change in the subpleural lung bases. Underlying emphysematous change.      2.  Cholelithiasis.      3.   Prostatic enlargement. Diffuse urinary bladder wall thickening likely due to outlet obstruction.      4.  No appendicitis.      5.  Colonic diverticula without CT evidence for diverticulitis.      6.  Subacute to chronic left L1-L3 transverse process fractures.      7.  Moderate-sized esophageal hiatal hernia.        .   Treatments provided: monitoring  Family Comments: niece at bedside  OBS brochure/video discussed/provided to patient:  No  ED Medications:   Medications   iopamidol (ISOVUE-370) solution 500 mL (72 mLs Intravenous Given 11/10/19 1722)   Saline Flush (58 mLs Other Given 11/10/19 1722)     Drips infusing:  No  For the majority of the shift, the patient's behavior Green. Interventions performed were n/a.     Severe Sepsis OR Septic Shock Diagnosis Present: No      ED Nurse Name/Phone Number: Fanta Kam RN,   6:04 PM    RECEIVING UNIT ED HANDOFF REVIEW    Above ED Nurse Handoff Report was reviewed: Yes  Reviewed by: Andrea Farley RN on November 10, 2019 at 8:53 PM

## 2019-11-12 ENCOUNTER — APPOINTMENT (OUTPATIENT)
Dept: PHYSICAL THERAPY | Facility: CLINIC | Age: 84
DRG: 177 | End: 2019-11-12
Attending: INTERNAL MEDICINE
Payer: MEDICARE

## 2019-11-12 LAB
ABO + RH BLD: NORMAL
ABO + RH BLD: NORMAL
ANION GAP SERPL CALCULATED.3IONS-SCNC: 7 MMOL/L (ref 3–14)
BLD GP AB SCN SERPL QL: NORMAL
BLOOD BANK CMNT PATIENT-IMP: NORMAL
BUN SERPL-MCNC: 30 MG/DL (ref 7–30)
CALCIUM SERPL-MCNC: 8.5 MG/DL (ref 8.5–10.1)
CHLORIDE SERPL-SCNC: 102 MMOL/L (ref 94–109)
CO2 SERPL-SCNC: 25 MMOL/L (ref 20–32)
CREAT SERPL-MCNC: 1.88 MG/DL (ref 0.66–1.25)
ERYTHROCYTE [DISTWIDTH] IN BLOOD BY AUTOMATED COUNT: 16.7 % (ref 10–15)
GFR SERPL CREATININE-BSD FRML MDRD: 31 ML/MIN/{1.73_M2}
GLUCOSE BLDC GLUCOMTR-MCNC: 111 MG/DL (ref 70–99)
GLUCOSE BLDC GLUCOMTR-MCNC: 120 MG/DL (ref 70–99)
GLUCOSE BLDC GLUCOMTR-MCNC: 125 MG/DL (ref 70–99)
GLUCOSE BLDC GLUCOMTR-MCNC: 135 MG/DL (ref 70–99)
GLUCOSE BLDC GLUCOMTR-MCNC: 203 MG/DL (ref 70–99)
GLUCOSE SERPL-MCNC: 110 MG/DL (ref 70–99)
GRAM STN SPEC: ABNORMAL
HCT VFR BLD AUTO: 23.9 % (ref 40–53)
HGB BLD-MCNC: 7.4 G/DL (ref 13.3–17.7)
HGB BLD-MCNC: 7.8 G/DL (ref 13.3–17.7)
Lab: ABNORMAL
MCH RBC QN AUTO: 26.5 PG (ref 26.5–33)
MCHC RBC AUTO-ENTMCNC: 31 G/DL (ref 31.5–36.5)
MCV RBC AUTO: 86 FL (ref 78–100)
PLATELET # BLD AUTO: 157 10E9/L (ref 150–450)
POTASSIUM SERPL-SCNC: 4.3 MMOL/L (ref 3.4–5.3)
RBC # BLD AUTO: 2.79 10E12/L (ref 4.4–5.9)
SODIUM SERPL-SCNC: 134 MMOL/L (ref 133–144)
SPECIMEN EXP DATE BLD: NORMAL
SPECIMEN SOURCE: ABNORMAL
WBC # BLD AUTO: 12.4 10E9/L (ref 4–11)

## 2019-11-12 PROCEDURE — 25000132 ZZH RX MED GY IP 250 OP 250 PS 637: Mod: GY | Performed by: INTERNAL MEDICINE

## 2019-11-12 PROCEDURE — 87205 SMEAR GRAM STAIN: CPT | Performed by: INTERNAL MEDICINE

## 2019-11-12 PROCEDURE — 94640 AIRWAY INHALATION TREATMENT: CPT

## 2019-11-12 PROCEDURE — 25000131 ZZH RX MED GY IP 250 OP 636 PS 637: Mod: GY | Performed by: INTERNAL MEDICINE

## 2019-11-12 PROCEDURE — 85018 HEMOGLOBIN: CPT | Performed by: INTERNAL MEDICINE

## 2019-11-12 PROCEDURE — 97530 THERAPEUTIC ACTIVITIES: CPT | Mod: GP | Performed by: PHYSICAL THERAPIST

## 2019-11-12 PROCEDURE — 87106 FUNGI IDENTIFICATION YEAST: CPT | Performed by: INTERNAL MEDICINE

## 2019-11-12 PROCEDURE — 94640 AIRWAY INHALATION TREATMENT: CPT | Mod: 76

## 2019-11-12 PROCEDURE — 87070 CULTURE OTHR SPECIMN AEROBIC: CPT | Performed by: INTERNAL MEDICINE

## 2019-11-12 PROCEDURE — 87077 CULTURE AEROBIC IDENTIFY: CPT | Performed by: INTERNAL MEDICINE

## 2019-11-12 PROCEDURE — 80048 BASIC METABOLIC PNL TOTAL CA: CPT | Performed by: INTERNAL MEDICINE

## 2019-11-12 PROCEDURE — 86901 BLOOD TYPING SEROLOGIC RH(D): CPT | Performed by: INTERNAL MEDICINE

## 2019-11-12 PROCEDURE — 97116 GAIT TRAINING THERAPY: CPT | Mod: GP | Performed by: PHYSICAL THERAPIST

## 2019-11-12 PROCEDURE — 25000128 H RX IP 250 OP 636: Performed by: INTERNAL MEDICINE

## 2019-11-12 PROCEDURE — 86850 RBC ANTIBODY SCREEN: CPT | Performed by: INTERNAL MEDICINE

## 2019-11-12 PROCEDURE — 12000000 ZZH R&B MED SURG/OB

## 2019-11-12 PROCEDURE — 97161 PT EVAL LOW COMPLEX 20 MIN: CPT | Mod: GP | Performed by: PHYSICAL THERAPIST

## 2019-11-12 PROCEDURE — 25000125 ZZHC RX 250: Performed by: INTERNAL MEDICINE

## 2019-11-12 PROCEDURE — 40000275 ZZH STATISTIC RCP TIME EA 10 MIN

## 2019-11-12 PROCEDURE — 86900 BLOOD TYPING SEROLOGIC ABO: CPT | Performed by: INTERNAL MEDICINE

## 2019-11-12 PROCEDURE — 99232 SBSQ HOSP IP/OBS MODERATE 35: CPT | Performed by: INTERNAL MEDICINE

## 2019-11-12 PROCEDURE — 25800030 ZZH RX IP 258 OP 636: Performed by: INTERNAL MEDICINE

## 2019-11-12 PROCEDURE — 85027 COMPLETE CBC AUTOMATED: CPT | Performed by: INTERNAL MEDICINE

## 2019-11-12 PROCEDURE — 00000146 ZZHCL STATISTIC GLUCOSE BY METER IP

## 2019-11-12 RX ORDER — SENNOSIDES 8.6 MG
1 TABLET ORAL EVERY MORNING
Status: DISCONTINUED | OUTPATIENT
Start: 2019-11-12 | End: 2019-11-18 | Stop reason: HOSPADM

## 2019-11-12 RX ORDER — HALOPERIDOL 5 MG/1
2.5 TABLET ORAL 3 TIMES DAILY
Status: ON HOLD | COMMUNITY
End: 2020-11-18

## 2019-11-12 RX ORDER — SENNOSIDES A AND B 8.6 MG/1
1 TABLET, FILM COATED ORAL
COMMUNITY

## 2019-11-12 RX ORDER — OLANZAPINE 20 MG/1
20 TABLET ORAL AT BEDTIME
Status: ON HOLD | COMMUNITY
End: 2021-01-27

## 2019-11-12 RX ORDER — OLANZAPINE 10 MG/1
20 TABLET ORAL AT BEDTIME
Status: DISCONTINUED | OUTPATIENT
Start: 2019-11-12 | End: 2019-11-18 | Stop reason: HOSPADM

## 2019-11-12 RX ORDER — AMLODIPINE BESYLATE 2.5 MG/1
2.5 TABLET ORAL DAILY
Status: ON HOLD | COMMUNITY
End: 2020-06-27

## 2019-11-12 RX ORDER — ACETAMINOPHEN 500 MG
500 TABLET ORAL EVERY 6 HOURS PRN
Status: ON HOLD | COMMUNITY
End: 2020-06-27

## 2019-11-12 RX ORDER — SENNOSIDES A AND B 8.6 MG/1
1 TABLET, FILM COATED ORAL EVERY MORNING
COMMUNITY

## 2019-11-12 RX ADMIN — METOPROLOL TARTRATE 50 MG: 50 TABLET, FILM COATED ORAL at 09:13

## 2019-11-12 RX ADMIN — OLANZAPINE 20 MG: 10 TABLET, FILM COATED ORAL at 21:59

## 2019-11-12 RX ADMIN — IPRATROPIUM BROMIDE AND ALBUTEROL SULFATE 3 ML: .5; 3 SOLUTION RESPIRATORY (INHALATION) at 07:15

## 2019-11-12 RX ADMIN — IPRATROPIUM BROMIDE AND ALBUTEROL SULFATE 3 ML: .5; 3 SOLUTION RESPIRATORY (INHALATION) at 15:16

## 2019-11-12 RX ADMIN — TAZOBACTAM SODIUM AND PIPERACILLIN SODIUM 3.38 G: 375; 3 INJECTION, SOLUTION INTRAVENOUS at 23:14

## 2019-11-12 RX ADMIN — OMEPRAZOLE 20 MG: 20 CAPSULE, DELAYED RELEASE ORAL at 09:13

## 2019-11-12 RX ADMIN — HALOPERIDOL 2 MG: 1 TABLET ORAL at 09:13

## 2019-11-12 RX ADMIN — TAZOBACTAM SODIUM AND PIPERACILLIN SODIUM 3.38 G: 375; 3 INJECTION, SOLUTION INTRAVENOUS at 04:52

## 2019-11-12 RX ADMIN — METOPROLOL TARTRATE 50 MG: 50 TABLET, FILM COATED ORAL at 21:59

## 2019-11-12 RX ADMIN — INSULIN ASPART 2 UNITS: 100 INJECTION, SOLUTION INTRAVENOUS; SUBCUTANEOUS at 13:36

## 2019-11-12 RX ADMIN — IPRATROPIUM BROMIDE AND ALBUTEROL SULFATE 3 ML: .5; 3 SOLUTION RESPIRATORY (INHALATION) at 11:14

## 2019-11-12 RX ADMIN — TAZOBACTAM SODIUM AND PIPERACILLIN SODIUM 3.38 G: 375; 3 INJECTION, SOLUTION INTRAVENOUS at 18:29

## 2019-11-12 RX ADMIN — HALOPERIDOL 2.5 MG: 1 TABLET ORAL at 21:59

## 2019-11-12 RX ADMIN — AMLODIPINE BESYLATE 5 MG: 5 TABLET ORAL at 09:13

## 2019-11-12 RX ADMIN — TAZOBACTAM SODIUM AND PIPERACILLIN SODIUM 3.38 G: 375; 3 INJECTION, SOLUTION INTRAVENOUS at 10:39

## 2019-11-12 RX ADMIN — DONEPEZIL HYDROCHLORIDE 5 MG: 5 TABLET ORAL at 21:59

## 2019-11-12 RX ADMIN — SODIUM CHLORIDE 500 ML: 9 INJECTION, SOLUTION INTRAVENOUS at 09:12

## 2019-11-12 RX ADMIN — TAMSULOSIN HYDROCHLORIDE 0.4 MG: 0.4 CAPSULE ORAL at 09:13

## 2019-11-12 RX ADMIN — SERTRALINE HYDROCHLORIDE 150 MG: 100 TABLET ORAL at 09:13

## 2019-11-12 RX ADMIN — IPRATROPIUM BROMIDE AND ALBUTEROL SULFATE 3 ML: .5; 3 SOLUTION RESPIRATORY (INHALATION) at 20:10

## 2019-11-12 RX ADMIN — SENNOSIDES 1 TABLET: 8.6 TABLET, FILM COATED ORAL at 14:15

## 2019-11-12 RX ADMIN — ATORVASTATIN CALCIUM 80 MG: 40 TABLET, FILM COATED ORAL at 09:13

## 2019-11-12 ASSESSMENT — ACTIVITIES OF DAILY LIVING (ADL)
ADLS_ACUITY_SCORE: 28

## 2019-11-12 NOTE — PLAN OF CARE
Pt is A&Ox4 with forgetfulness, Ax1, gait belt and walker to transfer, on 1L O2. VSS. Course lungs auscultated bilaterally. Pt c/o SOB upon exertion, alleviated with rest. Pt is drinking adequate amounts of fluids. Voiding in sufficient amounts. Pt continues IV zosyn and IV Vancomycin. Will continue to monitor.

## 2019-11-12 NOTE — PLAN OF CARE
A&Ox4 but forgetful. VSS. /203. Hgb 7.4 - 500 NS bolus given. Recheck at 1400. Eliquis and Plavix discontinued. Abx Zosyn. Denies pain. Midline in place with blood return noted. Up - Ax1 with a walker and gaitbelt. Possible discharge back home with niece in 2-3 days.

## 2019-11-12 NOTE — PROGRESS NOTES
Care Coordination:  Call received from pt's [facility], Lali [phone], regarding pt services. She confirms pt services noted in CTS note from 11/11. [facility] for RN, SW, OT, Dietician and MD. Pt has an RN who comes every other week to set up meds, assess and VS. His HC RN is Chuyita Cosme [phone]. The [facility] MD, pt's PCP, comes to his home to see him on a scheduled basis. [facility]. [facility] gets PT [facility] from [facility].    They request SW/CTS to call Chuyita TURNER [phone] on day of discharge with discharge plan. Orders should be faxed to [facility] at fax # [phone], attn:Chuyita.    Cherry Gardner RN BSN CM  Inpatient Care Coordination  [facility]  [phone]

## 2019-11-12 NOTE — PHARMACY-ADMISSION MEDICATION HISTORY
Admission medication history interview status for this patient is complete. Ani did original med  rec review, I completed it after clarifying his anticoagulant. See EPIC admission navigator for allergy information, prior to admission medications and immunization status.     Medication history interview source(s):Patient and Family  Medication history resources (including written lists, pill bottles, clinic record):[facility], [facility], [facility]..  [facility]    Changes made to PTA medication list:  Added: Eliquis, Tylenol  Deleted: Xarelto, Miralax, Norco  Changed: amlodipine 5 to 2.5mg, olanzapine 10 to 20mg, Haldol 2 to 2.5 mg, senna plus to senna,    Actions taken by pharmacist (provider contacted, etc):[facility]     Additional medication history information: At previous visit here 10/29-11/1 he was admitted on warfarin and discharged on Xarelto. [facility] the [facility] that he is [facility] Eliquis (apixaban). So discontinued Xarelto and updated list to Eliquis; (also is still on clopidogrel).    Medication reconciliation/reorder completed by provider prior to medication history? Yes          Prior to Admission medications    Medication Sig Last Dose Taking? Auth Provider   acetaminophen (TYLENOL) 500 MG tablet Take 500 mg by mouth every 6 hours as needed for mild pain  Yes Unknown, Entered By History   albuterol (PROAIR HFA/PROVENTIL HFA/VENTOLIN HFA) 108 (90 Base) MCG/ACT inhaler Inhale 2 puffs into the lungs every 4 hours as needed for shortness of breath / dyspnea or wheezing  Yes Unknown, Entered By History   amLODIPine (NORVASC) 2.5 MG tablet Take 2.5 mg by mouth daily  Yes Unknown, Entered By History   apixaban ANTICOAGULANT (ELIQUIS) 5 MG tablet Take 5 mg by mouth 2 times daily  Yes Unknown, Entered By History   artificial saliva (BIOTENE DRY MOUTHWASH) LIQD liquid Swish and spit 5-10 mLs in mouth 4 times daily as needed for dry mouth  Yes Unknown, Entered By History   atorvastatin (LIPITOR)  80 MG tablet Take 80 mg by mouth daily 11/10/2019 Yes Unknown, Entered By History   budesonide-formoterol (SYMBICORT) 160-4.5 MCG/ACT Inhaler Inhale 2 puffs into the lungs 2 times daily 11/10/2019 Yes Unknown, Entered By History   cholecalciferol 1000 units TABS Take 1,000 Units by mouth daily 11/10/2019 Yes Unknown, Entered By History   Donepezil HCl (ARICEPT PO) Take 5 mg by mouth daily  11/9/2019 Yes Reported, Patient   emollient (VANICREAM) external cream Apply topically daily Apply to dry skin 11/10/2019 Yes Unknown, Entered By History   haloperidol (HALDOL) 5 MG tablet Take 2.5 mg by mouth 2 times daily 11/10/2019 at Unknown time Yes Unknown, Entered By History   hydrocortisone (CORTAID) 1 % external cream Apply topically 2 times daily Apply thin layer to itchy scaling on left inner elbow and nose  Yes Unknown, Entered By History   metoprolol tartrate (LOPRESSOR) 50 MG tablet Take 1 tablet (50 mg) by mouth 2 times daily 11/10/2019 Yes Vamshi Fischer MD   OLANZapine (ZYPREXA) 20 MG tablet Take 20 mg by mouth At Bedtime  Yes Unknown, Entered By History   omeprazole (PRILOSEC) 20 MG DR capsule Take 1 capsule (20 mg) by mouth every morning (before breakfast) 11/10/2019 Yes Vamshi Fischer MD   Petrolatum OINT Externally apply topically 4 times daily as needed (dry lips)  Yes Unknown, Entered By History   senna (SENOKOT) 8.6 MG tablet Take 1 tablet by mouth every morning  Yes Unknown, Entered By History   senna (SENOKOT) 8.6 MG tablet Take 1 tablet by mouth every evening as needed for constipation  Yes Unknown, Entered By History   sertraline (ZOLOFT) 100 MG tablet Take 1.5 tablets (150 mg) by mouth daily 11/10/2019 Yes Todd Velarde MD   tamsulosin (FLOMAX) 0.4 MG capsule Take 0.4 mg by mouth daily 11/10/2019 Yes Reported, Patient   Tiotropium Bromide Monohydrate (SPIRIVA HANDIHALER IN) Inhale 1 capsule into the lungs daily  11/10/2019 Yes Reported, Patient   clopidogrel (PLAVIX) 75 MG tablet Take  1 tablet (75 mg) by mouth daily Unknown at Unknown time  Vamshi Fischer MD   mometasone (NASONEX) 50 MCG/ACT nasal spray Spray 2 sprays into both nostrils daily as needed (allergy symptoms / runny nose)   Unknown, Entered By History   nitroGLYcerin (NITROSTAT) 0.4 MG sublingual tablet Place 0.4 mg under the tongue every 5 minutes as needed for chest pain For chest pain place 1 tablet under the tongue every 5 minutes for 3 doses. If symptoms persist 5 minutes after 1st dose call 911.   Unknown, Entered By History   nystatin (MYCOSTATIN) 768347 UNIT/GM external cream Apply topically 2 times daily as needed (Intertrigo) Apply thin layer to groin folds   Unknown, Entered By History

## 2019-11-12 NOTE — PLAN OF CARE
"Discharge Planner PT   Patient plan for discharge: return home  Current status: PT: Order received; Initial evaluation completed and treatment initiated. At baseline chart indicates patient has 24/7 care from niece or other caregivers. Patient reports use of walker in home and cane when in community to Dr. Terrazas. Has 2 steps to enter home and stair chair to access 2nd floor; Patient oriented during session but tends to fixate on \"nobody is helping me\" and about issues with his service connection and overall poor care of him over the years. On eval patient noted to be up in bedside chair; able to come to stand with CGA and safety cues with use of walker; ambulated in hallway 75 feet x 2 with a seated rest secondary to fatigue; CGA for safety secondary to some path deviation; transfer back to bedside chair at end of session. O2 sats 93%; no significant SOB noted  Barriers to return to prior living situation: none anticipated as chart indicates patient has 24/7 assist  Recommendations for discharge: Home with prior 24/7 assist with mobility/cares; Home PT; use of a walker  Rationale for recommendations: Unsure how far patient is from his usual baseline as niece not present; not sure how accurate statements about prior level of function are; reports multiple falls at home. Chart indicates he has 24/7 assist; may benefit from Home PT if niece feels he is below his baseline level of function.        Entered by: Yarelis Cheney 11/12/2019 10:10 AM      "

## 2019-11-12 NOTE — PLAN OF CARE
[id number]: Pt resting comfortably. A&O ex forgetful.VSS on 1.5L O2. . Denies pain. RAMESH. Midline in place. Transfers with Ax1. On abx. Will continue to monitor.

## 2019-11-12 NOTE — PROGRESS NOTES
11/12/19 0930   Quick Adds   Type of Visit Initial PT Evaluation   Living Environment   Lives With other relative(s)  (Niece)   Living Arrangements house   Home Accessibility stairs to enter home;stairs within home   Number of Stairs, Main Entrance 2   Stair Railings, Main Entrance railings on both sides of stairs   Number of Stairs, Within Home, Primary other (see comments)  (reports having a stair chair to 2nd floor bedroom)   Transportation Anticipated family or friend will provide   Self-Care   Usual Activity Tolerance moderate   Current Activity Tolerance fair   Equipment Currently Used at Home walker, rolling;cane, straight  (reports use of walker in home and cane in community)   Activity/Exercise/Self-Care Comment reports he is able to ambulate with walker without help and use toilet independently; gets bathing help 2x/week; chart indicates 24/7 help available   Functional Level Prior   Ambulation 3-->assistive equipment and person   Transferring 3-->assistive equipment and person   Toileting 1-->assistive equipment   Bathing 2-->assistive person   Cognition 1 - attention or memory deficits   Fall history within last six months yes   Number of times patient has fallen within last six months   (patient reports multiple falls)   Which of the above functional risks had a recent onset or change? ambulation;transferring;fall history;cognition   Prior Functional Level Comment unsure of PLOF; chart indicates he has 24/7 assist; he reports independence with walker use   General Information   Onset of Illness/Injury or Date of Surgery - Date 11/10/19   Referring Physician Derek Bal MD   Patient/Family Goals Statement return home   Pertinent History of Current Problem (include personal factors and/or comorbidities that impact the POC) Patient with recent admission for NSTEMI who presented to hospital 2 days later with SOB and found to have HCP; see medical record for further information    Precautions/Limitations fall precautions   General Observations patient up in chair; tangential in conversation at times   Cognitive Status Examination   Orientation orientation to person, place and time   Level of Consciousness alert;other (see comments)  (forgetful)   Follows Commands and Answers Questions 75% of the time;100% of the time   Personal Safety and Judgment impaired;at risk behaviors demonstrated   Memory impaired   Pain Assessment   Patient Currently in Pain Yes, see Vital Sign flowsheet   Posture    Posture Comments forward flexed at head and trunk   Range of Motion (ROM)   ROM Comment WFL   Strength   Strength Comments functional weakness; below baseline per patient   Bed Mobility   Bed Mobility Comments NT; patient up in chair and wanting to return there   Transfer Skills   Transfer Comments sit<>stand with CGA and safety cues; use of walker   Gait   Gait Comments gait > 50 feet with a rolling walker and CGA for safety   Balance   Balance Comments impaired; current use of walker; multiple falls; needs assist for all mobility   General Therapy Interventions   Planned Therapy Interventions bed mobility training;gait training;strengthening;transfer training;progressive activity/exercise   Clinical Impression   Criteria for Skilled Therapeutic Intervention yes, treatment indicated   PT Diagnosis impaired gait/transfers; weakness   Influenced by the following impairments SOB with activity; decreased activity tolerance; functional weakness; forgetful   Functional limitations due to impairments impaired independence with functional mobility   Clinical Presentation Evolving/Changing   Clinical Presentation Rationale clinical judgement   Clinical Decision Making (Complexity) Low complexity   Therapy Frequency Daily   Predicted Duration of Therapy Intervention (days/wks) 3 days   Anticipated Equipment Needs at Discharge front wheeled walker   Anticipated Discharge Disposition Home with Assist;Home with  "Home Therapy  (if niece feels he is below baseline)   Risk & Benefits of therapy have been explained Yes   Patient, Family & other staff in agreement with plan of care Yes   [facility] TM \"6 Clicks\"   2016, Trustees of [facility], under license to [employer].  All rights reserved.   6 Clicks Short Forms Basic Mobility Inpatient Short Form   [facility] AM-PAC  \"6 Clicks\" V.2 Basic Mobility Inpatient Short Form   1. Turning from your back to your side while in a flat bed without using bedrails? 3 - A Little   2. Moving from lying on your back to sitting on the side of a flat bed without using bedrails? 3 - A Little   3. Moving to and from a bed to a chair (including a wheelchair)? 3 - A Little   4. Standing up from a chair using your arms (e.g., wheelchair, or bedside chair)? 3 - A Little   5. To walk in hospital room? 3 - A Little   6. Climbing 3-5 steps with a railing? 3 - A Little   Basic Mobility Raw Score (Score out of 24.Lower scores equate to lower levels of function) 18   Total Evaluation Time   Total Evaluation Time (Minutes) 10     "

## 2019-11-12 NOTE — PROGRESS NOTES
CLINICAL NUTRITION SERVICES  -  ASSESSMENT NOTE      Recommendations Ordered by Registered Dietitian (RD):     Oral nutrition supplements   Malnutrition:   % Weight Loss:Up to 7.5% in 3 months (non-severe malnutrition)  % Intake: unable to determine   Subcutaneous Fat Loss:  Orbital region mild depletion and Upper arm region moderate depletion  Muscle Loss:  Temporal region moderate depletion, Clavicle bone region moderate depletion, Acromion bone region moderate depletion, Dorsal hand region moderate depletion, Patellar region mild depletion, Anterior thigh region mild to moderate depletion and Posterior calf region mild depletion  Fluid Retention:  None noted    Malnutrition Diagnosis: Non-Severe malnutrition  In Context of:  Acute illness with underlying chronic illness or disease component      REASON FOR ASSESSMENT  Hubert Tamayo is a 89 year old male seen by Registered Dietitian for positive nutrition risk screen - Reduced oral intake over the last month    NUTRITION HISTORY    Information obtained from patient     Supplements at home: Boost (BID?)    Living situation: 24/7 care with PCA support, niece lives in his home    [facility] including [facility] support     Issues chewing or swallowing: denies    Denies changes to intake prior to admission, likes ashley ledesma    Seen by [facility] 10/31/19 with non severe malnutrition criteria met:   - Information obtained from patient   - Patient with a h/o COPD, dementia, paranoid schizophrenia, DMII (diet controlled), recent admit for fractures.  - Patient describes eating less than usual PTA d/t decreased appetite and being tired.  Unable to provide complete nutrition history at this time (?baseline diet - many missing teeth, usual number of meals/day).   - He does report consuming Ensure at home, likes strawberry or chocolate.  - Shellfish allergy interfaced with meal ordering system.    CURRENT NUTRITION ORDERS    Diet:  "Regular    Supplement: none  Current Intake/Tolerance:    Per flow sheet review, % intake for documented meals.     Patient reports good appetite, not impressed with the room service menu    NUTRITION FOCUSED PHYSICAL ASSESSMENT FOR DIAGNOSING MALNUTRITION + PHYSICAL FINDINGS  Completed and Observed:    Muscle wasting (refer to documentation in Malnutrition section) and Subcutaneous fat loss (refer to documentation in Malnutrition section)  Obtained from Chart/Interdisciplinary Team    Generalized weakness    Edema: none    Forgetful    ANTHROPOMETRICS  Height: 5'8\"  Weight: 64.9 kg   BMI.  Weight Status:  Normal BMI  Ideal body weight: 70 kg +/- 10%, 93% of IBW   Weight History:  6.6% weight loss in <3 months, as noted below  EMR indicates weight of 70.2 kg 7/17/19  Wt Readings from Last 10 Encounters:   11/12/19 64.2 kg (141 lb 8 oz)   10/29/19 64.9 kg (143 lb)   09/15/19 68.5 kg (151 lb 1.6 oz)       ASSESSED NUTRITION NEEDS (PER APPROVED PRACTICE GUIDELINES, Dosing weight: 64 kg):  Estimated Energy Needs: 0581-6563 kcals (30-35 Kcal/Kg)  Justification: maintenance vs repletion  Estimated Protein Needs: 77+ grams protein (>1.2 g pro/Kg)  Justification: preservation of lean body mass  Estimated Fluid Needs: >1 mL/Kcal  Justification: maintenance    LABS  Labs reviewed    Electrolytes  Potassium (mmol/L)   Date Value   11/12/2019 4.3   11/11/2019 4.4   11/10/2019 4.5     Phosphorus (mg/dL)   Date Value   10/24/2008 2.8    Blood Glucose  Glucose (mg/dL)   Date Value   11/12/2019 110 (H)   11/11/2019 102 (H)   11/10/2019 193 (H)   10/31/2019 93   10/29/2019 93    Inflammatory Markers  WBC (10e9/L)   Date Value   11/12/2019 12.4 (H)   11/11/2019 16.7 (H)   11/10/2019 18.9 (H)     Albumin (g/dL)   Date Value   11/21/2010 3.8   10/24/2008 3.9   09/21/2008 3.9      Magnesium (mg/dL)   Date Value   10/29/2019 2.2   10/24/2008 2.1     Sodium (mmol/L)   Date Value   11/12/2019 134   11/11/2019 133   11/10/2019 134    " Renal  Urea Nitrogen (mg/dL)   Date Value   11/12/2019 30   11/11/2019 23   11/10/2019 23   10/31/2019 25   10/29/2019 24     Creatinine (mg/dL)   Date Value   11/12/2019 1.88 (H)   11/11/2019 1.64 (H)   11/10/2019 1.64 (H)   11/01/2019 1.68 (H)   10/31/2019 1.40 (H)     Additional  Triglycerides (mg/dL)   Date Value   10/31/2019 103     Ketones Urine (mg/dL)   Date Value   10/23/2008 Negative           MEDICATIONS  Medications reviewed    NUTRITION DIAGNOSIS:  Unintentional weight loss related to increased needs 2/2 infection, hospitalization, disease progression as evidenced by 6.6% weight loss in <3 months, non severe malnutrition criteria met    INTERVENTIONS  Recommendations / Nutrition Prescription  Continue regular diet as ordered + oral nutrition supplements to increase calorie and protein intake    Implementation  Nutrition education: reviewed oral nutrition supplements for protein and calorie push  Medical food supplement: Boost 10-2  Collaboration and Referral of care: Discussed patient during interdisciplinary care rounds this morning    Goals  Patient to consume >/= 75% of meals TID and >/= 2 high protein supplements per day    MONITORING AND EVALUATION:  Progress towards goals will be monitored and evaluated per protocol and Practice Guidelines    Mia Philippe RDN, LD, CNSC  Pager - 3rd floor/ICU: [phone]  Pager - All other floors: [phone]  Pager - Weekend/holiday: [phone]  Office: [phone]

## 2019-11-13 LAB
ANION GAP SERPL CALCULATED.3IONS-SCNC: 5 MMOL/L (ref 3–14)
BUN SERPL-MCNC: 26 MG/DL (ref 7–30)
CALCIUM SERPL-MCNC: 9.1 MG/DL (ref 8.5–10.1)
CHLORIDE SERPL-SCNC: 107 MMOL/L (ref 94–109)
CO2 SERPL-SCNC: 27 MMOL/L (ref 20–32)
CREAT SERPL-MCNC: 1.88 MG/DL (ref 0.66–1.25)
ERYTHROCYTE [DISTWIDTH] IN BLOOD BY AUTOMATED COUNT: 17 % (ref 10–15)
FERRITIN SERPL-MCNC: 72 NG/ML (ref 26–388)
GFR SERPL CREATININE-BSD FRML MDRD: 31 ML/MIN/{1.73_M2}
GLUCOSE BLDC GLUCOMTR-MCNC: 119 MG/DL (ref 70–99)
GLUCOSE BLDC GLUCOMTR-MCNC: 162 MG/DL (ref 70–99)
GLUCOSE BLDC GLUCOMTR-MCNC: 198 MG/DL (ref 70–99)
GLUCOSE BLDC GLUCOMTR-MCNC: 94 MG/DL (ref 70–99)
GLUCOSE BLDC GLUCOMTR-MCNC: 95 MG/DL (ref 70–99)
GLUCOSE SERPL-MCNC: 106 MG/DL (ref 70–99)
HCT VFR BLD AUTO: 26.3 % (ref 40–53)
HGB BLD-MCNC: 8.1 G/DL (ref 13.3–17.7)
IRON SATN MFR SERPL: 6 % (ref 15–46)
IRON SERPL-MCNC: 17 UG/DL (ref 35–180)
MCH RBC QN AUTO: 26.6 PG (ref 26.5–33)
MCHC RBC AUTO-ENTMCNC: 30.8 G/DL (ref 31.5–36.5)
MCV RBC AUTO: 87 FL (ref 78–100)
PLATELET # BLD AUTO: 189 10E9/L (ref 150–450)
POTASSIUM SERPL-SCNC: 4 MMOL/L (ref 3.4–5.3)
RBC # BLD AUTO: 3.04 10E12/L (ref 4.4–5.9)
SODIUM SERPL-SCNC: 139 MMOL/L (ref 133–144)
TIBC SERPL-MCNC: 285 UG/DL (ref 240–430)
WBC # BLD AUTO: 14.2 10E9/L (ref 4–11)

## 2019-11-13 PROCEDURE — 25800030 ZZH RX IP 258 OP 636: Performed by: INTERNAL MEDICINE

## 2019-11-13 PROCEDURE — 25000125 ZZHC RX 250: Performed by: INTERNAL MEDICINE

## 2019-11-13 PROCEDURE — 94640 AIRWAY INHALATION TREATMENT: CPT

## 2019-11-13 PROCEDURE — 25000132 ZZH RX MED GY IP 250 OP 250 PS 637: Mod: GY | Performed by: INTERNAL MEDICINE

## 2019-11-13 PROCEDURE — 83540 ASSAY OF IRON: CPT | Performed by: INTERNAL MEDICINE

## 2019-11-13 PROCEDURE — 00000146 ZZHCL STATISTIC GLUCOSE BY METER IP

## 2019-11-13 PROCEDURE — 82728 ASSAY OF FERRITIN: CPT | Performed by: INTERNAL MEDICINE

## 2019-11-13 PROCEDURE — 12000000 ZZH R&B MED SURG/OB

## 2019-11-13 PROCEDURE — 83550 IRON BINDING TEST: CPT | Performed by: INTERNAL MEDICINE

## 2019-11-13 PROCEDURE — 99232 SBSQ HOSP IP/OBS MODERATE 35: CPT | Performed by: INTERNAL MEDICINE

## 2019-11-13 PROCEDURE — 36415 COLL VENOUS BLD VENIPUNCTURE: CPT | Performed by: INTERNAL MEDICINE

## 2019-11-13 PROCEDURE — 25000128 H RX IP 250 OP 636: Performed by: INTERNAL MEDICINE

## 2019-11-13 PROCEDURE — 85027 COMPLETE CBC AUTOMATED: CPT | Performed by: INTERNAL MEDICINE

## 2019-11-13 PROCEDURE — 94640 AIRWAY INHALATION TREATMENT: CPT | Mod: 76

## 2019-11-13 PROCEDURE — 80048 BASIC METABOLIC PNL TOTAL CA: CPT | Performed by: INTERNAL MEDICINE

## 2019-11-13 PROCEDURE — 40000275 ZZH STATISTIC RCP TIME EA 10 MIN

## 2019-11-13 RX ORDER — METHYLPREDNISOLONE SODIUM SUCCINATE 125 MG/2ML
125 INJECTION, POWDER, LYOPHILIZED, FOR SOLUTION INTRAMUSCULAR; INTRAVENOUS
Status: DISCONTINUED | OUTPATIENT
Start: 2019-11-13 | End: 2019-11-18 | Stop reason: HOSPADM

## 2019-11-13 RX ORDER — DIPHENHYDRAMINE HYDROCHLORIDE 50 MG/ML
50 INJECTION INTRAMUSCULAR; INTRAVENOUS
Status: DISCONTINUED | OUTPATIENT
Start: 2019-11-13 | End: 2019-11-18 | Stop reason: HOSPADM

## 2019-11-13 RX ORDER — CLOPIDOGREL BISULFATE 75 MG/1
75 TABLET ORAL DAILY
Status: DISCONTINUED | OUTPATIENT
Start: 2019-11-13 | End: 2019-11-18 | Stop reason: HOSPADM

## 2019-11-13 RX ADMIN — OLANZAPINE 20 MG: 10 TABLET, FILM COATED ORAL at 22:19

## 2019-11-13 RX ADMIN — TAZOBACTAM SODIUM AND PIPERACILLIN SODIUM 3.38 G: 375; 3 INJECTION, SOLUTION INTRAVENOUS at 05:15

## 2019-11-13 RX ADMIN — HALOPERIDOL 2.5 MG: 1 TABLET ORAL at 08:50

## 2019-11-13 RX ADMIN — IPRATROPIUM BROMIDE AND ALBUTEROL SULFATE 3 ML: .5; 3 SOLUTION RESPIRATORY (INHALATION) at 07:55

## 2019-11-13 RX ADMIN — METOPROLOL TARTRATE 50 MG: 50 TABLET, FILM COATED ORAL at 21:40

## 2019-11-13 RX ADMIN — CLOPIDOGREL BISULFATE 75 MG: 75 TABLET ORAL at 12:18

## 2019-11-13 RX ADMIN — APIXABAN 5 MG: 5 TABLET, FILM COATED ORAL at 12:18

## 2019-11-13 RX ADMIN — HALOPERIDOL 2.5 MG: 1 TABLET ORAL at 21:40

## 2019-11-13 RX ADMIN — IRON SUCROSE 300 MG: 20 INJECTION, SOLUTION INTRAVENOUS at 12:18

## 2019-11-13 RX ADMIN — TAZOBACTAM SODIUM AND PIPERACILLIN SODIUM 3.38 G: 375; 3 INJECTION, SOLUTION INTRAVENOUS at 18:15

## 2019-11-13 RX ADMIN — IPRATROPIUM BROMIDE AND ALBUTEROL SULFATE 3 ML: .5; 3 SOLUTION RESPIRATORY (INHALATION) at 11:18

## 2019-11-13 RX ADMIN — TAZOBACTAM SODIUM AND PIPERACILLIN SODIUM 3.38 G: 375; 3 INJECTION, SOLUTION INTRAVENOUS at 10:54

## 2019-11-13 RX ADMIN — APIXABAN 5 MG: 5 TABLET, FILM COATED ORAL at 21:40

## 2019-11-13 RX ADMIN — IPRATROPIUM BROMIDE AND ALBUTEROL SULFATE 3 ML: .5; 3 SOLUTION RESPIRATORY (INHALATION) at 16:00

## 2019-11-13 RX ADMIN — METOPROLOL TARTRATE 50 MG: 50 TABLET, FILM COATED ORAL at 08:50

## 2019-11-13 RX ADMIN — IPRATROPIUM BROMIDE AND ALBUTEROL SULFATE 3 ML: .5; 3 SOLUTION RESPIRATORY (INHALATION) at 20:28

## 2019-11-13 RX ADMIN — TAMSULOSIN HYDROCHLORIDE 0.4 MG: 0.4 CAPSULE ORAL at 08:51

## 2019-11-13 RX ADMIN — DONEPEZIL HYDROCHLORIDE 5 MG: 5 TABLET ORAL at 22:19

## 2019-11-13 RX ADMIN — OMEPRAZOLE 20 MG: 20 CAPSULE, DELAYED RELEASE ORAL at 08:50

## 2019-11-13 RX ADMIN — TAZOBACTAM SODIUM AND PIPERACILLIN SODIUM 3.38 G: 375; 3 INJECTION, SOLUTION INTRAVENOUS at 23:47

## 2019-11-13 RX ADMIN — HYDROCODONE BITARTRATE AND ACETAMINOPHEN 1 TABLET: 5; 325 TABLET ORAL at 18:16

## 2019-11-13 RX ADMIN — SERTRALINE HYDROCHLORIDE 150 MG: 100 TABLET ORAL at 08:50

## 2019-11-13 RX ADMIN — SENNOSIDES 1 TABLET: 8.6 TABLET, FILM COATED ORAL at 08:50

## 2019-11-13 RX ADMIN — FLUTICASONE FUROATE AND VILANTEROL TRIFENATATE 1 PUFF: 200; 25 POWDER RESPIRATORY (INHALATION) at 11:17

## 2019-11-13 RX ADMIN — ATORVASTATIN CALCIUM 80 MG: 40 TABLET, FILM COATED ORAL at 08:50

## 2019-11-13 RX ADMIN — AMLODIPINE BESYLATE 5 MG: 5 TABLET ORAL at 08:51

## 2019-11-13 ASSESSMENT — ACTIVITIES OF DAILY LIVING (ADL)
ADLS_ACUITY_SCORE: 28

## 2019-11-13 NOTE — PLAN OF CARE
PT- attempted to see, he kindly declines PT services at this time. RN on phone with family unable to update. her

## 2019-11-13 NOTE — PROGRESS NOTES
[facility]  Hospitalist Progress Note  Patient Name: Hubert Tamayo    MRN: [id number]  Provider: Derek Bal MD  11/12/19    Initial presenting complaint/issue to hospital (Diagnosis): shortness of breath         Assessment and Plan:      Hubert Tamayo is a 89-year-old man with history of paranoid schizophrenia, COPD, hypertension, osteoporosis, type 2 diabetes, dementia, remote history of DVT for which he is on chronic anticoagulation, chronic kidney disease with baseline creatinine of approximately 1.6, cataracts with surgical treatment, and recent admission here from 10/29 through 11/1 for non-ST elevation myocardial infarction due to anemia.  At the time of this recent admission he was on Coumadin.  He presented with INR greater than 10 and anemia with hemoglobin of 6.8.  He had a non-ST elevation myocardial infarction that was thought to be related to severe anemia.  He was treated with transfusion of 2 units of red blood cells.  He was seen by cardiology.  Prior to admission Coumadin was changed to Xarelto prior to discharge.  After discharge Hubert initially did okay.  After a day or 2 at home, however, he began to develop shortness of breath.  He presented to the emergency department on 11/10/19 with progressive shortness of breath, chest discomfort, cough, and blood tinged sputum.  Emergency department evaluation showed hypoxia with oxygen saturations of 86% on room air.  CBC showed white blood cell count of 18.9 and hemoglobin of 9.3.  Basic metabolic panel showed creatinine of 1.64 which seems to be near baseline.  Glucose was 193.  Lactic acid was 2.  Troponin was negative.  BNP was normal at 976.  CT of chest, abdomen, and pelvis was obtained and showed right lower lung consolidation with associated pleural effusion.  Gallstones were noted in the gallbladder. Colonic diverticulosis without diverticulitis was noted.  There were fractures of the left transverse processes of vertebrae L1  through L3.  Hiatal hernia was noted.  Hubert was given Zosyn and vancomycin for healthcare associated pneumonia.  He was given supplemental oxygen.  He was admitted to the hospital for further cares.     Problem list:     1.  Acute hypoxic respiratory failure.  This is likely due to right lower lung healthcare associated pneumonia. Clinically he is improving. He has weaned off of oxygen, WBC is lower (sill a bit high, though), and he is less short of breath. Hubert still feels weak.      2.  Healthcare associated pneumonia, possibly with gram negative organism, with associated chest discomfort and leukocytosis.  As noted above, Hubert was hospitalized here from 10/29/2019 through 11/1/2019.  Continue Zosyn. Stop vancomycin. Monitor clinically.  Repeat CBC tomorrow.    3.  Anemia, acute on chronic.  I think he has anemia of chronic disease.  Hemoglobin drifted down to 7.4 this morning.  There is no obvious bleeding.  I held his Eliquis and Plavix for now.  Hemoglobin this afternoon was a little bit higher.  Need to hold anticoagulants.  Repeat CBC tomorrow.  Hubert has been typed and screened.  If hemoglobin is continuing to trend up, consider restarting Eliquis and Plavix.     4.  Chest discomfort, likely due to pneumonia.  Troponin was normal.  EKG showed no ischemic changes.  Pneumonia was noted on CT. no further work-up planned.     5.  Recent hospitalization for non-ST elevation myocardial infarction due to severe anemia related to supratherapeutic INR.     6.  Blood tinged sputum.  This was likely due to pneumonia.  Seems resolved.     7.  Incidental finding of gallstones.     8.  Incidental finding of diverticulosis without diverticulitis.     9.  Incidental finding of hiatal hernia.     10.  Type 2 diabetes.  This appears to be diet controlled.  NovoLog sliding scale insulin will be available for use as needed.     11.  COPD without acute exacerbation.  Continue prior to admission Symbicort and Spiriva inhalers.   Albuterol nebs are available for use as needed.     12.  Paranoid schizophrenia.  Continue prior to admission olanzapine and Haldol.     13.  Dementia.  Continue prior to admission Aricept.     14.  Remote history of DVT.  He was chronically anticoagulated with Coumadin until a week and a half prior to presentation when he presented here with supratherapeutic INR and acute anemia causing non-ST elevation myocardial infarction.  At that time he was discharged home on Xarelto.  [facility] which he was taking at the time of this admission.  I held this today with worsening anemia.  Consider restarting tomorrow.  Ultimately, he should follow-up with [facility] to determine if he needs ongoing anticoagulation for this remote DVT.     15.  Hypertension.  Continue prior to admission amlodipine and metoprolol.     16.  Deconditioning. PT consult.      Full code  Xarelto or Eliquis will cover DVT prophylaxis  Disposition: Continue inpatient cares- likely here for 2 more days.         Interval History:      No new problems.  Still weak.  No bleeding noticed.  Cough is improving.  Hemoptysis seems resolved.                  Physical Exam:      Last Vital Signs:  /56 (BP Location: Left arm)   Pulse 65   Temp 97.4  F (36.3  C) (Oral)   Resp 20   Wt 64.2 kg (141 lb 8 oz)   SpO2 94%   BMI 21.52 kg/m      Intake/Output Summary (Last 24 hours) at 11/12/2019 1815  Last data filed at 11/12/2019 1711  Gross per 24 hour   Intake 640 ml   Output 550 ml   Net 90 ml       GENERAL:  Comfortable. Cooperative.  PSYCH: pleasant, oriented, No acute distress.  EYES: PERRLA, Normal conjunctiva.  HEART:  Regular rate and rhythm. No JVD. Pulses normal. No edema.  LUNGS:  Clear to auscultation, normal Respiratory effort.  ABDOMEN:  Soft, no hepatosplenomegaly, normal bowel sounds.  EXTREMETIES: No clubbing, cyanosis or ischemia  SKIN:  Dry to touch, No rash.           Medications:       All current medications were reviewed.         Data:      All new lab and imaging data was reviewed.   Labs:       Lab Results   Component Value Date     11/12/2019     11/11/2019     11/10/2019    Lab Results   Component Value Date    CHLORIDE 102 11/12/2019    CHLORIDE 104 11/11/2019    CHLORIDE 104 11/10/2019    Lab Results   Component Value Date    BUN 30 11/12/2019    BUN 23 11/11/2019    BUN 23 11/10/2019      Lab Results   Component Value Date    POTASSIUM 4.3 11/12/2019    POTASSIUM 4.4 11/11/2019    POTASSIUM 4.5 11/10/2019    Lab Results   Component Value Date    CO2 25 11/12/2019    CO2 24 11/11/2019    CO2 24 11/10/2019    Lab Results   Component Value Date    CR 1.88 11/12/2019    CR 1.64 11/11/2019    CR 1.64 11/10/2019        Recent Labs   Lab 11/12/19  1540 11/12/19  0545 11/11/19  0720 11/10/19  1630   WBC  --  12.4* 16.7* 18.9*   HGB 7.8* 7.4* 8.4* 9.3*   HCT  --  23.9* 27.7* 31.6*   MCV  --  86 87 89   PLT  --  157 151 161

## 2019-11-13 NOTE — PROGRESS NOTES
[facility]  Hospitalist Progress Note  Patient Name: Hubert Tamayo    MRN: [id number]  Provider: Randolph Henderson MD          Assessment and Plan:      Hubert Tamayo is a 89-year-old man with history of paranoid schizophrenia, COPD, hypertension, osteoporosis, type 2 diabetes, dementia, remote history of DVT for which he is on chronic anticoagulation, chronic kidney disease with baseline creatinine of approximately 1.6, cataracts with surgical treatment, and recent admission here from 10/29 through 11/1 for non-ST elevation myocardial infarction due to anemia.  At the time of this recent admission he was on Coumadin.  He presented with INR greater than 10 and anemia with hemoglobin of 6.8.  He had a non-ST elevation myocardial infarction that was thought to be related to severe anemia.  He was treated with transfusion of 2 units of red blood cells.  He was seen by cardiology.  Prior to admission Coumadin was changed to Xarelto prior to discharge.  After discharge Hubert initially did okay.  After a day or 2 at home, however, he began to develop shortness of breath.     He presented to the emergency department on 11/10/19 with progressive shortness of breath, chest discomfort, cough, and blood tinged sputum.  Emergency department evaluation showed hypoxia with oxygen saturations of 86% on room air.  CT of chest, abdomen, and pelvis was obtained and showed right lower lung consolidation with associated pleural effusion. Hubert was given Zosyn and vancomycin for healthcare associated pneumonia.  He was given supplemental oxygen.  He was admitted to the hospital for further cares.    Since admission, he has been weaned from oxygen. He has been treated with Zosyn for presumed HCAP.    Today:  - His hemoglobin is stable today at 8.1, feel comfortable restarting his Plavix and Apixaban.  - His iron studies show iron deficiency (ferritin is equivocal at 72 but low iron sat index of 6% essentially diagnostic of  iron deficiency). Will give him iron sucrose 300 mg x1 dose.   - He'll remain in the hospital today to monitor his hemoglobin tomorrow after his blood thinners were restarted.   - Continue Zosyn   - Discussed today with his niece and son Kumar. They are concerned about the cause for his chronic blood loss. He is iron deficient and GI etiology would be high on the differential. Reasonable to look into whether colonoscopy might be appropriate for him. GI consult placed.     Problem list:     1.  Acute hypoxic respiratory failure--improving  This is likely due to right lower lung healthcare associated pneumonia. Clinically he is improving. He has weaned off of oxygen, WBC is overall lower.   Treat with Zosyn as below.       2.  Healthcare associated pneumonia with associated chest discomfort and leukocytosis.   As noted above, Hubert was hospitalized here from 10/29/2019 through 11/1/2019.  Continue Zosyn. Stop vancomycin. Monitor clinically.      3.  Anemia, acute on chronic.   Has evidence of iron deficiency on his labs with an iron saturation index (also known as transferrin saturation) which is total iron divided by TIBC. Consistent with iron deficiency when this is below 10%, and his is 6%.   No obvious sign of bleeding.  Hb stable on 11/13 up to 8.1 from 7.8.   With the overt iron deficiency he'll benefit from IV iron and he will receive iron sucrose 300 mg.      4.  Chest discomfort, likely due to pneumonia.  Troponin was normal.  EKG showed no ischemic changes.  Pneumonia was noted on CT. no further work-up planned.     5.  Recent hospitalization for non-ST elevation myocardial infarction due to severe anemia related to supratherapeutic INR.     6.  Blood tinged sputum.  This was likely due to pneumonia.  Seems resolved.     7.  Incidental finding of gallstones.     8.  Incidental finding of diverticulosis without diverticulitis.     9.  Incidental finding of hiatal hernia.     10.  Type 2 diabetes.  This appears  to be diet controlled.  NovoLog sliding scale insulin will be available for use as needed.     11.  COPD without acute exacerbation.  Continue prior to admission Symbicort and Spiriva inhalers.  Albuterol nebs are available for use as needed.     12.  Paranoid schizophrenia.  Continue prior to admission olanzapine and Haldol.     13.  Dementia.  Continue prior to admission Aricept.     14.  Remote history of DVT.  He was chronically anticoagulated with Coumadin until a week and a half prior to presentation when he presented here with supratherapeutic INR and acute anemia causing non-ST elevation myocardial infarction.  At that time he was discharged home on Xarelto.  [facility] Eli[facility] which he was taking at the time of this admission.  Ultimately, he should follow-up with [facility] to determine if he needs ongoing anticoagulation for this remote DVT.     15.  Hypertension.  Continue prior to admission amlodipine and metoprolol.     16.  Deconditioning. PT consult.      Full code  Eliquis will cover DVT prophylaxis  Disposition: Continue inpatient cares- likely here for 1-2 more days. He'll remain in the hospital today to monitor his hemoglobin tomorrow after his blood thinners were restarted. PT recommended home with assist or home with home therapy.         Interval History:      No new issues. Patient is tangential and started talking about events in 1958 when I asked him how he was doing today.  He does say that his chest does not hurt and that his breathing is okay.  No further history was able to be obtained secondary to his tangential state.                  Physical Exam:      Last Vital Signs:  /53 (BP Location: Right arm)   Pulse 65   Temp 96.9  F (36.1  C) (Oral)   Resp 20   Wt 64.6 kg (142 lb 8 oz)   SpO2 91%   BMI 21.67 kg/m        GENERAL:  Comfortable. Cooperative.  PSYCH: pleasant, oriented, No acute distress.  EYES: PERRLA, Normal  conjunctiva.  HEART:  Regular rate and rhythm. No JVD. Pulses normal. No edema.  LUNGS:  Clear to auscultation, normal Respiratory effort.  ABDOMEN:  Soft, no hepatosplenomegaly, normal bowel sounds.  EXTREMETIES: No clubbing, cyanosis or ischemia  SKIN:  Dry to touch, No rash.           Medications:      All current medications were reviewed.         Data:      All new lab and imaging data was reviewed.   Labs:         Recent Labs   Lab 11/13/19  0514 11/12/19  1540 11/12/19  0545 11/11/19  0720   WBC 14.2*  --  12.4* 16.7*   HGB 8.1* 7.8* 7.4* 8.4*   HCT 26.3*  --  23.9* 27.7*   MCV 87  --  86 87     --  157 151

## 2019-11-13 NOTE — PLAN OF CARE
VS stable. RAMESH. Diminished LS with non productive cough. No complaints of pain. Blood sugar level of 198. Slept well throughout the night.

## 2019-11-13 NOTE — PLAN OF CARE
DX :    HCAP  HX :CKD, DVT, COPD, HTN, DM2  LABS : Hgb 7.8, , K 4.3,   TELE:   None  GI/ : Continent of B&B  DIET : Regular diet  ASSESS; A&OX3, disoriented to time. Forgetful. LS diminished and fine crackles. Skin dry and flaky. Lotion applied. Type and screen completed. B+ and antibody screen was negative. Midline to right upper arm flushed and return blood. No bleeding noted.   PAIN : Denied pain.  ACTIVITY :  up with assist of 1 with walker and gait belt.  TEACHING : plan of care.  PLAN FOR DISCHARGE : in 2 to 3 days. Continue POC.

## 2019-11-13 NOTE — PLAN OF CARE
A&Ox4 but forgetful. VSS. Complains of some dyspnea on exertion. Declined walking with nursing staff today. Continue to encourage ambulation. Up - Ax1 with a walker. BG 94/162. Abx - Zosyn. Iron sucrose given for iron deficiency. Midline in place with blood return noted. Possible discharge 1-2 days.

## 2019-11-14 ENCOUNTER — APPOINTMENT (OUTPATIENT)
Dept: PHYSICAL THERAPY | Facility: CLINIC | Age: 84
DRG: 177 | End: 2019-11-14
Payer: MEDICARE

## 2019-11-14 LAB
ANION GAP SERPL CALCULATED.3IONS-SCNC: 4 MMOL/L (ref 3–14)
BACTERIA SPEC CULT: ABNORMAL
BACTERIA SPEC CULT: ABNORMAL
BUN SERPL-MCNC: 19 MG/DL (ref 7–30)
CALCIUM SERPL-MCNC: 8.8 MG/DL (ref 8.5–10.1)
CHLORIDE SERPL-SCNC: 108 MMOL/L (ref 94–109)
CO2 SERPL-SCNC: 27 MMOL/L (ref 20–32)
CREAT SERPL-MCNC: 1.64 MG/DL (ref 0.66–1.25)
ERYTHROCYTE [DISTWIDTH] IN BLOOD BY AUTOMATED COUNT: 17.2 % (ref 10–15)
GFR SERPL CREATININE-BSD FRML MDRD: 36 ML/MIN/{1.73_M2}
GLUCOSE BLDC GLUCOMTR-MCNC: 104 MG/DL (ref 70–99)
GLUCOSE BLDC GLUCOMTR-MCNC: 106 MG/DL (ref 70–99)
GLUCOSE BLDC GLUCOMTR-MCNC: 141 MG/DL (ref 70–99)
GLUCOSE BLDC GLUCOMTR-MCNC: 82 MG/DL (ref 70–99)
GLUCOSE BLDC GLUCOMTR-MCNC: 87 MG/DL (ref 70–99)
GLUCOSE SERPL-MCNC: 149 MG/DL (ref 70–99)
HCT VFR BLD AUTO: 23.8 % (ref 40–53)
HGB BLD-MCNC: 7.4 G/DL (ref 13.3–17.7)
Lab: ABNORMAL
MCH RBC QN AUTO: 27 PG (ref 26.5–33)
MCHC RBC AUTO-ENTMCNC: 31.1 G/DL (ref 31.5–36.5)
MCV RBC AUTO: 87 FL (ref 78–100)
PLATELET # BLD AUTO: 175 10E9/L (ref 150–450)
POTASSIUM SERPL-SCNC: 3.7 MMOL/L (ref 3.4–5.3)
RBC # BLD AUTO: 2.74 10E12/L (ref 4.4–5.9)
SODIUM SERPL-SCNC: 139 MMOL/L (ref 133–144)
SPECIMEN SOURCE: ABNORMAL
WBC # BLD AUTO: 8.8 10E9/L (ref 4–11)

## 2019-11-14 PROCEDURE — 25000125 ZZHC RX 250: Performed by: INTERNAL MEDICINE

## 2019-11-14 PROCEDURE — 12000000 ZZH R&B MED SURG/OB

## 2019-11-14 PROCEDURE — 25000128 H RX IP 250 OP 636: Performed by: INTERNAL MEDICINE

## 2019-11-14 PROCEDURE — 40000274 ZZH STATISTIC RCP CONSULT EA 30 MIN

## 2019-11-14 PROCEDURE — 85027 COMPLETE CBC AUTOMATED: CPT | Performed by: INTERNAL MEDICINE

## 2019-11-14 PROCEDURE — 94640 AIRWAY INHALATION TREATMENT: CPT | Mod: 76

## 2019-11-14 PROCEDURE — 00000146 ZZHCL STATISTIC GLUCOSE BY METER IP

## 2019-11-14 PROCEDURE — 80048 BASIC METABOLIC PNL TOTAL CA: CPT | Performed by: INTERNAL MEDICINE

## 2019-11-14 PROCEDURE — 97530 THERAPEUTIC ACTIVITIES: CPT | Mod: GP | Performed by: PHYSICAL THERAPIST

## 2019-11-14 PROCEDURE — 99232 SBSQ HOSP IP/OBS MODERATE 35: CPT | Performed by: INTERNAL MEDICINE

## 2019-11-14 PROCEDURE — 97116 GAIT TRAINING THERAPY: CPT | Mod: GP | Performed by: PHYSICAL THERAPIST

## 2019-11-14 PROCEDURE — 25000132 ZZH RX MED GY IP 250 OP 250 PS 637: Mod: GY | Performed by: INTERNAL MEDICINE

## 2019-11-14 PROCEDURE — 94640 AIRWAY INHALATION TREATMENT: CPT

## 2019-11-14 PROCEDURE — 40000275 ZZH STATISTIC RCP TIME EA 10 MIN

## 2019-11-14 RX ADMIN — SENNOSIDES 1 TABLET: 8.6 TABLET, FILM COATED ORAL at 09:04

## 2019-11-14 RX ADMIN — IPRATROPIUM BROMIDE AND ALBUTEROL SULFATE 3 ML: .5; 3 SOLUTION RESPIRATORY (INHALATION) at 11:27

## 2019-11-14 RX ADMIN — HALOPERIDOL 2.5 MG: 1 TABLET ORAL at 21:36

## 2019-11-14 RX ADMIN — METOPROLOL TARTRATE 50 MG: 50 TABLET, FILM COATED ORAL at 21:36

## 2019-11-14 RX ADMIN — METOPROLOL TARTRATE 50 MG: 50 TABLET, FILM COATED ORAL at 09:04

## 2019-11-14 RX ADMIN — IPRATROPIUM BROMIDE AND ALBUTEROL SULFATE 3 ML: .5; 3 SOLUTION RESPIRATORY (INHALATION) at 16:19

## 2019-11-14 RX ADMIN — ATORVASTATIN CALCIUM 80 MG: 40 TABLET, FILM COATED ORAL at 09:04

## 2019-11-14 RX ADMIN — FLUTICASONE FUROATE AND VILANTEROL TRIFENATATE 1 PUFF: 200; 25 POWDER RESPIRATORY (INHALATION) at 07:10

## 2019-11-14 RX ADMIN — Medication 1 MG: at 22:05

## 2019-11-14 RX ADMIN — DONEPEZIL HYDROCHLORIDE 5 MG: 5 TABLET ORAL at 21:36

## 2019-11-14 RX ADMIN — IPRATROPIUM BROMIDE AND ALBUTEROL SULFATE 3 ML: .5; 3 SOLUTION RESPIRATORY (INHALATION) at 19:45

## 2019-11-14 RX ADMIN — TAZOBACTAM SODIUM AND PIPERACILLIN SODIUM 3.38 G: 375; 3 INJECTION, SOLUTION INTRAVENOUS at 17:34

## 2019-11-14 RX ADMIN — OLANZAPINE 20 MG: 10 TABLET, FILM COATED ORAL at 21:36

## 2019-11-14 RX ADMIN — TAZOBACTAM SODIUM AND PIPERACILLIN SODIUM 3.38 G: 375; 3 INJECTION, SOLUTION INTRAVENOUS at 05:17

## 2019-11-14 RX ADMIN — HALOPERIDOL 2.5 MG: 1 TABLET ORAL at 09:04

## 2019-11-14 RX ADMIN — OMEPRAZOLE 20 MG: 20 CAPSULE, DELAYED RELEASE ORAL at 09:04

## 2019-11-14 RX ADMIN — AMLODIPINE BESYLATE 5 MG: 5 TABLET ORAL at 09:04

## 2019-11-14 RX ADMIN — SERTRALINE HYDROCHLORIDE 150 MG: 100 TABLET ORAL at 09:04

## 2019-11-14 RX ADMIN — IPRATROPIUM BROMIDE AND ALBUTEROL SULFATE 3 ML: .5; 3 SOLUTION RESPIRATORY (INHALATION) at 07:10

## 2019-11-14 RX ADMIN — TAMSULOSIN HYDROCHLORIDE 0.4 MG: 0.4 CAPSULE ORAL at 09:04

## 2019-11-14 RX ADMIN — TAZOBACTAM SODIUM AND PIPERACILLIN SODIUM 3.38 G: 375; 3 INJECTION, SOLUTION INTRAVENOUS at 13:17

## 2019-11-14 ASSESSMENT — ACTIVITIES OF DAILY LIVING (ADL)
ADLS_ACUITY_SCORE: 28
ADLS_ACUITY_SCORE: 24

## 2019-11-14 NOTE — PROGRESS NOTES
"Critical [facility] hospital RCAT     Date: 11/14/2019  Admission Dx: Pneumonia  Pulmonary History: Emphysema  Home Nebulizer/MDI Use: Albuterol HFA Q4 hours prn, Symbicort BID   Home Oxygen: Room air  Acuity Level (RCAT flow sheet): 3  Aerosol Therapy initiated: Continue Duoneb QID with Albuterol Q2 hours prn and Breo daily.      Pulmonary Hygiene initiated: Deep breath and cough TID.      Volume Expansion initiated: IS TID      Current Oxygen Requirements: Room air  Current SpO2: 94%    Re-evaluation date: 11/17/2019    Patient Education: Informed Pt of RCAT.  Encouraged deep breathing.      See \"RT Assessments\" flow sheet for patient assessment scoring and Acuity Level Details.             "

## 2019-11-14 NOTE — PLAN OF CARE
BP (!) 142/75 (BP Location: Left arm)   Pulse 65   Temp 96.4  F (35.8  C) (Oral)   Resp 18   Wt 64.6 kg (142 lb 8 oz)   SpO2 95%   BMI 21.67 kg/m      -VSS  Except for HTN  -Norco 1 tablet given for pain 7/10 in back  -Patient alert and oriented but lots of paranoid rambling, seems mistrusting of hospital staff and doctors  -Talked to son and gave him an update  -Expiratory wheezes  -Was dissatisfied with dinner, only ate 25%  -Up assist of 1, gaitbelt, walker  -Skin looks good  -No insulin given

## 2019-11-14 NOTE — PLAN OF CARE
Discharge Planner PT   Patient plan for discharge: return home  Current status: Pt transferred sup > sit without bedrail from flat bed with S.  Sit <> stand to FWW with VCs for safe hand placement.   Pt amb ~ 150' x 2 with FWW and SBA.  Mild unsteadiness but no LOB.  Pt able to amb on/off elevator and perform 180 deg turn with SBA.  Pt also amb up/down 4 steps with B rails and close SBA/CGA, using a step to pattern.  Pt desaturates with gait from 95% (at rest) to 88% after amb 150', on room air.  Pt took ~ 4 min to recover to low 90s after first walk.  Pt desaturated to 87% after gait on stairs and 150' with recovery to 90% in 30 sec.    Barriers to return to prior living situation: none anticipated as chart indicates patient has 24/7 assist  Recommendations for discharge: Home with prior 24/7 assist with mobility/cares; Home PT; use of a walker.   Rationale for recommendations: Unsure how far patient is from his usual baseline. Chart indicates he has 24/7 assist; may benefit from Home PT if niece feels he is below his baseline level of function.        Entered by: Stephanie Hollins 11/14/2019 3:36 PM

## 2019-11-14 NOTE — PLAN OF CARE
A&O x 4, forgetful at times, assist x 1 w/gb& walker, regular diet, , PICC, blood drawn and sent to lab, IV zosyn given, O2 93% RA, will continue to monitor.

## 2019-11-14 NOTE — CONSULTS
GASTROENTEROLOGY CONSULTATION      Hubert Tamayo  [address] [facility] [address]  89 year old male     Admission Date/Time: 11/10/2019  Primary Care Provider: Saige Cortez  Referring / Attending Physician:  Dr. Henderson     We were asked to see the patient in consultation by Dr. Henderson for evaluation of anemia.        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  Gastroenterology was consulted and she was found to have acute on chronic anemic.    Patient is a poor historian.  History is obtained from medical chart. patient has history of supratherapeutic INR.  At the end of October his INR was greater than 10 his hemoglobin was 6.8.  He was admitted 2 units of red blood cells.  His Coumadin was apparently changed to Xarelto at that time.  Patient's hemoglobin is stable between 7.5-8.  He does have known chronic iron deficiency.  No melena, hematochezia, or hematemesis.        PAST MEDICAL HISTORY:  Patient Active Problem List    Diagnosis Date Noted     HCAP (healthcare-associated pneumonia) 11/10/2019     Priority: Medium     Acute respiratory failure with hypoxia (H) 11/10/2019     Priority: Medium     Chest pain 10/29/2019     Priority: Medium     NSTEMI (non-ST elevated myocardial infarction) (H) 10/29/2019     Priority: Medium     Lumbar transverse process fracture (H) 09/17/2019     Priority: Medium     Closed fracture of transverse process of lumbar vertebra (H) 09/15/2019     Priority: Medium          ROS: A comprehensive ten point review of systems was negative aside from those in mentioned in the HPI.       MEDICATIONS:   Prior to Admission medications    Medication Sig Start Date End Date Taking? Authorizing Provider   acetaminophen (TYLENOL) 500 MG tablet Take 500 mg by mouth every 6 hours as needed for mild  pain   Yes Unknown, Entered By History   albuterol (PROAIR HFA/PROVENTIL HFA/VENTOLIN HFA) 108 (90 Base) MCG/ACT inhaler Inhale 2 puffs into the lungs every 4 hours as needed for shortness of breath / dyspnea or wheezing   Yes Unknown, Entered By History   amLODIPine (NORVASC) 2.5 MG tablet Take 2.5 mg by mouth daily   Yes Unknown, Entered By History   apixaban ANTICOAGULANT (ELIQUIS) 5 MG tablet Take 5 mg by mouth 2 times daily   Yes Unknown, Entered By History   artificial saliva (BIOTENE DRY MOUTHWASH) LIQD liquid Swish and spit 5-10 mLs in mouth 4 times daily as needed for dry mouth   Yes Unknown, Entered By History   atorvastatin (LIPITOR) 80 MG tablet Take 80 mg by mouth daily   Yes Unknown, Entered By History   budesonide-formoterol (SYMBICORT) 160-4.5 MCG/ACT Inhaler Inhale 2 puffs into the lungs 2 times daily   Yes Unknown, Entered By History   cholecalciferol 1000 units TABS Take 1,000 Units by mouth daily   Yes Unknown, Entered By History   Donepezil HCl (ARICEPT PO) Take 5 mg by mouth daily    Yes Reported, Patient   emollient (VANICREAM) external cream Apply topically daily Apply to dry skin   Yes Unknown, Entered By History   haloperidol (HALDOL) 5 MG tablet Take 2.5 mg by mouth 2 times daily   Yes Unknown, Entered By History   hydrocortisone (CORTAID) 1 % external cream Apply topically 2 times daily Apply thin layer to itchy scaling on left inner elbow and nose   Yes Unknown, Entered By History   metoprolol tartrate (LOPRESSOR) 50 MG tablet Take 1 tablet (50 mg) by mouth 2 times daily 11/1/19  Yes Vamshi Fischer MD   OLANZapine (ZYPREXA) 20 MG tablet Take 20 mg by mouth At Bedtime   Yes Unknown, Entered By History   omeprazole (PRILOSEC) 20 MG DR capsule Take 1 capsule (20 mg) by mouth every morning (before breakfast) 11/2/19  Yes Vamshi Fischer MD   Petrolatum OINT Externally apply topically 4 times daily as needed (dry lips)   Yes Unknown, Entered By History   senna (SENOKOT) 8.6 MG  tablet Take 1 tablet by mouth every morning   Yes Unknown, Entered By History   senna (SENOKOT) 8.6 MG tablet Take 1 tablet by mouth every evening as needed for constipation   Yes Unknown, Entered By History   sertraline (ZOLOFT) 100 MG tablet Take 1.5 tablets (150 mg) by mouth daily 9/18/19  Yes Todd Velarde MD   tamsulosin (FLOMAX) 0.4 MG capsule Take 0.4 mg by mouth daily   Yes Reported, Patient   Tiotropium Bromide Monohydrate (SPIRIVA HANDIHALER IN) Inhale 1 capsule into the lungs daily    Yes Reported, Patient   clopidogrel (PLAVIX) 75 MG tablet Take 1 tablet (75 mg) by mouth daily 11/1/19   Vamshi Fischer MD   mometasone (NASONEX) 50 MCG/ACT nasal spray Spray 2 sprays into both nostrils daily as needed (allergy symptoms / runny nose)    Unknown, Entered By History   nitroGLYcerin (NITROSTAT) 0.4 MG sublingual tablet Place 0.4 mg under the tongue every 5 minutes as needed for chest pain For chest pain place 1 tablet under the tongue every 5 minutes for 3 doses. If symptoms persist 5 minutes after 1st dose call 911.    Unknown, Entered By History   nystatin (MYCOSTATIN) 112904 UNIT/GM external cream Apply topically 2 times daily as needed (Intertrigo) Apply thin layer to groin folds    Unknown, Entered By History        ALLERGIES:   Allergies   Allergen Reactions     Crustaceans      Loxapine      Nifedipine      Shellfish Allergy      Sulfa Drugs      Sulfamethoxazole-Trimethoprim      Trimethoprim         SOCIAL HISTORY:  Social History     Tobacco Use     Smoking status: Former Smoker     Packs/day: 0.00     Smokeless tobacco: Never Used   Substance Use Topics     Alcohol use: Not Currently     Drug use: Never        FAMILY HISTORY:  History reviewed. No pertinent family history.     PHYSICAL EXAM:     /62 (BP Location: Left arm)   Pulse 65   Temp 96.3  F (35.7  C) (Oral)   Resp 17   Wt 64.8 kg (142 lb 12.8 oz)   SpO2 92%   BMI 21.71 kg/m       PHYSICAL EXAM:  GENERAL: Chronically  ill-appearing male  SKIN: no suspicious lesions, rashes, jaundice  HEAD: Normocephalic. Atraumatic.  NECK: Neck supple. No adenopathy.   EYES: No scleral icterus  RESPIRATORY: Fair transmission. CTA bilaterally.   CARDIOVASCULAR: RRR, normal S1, S2,  No murmur appreciated  GASTROINTESTINAL: +BS, soft, non tender, non distended, no guarding/rebound  JOINT/EXTREMITIES:  no gross deformities noted, normal muscle tone  NEURO: CN 2-12 grossly intact, no focal deficits  PSYCH: Normal affect          ADDITIONAL COMMENTS:   I reviewed the patient's new clinical lab test results.   Recent Labs   Lab Test 11/14/19 0618 11/13/19  0514 11/12/19  1540 11/12/19  0545  11/10/19  1630  10/31/19  0611 10/30/19  0618   WBC 8.8 14.2*  --  12.4*   < > 18.9*  --  10.2 11.0   HGB 7.4* 8.1* 7.8* 7.4*   < > 9.3*   < > 10.0* 6.8*   MCV 87 87  --  86   < > 89  --  84 84    189  --  157   < > 161  --  168 173   INR  --   --   --   --   --  1.77*  --  1.23* 3.08*    < > = values in this interval not displayed.     Recent Labs   Lab Test 11/14/19 0618 11/13/19  0514 11/12/19  0545   POTASSIUM 3.7 4.0 4.3   CHLORIDE 108 107 102   CO2 27 27 25   BUN 19 26 30   ANIONGAP 4 5 7     Recent Labs   Lab Test 11/01/19  0623   BILITOTAL 0.6        IMAGING / ENDOSCOPY     CT CHEST/ABDOMEN/PELVIS W CONTRAST  LOCATION: [facility]  DATE/TIME: 11/10/2019 5:19 PM                                                                    IMPRESSION:  1.  Subpleural consolidation in the right lower lobe with a small right pleural effusion. Increased interstitial change in the subpleural lung bases. Underlying emphysematous change.     2.  Cholelithiasis.     3.  Prostatic enlargement. Diffuse urinary bladder wall thickening likely due to outlet obstruction.     4.  No appendicitis.     5.  Colonic diverticula without CT evidence for diverticulitis.     6.  Subacute to chronic left L1-L3 transverse process fractures.     7.  Moderate-sized  esophageal hiatal hernia.     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  Gastroenterology was consulted and she was found to have acute on chronic anemic.    1.  Acute on chronic anemia: Patient has iron deficiency as well as on chronic anticoagulation.  No evidence of overt GI bleeding.  He is currently being treated for pneumonia presented with hypoxia.  Would not proceed with any inpatient endoscopic procedure.    -- Suggest outpatient colonoscopy once medically stable  -- We will no longer follow.      I discussed the patient plan with Dr. Wilkerson. Thank you for asking us to participate in the care of this patient.    Deann Benitez PA-C  [facility] ( [facility])

## 2019-11-14 NOTE — PROGRESS NOTES
[facility]  Hospitalist Progress Note  Patient Name: Hubert Tamayo    MRN: [id number]  Provider: Randolph Henderson MD          Assessment and Plan:      Hubert Tamayo is a 89-year-old man with history of paranoid schizophrenia, COPD, hypertension, osteoporosis, type 2 diabetes, dementia, remote history of DVT for which he is on chronic anticoagulation, chronic kidney disease with baseline creatinine of approximately 1.6, cataracts with surgical treatment, and recent admission here from 10/29 through 11/1 for non-ST elevation myocardial infarction due to anemia.  At the time of this recent admission he was on Coumadin.  He presented with INR greater than 10 and anemia with hemoglobin of 6.8.  He had a non-ST elevation myocardial infarction that was thought to be related to severe anemia.  He was treated with transfusion of 2 units of red blood cells.  He was seen by cardiology.  Prior to admission Coumadin was changed to Xarelto prior to discharge.  After discharge Hubert initially did okay.  After a day or 2 at home, however, he began to develop shortness of breath.     He presented to the emergency department on 11/10/19 with progressive shortness of breath, chest discomfort, cough, and blood tinged sputum.  Emergency department evaluation showed hypoxia with oxygen saturations of 86% on room air.  CT of chest, abdomen, and pelvis was obtained and showed right lower lung consolidation with associated pleural effusion. Hubert was given Zosyn and vancomycin for healthcare associated pneumonia.  He was given supplemental oxygen.  He was admitted to the hospital for further cares.    Since admission, he has been weaned from oxygen. He has been treated with Zosyn for presumed HCAP. His hemoglobin waxes and wanes and seems to go down when anticoagulation is restarted, though no overt bleeding has been seen. Due to low iron stores he was given iron sucrose 300 mg x1 dose.     Today:  - Plavix and apixaban were  given yesterday and his hemoglobin dropped from 8.1 to 7.4, though no overt bleeding was seen. Hold those again today.   - GI evaluated and recommended colonoscopy as an outpatient.  - Difficult to balance the risks and benefits of anticoagulation and apparent occult blood loss. He has a remote history of DVT of which I am requesting records. He has an indication for plavix with his history of CAD. With these continued drops in hemoglobin I favor holding anticoagulation, but would like to have records about his original DVT diagnosis first. I attempted to call family today but did not reach them initially. Hold blood thinners today.   - Today is day 5 of Zosyn    ADDENDUM - spoke to patient's son Kumar. He did not know much about what was going on in terms of the original DVT diagnosis. His brother Alfonzo knows more information, but is not available during the day. Kumar will attempt to call Alfonzo and hopefully get back to us tomorrow. Discussed with Kumar we may end up just having to stop the blood thinners altogether given the dropping hemoglobin and he understood.     Problem list:     1.  Acute hypoxic respiratory failure--improving  This is likely due to right lower lung healthcare associated pneumonia. Clinically he is improving. He has weaned off of oxygen, WBC is overall lower.   Treat with Zosyn as below.       2.  Healthcare associated pneumonia with associated chest discomfort and leukocytosis.   As noted above, Hubert was hospitalized here from 10/29/2019 through 11/1/2019.  Continue Zosyn Stop vancomycin. Monitor clinically.      3.  Anemia, acute on chronic.   Has evidence of iron deficiency on his labs with an iron saturation index (also known as transferrin saturation) which is total iron divided by TIBC. Consistent with iron deficiency when this is below 10%, and his is 6%.   No obvious sign of bleeding.   Hb on 11/14 dipped to 7.4 from 8.1 after blood thinners restarted.   With the overt iron  deficiency gave iron sucrose 300 mg, can continue q72h for a total of three doses if he stays in the hospital that long.   See above discussion under today section.      4.  Chest discomfort, likely due to pneumonia.  Troponin was normal.  EKG showed no ischemic changes.  Pneumonia was noted on CT. no further work-up planned.     5.  Recent hospitalization for non-ST elevation myocardial infarction due to severe anemia related to supratherapeutic INR.     6.  Blood tinged sputum.  This was likely due to pneumonia.  Seems resolved.     7.  Incidental finding of gallstones.     8.  Incidental finding of diverticulosis without diverticulitis.     9.  Incidental finding of hiatal hernia.     10.  Type 2 diabetes.  This appears to be diet controlled.  NovoLog sliding scale insulin will be available for use as needed.     11.  COPD without acute exacerbation.  Continue prior to admission Symbicort and Spiriva inhalers.  Albuterol nebs are available for use as needed.     12.  Paranoid schizophrenia.  Continue prior to admission olanzapine and Haldol.     13.  Dementia.  Continue prior to admission Aricept.     14.  Remote history of DVT.  He was chronically anticoagulated with Coumadin until a week and a half prior to presentation when he presented here with supratherapeutic INR and acute anemia causing non-ST elevation myocardial infarction.  At that time he was discharged home on Xarelto.  [facility] which he was taking at the time of this admission.  See Today section.      15.  Hypertension.  Continue prior to admission amlodipine and metoprolol.     16.  Deconditioning. PT consult.      Full code  Eliquis will cover DVT prophylaxis  Disposition: Continue inpatient care. Anticipate 1-2 more days in the hospital. Need to monitor Hb on an inpatient basis.         Interval History:      No new issues.     He thinks his breathing is about the same. He denies noticing any blood in his stool.  He denies other new complaints.          Physical Exam:      Last Vital Signs:  /62 (BP Location: Left arm)   Pulse 65   Temp 96.3  F (35.7  C) (Oral)   Resp 20   Wt 64.8 kg (142 lb 12.8 oz)   SpO2 93%   BMI 21.71 kg/m        GENERAL:  Comfortable. Cooperative.  PSYCH: Tangential. Answers appropriately when asked direct questions.   EYES: PERRLA, Normal conjunctiva.  HEART:  Regular rate and rhythm. No JVD. Pulses normal. No edema.  LUNGS:  Clear to auscultation, normal Respiratory effort.  ABDOMEN:  Soft, no hepatosplenomegaly, normal bowel sounds.  EXTREMETIES: No clubbing, cyanosis or ischemia  SKIN:  Dry to touch, No rash.           Medications:      All current medications were reviewed.         Data:      All new lab and imaging data was reviewed.   Labs:         Recent Labs   Lab 11/14/19  0618 11/13/19  0514 11/12/19  1540 11/12/19  0545   WBC 8.8 14.2*  --  12.4*   HGB 7.4* 8.1* 7.8* 7.4*   HCT 23.8* 26.3*  --  23.9*   MCV 87 87  --  86    189  --  157

## 2019-11-14 NOTE — PLAN OF CARE
"Pt A&O x4, up with A1. 93% on RA, VSS. Denies pain. Eliquis and Plavix held this am r/t drop in hgb. PICC SL to R arm. Non Tele. IV Zosyn q8 for PNA. PO metoprolol. Pt following. GI signed off- recommending out pt colonoscopy. Discharge tomorrow? Will continue POC.     Addendum: pt verbalized frustration to staff on multiple occasions regarding various requests. Pt claims staff \"doesn't make time for him\". Writer validated pt's feelings and questioned if there was a particular incident that made him feel that way. Pt declined to answer. Pt referred to staff as \"crackers\" and questioned on multiple occasions if staff had \"education greater than a \". Pt's moods appeared to be quite labile.   "

## 2019-11-14 NOTE — PLAN OF CARE
/63 (BP Location: Left arm)   Pulse 65   Temp 97.4  F (36.3  C) (Axillary)   Resp 18   Wt 64.8 kg (142 lb 12.8 oz)   SpO2 94%   BMI 21.71 kg/m      -VSS  -Alert and oriented  -Is being pretty pleasant  -, 141, no coverage given  -Lung sounds diminished but clear  -A little more rambling as the night went on.  -some incontinence but calls appropriately  -Talked to son who said he thinks the DVT occurred in his father's calf. This was added to the sticky note  -Melatonin given for sleep  -Around 4 small stools for the night  -A1, gaitbelt and walker. Moves well  -On room air

## 2019-11-15 PROBLEM — Z86.718 HISTORY OF VENOUS THROMBOEMBOLISM: Status: ACTIVE | Noted: 2019-11-15

## 2019-11-15 PROBLEM — Z79.01 ANTICOAGULATED: Status: ACTIVE | Noted: 2019-11-15

## 2019-11-15 LAB
ERYTHROCYTE [DISTWIDTH] IN BLOOD BY AUTOMATED COUNT: 17.2 % (ref 10–15)
GLUCOSE BLDC GLUCOMTR-MCNC: 83 MG/DL (ref 70–99)
GLUCOSE BLDC GLUCOMTR-MCNC: 84 MG/DL (ref 70–99)
GLUCOSE BLDC GLUCOMTR-MCNC: 87 MG/DL (ref 70–99)
GLUCOSE BLDC GLUCOMTR-MCNC: 90 MG/DL (ref 70–99)
GLUCOSE BLDC GLUCOMTR-MCNC: 96 MG/DL (ref 70–99)
HCT VFR BLD AUTO: 25.7 % (ref 40–53)
HGB BLD-MCNC: 8 G/DL (ref 13.3–17.7)
MCH RBC QN AUTO: 26.9 PG (ref 26.5–33)
MCHC RBC AUTO-ENTMCNC: 31.1 G/DL (ref 31.5–36.5)
MCV RBC AUTO: 87 FL (ref 78–100)
PLATELET # BLD AUTO: 208 10E9/L (ref 150–450)
RBC # BLD AUTO: 2.97 10E12/L (ref 4.4–5.9)
WBC # BLD AUTO: 9.1 10E9/L (ref 4–11)

## 2019-11-15 PROCEDURE — 12000000 ZZH R&B MED SURG/OB

## 2019-11-15 PROCEDURE — 25800030 ZZH RX IP 258 OP 636: Performed by: INTERNAL MEDICINE

## 2019-11-15 PROCEDURE — 00000146 ZZHCL STATISTIC GLUCOSE BY METER IP

## 2019-11-15 PROCEDURE — 25000132 ZZH RX MED GY IP 250 OP 250 PS 637: Mod: GY | Performed by: INTERNAL MEDICINE

## 2019-11-15 PROCEDURE — 25000125 ZZHC RX 250: Performed by: INTERNAL MEDICINE

## 2019-11-15 PROCEDURE — 99233 SBSQ HOSP IP/OBS HIGH 50: CPT | Performed by: INTERNAL MEDICINE

## 2019-11-15 PROCEDURE — 25000128 H RX IP 250 OP 636: Performed by: INTERNAL MEDICINE

## 2019-11-15 PROCEDURE — 94640 AIRWAY INHALATION TREATMENT: CPT

## 2019-11-15 PROCEDURE — 40000275 ZZH STATISTIC RCP TIME EA 10 MIN

## 2019-11-15 PROCEDURE — 94640 AIRWAY INHALATION TREATMENT: CPT | Mod: 76

## 2019-11-15 PROCEDURE — 85027 COMPLETE CBC AUTOMATED: CPT | Performed by: INTERNAL MEDICINE

## 2019-11-15 RX ORDER — AMOXICILLIN AND CLAVULANATE POTASSIUM 500; 125 MG/1; MG/1
1 TABLET, FILM COATED ORAL 2 TIMES DAILY
Qty: 10 TABLET | Refills: 0 | Status: SHIPPED | OUTPATIENT
Start: 2019-11-15 | End: 2019-11-18

## 2019-11-15 RX ORDER — AMOXICILLIN AND CLAVULANATE POTASSIUM 500; 125 MG/1; MG/1
1 TABLET, FILM COATED ORAL EVERY 12 HOURS
Status: DISCONTINUED | OUTPATIENT
Start: 2019-11-15 | End: 2019-11-18 | Stop reason: HOSPADM

## 2019-11-15 RX ORDER — LACTOBACILLUS RHAMNOSUS GG 10B CELL
1 CAPSULE ORAL 2 TIMES DAILY
Status: DISCONTINUED | OUTPATIENT
Start: 2019-11-15 | End: 2019-11-18 | Stop reason: HOSPADM

## 2019-11-15 RX ADMIN — TAZOBACTAM SODIUM AND PIPERACILLIN SODIUM 3.38 G: 375; 3 INJECTION, SOLUTION INTRAVENOUS at 03:41

## 2019-11-15 RX ADMIN — HALOPERIDOL 2.5 MG: 1 TABLET ORAL at 09:09

## 2019-11-15 RX ADMIN — FLUTICASONE FUROATE AND VILANTEROL TRIFENATATE 1 PUFF: 200; 25 POWDER RESPIRATORY (INHALATION) at 09:14

## 2019-11-15 RX ADMIN — AMLODIPINE BESYLATE 5 MG: 5 TABLET ORAL at 09:09

## 2019-11-15 RX ADMIN — TAMSULOSIN HYDROCHLORIDE 0.4 MG: 0.4 CAPSULE ORAL at 09:09

## 2019-11-15 RX ADMIN — OMEPRAZOLE 20 MG: 20 CAPSULE, DELAYED RELEASE ORAL at 09:09

## 2019-11-15 RX ADMIN — Medication 1 MG: at 23:02

## 2019-11-15 RX ADMIN — METOPROLOL TARTRATE 50 MG: 50 TABLET, FILM COATED ORAL at 09:09

## 2019-11-15 RX ADMIN — SERTRALINE HYDROCHLORIDE 150 MG: 100 TABLET ORAL at 09:09

## 2019-11-15 RX ADMIN — OLANZAPINE 20 MG: 10 TABLET, FILM COATED ORAL at 21:03

## 2019-11-15 RX ADMIN — IPRATROPIUM BROMIDE AND ALBUTEROL SULFATE 3 ML: .5; 3 SOLUTION RESPIRATORY (INHALATION) at 11:34

## 2019-11-15 RX ADMIN — IRON SUCROSE 200 MG: 20 INJECTION, SOLUTION INTRAVENOUS at 18:02

## 2019-11-15 RX ADMIN — METOPROLOL TARTRATE 50 MG: 50 TABLET, FILM COATED ORAL at 20:59

## 2019-11-15 RX ADMIN — ATORVASTATIN CALCIUM 80 MG: 40 TABLET, FILM COATED ORAL at 09:09

## 2019-11-15 RX ADMIN — DONEPEZIL HYDROCHLORIDE 5 MG: 5 TABLET ORAL at 21:03

## 2019-11-15 RX ADMIN — Medication 1 CAPSULE: at 20:59

## 2019-11-15 RX ADMIN — IPRATROPIUM BROMIDE AND ALBUTEROL SULFATE 3 ML: .5; 3 SOLUTION RESPIRATORY (INHALATION) at 20:10

## 2019-11-15 RX ADMIN — HALOPERIDOL 2.5 MG: 1 TABLET ORAL at 20:59

## 2019-11-15 RX ADMIN — IPRATROPIUM BROMIDE AND ALBUTEROL SULFATE 3 ML: .5; 3 SOLUTION RESPIRATORY (INHALATION) at 15:50

## 2019-11-15 RX ADMIN — TAZOBACTAM SODIUM AND PIPERACILLIN SODIUM 3.38 G: 375; 3 INJECTION, SOLUTION INTRAVENOUS at 09:24

## 2019-11-15 RX ADMIN — AMOXICILLIN AND CLAVULANATE POTASSIUM 1 TABLET: 500; 125 TABLET, FILM COATED ORAL at 20:59

## 2019-11-15 ASSESSMENT — ACTIVITIES OF DAILY LIVING (ADL)
ADLS_ACUITY_SCORE: 24
ADLS_ACUITY_SCORE: 25
ADLS_ACUITY_SCORE: 23
ADLS_ACUITY_SCORE: 24

## 2019-11-15 NOTE — PLAN OF CARE
Pt A/O x 4, but forgetful. VSS, pt denies pain, headache, dizziness, n/v & SOB. Lung sounds diminished, RA. Pt up Asst: 1 w/walker, but pt is impulsive and does not always wait for staff. IV Solu-Medrol, Zosyn. Blood glucose levels: 84, 87. Possible discharge today, pending family meeting. Will continue with plan of care.    Pt reports some mild diarrhea and thinks it is caused by hospital food. Writer contributes diarrhea to antibiotics - MD notified, may add PO Pro-biotics.    Page MD once pt niece arrives.

## 2019-11-15 NOTE — PROGRESS NOTES
D:  Per record review, pt's discharge recommendation is home with resumption of all HC services (please see RN CC note from 11/11).    I:  Sw met with the pt and his niece, Anny, to discuss discharge planning.  Anny expressed many concerns about the pt discharging today because he will not have any services over the weekend and she is not sure that she can take care of him by herself.  The pt's son is supposed to come this weekend, but it is unknown as to when.  The pt is not discharge ready today.  The physician explained the discharge plan to Anny and she agreed to not have the pt discharge today.    A/P:  Sw will continue with discharge planning and will be available as needed until discharge.    LAMAR Blankenship, [facility]  Inpatient Care Coordination  [facility]  [phone]

## 2019-11-15 NOTE — PLAN OF CARE
PT- attempted to see, per Hubert he is leaving today.  Per RN awaiting family member to discuss plan. Per chart review  yesterday was walking with RW to 150'x2  and did stairs, needed more time to recover from activity but was moving at SBA. Will await formal discharge from MD

## 2019-11-15 NOTE — PROGRESS NOTES
SW: JENNIFER returned call to MARINA Cook 10 hrs per week through [facility] ([phone]) who stated concern of patient returning home with his niece as his caretaker, preferring that he go to TCU in order to give the niece a reprieve. JENNIFER shared that patient's HGB is stable and his IV AB's will be completed by Monday.    JENNIFER explained the Medicare laws around TCU qualifications and requirement of a skilled need.    Joyce's additional concerns;  1-Would like to have patient observed eating as he starts out well but gets quite shaky, creating concern about adequate nutrition  2-Concern about patient's bowel incontinence as it is difficult for the niece to manage  3-Concern about doing stairs although Joyce did share that patient has a chair lift at home    JENNIFER to reconnect with Joyce on Monday to review how patient did over the weekend and to assist with additional services at home vs determining eligibility for TCU.     SW to continue to follow and assist with discharge planning.

## 2019-11-15 NOTE — PROGRESS NOTES
[facility]  Hospitalist Progress Note  Patient Name: Hubert Tamayo    MRN: [id number]         Assessment and Plan:      Hubert Tamayo is a 89-year-old man who came to attention on 11/10 with progressive SOB, chest discomfort, cough and blood-tinged sputum.     He had a recent admission here from 10/29 through 11/1 for non-ST elevation myocardial infarction due to anemia.  At the time of this recent admission he was on Coumadin.  He presented with INR greater than 10 and anemia with hemoglobin of 6.8.  He had a non-ST elevation myocardial infarction that was thought to be related to severe anemia.  He was treated with transfusion of 2 units of red blood cells.  He was seen by cardiology.  Prior to admission Coumadin was changed to Xarelto prior to discharge.  After discharge Hubert initially did okay.  After a day or 2 at home, however, he began to develop shortness of breath.     PMH includes paranoid schizophrenia, COPD, hypertension, osteoporosis, type 2 diabetes, dementia, remote history of DVT for which he is on chronic anticoagulation, chronic kidney disease with baseline creatinine of approximately 1.6.      Emergency department evaluation showed hypoxia with oxygen saturations of 86% on room air.  CT of chest, abdomen, and pelvis was obtained and showed right lower lung consolidation with associated pleural effusion. Hubert was given Zosyn and vancomycin for healthcare associated pneumonia.  He was given supplemental oxygen.  He was admitted to the hospital for further cares.    Since admission, he has been weaned from oxygen. He has been treated with Zosyn for presumed HCAP. His hemoglobin waxes and wanes and seems to go down when anticoagulation is restarted, though no overt bleeding has been seen. Due to low iron stores he was given iron sucrose 300 mg x1 dose.     Today:  - Hgb remains generally stable with some non-significant fluctuation.  Reasonable to hold Apixaban in light of anemia, but ok to  cont Plavix.   - GI evaluated and recommended colonoscopy as an outpatient.  - Records from [facility] indicate hx recurrent DVT (1998 and 2007) with hx hemoptysis episodes as well as GIB in 2010, with recent Hgb 8-10 for the past couple of months at least.  Resume Plavix, cont to hold Apixaban.  - completed 5 days of Zosyn, now afeb with new onset of mild diarrhea. Switch to Augmentin (500/125) bid.  - spoke to patient's son Kumar as well as Maria Luisa, his niece (with whom he lives) today. She in particular, thinks that he is not quite to his baseline. Kumar and Maria Luisa confirm that the patient has some degree of Dementia for which his judgement is probably not as good as it would need to be to really have good insight.       Problem list:  1.  Acute hypoxic respiratory failure--resolved  Due to right lower lung healthcare associated pneumonia; weaned off of oxygen, WBC is lower, afebrile.   Completed 5 days of Zosyn. Will cont additional 5 days of Augmentin.       2.  Healthcare associated pneumonia with associated chest discomfort and leukocytosis.   As noted above, Hubert was hospitalized here from 10/29/2019 through 11/1/2019.  Stop zosyn, start orals.  Monitor clinically.      3.  Anemia, chronic.  The fluctuations appear consistent with his recent anemia.  Has evidence of iron deficiency on his labs with an iron saturation index (also known as transferrin saturation) which is total iron divided by TIBC. Consistent with iron deficiency when this is below 10%, and his is 6%.   No obvious signs of bleeding.   Hb on 11/14 dipped to 7.4 from 8.1 after blood thinners restarted.   With the overt iron deficiency gave iron sucrose 300 mg.  Will start with daily Iron sucrose 200 mg daily x 5.       4.  Chest discomfort, likely due to pneumonia.  Troponin was normal.  EKG showed no ischemic changes.  Pneumonia was noted on CT. no further work-up planned.     5.  Recent hospitalization for non-ST elevation myocardial infarction due to  severe anemia related to supratherapeutic INR.     6.  Blood tinged sputum.  This was likely due to pneumonia.  Seems resolved.     7.  Incidental finding of gallstones.     8.  Incidental finding of diverticulosis without diverticulitis.     9.  Incidental finding of hiatal hernia.     10.  Type 2 diabetes.  This appears to be diet controlled.  NovoLog sliding scale insulin will be available for use as needed.     11.  COPD without acute exacerbation.  Continue prior to admission Symbicort and Spiriva inhalers.  Albuterol nebs are available for use as needed.     12.  Paranoid schizophrenia.  Continue prior to admission olanzapine and Haldol.     13.  Dementia.  Continue prior to admission Aricept.     14.  Remote history of DVT.  He was chronically anticoagulated with Coumadin until a week and a half prior to presentation when he presented here with supratherapeutic INR and acute anemia causing non-ST elevation myocardial infarction.  At that time he was discharged home on Xarelto.  [facility] which he was taking at the time of this admission.  See Today section.      15.  Hypertension.  Continue prior to admission amlodipine and metoprolol.     16.  Deconditioning. PT consult.      Full code  Eliquis will cover DVT prophylaxis  Disposition: Continue inpatient care. Anticipate 1-2 more days in the hospital. Need to monitor Hb on an inpatient basis.         Interval History:      Chart reviewed, pt interviewed.    I spoke with niece, Maria Luisa at bedside and son, Kumar, on the phone. Pt is advocating discharge to home, but Maria Luisa is not comfortable that he is stable enough for her to care for him alone.  (Her help is not available this weekend.)    He thinks his breathing is about the same. He denies noticing any blood in his stool. He denies other new complaints.          Physical Exam:      Last Vital Signs:  /64 (BP Location: Left arm)   Pulse 65   Temp 96.9  F (36.1  C)  (Axillary)   Resp 12   Wt 65.2 kg (143 lb 12.8 oz)   SpO2 99%   BMI 21.86 kg/m        GENERAL:  Comfortable. Cooperative.  PSYCH: Tangential. Answers appropriately when asked direct questions.   EYES: PERRLA, Normal conjunctiva.  HEART:  Regular rate and rhythm. No JVD. Pulses normal. No edema.  LUNGS:  Clear to auscultation, normal Respiratory effort.  ABDOMEN:  Soft, no hepatosplenomegaly, normal bowel sounds.  EXTREMETIES: No clubbing, cyanosis or ischemia  SKIN:  Dry to touch, No rash.           Medications:      All current medications were reviewed.         Data:      All new lab and imaging data was reviewed.   Labs:         Recent Labs   Lab 11/15/19  0558 11/14/19  0618 11/13/19  0514   WBC 9.1 8.8 14.2*   HGB 8.0* 7.4* 8.1*   HCT 25.7* 23.8* 26.3*   MCV 87 87 87    175 189

## 2019-11-15 NOTE — PLAN OF CARE
VSS on RA. Pt denies pain, CP, SOB/RAMESH. Up in room with SBA, steady gait. Midline SL with IV abx, good lab draw. Voiding without saving. Small BM this shift with red streaks. Hgb improved this AM to 8.0 from previous day. LS coarse in bases, denies RAMESH/SOB. Impulsive at times, calls appropriately but often does not wait for assistance to ambulate and requires bed alarms. Able to make needs known, continue to monitor.     7:54 AM  Pt reports concerns about car/home keys being collected with dinner tray 11/14/19. Searched trays in dirty utility room, text page sent to nursing supervisor overnight and call placed to nutrition at *[phone] with concerns; assured that the item will be searched for and returned to [facility] if located. No keys located at this time.

## 2019-11-16 ENCOUNTER — APPOINTMENT (OUTPATIENT)
Dept: PHYSICAL THERAPY | Facility: CLINIC | Age: 84
DRG: 177 | End: 2019-11-16
Payer: MEDICARE

## 2019-11-16 LAB
ANION GAP SERPL CALCULATED.3IONS-SCNC: 3 MMOL/L (ref 3–14)
BACTERIA SPEC CULT: NO GROWTH
BACTERIA SPEC CULT: NO GROWTH
BUN SERPL-MCNC: 12 MG/DL (ref 7–30)
CALCIUM SERPL-MCNC: 9.1 MG/DL (ref 8.5–10.1)
CHLORIDE SERPL-SCNC: 109 MMOL/L (ref 94–109)
CO2 SERPL-SCNC: 29 MMOL/L (ref 20–32)
CREAT SERPL-MCNC: 1.55 MG/DL (ref 0.66–1.25)
FOLATE SERPL-MCNC: 7.8 NG/ML
GFR SERPL CREATININE-BSD FRML MDRD: 39 ML/MIN/{1.73_M2}
GLUCOSE BLDC GLUCOMTR-MCNC: 101 MG/DL (ref 70–99)
GLUCOSE BLDC GLUCOMTR-MCNC: 105 MG/DL (ref 70–99)
GLUCOSE BLDC GLUCOMTR-MCNC: 76 MG/DL (ref 70–99)
GLUCOSE BLDC GLUCOMTR-MCNC: 91 MG/DL (ref 70–99)
GLUCOSE BLDC GLUCOMTR-MCNC: 95 MG/DL (ref 70–99)
GLUCOSE BLDC GLUCOMTR-MCNC: 98 MG/DL (ref 70–99)
GLUCOSE SERPL-MCNC: 99 MG/DL (ref 70–99)
HGB BLD-MCNC: 7.4 G/DL (ref 13.3–17.7)
Lab: NORMAL
Lab: NORMAL
POTASSIUM SERPL-SCNC: 4.1 MMOL/L (ref 3.4–5.3)
SODIUM SERPL-SCNC: 141 MMOL/L (ref 133–144)
SPECIMEN SOURCE: NORMAL
SPECIMEN SOURCE: NORMAL
VIT B12 SERPL-MCNC: 582 PG/ML (ref 193–986)

## 2019-11-16 PROCEDURE — 85018 HEMOGLOBIN: CPT | Performed by: INTERNAL MEDICINE

## 2019-11-16 PROCEDURE — 94640 AIRWAY INHALATION TREATMENT: CPT | Mod: 76

## 2019-11-16 PROCEDURE — 00000146 ZZHCL STATISTIC GLUCOSE BY METER IP

## 2019-11-16 PROCEDURE — 94640 AIRWAY INHALATION TREATMENT: CPT

## 2019-11-16 PROCEDURE — 36415 COLL VENOUS BLD VENIPUNCTURE: CPT | Performed by: INTERNAL MEDICINE

## 2019-11-16 PROCEDURE — 82746 ASSAY OF FOLIC ACID SERUM: CPT | Performed by: INTERNAL MEDICINE

## 2019-11-16 PROCEDURE — 25000125 ZZHC RX 250: Performed by: INTERNAL MEDICINE

## 2019-11-16 PROCEDURE — 82607 VITAMIN B-12: CPT | Performed by: INTERNAL MEDICINE

## 2019-11-16 PROCEDURE — 25000132 ZZH RX MED GY IP 250 OP 250 PS 637: Mod: GY | Performed by: INTERNAL MEDICINE

## 2019-11-16 PROCEDURE — 97530 THERAPEUTIC ACTIVITIES: CPT | Mod: GP

## 2019-11-16 PROCEDURE — 40000809 ZZH STATISTIC NO DOCUMENTATION TO SUPPORT CHARGE

## 2019-11-16 PROCEDURE — 25800030 ZZH RX IP 258 OP 636: Performed by: INTERNAL MEDICINE

## 2019-11-16 PROCEDURE — 80048 BASIC METABOLIC PNL TOTAL CA: CPT | Performed by: INTERNAL MEDICINE

## 2019-11-16 PROCEDURE — 99233 SBSQ HOSP IP/OBS HIGH 50: CPT | Performed by: INTERNAL MEDICINE

## 2019-11-16 PROCEDURE — 12000000 ZZH R&B MED SURG/OB

## 2019-11-16 PROCEDURE — 25000128 H RX IP 250 OP 636: Performed by: INTERNAL MEDICINE

## 2019-11-16 PROCEDURE — 97110 THERAPEUTIC EXERCISES: CPT | Mod: GP

## 2019-11-16 PROCEDURE — 40000275 ZZH STATISTIC RCP TIME EA 10 MIN

## 2019-11-16 RX ADMIN — IPRATROPIUM BROMIDE AND ALBUTEROL SULFATE 3 ML: .5; 3 SOLUTION RESPIRATORY (INHALATION) at 15:31

## 2019-11-16 RX ADMIN — DONEPEZIL HYDROCHLORIDE 5 MG: 5 TABLET ORAL at 21:00

## 2019-11-16 RX ADMIN — IPRATROPIUM BROMIDE AND ALBUTEROL SULFATE 3 ML: .5; 3 SOLUTION RESPIRATORY (INHALATION) at 08:06

## 2019-11-16 RX ADMIN — CLOPIDOGREL BISULFATE 75 MG: 75 TABLET ORAL at 09:03

## 2019-11-16 RX ADMIN — IPRATROPIUM BROMIDE AND ALBUTEROL SULFATE 3 ML: .5; 3 SOLUTION RESPIRATORY (INHALATION) at 12:00

## 2019-11-16 RX ADMIN — TAMSULOSIN HYDROCHLORIDE 0.4 MG: 0.4 CAPSULE ORAL at 09:03

## 2019-11-16 RX ADMIN — Medication 1 CAPSULE: at 20:08

## 2019-11-16 RX ADMIN — IPRATROPIUM BROMIDE AND ALBUTEROL SULFATE 3 ML: .5; 3 SOLUTION RESPIRATORY (INHALATION) at 19:55

## 2019-11-16 RX ADMIN — IRON SUCROSE 200 MG: 20 INJECTION, SOLUTION INTRAVENOUS at 15:48

## 2019-11-16 RX ADMIN — FLUTICASONE FUROATE AND VILANTEROL TRIFENATATE 1 PUFF: 200; 25 POWDER RESPIRATORY (INHALATION) at 08:06

## 2019-11-16 RX ADMIN — AMOXICILLIN AND CLAVULANATE POTASSIUM 1 TABLET: 500; 125 TABLET, FILM COATED ORAL at 09:03

## 2019-11-16 RX ADMIN — Medication 1 MG: at 21:00

## 2019-11-16 RX ADMIN — AMOXICILLIN AND CLAVULANATE POTASSIUM 1 TABLET: 500; 125 TABLET, FILM COATED ORAL at 20:08

## 2019-11-16 RX ADMIN — HALOPERIDOL 2.5 MG: 1 TABLET ORAL at 20:08

## 2019-11-16 RX ADMIN — METOPROLOL TARTRATE 50 MG: 50 TABLET, FILM COATED ORAL at 09:03

## 2019-11-16 RX ADMIN — ATORVASTATIN CALCIUM 80 MG: 40 TABLET, FILM COATED ORAL at 09:03

## 2019-11-16 RX ADMIN — OMEPRAZOLE 20 MG: 20 CAPSULE, DELAYED RELEASE ORAL at 09:03

## 2019-11-16 RX ADMIN — OLANZAPINE 20 MG: 10 TABLET, FILM COATED ORAL at 21:00

## 2019-11-16 RX ADMIN — HALOPERIDOL 2.5 MG: 1 TABLET ORAL at 09:03

## 2019-11-16 RX ADMIN — METOPROLOL TARTRATE 50 MG: 50 TABLET, FILM COATED ORAL at 20:08

## 2019-11-16 RX ADMIN — Medication 1 CAPSULE: at 09:03

## 2019-11-16 RX ADMIN — SERTRALINE HYDROCHLORIDE 150 MG: 100 TABLET ORAL at 09:03

## 2019-11-16 RX ADMIN — AMLODIPINE BESYLATE 5 MG: 5 TABLET ORAL at 09:03

## 2019-11-16 ASSESSMENT — ACTIVITIES OF DAILY LIVING (ADL)
ADLS_ACUITY_SCORE: 20
ADLS_ACUITY_SCORE: 22
ADLS_ACUITY_SCORE: 20
ADLS_ACUITY_SCORE: 22

## 2019-11-16 NOTE — PLAN OF CARE
A&O X4. VSS on RA. Assist of 1. R midline - dressing CDI. PO Augmentin. LS diminished. QID nebs. IV Iron. Hemo 8.0. BS 95. No loose stools this shift. GI, SW, PT following. Plan to possibly discharge today or tomorrow to TCU or home w/family. Continue to monitor.

## 2019-11-16 NOTE — PLAN OF CARE
/64 (BP Location: Left arm)   Pulse 65   Temp 96.9  F (36.1  C) (Axillary)   Resp 12   Wt 65.2 kg (143 lb 12.8 oz)   SpO2 99%   BMI 21.86 kg/m    Took over cares for 3 hours. Gave IV iron-tolerated well. Denied pain. BG 83, gave juice pt had dinner. Pt switching to po abx, plan is to discharge Monday. Did not perform assessment. Will continue to monitor.

## 2019-11-16 NOTE — PROGRESS NOTES
[facility]  Hospitalist Progress Note  Patient Name: Hubert Tamayo    MRN: [id number]         Assessment and Plan:      Hubert Tamayo is a 89-year-old man who came to attention on 11/10 with progressive SOB, chest discomfort, cough and blood-tinged sputum.     He had a recent admission here from 10/29 through 11/1 for non-ST elevation myocardial infarction due to anemia.  At the time of this recent admission he was on Coumadin.  He presented with INR greater than 10 and anemia with hemoglobin of 6.8.  He had a non-ST elevation myocardial infarction that was thought to be related to severe anemia.  He was treated with transfusion of 2 units of red blood cells.  He was seen by cardiology.  Prior to admission Coumadin was changed to Xarelto prior to discharge.  After discharge Hubert initially did okay.  After a day or 2 at home, however, he began to develop shortness of breath.     PMH includes paranoid schizophrenia, COPD, hypertension, osteoporosis, type 2 diabetes, dementia, remote history of DVT for which he is on chronic anticoagulation, chronic kidney disease with baseline creatinine of approximately 1.6.      Emergency department evaluation showed hypoxia with oxygen saturations of 86% on room air.  CT of chest, abdomen, and pelvis was obtained and showed right lower lung consolidation with associated pleural effusion. Hubert was given Zosyn and vancomycin for healthcare associated pneumonia.  He was given supplemental oxygen.  He was admitted to the hospital for further cares.    Since admission, he has been weaned from oxygen. He has been treated with Zosyn for presumed HCAP. His hemoglobin waxes and wanes and seems to go down when anticoagulation is restarted, though no overt bleeding has been seen. Due to low iron stores he was given iron sucrose 300 mg x1 dose.     Today:  - Hgb remains generally stable with some non-significant fluctuation.  Reasonable to hold Apixaban in light of anemia, but ok to  cont Plavix.   - GI evaluated and recommended colonoscopy as an outpatient.  - Records from [facility] indicate hx recurrent DVT (1998 and 2007) with hx hemoptysis episodes as well as GIB in 2010, with recent Hgb 8-10 for the past couple of months at least.  Resume Plavix, cont to hold Apixaban.  - completed 5 days of Zosyn, now afeb with new onset of mild diarrhea. Switch to Augmentin (500/125) bid.  - spoke to patient's son Kumar as well as Maria Luisa, his niece (with whom he lives) today. She in particular, thinks that he is not quite to his baseline. Kumar and Maria Luisa confirm that the patient has some degree of Dementia for which his judgement is probably not as good as it would need to be to really have good insight.       Problem list:  1.  Acute hypoxic respiratory failure--resolved  Due to right lower lung healthcare associated pneumonia; weaned off of oxygen, WBC is lower, afebrile.   Completed 5 days of Zosyn. Will cont additional 5 days of Augmentin.       2.  Healthcare associated pneumonia with associated chest discomfort and leukocytosis. Resolved.  As noted above, Hubert was hospitalized here from 10/29/2019 through 11/1/2019.  Stop zosyn, start orals.  Monitor clinically, now on Augemntin for a total of 5 days.      3.  Anemia, chronic.  The fluctuations appear consistent with his recent anemia.  - Has evidence of iron deficiency on his labs with an iron saturation index (also known as transferrin saturation) which is total iron divided by TIBC. Consistent with iron deficiency when this is below 10%, and his is 6%. With the overt iron deficiency gave iron sucrose 300 mg.  Will start with daily Iron sucrose 200 mg daily x 5.    - Hb has fluctuated, but not with a trend suggesting blood loss. No obvious signs of bleeding.   - suspect there is a component of inadequate RBC production as well, related to age and poss chronic illness     4.  Chest discomfort, likely due to pneumonia.  Troponin was normal.  EKG showed no  ischemic changes.  Pneumonia was noted on CT. no further work-up planned.     5.  Recent hospitalization for non-ST elevation myocardial infarction due to severe anemia related to supratherapeutic INR.     6.  Blood tinged sputum.  This was likely due to pneumonia.  Seems resolved.     7.  Incidental finding of gallstones.     8.  Incidental finding of diverticulosis without diverticulitis.     9.  Incidental finding of hiatal hernia.     10.  Type 2 diabetes, diet controlled.  Stop further glucose checks.     11.  COPD without acute exacerbation.  Continue prior to admission Symbicort and Spiriva inhalers.  Albuterol nebs are available for use as needed.     12.  Paranoid schizophrenia.  Continue prior to admission olanzapine and Haldol.     13.  Dementia.  Continue prior to admission Aricept.     14.  Remote history of DVT.  He was chronically anticoagulated with Coumadin until a week and a half prior to presentation when he presented here with supratherapeutic INR and acute anemia causing non-ST elevation myocardial infarction.  At that time he was discharged home on Xarelto.  [facility] which he was taking at the time of this admission.    - I have determined to stop further anticoagulation in the setting of unexplained anemia.  I discussed this with the patient's son, and he is aware that although this does put the patient at risk of VTE, he also is at risk of bleeding from falls, which is more likely. Pt will continue Plavix.     15.  Hypertension.  Continue prior to admission amlodipine and metoprolol.     16.  Deconditioning. PT consult.      Full code  Eliquis will cover DVT prophylaxis  Disposition: Continue inpatient care. Anticipate discharge on Monday to home, when adequate help will be available.         Interval History:      Stable night reported in nursing notes.    Pt's son and niece present at bedside today. I answered extensive questions for more tan 30 minutes.  "  Pt is \"more himself today\" and pleasantly interactive.   Reportedly was not inclined to be up with PT. Has remained in bed.   I have encouraged him to get up to chair for all meals.         Physical Exam:      Last Vital Signs:  BP (!) 161/80 (BP Location: Left arm)   Pulse 68   Temp 96.5  F (35.8  C) (Axillary)   Resp 18   Wt 65.2 kg (143 lb 12.8 oz)   SpO2 95%   BMI 21.86 kg/m      GENERAL:  Comfortable. Cooperative.  PSYCH: Tangential. Answers appropriately when asked direct questions.   EYES: PERRLA, Normal conjunctiva.  HEART:  Regular rate and rhythm. No JVD. Pulses normal. No edema.  LUNGS:  Clear to auscultation, normal Respiratory effort.  ABDOMEN:  Soft, no hepatosplenomegaly, normal bowel sounds.  EXTREMETIES: No clubbing, cyanosis or ischemia  SKIN:  Dry to touch, No rash.           Medications:      All current medications were reviewed.         Data:      All new lab and imaging data was reviewed.   Labs:         Recent Labs   Lab 11/16/19  0600 11/15/19  0558 11/14/19  0618 11/13/19  0514   WBC  --  9.1 8.8 14.2*   HGB 7.4* 8.0* 7.4* 8.1*   HCT  --  25.7* 23.8* 26.3*   MCV  --  87 87 87   PLT  --  208 175 189              "

## 2019-11-16 NOTE — PLAN OF CARE
Assumed care of pt at 1930. Pt had first dose of oral Augmentin. Dim lungs auscultated bilaterally. BG was 90 at HS. Snack provided. VSS.

## 2019-11-16 NOTE — PLAN OF CARE
Discharge Planner PT   Patient plan for discharge: Return home   Current status: Patient supine in bed upon arrival of therapist, agreeable to working with PT with encouragement provided. NA in completing vitals, supine BP noted as 122/59. Supine<>sit with SBA, patient moving from sit>stand quickly with FWW and SBA. Patient ambulated to door with FWW and SBA to CGA, short shuffling steps noted bilaterally with some unsteadiness. Patient then noting dizziness, instructed to return to sitting at EOB, patient in supine, BP noted as 103/46. Completed supine B LE exercises with cues for technique. All needs in reach and bed alarm on upon therapist exit. Discussed with RN.   Barriers to return to prior living situation: None has chart indicates patient has 24/7 A at home   Recommendations for discharge: Home with 24/7 assist for all mobility/ambulation with use of FWW at all times; home PT   Rationale for recommendations: Patient requiring SBA to CGA for mobility/ambulation. Per chart, patient has 24/7 A at home. Patient safe to discharge home with 24/7 A for all mobility/ambulation with use of FWW at all times. Patient would also benefit from continued skilled HH PT to further address functional limitations and decrease risk of falls. Patient would qualify for HH PT as he would require use of AD or significant taxing effort to leave the home.        Entered by: Ellen Tijerina 11/16/2019 2:08 PM

## 2019-11-16 NOTE — PROVIDER NOTIFICATION
"Text page MD: \"From my understanding from day shift RN, you wanted to speak c/ family. Family is here in room.\"    "

## 2019-11-16 NOTE — PLAN OF CARE
Pt A/O x 4, but forgetful. VSS, pt denies pain, headache, dizziness, n/v & SOB. Pt diarrhea is improving. Lung sounds diminished, RA. Pt up Asst: 1 w/walker, but pt is impulsive and does not always wait for staff. PO Augmentin, pro-biotics. Blood glucose levels: 76, 98, 105. Possible discharge Monday back to home with niece. Will continue with plan of care.     Please kiersten CHERRY once family arrives.

## 2019-11-17 LAB
ALBUMIN SERPL-MCNC: 2.4 G/DL (ref 3.4–5)
ALP SERPL-CCNC: 97 U/L (ref 40–150)
ALT SERPL W P-5'-P-CCNC: 48 U/L (ref 0–70)
ANION GAP SERPL CALCULATED.3IONS-SCNC: 6 MMOL/L (ref 3–14)
AST SERPL W P-5'-P-CCNC: 59 U/L (ref 0–45)
BILIRUB SERPL-MCNC: 0.2 MG/DL (ref 0.2–1.3)
BUN SERPL-MCNC: 12 MG/DL (ref 7–30)
CALCIUM SERPL-MCNC: 8.8 MG/DL (ref 8.5–10.1)
CHLORIDE SERPL-SCNC: 107 MMOL/L (ref 94–109)
CO2 SERPL-SCNC: 27 MMOL/L (ref 20–32)
CREAT SERPL-MCNC: 1.4 MG/DL (ref 0.66–1.25)
ERYTHROCYTE [DISTWIDTH] IN BLOOD BY AUTOMATED COUNT: 17.2 % (ref 10–15)
GFR SERPL CREATININE-BSD FRML MDRD: 44 ML/MIN/{1.73_M2}
GLUCOSE BLDC GLUCOMTR-MCNC: 90 MG/DL (ref 70–99)
GLUCOSE SERPL-MCNC: 116 MG/DL (ref 70–99)
HCT VFR BLD AUTO: 24.5 % (ref 40–53)
HGB BLD-MCNC: 7.8 G/DL (ref 13.3–17.7)
MCH RBC QN AUTO: 27.6 PG (ref 26.5–33)
MCHC RBC AUTO-ENTMCNC: 31.8 G/DL (ref 31.5–36.5)
MCV RBC AUTO: 87 FL (ref 78–100)
PLATELET # BLD AUTO: 208 10E9/L (ref 150–450)
POTASSIUM SERPL-SCNC: 3.9 MMOL/L (ref 3.4–5.3)
PROT SERPL-MCNC: 6.8 G/DL (ref 6.8–8.8)
RBC # BLD AUTO: 2.83 10E12/L (ref 4.4–5.9)
SODIUM SERPL-SCNC: 140 MMOL/L (ref 133–144)
WBC # BLD AUTO: 9.9 10E9/L (ref 4–11)

## 2019-11-17 PROCEDURE — 25800030 ZZH RX IP 258 OP 636: Performed by: INTERNAL MEDICINE

## 2019-11-17 PROCEDURE — 25000128 H RX IP 250 OP 636: Performed by: INTERNAL MEDICINE

## 2019-11-17 PROCEDURE — 00000146 ZZHCL STATISTIC GLUCOSE BY METER IP

## 2019-11-17 PROCEDURE — 80053 COMPREHEN METABOLIC PANEL: CPT | Performed by: INTERNAL MEDICINE

## 2019-11-17 PROCEDURE — 25000132 ZZH RX MED GY IP 250 OP 250 PS 637: Mod: GY | Performed by: INTERNAL MEDICINE

## 2019-11-17 PROCEDURE — 85027 COMPLETE CBC AUTOMATED: CPT | Performed by: INTERNAL MEDICINE

## 2019-11-17 PROCEDURE — 12000000 ZZH R&B MED SURG/OB

## 2019-11-17 PROCEDURE — 99232 SBSQ HOSP IP/OBS MODERATE 35: CPT | Performed by: INTERNAL MEDICINE

## 2019-11-17 PROCEDURE — 40000275 ZZH STATISTIC RCP TIME EA 10 MIN

## 2019-11-17 RX ORDER — POT SOR/HE-CELLULOS/POV/HYALUR
1 GEL IN PACKET (ML) MUCOUS MEMBRANE 4 TIMES DAILY
Status: DISCONTINUED | OUTPATIENT
Start: 2019-11-17 | End: 2019-11-18 | Stop reason: HOSPADM

## 2019-11-17 RX ORDER — IPRATROPIUM BROMIDE AND ALBUTEROL SULFATE 2.5; .5 MG/3ML; MG/3ML
3 SOLUTION RESPIRATORY (INHALATION) EVERY 4 HOURS PRN
Status: DISCONTINUED | OUTPATIENT
Start: 2019-11-17 | End: 2019-11-18 | Stop reason: HOSPADM

## 2019-11-17 RX ORDER — SALIVA STIMULANT COMB. NO.3
2 SPRAY, NON-AEROSOL (ML) MUCOUS MEMBRANE
Status: DISCONTINUED | OUTPATIENT
Start: 2019-11-17 | End: 2019-11-18 | Stop reason: HOSPADM

## 2019-11-17 RX ADMIN — Medication 1 CAPSULE: at 21:19

## 2019-11-17 RX ADMIN — HALOPERIDOL 2.5 MG: 1 TABLET ORAL at 21:19

## 2019-11-17 RX ADMIN — Medication 1 CAPSULE: at 09:10

## 2019-11-17 RX ADMIN — OMEPRAZOLE 20 MG: 20 CAPSULE, DELAYED RELEASE ORAL at 09:10

## 2019-11-17 RX ADMIN — METOPROLOL TARTRATE 50 MG: 50 TABLET, FILM COATED ORAL at 21:20

## 2019-11-17 RX ADMIN — IRON SUCROSE 200 MG: 20 INJECTION, SOLUTION INTRAVENOUS at 16:29

## 2019-11-17 RX ADMIN — HALOPERIDOL 2.5 MG: 1 TABLET ORAL at 09:09

## 2019-11-17 RX ADMIN — CLOPIDOGREL BISULFATE 75 MG: 75 TABLET ORAL at 09:10

## 2019-11-17 RX ADMIN — ATORVASTATIN CALCIUM 80 MG: 40 TABLET, FILM COATED ORAL at 09:10

## 2019-11-17 RX ADMIN — AMLODIPINE BESYLATE 5 MG: 5 TABLET ORAL at 09:10

## 2019-11-17 RX ADMIN — FLUTICASONE FUROATE AND VILANTEROL TRIFENATATE 1 PUFF: 200; 25 POWDER RESPIRATORY (INHALATION) at 08:08

## 2019-11-17 RX ADMIN — SERTRALINE HYDROCHLORIDE 150 MG: 100 TABLET ORAL at 09:10

## 2019-11-17 RX ADMIN — AMOXICILLIN AND CLAVULANATE POTASSIUM 1 TABLET: 500; 125 TABLET, FILM COATED ORAL at 21:19

## 2019-11-17 RX ADMIN — AMOXICILLIN AND CLAVULANATE POTASSIUM 1 TABLET: 500; 125 TABLET, FILM COATED ORAL at 09:10

## 2019-11-17 RX ADMIN — OLANZAPINE 20 MG: 10 TABLET, FILM COATED ORAL at 21:20

## 2019-11-17 RX ADMIN — TAMSULOSIN HYDROCHLORIDE 0.4 MG: 0.4 CAPSULE ORAL at 09:10

## 2019-11-17 RX ADMIN — Medication 1 PACKET: at 21:22

## 2019-11-17 RX ADMIN — DONEPEZIL HYDROCHLORIDE 5 MG: 5 TABLET ORAL at 21:20

## 2019-11-17 RX ADMIN — METOPROLOL TARTRATE 50 MG: 50 TABLET, FILM COATED ORAL at 09:10

## 2019-11-17 ASSESSMENT — ACTIVITIES OF DAILY LIVING (ADL)
ADLS_ACUITY_SCORE: 20

## 2019-11-17 NOTE — PROGRESS NOTES
[facility]  Hospitalist Progress Note  Patient Name: Hubert Tamayo    MRN: [id number]         Assessment and Plan:      Hubert Tamayo is a 89-year-old man who came to attention on 11/10 with progressive SOB, chest discomfort, cough and blood-tinged sputum.     He had a recent admission here from 10/29 through 11/1 for non-ST elevation myocardial infarction due to anemia.  At the time of this recent admission he was on Coumadin.  He presented with INR greater than 10 and anemia with hemoglobin of 6.8.  He had a non-ST elevation myocardial infarction that was thought to be related to severe anemia.  He was treated with transfusion of 2 units of red blood cells.  He was seen by cardiology.  Prior to admission Coumadin was changed to Xarelto prior to discharge.  After discharge Hubert initially did okay.  After a day or 2 at home, however, he began to develop shortness of breath.     PMH includes paranoid schizophrenia, COPD, hypertension, osteoporosis, type 2 diabetes, dementia, remote history of DVT for which he is on chronic anticoagulation, chronic kidney disease with baseline creatinine of approximately 1.6.      Emergency department evaluation showed hypoxia with oxygen saturations of 86% on room air.  CT of chest, abdomen, and pelvis was obtained and showed right lower lung consolidation with associated pleural effusion. Hubert was given Zosyn and vancomycin for healthcare associated pneumonia.  He was given supplemental oxygen.  He was admitted to the hospital for further cares.    Since admission, he has been weaned from oxygen. He has been treated with Zosyn for presumed HCAP. His hemoglobin waxes and wanes and seems to go down when anticoagulation is restarted, though no overt bleeding has been seen. Due to low iron stores he was given iron sucrose 300 mg x1 dose.     Today:  - Hgb remains generally stable with some non-significant fluctuation.  Reasonable to hold Apixaban in light of anemia, but ok to  cont Plavix.   - GI evaluated and recommended colonoscopy as an outpatient.  - Records from [facility] indicate hx recurrent DVT (1998 and 2007) with hx hemoptysis episodes as well as GIB in 2010, with recent Hgb 8-10 for the past couple of months at least.  Resume Plavix, cont to hold Apixaban.  - completed 5 days of Zosyn, now afeb with new onset of mild diarrhea. Switch to Augmentin (500/125) bid.  -I again spoke with Mr. Tamayo's son, Alfonzo.  He has multiple concerns which I tried to address today.      Problem list:  1.  Acute hypoxic respiratory failure--resolved  Due to right lower lung healthcare associated pneumonia; weaned off of oxygen, WBC is lower, afebrile.   Completed 5 days of Zosyn. Will cont additional 5 days of Augmentin.       2.  Healthcare associated pneumonia with associated chest discomfort and leukocytosis. Resolved.  As noted above, Hubert was hospitalized here from 10/29/2019 through 11/1/2019.  Stop zosyn, start orals.  Monitor clinically, now on Augementin.  Anticipate completing a full 10-day course of antibiotics.    3.  Anemia, chronic.  The fluctuations appear consistent with his recent anemia.  - Has evidence of iron deficiency on his labs with an iron saturation index (also known as transferrin saturation) which is total iron divided by TIBC. Consistent with iron deficiency when this is below 10%, and his is 6%. With the overt iron deficiency gave iron sucrose 300 mg.  Will start with daily Iron sucrose 200 mg daily x 5.    - Hb has fluctuated, but not with a trend suggesting blood loss. No obvious signs of bleeding.   - suspect there is a component of inadequate RBC production as well, related to age and poss chronic illness  - Continue to monitor.  Consider transfusion.     4.  Chest discomfort, likely due to pneumonia.  Resolved     5.  Recent hospitalization for non-ST elevation myocardial infarction due to severe anemia related to supratherapeutic INR.     6.  Blood tinged sputum.   This was likely due to pneumonia and ongoing anticoagulation.  Seems resolved.     7.  Incidental finding of gallstones.     8.  Incidental finding of diverticulosis without diverticulitis.     9.  Incidental finding of hiatal hernia.     10.  Type 2 diabetes, diet controlled.  Stop further glucose checks.     11.  COPD without acute exacerbation.  Continue prior to admission Symbicort and Spiriva inhalers.  Albuterol nebs are available for use as needed.     12.  Paranoid schizophrenia.  Continue prior to admission olanzapine and Haldol.     13.  Dementia.  Continue prior to admission Aricept.     14.  Remote history of DVT.  He was chronically anticoagulated with Coumadin until a week and a half prior to presentation when he presented here with supratherapeutic INR and acute anemia causing non-ST elevation myocardial infarction.  At that time he was discharged home on Xarelto.  [facility] which he was taking at the time of this admission.    - I have determined to stop further anticoagulation in the setting of unexplained anemia.  I discussed this with the patient's son, and he is aware that although this does put the patient at risk of VTE, he also is at risk of bleeding from falls, which is more likely. Pt will continue Plavix.     15.  Hypertension.  Continue prior to admission amlodipine and metoprolol.     16.  Deconditioning. PT consult.      Full code  Eliquis will cover DVT prophylaxis  Disposition: Continue inpatient care. Anticipate discharge on Monday to home, when adequate help will be available.         Interval History:      Stable night reported in nursing notes.    Patient is not fully able to give a history.  He perseverative today about his tongue feeling funny stating that it has been feeling funny since 1958.    Otherwise though, the patient been up in a chair and has been eating fairly well.    I again met with the patient's son, Alfonzo, and reiterated the plan  for the patient to be discharged to home tomorrow.         Physical Exam:      Last Vital Signs:  /49 (BP Location: Left arm)   Pulse 76   Temp 97.1  F (36.2  C) (Axillary)   Resp 24   Wt 64.6 kg (142 lb 8 oz)   SpO2 94%   BMI 21.67 kg/m      GENERAL:  Comfortable. Cooperative.  PSYCH: Tangential. Answers appropriately when asked direct questions.   EYES: PERRLA, Normal conjunctiva.  HEART:  Regular rate and rhythm. No JVD. Pulses normal. No edema.  LUNGS:  Clear to auscultation, normal Respiratory effort.  ABDOMEN:  Soft, no hepatosplenomegaly, normal bowel sounds.  EXTREMETIES: No clubbing, cyanosis or ischemia  SKIN:  Dry to touch, No rash.           Medications:      All current medications were reviewed.         Data:      All new lab and imaging data was reviewed.   Labs:         Recent Labs   Lab 11/17/19  0521 11/16/19  0600 11/15/19  0558 11/14/19  0618   WBC 9.9  --  9.1 8.8   HGB 7.8* 7.4* 8.0* 7.4*   HCT 24.5*  --  25.7* 23.8*   MCV 87  --  87 87     --  208 175

## 2019-11-17 NOTE — PLAN OF CARE
Hx: Pt was admitted on 11/10 for progressive SOB and chest discomfort, cough, blood-tinged sputum. Pt was previously admitted for NSTEMI d/t anemia and INR of 10. Currently, Pt is being treated for hospital acquired PNA, acute hypoxic respiratory failure, and anemia. PMH includes paranoid schizophrenia, dementia, CKD c/ baseline Cr of 1.6, CAD, T2DM, and COPD.  Orientation: A&OX4, Ambulation: SBA c/ walker. Pt was up in the chair for meals.  V/S: VSS, Pt is on RA. Pain: Denies. BGL: 101 mg/dL at AC. BG spot checks discontinued per orders.  Tele/CV: S1, S2 noted, Pt denies chest pain and chest discoomfort.  LS: Diminished throughout.  : Voids spontaneously.  GI: Active & audible X4.  IV: SL, flushes well. IV Fe administered per oders.  Plan: 1 - 2 IP days, continue to monitor Hbg levels.  Other: MD discussed plan c/ family today.

## 2019-11-17 NOTE — PLAN OF CARE
A&Ox4. VSS. Midline in right arm with blood return noted. Up - Ax1 with a walker and gaitbelt. Denies pain. Plan to discharge home tomorrow with resumption of home care.

## 2019-11-18 VITALS
SYSTOLIC BLOOD PRESSURE: 111 MMHG | OXYGEN SATURATION: 91 % | HEART RATE: 73 BPM | RESPIRATION RATE: 18 BRPM | DIASTOLIC BLOOD PRESSURE: 54 MMHG | WEIGHT: 141.5 LBS | TEMPERATURE: 97 F | BODY MASS INDEX: 21.52 KG/M2

## 2019-11-18 PROBLEM — D64.9 ANEMIA: Status: ACTIVE | Noted: 2019-11-18

## 2019-11-18 LAB
ABO + RH BLD: NORMAL
ABO + RH BLD: NORMAL
ANION GAP SERPL CALCULATED.3IONS-SCNC: 2 MMOL/L (ref 3–14)
BLD GP AB SCN SERPL QL: NORMAL
BLD PROD TYP BPU: NORMAL
BLD PROD TYP BPU: NORMAL
BLD UNIT ID BPU: 0
BLOOD BANK CMNT PATIENT-IMP: NORMAL
BLOOD PRODUCT CODE: NORMAL
BPU ID: NORMAL
BUN SERPL-MCNC: 15 MG/DL (ref 7–30)
CALCIUM SERPL-MCNC: 9.3 MG/DL (ref 8.5–10.1)
CHLORIDE SERPL-SCNC: 106 MMOL/L (ref 94–109)
CO2 SERPL-SCNC: 30 MMOL/L (ref 20–32)
CREAT SERPL-MCNC: 1.6 MG/DL (ref 0.66–1.25)
ERYTHROCYTE [DISTWIDTH] IN BLOOD BY AUTOMATED COUNT: 17.3 % (ref 10–15)
GFR SERPL CREATININE-BSD FRML MDRD: 37 ML/MIN/{1.73_M2}
GLUCOSE BLDC GLUCOMTR-MCNC: 73 MG/DL (ref 70–99)
GLUCOSE SERPL-MCNC: 86 MG/DL (ref 70–99)
HCT VFR BLD AUTO: 25 % (ref 40–53)
HGB BLD-MCNC: 7.7 G/DL (ref 13.3–17.7)
MCH RBC QN AUTO: 26.8 PG (ref 26.5–33)
MCHC RBC AUTO-ENTMCNC: 30.8 G/DL (ref 31.5–36.5)
MCV RBC AUTO: 87 FL (ref 78–100)
NUM BPU REQUESTED: 1
PLATELET # BLD AUTO: 212 10E9/L (ref 150–450)
POTASSIUM SERPL-SCNC: 4.4 MMOL/L (ref 3.4–5.3)
RBC # BLD AUTO: 2.87 10E12/L (ref 4.4–5.9)
RETICS # AUTO: 73.2 10E9/L (ref 25–95)
RETICS/RBC NFR AUTO: 2.6 % (ref 0.5–2)
SODIUM SERPL-SCNC: 138 MMOL/L (ref 133–144)
SPECIMEN EXP DATE BLD: NORMAL
TRANSFUSION STATUS PATIENT QL: NORMAL
TRANSFUSION STATUS PATIENT QL: NORMAL
WBC # BLD AUTO: 10.8 10E9/L (ref 4–11)

## 2019-11-18 PROCEDURE — 00000146 ZZHCL STATISTIC GLUCOSE BY METER IP

## 2019-11-18 PROCEDURE — 25000132 ZZH RX MED GY IP 250 OP 250 PS 637: Mod: GY | Performed by: INTERNAL MEDICINE

## 2019-11-18 PROCEDURE — 40000257 ZZH STATISTIC CONSULT NO CHARGE VASC ACCESS

## 2019-11-18 PROCEDURE — 80048 BASIC METABOLIC PNL TOTAL CA: CPT | Performed by: INTERNAL MEDICINE

## 2019-11-18 PROCEDURE — 86923 COMPATIBILITY TEST ELECTRIC: CPT | Performed by: INTERNAL MEDICINE

## 2019-11-18 PROCEDURE — 86901 BLOOD TYPING SEROLOGIC RH(D): CPT | Performed by: INTERNAL MEDICINE

## 2019-11-18 PROCEDURE — 85027 COMPLETE CBC AUTOMATED: CPT | Performed by: INTERNAL MEDICINE

## 2019-11-18 PROCEDURE — 85045 AUTOMATED RETICULOCYTE COUNT: CPT | Performed by: INTERNAL MEDICINE

## 2019-11-18 PROCEDURE — 86850 RBC ANTIBODY SCREEN: CPT | Performed by: INTERNAL MEDICINE

## 2019-11-18 PROCEDURE — 99239 HOSP IP/OBS DSCHRG MGMT >30: CPT | Performed by: INTERNAL MEDICINE

## 2019-11-18 PROCEDURE — 86900 BLOOD TYPING SEROLOGIC ABO: CPT | Performed by: INTERNAL MEDICINE

## 2019-11-18 PROCEDURE — P9016 RBC LEUKOCYTES REDUCED: HCPCS | Performed by: INTERNAL MEDICINE

## 2019-11-18 RX ADMIN — ATORVASTATIN CALCIUM 80 MG: 40 TABLET, FILM COATED ORAL at 09:50

## 2019-11-18 RX ADMIN — AMLODIPINE BESYLATE 5 MG: 5 TABLET ORAL at 09:50

## 2019-11-18 RX ADMIN — CLOPIDOGREL BISULFATE 75 MG: 75 TABLET ORAL at 09:49

## 2019-11-18 RX ADMIN — METOPROLOL TARTRATE 50 MG: 50 TABLET, FILM COATED ORAL at 09:49

## 2019-11-18 RX ADMIN — FLUTICASONE FUROATE AND VILANTEROL TRIFENATATE 1 PUFF: 200; 25 POWDER RESPIRATORY (INHALATION) at 09:13

## 2019-11-18 RX ADMIN — AMOXICILLIN AND CLAVULANATE POTASSIUM 1 TABLET: 500; 125 TABLET, FILM COATED ORAL at 09:51

## 2019-11-18 RX ADMIN — HALOPERIDOL 2.5 MG: 1 TABLET ORAL at 09:49

## 2019-11-18 RX ADMIN — OMEPRAZOLE 20 MG: 20 CAPSULE, DELAYED RELEASE ORAL at 09:49

## 2019-11-18 RX ADMIN — TAMSULOSIN HYDROCHLORIDE 0.4 MG: 0.4 CAPSULE ORAL at 09:50

## 2019-11-18 RX ADMIN — Medication 1 CAPSULE: at 09:48

## 2019-11-18 RX ADMIN — SERTRALINE HYDROCHLORIDE 150 MG: 100 TABLET ORAL at 09:50

## 2019-11-18 ASSESSMENT — ACTIVITIES OF DAILY LIVING (ADL)
ADLS_ACUITY_SCORE: 20

## 2019-11-18 NOTE — PLAN OF CARE
A and Ox4; forgetful. Regular diet. SOB on exertion. Declines Nausea, pain, numbness and tingling. A1 with walker and gait belt. Midline SL. Hopes to discharge tomorrow. Resting in bed. Will continue to monitor.

## 2019-11-18 NOTE — DISCHARGE INSTRUCTIONS
1. [facility] [phone] has been notified that you will be discharging 11/18. They will continue to follow you for home services.    2. Call placed to Chuyita TRUNER [phone] your home care nurse. Discharge orders were faxed to her and [facility] as [facility] to [facility].    3. [facility] [phone] will resume services for personal care

## 2019-11-18 NOTE — PLAN OF CARE
Alert and oriented, forgetful. Lung sounds diminished. On PO Augmentin. Tolerating regular diet. Transfers with Ax1, gait belt, and walker. Voiding in adequate amounts. Denies pain. Midline patent. Plans discharge home tomorrow with resumption of home care.

## 2019-11-18 NOTE — PROGRESS NOTES
Care Coordination:  Pt has orders for discharge home today with kira Mccormick and resumption of home services.   1. [facility] [phone] has been notified that pt will be discharging home on 11/18. They will continue to follow for home services.    2. Call placed to Chuyita TURNER [phone] home care nurse. Discharge orders were faxed to her at fax # [phone] and [facility] as prior to admission.    3. [facility] [phone] was notified of pt discharge and will resume services for personal care    Per bedside RN, she spoke to pt's niece Anny regarding discharge plan. Anny will be here this afternoon for transport back home.    Cherry Gardner RN BSN CM  Inpatient Care Coordination  [facility]  [phone]

## 2019-11-18 NOTE — DISCHARGE SUMMARY
[facility] Discharge Summary    Hubert Tamayo MRN# [id number]   Age: 89 year old YOB: 1929     Date of Admission:  11/10/2019  Date of Discharge::  11/18/2019  Admitting Physician:  Derek Bal MD  Discharge Physician:  Eulogio Cadet MD     Home clinic: [facility], Dr. Saige Cortez          Admission Diagnoses:   Acute respiratory failure with hypoxia (H) [J96.01]          Discharge Diagnosis:   Principal Problem:    Acute respiratory failure with hypoxia (H)  Active Problems:    History of venous thromboembolism    Anticoagulated    Anemia, likely multifactorial with inadequate RBC production    Healthcare associated bacterial pneumonia, possibly with gram negative organism            Procedures:   CT Chest/Abdomen/Pelvis with contrast       IMPRESSION:  1.  Subpleural consolidation in the right lower lobe with a small right pleural effusion. Increased interstitial change in the subpleural lung bases. Underlying emphysematous change.  2.  Cholelithiasis.  3.  Prostatic enlargement. Diffuse urinary bladder wall thickening likely due to outlet obstruction.  4.  No appendicitis.  5.  Colonic diverticula without CT evidence for diverticulitis.  6.  Subacute to chronic left L1-L3 transverse process fractures.  7.  Moderate-sized esophageal hiatal hernia.          Medications Prior to Admission:     No medications prior to admission.             Discharge Medications:     Current Discharge Medication List      START taking these medications    Details   amoxicillin-clavulanate (AUGMENTIN) 500-125 MG tablet Take 1 tablet by mouth 2 times daily for 5 days  Qty: 10 tablet, Refills: 0    Associated Diagnoses: HCAP (healthcare-associated pneumonia)         CONTINUE these medications which have NOT CHANGED    Details   acetaminophen (TYLENOL) 500 MG tablet Take 500 mg by mouth every 6 hours as needed for mild pain      albuterol (PROAIR HFA/PROVENTIL HFA/VENTOLIN HFA) 108 (90 Base) MCG/ACT inhaler Inhale  2 puffs into the lungs every 4 hours as needed for shortness of breath / dyspnea or wheezing    Comments: Pharmacy may dispense brand covered by insurance (Proair, or proventil or ventolin or generic albuterol inhaler)      amLODIPine (NORVASC) 2.5 MG tablet Take 2.5 mg by mouth daily      artificial saliva (BIOTENE DRY MOUTHWASH) LIQD liquid Swish and spit 5-10 mLs in mouth 4 times daily as needed for dry mouth      atorvastatin (LIPITOR) 80 MG tablet Take 80 mg by mouth daily      budesonide-formoterol (SYMBICORT) 160-4.5 MCG/ACT Inhaler Inhale 2 puffs into the lungs 2 times daily      cholecalciferol 1000 units TABS Take 1,000 Units by mouth daily      Donepezil HCl (ARICEPT PO) Take 5 mg by mouth daily       emollient (VANICREAM) external cream Apply topically daily Apply to dry skin      haloperidol (HALDOL) 5 MG tablet Take 2.5 mg by mouth 2 times daily      hydrocortisone (CORTAID) 1 % external cream Apply topically 2 times daily Apply thin layer to itchy scaling on left inner elbow and nose      metoprolol tartrate (LOPRESSOR) 50 MG tablet Take 1 tablet (50 mg) by mouth 2 times daily  Qty: 60 tablet, Refills: 1    Associated Diagnoses: NSTEMI (non-ST elevated myocardial infarction) (H)      OLANZapine (ZYPREXA) 20 MG tablet Take 20 mg by mouth At Bedtime      omeprazole (PRILOSEC) 20 MG DR capsule Take 1 capsule (20 mg) by mouth every morning (before breakfast)  Qty: 30 capsule, Refills: 1    Associated Diagnoses: Anemia, unspecified type      Petrolatum OINT Externally apply topically 4 times daily as needed (dry lips)      !! senna (SENOKOT) 8.6 MG tablet Take 1 tablet by mouth every morning      !! senna (SENOKOT) 8.6 MG tablet Take 1 tablet by mouth every evening as needed for constipation      sertraline (ZOLOFT) 100 MG tablet Take 1.5 tablets (150 mg) by mouth daily  Qty: 30 tablet, Refills: 0    Associated Diagnoses: Paranoid schizophrenia (H)      tamsulosin (FLOMAX) 0.4 MG capsule Take 0.4 mg by  mouth daily      Tiotropium Bromide Monohydrate (SPIRIVA HANDIHALER IN) Inhale 1 capsule into the lungs daily       clopidogrel (PLAVIX) 75 MG tablet Take 1 tablet (75 mg) by mouth daily    Associated Diagnoses: NSTEMI (non-ST elevated myocardial infarction) (H)      mometasone (NASONEX) 50 MCG/ACT nasal spray Spray 2 sprays into both nostrils daily as needed (allergy symptoms / runny nose)      nitroGLYcerin (NITROSTAT) 0.4 MG sublingual tablet Place 0.4 mg under the tongue every 5 minutes as needed for chest pain For chest pain place 1 tablet under the tongue every 5 minutes for 3 doses. If symptoms persist 5 minutes after 1st dose call 911.      nystatin (MYCOSTATIN) 485810 UNIT/GM external cream Apply topically 2 times daily as needed (Intertrigo) Apply thin layer to groin folds       !! - Potential duplicate medications found. Please discuss with provider.      STOP taking these medications       apixaban ANTICOAGULANT (ELIQUIS) 5 MG tablet Comments:   Reason for Stopping:         HYDROcodone-acetaminophen (NORCO) 5-325 MG tablet Comments:   Reason for Stopping:         polyethylene glycol (MIRALAX/GLYCOLAX) packet Comments:   Reason for Stopping:                     Consultations:   Consultation during this admission received from gastroenterology          Hospital Course:   Hubert Tamayo is a 89-year-old man who came to attention on 11/10 with progressive SOB, chest discomfort, cough and blood-tinged sputum.      He had a recent admission here from 10/29 through 11/1 for non-ST elevation myocardial infarction due to anemia.  At the time of this recent admission he was on Coumadin.  He presented with INR greater than 10 and anemia with hemoglobin of 6.8.  He had a non-ST elevation myocardial infarction that was thought to be related to severe anemia.  He was treated with transfusion of 2 units of red blood cells.  He was seen by cardiology.  Prior to admission Coumadin was changed to Xarelto prior to discharge.   After discharge Mr. Tamayo initially remained comfortable.  After a day or 2 at home, however, he began to develop shortness of breath.      PMH includes paranoid schizophrenia, COPD, hypertension, osteoporosis, type 2 diabetes, dementia, remote history of DVT for which he is on chronic anticoagulation, chronic kidney disease with baseline creatinine of approximately 1.6.       Emergency department evaluation at the time of this re-admission, showed hypoxia with oxygen saturations of 86% on room air.  CT of chest, abdomen, and pelvis was obtained and showed right lower lung consolidation with associated pleural effusion. Mr. Tamayo was started on Zosyn and vancomycin for healthcare associated pneumonia.  He was given supplemental oxygen.  He was admitted to the hospital for further cares.     Following admission, Mr. Tamayo was weaned from oxygen. He was treated with Zosyn and subsequently Augmentin for presumed HCAP (vancomycin had been stopped). His hemoglobin waxed and waned and remained stable despite resumption of eliquis, though no overt bleeding has been seen. Due to low iron stores he was given iron sucrose doses for 4 days.  He was also transfused with 1 U PRBC on the date of discharge after his reticulocyte count and index remained inappropriately low.    Mr. Tamayo had remained relatively weak and due to having inadequate help at home, his discharge was held while adequate assistance was made available.  While he waited for family to arrange adequate care at home, he remained stable and worked daily with therapies to recover function.    /54 (BP Location: Left arm)   Pulse 73   Temp 97  F (36.1  C) (Oral)   Resp 18   Wt 64.2 kg (141 lb 8 oz)   SpO2 91%   BMI 21.52 kg/m    ON the date of discharge, Mr. Tamayo was alert, pleasant and comfortable. He appeared fully appropriate to the situation. His niece, Maria Luisa, was also present at the bedside, and we all agreed that he was ready for discharge.   Chest:  CTA.  No increased WOB  COR: RRR without murmur  Abd: Soft, NTND. He had eaten lunch.          Discharge Instructions and Follow-Up:   Discharge diet: Regular   Discharge activity: Activity as tolerated with assist and walker   Discharge follow-up: Follow up with primary care provider as soon as can be arranged.           Discharge Disposition:   Discharged to home      Attestation:  I have reviewed today's vital signs, notes, medications, labs and imaging.  Total time: 45 minutes    Eulogio Cadet MD

## 2019-11-18 NOTE — PROGRESS NOTES
"Physical Therapy Discharge Summary    Reason for therapy discharge:    Discharged to home.    Progress towards therapy goal(s). See goals on Care Plan in [facility] electronic health record for goal details.  Goals not met.  Barriers to achieving goals:   discharge from facility.    Therapy recommendation(s):    Per most recent PT note: \"Home with 24/7 assist for all mobility/ambulation with use of FWW at all times; home PT\"     **Pt not seen by discharging therapist on this date, note written based on previous treating therapist's notes and recommendations.      "

## 2019-11-18 NOTE — PLAN OF CARE
Patient discharged home via private car, transported via wheelchair with support staff.  Patient verbalized and received copy of discharge instructions.  Personal belongings gathered and sent with patient.  VSS. Midline removed prior to discharge.

## 2019-11-19 PROBLEM — J15.9 HEALTHCARE ASSOCIATED BACTERIAL PNEUMONIA: Status: ACTIVE | Noted: 2019-11-19

## 2019-11-19 NOTE — PROGRESS NOTES
Transition Communication Hand-off for Care Transitions to Next Level of Care Provider    Name: Hubert Tamayo  : 1929  MRN #: [id number]  Primary Care Provider: Saige Cortez  Primary Care MD Name: Dr Coleman  Primary Clinic: [facility] [address] [place] [address]  Primary Care Clinic Name: [facility]  Reason for Hospitalization:  Acute respiratory failure with hypoxia (H) [J96.01]  Admit Date/Time: 11/10/2019  3:46 PM  Discharge Date:    Payor Source: Payor: MEDICARE / Plan: MEDICARE / Product Type: Medicare /     Readmission Assessment Measure (DANIELE) Risk Score/category: elevated    Reason for Communication Hand-off Referral: Admission diagnoses: PN    Discharge Plan: home with continued services    Discharge Needs Assessment:  Needs      Most Recent Value   Equipment Currently Used at Home  walker, rolling, cane, straight [reports use of walker in home and cane in community]   # of Referrals Placed by [facility]  Homecare, Communication hand-offs to next level of Care Providers        Follow-up plan:  No future appointments.    Any outstanding tests or procedures:        Referrals     Future Labs/Procedures    Medication Therapy Management Referral     Process Instructions:        This referral will be filtered to a centralized scheduling office at [facility] Therapy Management and the patient will receive a call to schedule an appointment at a [place] location most convenient for them.    Comments:    MTM referral reason            Patient had a hospital or ED visit in last 6 months and has more than 10   PTA or Discharge medications       This service is designed to help you get the most from your medications.  A specially trained pharmacist will work closely with you and your doctors  to solve any problems related to your medications and to help you get the   best results from taking them.      The Medication Therapy Management staff will call you to schedule an  appointment.                     Key Recommendations:  FYI of pt admission:  CTS following for Elevated DANIELE and PNA diagnosis. Per chart review from last admission, pt is 100% service [facility]. Pt was last admitted from 10/29-11/1 with NSTEMI and is now admitted with PNA. He has had 3 admits and 3 ED visits in the last 6 months. He has a history of paranoid schitzophrenia, COPD, HTN, DM, Dementia, MI. He is currently being treated with IV Vanco and zosyn. Met with pt, kira Mccormick and  from [facility] regarding plan of care. They confirm that pt has 24/7 care at home with his services.     discharge recommendations are: Pt has orders for discharge home today with kira Mccormick and resumption of home services.   1. [facility] [phone] has been notified that pt will be discharging home on 11/18. They will continue to follow for home services.     2. Call placed to Chuyita TURNER [phone] home care nurse. Discharge orders were faxed to her at fax # [phone] and [facility] will [facility] as prior to admission.     3. [facility] [phone] was notified of pt discharge and will resume services for personal care    He was discharged with new antibiotics. All other meds remain the same. He should continue to be followed closely by [facility] MD in his home.    Cherry Gardner RN    AVS/Discharge Summary is the source of truth; this is a helpful guide for improved communication of patient story

## 2020-06-26 ENCOUNTER — APPOINTMENT (OUTPATIENT)
Dept: CT IMAGING | Facility: CLINIC | Age: 85
End: 2020-06-26
Attending: EMERGENCY MEDICINE
Payer: MEDICARE

## 2020-06-26 ENCOUNTER — HOSPITAL ENCOUNTER (OUTPATIENT)
Facility: CLINIC | Age: 85
Setting detail: OBSERVATION
Discharge: HOME | End: 2020-06-27
Attending: EMERGENCY MEDICINE | Admitting: INTERNAL MEDICINE
Payer: MEDICARE

## 2020-06-26 DIAGNOSIS — R41.0 CONFUSION: ICD-10-CM

## 2020-06-26 DIAGNOSIS — E86.0 DEHYDRATION: ICD-10-CM

## 2020-06-26 LAB
ALBUMIN UR-MCNC: 10 MG/DL
ANION GAP SERPL CALCULATED.3IONS-SCNC: 3 MMOL/L (ref 3–14)
APPEARANCE UR: CLEAR
BASOPHILS # BLD AUTO: 0 10E9/L (ref 0–0.2)
BASOPHILS NFR BLD AUTO: 0.2 %
BILIRUB UR QL STRIP: NEGATIVE
BUN SERPL-MCNC: 35 MG/DL (ref 7–30)
CALCIUM SERPL-MCNC: 9 MG/DL (ref 8.5–10.1)
CHLORIDE SERPL-SCNC: 109 MMOL/L (ref 94–109)
CO2 SERPL-SCNC: 28 MMOL/L (ref 20–32)
COLOR UR AUTO: ABNORMAL
CREAT SERPL-MCNC: 2.31 MG/DL (ref 0.66–1.25)
DIFFERENTIAL METHOD BLD: ABNORMAL
EOSINOPHIL # BLD AUTO: 0.3 10E9/L (ref 0–0.7)
EOSINOPHIL NFR BLD AUTO: 3 %
ERYTHROCYTE [DISTWIDTH] IN BLOOD BY AUTOMATED COUNT: 15.8 % (ref 10–15)
GFR SERPL CREATININE-BSD FRML MDRD: 24 ML/MIN/{1.73_M2}
GLUCOSE SERPL-MCNC: 90 MG/DL (ref 70–99)
GLUCOSE UR STRIP-MCNC: NEGATIVE MG/DL
HCT VFR BLD AUTO: 37.2 % (ref 40–53)
HEMOCCULT STL QL: NEGATIVE
HGB BLD-MCNC: 11.7 G/DL (ref 13.3–17.7)
HGB UR QL STRIP: NEGATIVE
HYALINE CASTS #/AREA URNS LPF: 1 /LPF (ref 0–2)
IMM GRANULOCYTES # BLD: 0 10E9/L (ref 0–0.4)
IMM GRANULOCYTES NFR BLD: 0.3 %
KETONES UR STRIP-MCNC: NEGATIVE MG/DL
LACTATE BLD-SCNC: 0.7 MMOL/L (ref 0.7–2)
LEUKOCYTE ESTERASE UR QL STRIP: NEGATIVE
LYMPHOCYTES # BLD AUTO: 2.4 10E9/L (ref 0.8–5.3)
LYMPHOCYTES NFR BLD AUTO: 27.4 %
MCH RBC QN AUTO: 29.5 PG (ref 26.5–33)
MCHC RBC AUTO-ENTMCNC: 31.5 G/DL (ref 31.5–36.5)
MCV RBC AUTO: 94 FL (ref 78–100)
MONOCYTES # BLD AUTO: 1.1 10E9/L (ref 0–1.3)
MONOCYTES NFR BLD AUTO: 12.3 %
MUCOUS THREADS #/AREA URNS LPF: PRESENT /LPF
NEUTROPHILS # BLD AUTO: 4.9 10E9/L (ref 1.6–8.3)
NEUTROPHILS NFR BLD AUTO: 56.8 %
NITRATE UR QL: NEGATIVE
NRBC # BLD AUTO: 0 10*3/UL
NRBC BLD AUTO-RTO: 0 /100
PH UR STRIP: 5.5 PH (ref 5–7)
PLATELET # BLD AUTO: 145 10E9/L (ref 150–450)
POTASSIUM SERPL-SCNC: 4.6 MMOL/L (ref 3.4–5.3)
RBC # BLD AUTO: 3.97 10E12/L (ref 4.4–5.9)
RBC #/AREA URNS AUTO: 0 /HPF (ref 0–2)
SODIUM SERPL-SCNC: 140 MMOL/L (ref 133–144)
SOURCE: ABNORMAL
SP GR UR STRIP: 1.01 (ref 1–1.03)
UROBILINOGEN UR STRIP-MCNC: NORMAL MG/DL (ref 0–2)
WBC # BLD AUTO: 8.6 10E9/L (ref 4–11)
WBC #/AREA URNS AUTO: <1 /HPF (ref 0–5)

## 2020-06-26 PROCEDURE — 99285 EMERGENCY DEPT VISIT HI MDM: CPT | Mod: 25

## 2020-06-26 PROCEDURE — 83605 ASSAY OF LACTIC ACID: CPT | Performed by: EMERGENCY MEDICINE

## 2020-06-26 PROCEDURE — 81001 URINALYSIS AUTO W/SCOPE: CPT | Performed by: EMERGENCY MEDICINE

## 2020-06-26 PROCEDURE — 96360 HYDRATION IV INFUSION INIT: CPT

## 2020-06-26 PROCEDURE — 82272 OCCULT BLD FECES 1-3 TESTS: CPT | Performed by: EMERGENCY MEDICINE

## 2020-06-26 PROCEDURE — 96361 HYDRATE IV INFUSION ADD-ON: CPT

## 2020-06-26 PROCEDURE — 25800030 ZZH RX IP 258 OP 636: Performed by: EMERGENCY MEDICINE

## 2020-06-26 PROCEDURE — 74176 CT ABD & PELVIS W/O CONTRAST: CPT

## 2020-06-26 PROCEDURE — C9803 HOPD COVID-19 SPEC COLLECT: HCPCS

## 2020-06-26 PROCEDURE — U0003 INFECTIOUS AGENT DETECTION BY NUCLEIC ACID (DNA OR RNA); SEVERE ACUTE RESPIRATORY SYNDROME CORONAVIRUS 2 (SARS-COV-2) (CORONAVIRUS DISEASE [COVID-19]), AMPLIFIED PROBE TECHNIQUE, MAKING USE OF HIGH THROUGHPUT TECHNOLOGIES AS DESCRIBED BY CMS-2020-01-R: HCPCS | Performed by: EMERGENCY MEDICINE

## 2020-06-26 PROCEDURE — 85025 COMPLETE CBC W/AUTO DIFF WBC: CPT | Performed by: EMERGENCY MEDICINE

## 2020-06-26 PROCEDURE — 80048 BASIC METABOLIC PNL TOTAL CA: CPT | Performed by: EMERGENCY MEDICINE

## 2020-06-26 RX ADMIN — SODIUM CHLORIDE 500 ML: 9 INJECTION, SOLUTION INTRAVENOUS at 22:06

## 2020-06-26 NOTE — LETTER
June 27, 2020        Hubert Tamayo  [address] [address]    This letter provides a written record that you were tested for COVID-19 on  6/26/20.  Your result was negative. This means that we didn t find the virus that causes COVID-19 in your sample. A test may show negative when you do actually have the virus. This can happen when the virus is in the early stages of infection, before you feel illness symptoms.    If you have symptoms   Stay home and away from others (self-isolate) until you meet ALL of the guidelines below:    You ve had no fever--and no medicine that reduces fever--for 3 full days (72 hours). And      Your other symptoms have gotten better. For example, your cough or breathing has improved. And     At least 10 days have passed since your symptoms started.    During this time:    Stay home. Don t go to work, school or anywhere else.     Stay in your own room, including for meals. Use your own bathroom if you can.    Stay away from others in your home. No hugging, kissing or shaking hands. No visitors.    Clean  high touch  surfaces often (doorknobs, counters, handles, etc.). Use a household cleaning spray or wipes. You can find a full list on the EPA website at www.epa.gov/pesticide-registration/list-n-disinfectants-use-against-sars-cov-2.    Cover your mouth and nose with a mask, tissue or washcloth to avoid spreading germs.    Wash your hands and face often with soap and water.    Going back to work  Check with your employer for any guidelines to follow for going back to work.    Employers: This document serves as formal notice that your employee tested negative for COVID-19, as of the testing date shown above.

## 2020-06-27 ENCOUNTER — APPOINTMENT (OUTPATIENT)
Dept: GENERAL RADIOLOGY | Facility: CLINIC | Age: 85
End: 2020-06-27
Attending: INTERNAL MEDICINE
Payer: MEDICARE

## 2020-06-27 VITALS
HEART RATE: 69 BPM | BODY MASS INDEX: 21.9 KG/M2 | SYSTOLIC BLOOD PRESSURE: 168 MMHG | OXYGEN SATURATION: 97 % | DIASTOLIC BLOOD PRESSURE: 82 MMHG | TEMPERATURE: 96.5 F | RESPIRATION RATE: 17 BRPM | WEIGHT: 144 LBS

## 2020-06-27 PROBLEM — N17.9 AKI (ACUTE KIDNEY INJURY) (H): Status: ACTIVE | Noted: 2020-06-27

## 2020-06-27 LAB
ANION GAP SERPL CALCULATED.3IONS-SCNC: 5 MMOL/L (ref 3–14)
BUN SERPL-MCNC: 31 MG/DL (ref 7–30)
CALCIUM SERPL-MCNC: 8.5 MG/DL (ref 8.5–10.1)
CHLORIDE SERPL-SCNC: 113 MMOL/L (ref 94–109)
CO2 SERPL-SCNC: 24 MMOL/L (ref 20–32)
CREAT SERPL-MCNC: 1.84 MG/DL (ref 0.66–1.25)
ERYTHROCYTE [DISTWIDTH] IN BLOOD BY AUTOMATED COUNT: 15.7 % (ref 10–15)
GFR SERPL CREATININE-BSD FRML MDRD: 31 ML/MIN/{1.73_M2}
GLUCOSE SERPL-MCNC: 84 MG/DL (ref 70–99)
HCT VFR BLD AUTO: 34.8 % (ref 40–53)
HGB BLD-MCNC: 11.1 G/DL (ref 13.3–17.7)
MCH RBC QN AUTO: 29.8 PG (ref 26.5–33)
MCHC RBC AUTO-ENTMCNC: 31.9 G/DL (ref 31.5–36.5)
MCV RBC AUTO: 94 FL (ref 78–100)
PLATELET # BLD AUTO: 135 10E9/L (ref 150–450)
POTASSIUM SERPL-SCNC: 4.2 MMOL/L (ref 3.4–5.3)
RBC # BLD AUTO: 3.72 10E12/L (ref 4.4–5.9)
SARS-COV-2 RNA SPEC QL NAA+PROBE: NOT DETECTED
SODIUM SERPL-SCNC: 142 MMOL/L (ref 133–144)
SPECIMEN SOURCE: NORMAL
WBC # BLD AUTO: 9.6 10E9/L (ref 4–11)

## 2020-06-27 PROCEDURE — 36415 COLL VENOUS BLD VENIPUNCTURE: CPT | Performed by: INTERNAL MEDICINE

## 2020-06-27 PROCEDURE — 99236 HOSP IP/OBS SAME DATE HI 85: CPT | Performed by: INTERNAL MEDICINE

## 2020-06-27 PROCEDURE — G0378 HOSPITAL OBSERVATION PER HR: HCPCS

## 2020-06-27 PROCEDURE — 25000132 ZZH RX MED GY IP 250 OP 250 PS 637: Mod: GY | Performed by: INTERNAL MEDICINE

## 2020-06-27 PROCEDURE — 99207 ZZC APP CREDIT; MD BILLING SHARED VISIT: CPT | Performed by: INTERNAL MEDICINE

## 2020-06-27 PROCEDURE — 71046 X-RAY EXAM CHEST 2 VIEWS: CPT

## 2020-06-27 PROCEDURE — 80048 BASIC METABOLIC PNL TOTAL CA: CPT | Performed by: INTERNAL MEDICINE

## 2020-06-27 PROCEDURE — 25800030 ZZH RX IP 258 OP 636: Performed by: INTERNAL MEDICINE

## 2020-06-27 PROCEDURE — 99207 ZZC CDG-CODE CATEGORY CHANGED: CPT | Performed by: INTERNAL MEDICINE

## 2020-06-27 PROCEDURE — 85027 COMPLETE CBC AUTOMATED: CPT | Performed by: INTERNAL MEDICINE

## 2020-06-27 RX ORDER — PROCHLORPERAZINE 25 MG
12.5 SUPPOSITORY, RECTAL RECTAL EVERY 12 HOURS PRN
Status: DISCONTINUED | OUTPATIENT
Start: 2020-06-27 | End: 2020-06-27 | Stop reason: HOSPADM

## 2020-06-27 RX ORDER — ACETAMINOPHEN 325 MG/1
650 TABLET ORAL EVERY 4 HOURS PRN
Status: DISCONTINUED | OUTPATIENT
Start: 2020-06-27 | End: 2020-06-27 | Stop reason: HOSPADM

## 2020-06-27 RX ORDER — LIDOCAINE 40 MG/G
CREAM TOPICAL
Status: DISCONTINUED | OUTPATIENT
Start: 2020-06-27 | End: 2020-06-27 | Stop reason: HOSPADM

## 2020-06-27 RX ORDER — NITROGLYCERIN 0.4 MG/1
0.4 TABLET SUBLINGUAL EVERY 5 MIN PRN
Status: DISCONTINUED | OUTPATIENT
Start: 2020-06-27 | End: 2020-06-27 | Stop reason: HOSPADM

## 2020-06-27 RX ORDER — AMLODIPINE BESYLATE 2.5 MG/1
2.5 TABLET ORAL DAILY
Status: DISCONTINUED | OUTPATIENT
Start: 2020-06-27 | End: 2020-06-27 | Stop reason: HOSPADM

## 2020-06-27 RX ORDER — METOPROLOL TARTRATE 25 MG/1
25 TABLET, FILM COATED ORAL 2 TIMES DAILY
Status: DISCONTINUED | OUTPATIENT
Start: 2020-06-27 | End: 2020-06-27 | Stop reason: HOSPADM

## 2020-06-27 RX ORDER — NALOXONE HYDROCHLORIDE 0.4 MG/ML
.1-.4 INJECTION, SOLUTION INTRAMUSCULAR; INTRAVENOUS; SUBCUTANEOUS
Status: DISCONTINUED | OUTPATIENT
Start: 2020-06-27 | End: 2020-06-27 | Stop reason: HOSPADM

## 2020-06-27 RX ORDER — ACETAMINOPHEN 650 MG/1
650 SUPPOSITORY RECTAL EVERY 4 HOURS PRN
Status: DISCONTINUED | OUTPATIENT
Start: 2020-06-27 | End: 2020-06-27 | Stop reason: HOSPADM

## 2020-06-27 RX ORDER — HYDRALAZINE HYDROCHLORIDE 20 MG/ML
5 INJECTION INTRAMUSCULAR; INTRAVENOUS EVERY 4 HOURS PRN
Status: DISCONTINUED | OUTPATIENT
Start: 2020-06-27 | End: 2020-06-27 | Stop reason: HOSPADM

## 2020-06-27 RX ORDER — PROCHLORPERAZINE MALEATE 5 MG
5 TABLET ORAL EVERY 6 HOURS PRN
Status: DISCONTINUED | OUTPATIENT
Start: 2020-06-27 | End: 2020-06-27 | Stop reason: HOSPADM

## 2020-06-27 RX ORDER — SODIUM CHLORIDE 9 MG/ML
INJECTION, SOLUTION INTRAVENOUS CONTINUOUS
Status: DISCONTINUED | OUTPATIENT
Start: 2020-06-27 | End: 2020-06-27 | Stop reason: HOSPADM

## 2020-06-27 RX ADMIN — SODIUM CHLORIDE, PRESERVATIVE FREE: 5 INJECTION INTRAVENOUS at 13:30

## 2020-06-27 RX ADMIN — METOPROLOL TARTRATE 25 MG: 25 TABLET, FILM COATED ORAL at 09:33

## 2020-06-27 RX ADMIN — SODIUM CHLORIDE, PRESERVATIVE FREE: 5 INJECTION INTRAVENOUS at 01:58

## 2020-06-27 RX ADMIN — Medication 1 MG: at 03:10

## 2020-06-27 RX ADMIN — AMLODIPINE BESYLATE 2.5 MG: 2.5 TABLET ORAL at 09:33

## 2020-06-27 NOTE — PLAN OF CARE
PRIMARY DIAGNOSIS: GI BLEED    OUTPATIENT/OBSERVATION GOALS TO BE MET BEFORE DISCHARGE  Orthostatic performed: No    Stable Hgb Yes.   Recent Labs   Lab Test 06/26/20 2051 11/18/19 0525 11/17/19 0521   HGB 11.7* 7.7* 7.8*         Resolved or declined bleeding episodes: Yes Last episode: PTA    Appropriate testing complete: Yes    Cleared for discharge by consultants (if involved): No    Safe discharge environment identified: Yes    Discharge Planner Nurse   Safe discharge environment identified: Yes  Barriers to discharge: No       Entered by: Julia Pulido 06/27/2020 3:41 AM     Please review provider order for any additional goals.   Nurse to notify provider when observation goals have been met and patient is ready for discharge.      Pt is alert, oriented to self and place. Confused about what time it is and thinks it is morning. No stools since arriving. Pt denies any pain.

## 2020-06-27 NOTE — ED PROVIDER NOTES
History     Chief Complaint:  Rectal Bleeding     HPI   Hubert Tamayo is a 89 year old male, with a history of NSTEMI, COPD, coronary artery disease, type 2 diabetes, hypertension, and on Plavix, who presents via EMS to the ED for evaluation of rectal bleeding. The patient reports he developed bright red blood mixed into his diarrheal stool beginning a week ago. No black/tarry stool. He has never had bloody stool prior. The patient notes he feels weak and has had a decreased appetite over the past 2 days. The patient does have a cough. No abdominal pain, chest pain, shortness of breath, or rhinorrhea.     Allergies:  Loxapine  Nifedipine  Sulfa drugs  Bactrim     Medications:    Plavix   Metoprolol   Omeprazole  Zoloft  Albuterol inhaler  Amlodipine  Atorvastatin  Symbicort inhaler  Vitamin D3  Aricept  Haldol  Nitrostat  Zyprexa  Spiriva handihaler     Past Medical History:    Coronary artery disease  Paranoid schizophrenia  COPD  Dementia  Type 2 diabetes  Hypertension   Osteoporosis  NSTEMI  Anemia  Venous thromboembolism     Past Surgical History:    Bilateral cataract removal      Family History:    History reviewed. No pertinent family history.     Social History:  Smoking status: Former smoker  Substance use: No  Alcohol use: Not currently     Review of Systems   All other systems reviewed and are negative.    Physical Exam     Patient Vitals for the past 24 hrs:   BP Temp Temp src Pulse Heart Rate Resp SpO2   06/26/20 2230 (!) 187/91 -- -- 58 -- -- 95 %   06/26/20 2215 (!) 175/92 -- -- 57 -- -- 97 %   06/26/20 2145 (!) 185/95 -- -- 61 -- -- 98 %   06/26/20 2115 -- -- -- 61 -- -- 96 %   06/26/20 2100 (!) 173/96 -- -- 60 -- -- 96 %   06/26/20 2045 (!) 169/87 -- -- 61 -- -- 97 %   06/26/20 2030 (!) 162/87 -- -- 64 -- -- --   06/26/20 2020 (!) 161/90 98.1  F (36.7  C) Oral 65 65 20 97 %      Physical Exam  General: Resting on the bed.  Head: No obvious trauma to head.  Ears, Nose, Throat:  External ears normal.   Nose normal.    Eyes:  Conjunctivae clear.  Pupils are equal, round, and reactive.   Neck: Normal range of motion.  Neck supple.   CV: Regular rate and rhythm.  No murmurs.      Respiratory: Effort normal and breath sounds normal.  No wheezing or crackles.   Gastrointestinal: Soft.  No distension. There is RLQ tenderness.  There is no rigidity, no rebound and no guarding.   Neuro: Alert. Moving all extremities appropriately.  Normal speech.    Skin: Skin is warm and dry.  No rash noted.   : no rectal bleeding on exam.  No appreciable fissure or hemorrhoids.      Emergency Department Course     Imaging:  Radiology findings were communicated with the patient who voiced understanding of the findings.    CT Abdomen Pelvis w/o Contrast  IMPRESSION:   1.  Bladder wall thickening may be secondary to chronic outlet obstruction from enlarged prostate, though recommend correlation to exclude cystitis.   2.  Colonic diverticulosis.   3.  Cholelithiasis.   4.  Moderate to large hiatal hernia.   As read by Radiology.     Laboratory:  Laboratory findings were communicated with the patient who voiced understanding of the findings.    Asymptomatic COVID-19 Virus (Coronavirus) PCR: In process     Occult blood stool: Negative     Lactic acid (2111): 0.7    CBC: HGB 11.7(L), (L), o/w WNL (WBC 8.6)  BMP: BUN 35(H), Creatinine 2.31(H), GFR estimate 24(L), o/w WNL     UA: Protein albumin 10, Mucous present, o/w Negative     Interventions:  2206: NS 500mL Bolus IV    Emergency Department Course:  Patient arrived by EMS.     Past medical records, nursing notes, and vitals reviewed.    2048: I performed an exam of the patient as documented above.     2051: IV was inserted and blood was drawn for laboratory testing, results above.    2052: Rectal exam was performed here in the emergency department. This was performed with male chaperone at bedside.    The patient was sent for an abdomen pelvis CT while in the emergency department,  results above.     2253: The patient provided a urine sample here in the emergency department. This was sent for laboratory testing, findings above.    2339: I rechecked the patient.     Patient provided an asymptomatic COVID-19 swab.    2353: I spoke to Dr. Gentile of the hospitalist service who accepts the patient for admission.     Findings and plan explained to the patient who consents to admission. Discussed the patient with Dr. Gentile, who will admit the patient to a bed for further monitoring, evaluation, and treatment.    Impression & Plan      Covid-19  Hubert Tamayo was evaluated during a global COVID-19 pandemic, which necessitated consideration that the patient might be at risk for infection with the SARS-CoV-2 virus that causes COVID-19.   Applicable protocols for evaluation were followed during the patient's care. COVID-19 was considered as part of the patient's evaluation. The plan for testing is: a test was obtained during this visit.    Medical Decision Makin-year-old male with history of paranoid schizophrenia, Alzheimer's disease presents with rectal bleeding and abdominal discomfort.  Broad differentials pursued include not limited to acute electrolyte, metabolic, renal dysfunction, severe sepsis or septic shock, mesenteric ischemia or ischemic colitis, diverticulitis, diverticular bleed, upper versus lower GI bleed, etc.  Overall patient is well-appearing nontoxic.  He seems to be somewhat confused but able to answer most questions correctly.  CBC shows an improved hemoglobin from prior at 11.7.  No evidence of active bleeding.  No evidence of leukocytosis.  BMP shows mildly elevated BUN and creatinine, suspect this is likely secondary to mild dehydration.  His prior creatinine is 1.6, therefore creatinine at 2.3 today is acutely elevated.  UA shows no evidence of infection, additionally no evidence of blood to indicate nephrolithiasis.  Occult stool is negative.  No gross bleeding or  melena on examination.  No clear evidence of GI bleed at this time.  No appreciable fissure or hemorrhoids on examination.  Lactate is fortunately normal not suggestive of severe sepsis, septic shock or mesenteric ischemia or ischemic colitis.  CT of the abdomen shows some bladder wall thickening likely from chronic outlet obstruction but otherwise no other acute surgical process.  Patient is otherwise well-appearing nontoxic.  I spoke with son, it appears that he is likely somewhat confused from baseline.  This may be from his mild dehydration.  At this point he seems unsafe to go home I discussed with the hospitalist who graciously accepted.  COVID-19 swab was obtained given that he will be admitted.  Patient was admitted to the hospitalist who graciously accepted.    Diagnosis:    ICD-10-CM    1. Dehydration  E86.0    2. Confusion  R41.0      Disposition: Patient admitted to a bed by Dr. Krupa Shah  6/26/2020   [facility] EMERGENCY DEPARTMENT     Scribe Disclosure:  I, Julia Shah, am serving as a scribe at 8:48 PM on 6/26/2020 to document services personally performed by Angely Romero MD based on my observations and the provider's statements to me.        Angely Romero MD  06/27/20 0008

## 2020-06-27 NOTE — ED TRIAGE NOTES
"Pt presents to ED via EMS from home with complaints of bloody stools for a couple days. Pt complaint of increased weakness and decreased appetite over last two days. Pt difficult to understand. Pt has cough but says he's had the cough \"since 1980.\" Hx COPD. Denies fevers and SOB. ABCs intact.   "

## 2020-06-27 NOTE — PHARMACY-ADMISSION MEDICATION HISTORY
Admission medication history interview status for this patient is complete. See [facility] admission navigator for allergy information, prior to admission medications and immunization status.     Medication history interview done via telephone during Covid-19 pandemic, indicate source(s): Pt's niece, Anny [phone].  Medication history resources (including written lists, pill bottles, clinic record):Pt has red folder with medication list and pill bottle.    Changes made to PTA medication list:  Added: eucerin  Deleted: acetaminophen, amlodipine, artificial saliva, atorvastatin, omeprazole  Changed: donepezil strength, haloperidol changed to TID    Actions taken by pharmacist (provider contacted, etc): Called Anny who gave us phone number for the [facility], [facility], who sets up his medication pill box. Left a message, but spoke with Anny to get med list.     Additional medication history information: Pt was prescribed senna, but has not started taking it. Pt took all of his inhaler medications on Thurs. Pt's niece said that the pt has run out of hydrocortisone cream and that is why he stopped using it.     Medication reconciliation/reorder completed by provider prior to medication history?  Y   (Y/N)     For patients on insulin therapy: N  (Y/N)    Prior to Admission medications    Medication Sig Last Dose Taking? Auth Provider   albuterol (PROAIR HFA/PROVENTIL HFA/VENTOLIN HFA) 108 (90 Base) MCG/ACT inhaler Inhale 2 puffs into the lungs every 4 hours as needed for shortness of breath / dyspnea or wheezing 6/25/2020 Yes Unknown, Entered By History   budesonide-formoterol (SYMBICORT) 160-4.5 MCG/ACT Inhaler Inhale 2 puffs into the lungs 2 times daily 6/25/2020 Yes Unknown, Entered By History   cholecalciferol 1000 units TABS Take 1,000 Units by mouth daily 6/26/2020 at am Yes Unknown, Entered By History   clopidogrel (PLAVIX) 75 MG tablet Take 1 tablet (75 mg) by mouth daily 6/26/2020 at am Yes Vamshi Fischer,  MD   donepezil (ARICEPT) 10 MG tablet Take 10 mg by mouth daily  6/26/2020 at am Yes Reported, Patient   haloperidol (HALDOL) 5 MG tablet Take 2.5 mg by mouth 3 times daily  6/26/2020 at x2 Yes Unknown, Entered By History   metoprolol tartrate (LOPRESSOR) 50 MG tablet Take 1 tablet (50 mg) by mouth 2 times daily 6/26/2020 at am Yes Vamshi Fischer MD   mometasone (NASONEX) 50 MCG/ACT nasal spray Spray 2 sprays into both nostrils daily as needed (allergy symptoms / runny nose) Past Month at Unknown time Yes Unknown, Entered By History   nystatin (MYCOSTATIN) 706234 UNIT/GM external cream Apply topically 2 times daily as needed (Intertrigo) Apply thin layer to groin folds Past Month at Unknown time Yes Unknown, Entered By History   OLANZapine (ZYPREXA) 20 MG tablet Take 20 mg by mouth At Bedtime 6/25/2020 at pm Yes Unknown, Entered By History   sertraline (ZOLOFT) 100 MG tablet Take 1.5 tablets (150 mg) by mouth daily 6/26/2020 at am Yes Todd Velarde MD   Skin Protectants, Misc. (EUCERIN) cream Apply topically daily as needed for dry skin 6/26/2020 at Unknown time Yes Unknown, Entered By History   tamsulosin (FLOMAX) 0.4 MG capsule Take 0.4 mg by mouth daily 6/26/2020 at am Yes Reported, Patient   Tiotropium Bromide Monohydrate (SPIRIVA HANDIHALER IN) Inhale 1 capsule into the lungs daily  6/25/2020 Yes Reported, Patient   hydrocortisone (CORTAID) 1 % external cream Apply topically 2 times daily Apply thin layer to itchy scaling on left inner elbow and nose More than a month at Unknown time  Unknown, Entered By History   nitroGLYcerin (NITROSTAT) 0.4 MG sublingual tablet Place 0.4 mg under the tongue every 5 minutes as needed for chest pain For chest pain place 1 tablet under the tongue every 5 minutes for 3 doses. If symptoms persist 5 minutes after 1st dose call 911.   Unknown, Entered By History   Petrolatum OINT Externally apply topically 4 times daily as needed (dry lips)   Unknown, Entered By History    senna (SENOKOT) 8.6 MG tablet Take 1 tablet by mouth every morning   Unknown, Entered By History   senna (SENOKOT) 8.6 MG tablet Take 1 tablet by mouth every evening as needed for constipation   Unknown, Entered By History

## 2020-06-27 NOTE — ED NOTES
[facility]  ED Nurse Handoff Report    Hubert Tamayo is a 89 year old male   ED Chief complaint: Rectal Bleeding  . ED Diagnosis:   Final diagnoses:   Dehydration   Confusion     Allergies:   Allergies   Allergen Reactions     Crustaceans      Loxapine      Nifedipine      Shellfish Allergy      Sulfa Drugs      Sulfamethoxazole-Trimethoprim      Trimethoprim        Code Status: Full Code  Activity level - Baseline/Home:  Stand by Assist. Activity Level - Current:   Assist X 1. Lift room needed: No. Bariatric: No   Needed: No   Isolation: No. Infection: Not Applicable.     Vital Signs:   Vitals:    06/26/20 2115 06/26/20 2145 06/26/20 2215 06/26/20 2230   BP:  (!) 185/95 (!) 175/92 (!) 187/91   Pulse: 61 61 57 58   Resp:       Temp:       TempSrc:       SpO2: 96% 98% 97% 95%       Cardiac Rhythm:  ,      Pain level:    Patient confused: Yes. Patient Falls Risk: Yes.   Elimination Status: Has voided   Patient Report - Initial Complaint: rectal bleeding. Focused Assessment: Hubert Tamayo is a 89 year old male, with a history of NSTEMI, COPD, coronary artery disease, type 2 diabetes, hypertension, and on Plavix, who presents via EMS to the ED for evaluation of rectal bleeding. The patient reports he developed bright red blood mixed into his diarrheal stool beginning a week ago. No black/tarry stool. He has never had bloody stool prior. The patient notes he feels weak and has had a decreased appetite over the past 2 days. The patient does have a cough. No abdominal pain, chest pain, shortness of breath, or rhinorrhea.    Tests Performed:   Labs Ordered and Resulted from Time of ED Arrival Up to the Time of Departure from the ED   CBC WITH PLATELETS DIFFERENTIAL - Abnormal; Notable for the following components:       Result Value    RBC Count 3.97 (*)     Hemoglobin 11.7 (*)     Hematocrit 37.2 (*)     RDW 15.8 (*)     Platelet Count 145 (*)     All other components within normal limits   BASIC  METABOLIC PANEL - Abnormal; Notable for the following components:    Urea Nitrogen 35 (*)     Creatinine 2.31 (*)     GFR Estimate 24 (*)     GFR Estimate If Black 28 (*)     All other components within normal limits   ROUTINE UA WITH MICROSCOPIC - Abnormal; Notable for the following components:    Protein Albumin Urine 10 (*)     Mucous Urine Present (*)     All other components within normal limits   LACTIC ACID WHOLE BLOOD   OCCULT BLOOD STOOL   COVID-19 VIRUS (CORONAVIRUS) BY PCR   FREE WATER     CT Abdomen Pelvis w/o Contrast   Final Result   IMPRESSION:    1.  Bladder wall thickening may be secondary to chronic outlet obstruction from enlarged prostate, though recommend correlation to exclude cystitis.      2.  Colonic diverticulosis.      3.  Cholelithiasis.      4.  Moderate to large hiatal hernia.            Abnormal Results: see above.   Treatments provided: see MAR  Family Comments: son contacted, see demographics  OBS brochure/video discussed/provided to patient:  Yes  ED Medications:   Medications   0.9% sodium chloride BOLUS (0 mLs Intravenous Stopped 6/26/20 8866)     Drips infusing:  No  For the majority of the shift, the patient's behavior Green. Interventions performed were none.    Sepsis treatment initiated: No       ED Nurse Name/Phone Number: Augie Phoenix RN,   11:54 PM    RECEIVING UNIT ED HANDOFF REVIEW    Above ED Nurse Handoff Report was reviewed: Yes  Reviewed by: Julia Pulido RN on June 27, 2020 at 12:32 AM

## 2020-06-27 NOTE — PLAN OF CARE
PRIMARY DIAGNOSIS: GI BLEED     OUTPATIENT/OBSERVATION GOALS TO BE MET BEFORE DISCHARGE  Orthostatic performed: No     Stable Hgb Yes.         Recent Labs   Lab Test 06/27/20  0539 06/26/20 2051 11/18/19  0525   HGB 11.1* 11.7* 7.7*            Resolved or declined bleeding episodes: Yes Last episode: has not had an episode today.     Appropriate testing complete: Yes     Cleared for discharge by consultants (if involved): Yes     Safe discharge environment identified: Yes     Discharge Planner Nurse   Safe discharge environment identified: Yes  Barriers to discharge: Yes       Entered by: Case Silva 06/27/2020   Pt alert, confused at times. Denies pain. Has not had any bleeding episodes today. One normal bowel movement today. Regular diet. Up SBA w/ walker and GB. Will go home today at 4 pm.     BP (!) 168/82 (BP Location: Left arm)   Pulse 69   Temp 96.5  F (35.8  C) (Oral)   Resp 17   Wt 65.3 kg (144 lb)   SpO2 97%   BMI 21.90 kg/m         Please review provider order for any additional goals.   Nurse to notify provider when observation goals have been met and patient is ready for discharge.

## 2020-06-27 NOTE — ED NOTES
Pt coughing non stop during triage. Spoke with MD Neri who asked pt be moved to A pod for COVID r/o

## 2020-06-27 NOTE — PROGRESS NOTES
ROOM # 215    Living Situation (if not independent, order SW consult): in a house with his niece  Facility name:  : Niece (Can't rememer her name)    Activity level at baseline: Ind with cane  Activity level on admit: SBA      Patient registered to observation; given Patient Bill of Rights; given the opportunity to ask questions about observation status and their plan of care.  Patient has been oriented to the observation room, bathroom and call light is in place.    Discussed discharge goals and expectations with patient/family.

## 2020-06-27 NOTE — PLAN OF CARE
PRIMARY DIAGNOSIS: GI BLEED    OUTPATIENT/OBSERVATION GOALS TO BE MET BEFORE DISCHARGE  1. Orthostatic performed: No    2. Stable Hgb Yes.   Recent Labs   Lab Test 06/27/20  0539 06/26/20 2051 11/18/19  0525   HGB 11.1* 11.7* 7.7*         3. Resolved or declined bleeding episodes: Yes Last episode: has not had an episode today.    4. Appropriate testing complete: Yes    5. Cleared for discharge by consultants (if involved): Yes    6. Safe discharge environment identified: Yes    Discharge Planner Nurse   Safe discharge environment identified: Yes  Barriers to discharge: Yes       Entered by: Case Silva 06/27/2020        Please review provider order for any additional goals.   Nurse to notify provider when observation goals have been met and patient is ready for discharge.

## 2020-06-27 NOTE — ED NOTES
Bed: ED27  Expected date: 6/26/20  Expected time:   Means of arrival: Ambulance  Comments:  [id number] 89yo M Bloody stool

## 2020-06-27 NOTE — PLAN OF CARE
PRIMARY DIAGNOSIS: GI BLEED    OUTPATIENT/OBSERVATION GOALS TO BE MET BEFORE DISCHARGE  Orthostatic performed: No    Stable Hgb: TBD.   Recent Labs   Lab Test 06/26/20 2051 11/18/19 0525 11/17/19 0521   HGB 11.7* 7.7* 7.8*         Resolved or declined bleeding episodes: Yes Last episode: PTA    Appropriate testing complete: Yes    Cleared for discharge by consultants (if involved): N/A    Safe discharge environment identified: Yes    Discharge Planner Nurse   Safe discharge environment identified: Yes  Barriers to discharge: No       Entered by: Julia Pulido 06/27/2020 6:18 AM     Please review provider order for any additional goals.   Nurse to notify provider when observation goals have been met and patient is ready for discharge.    Pt denies pain. No stools since arriving. BP elevated, MD paged. Home meds reordered and PRN Hydralazine added. Pt did not meet parameters. Pt is confused on time and situation.

## 2020-06-27 NOTE — H&P
[facility]    History and Physical - Hospitalist Service       Date of Admission:  6/26/2020    Assessment & Plan   Hubert Tamayo is a 89 year old male admitted on 6/26/2020. He has a past medical history significant for schizophrenia, hypertension, Alzheimer's dementia, coronary artery disease, COPD, hyperlipidemia, GERD, and diabetes mellitus.  He was brought to the hospital due to diarrhea, weakness and possible blood in his stools.  He was noted to have acute kidney injury.    1.  Acute kidney injury.  Suspect dehydration.  Start continuous IV fluids.  Avoid nephrotoxins as able.    2.  Possible bright red blood in stools.  Have nursing monitor stools for evidence of blood.  Recheck CBC in 6 hours.  Could consider GI consult if evidence of blood is noted.  Hold clopidogrel until repeat CBC in a few hours.    3.  Schizophrenia.  Likely restart home medications once verified by pharmacy.    4.  Dementia.  Restart home medications once verified by pharmacy.    5.  Cough.  SARS-CoV-2 PCR sent from emergency room.  Results pending.  Check chest x-ray.    6.  Hypertension, CAD, and hyperlipidemia.  Likely restart home medications in the morning after verified by pharmacy.  Possibly need to continue holding clopidogrel depending on CBC and if any evidence of blood is noted in stools on monitoring.     Diet: Regular diet  DVT Prophylaxis: Pneumatic Compression Devices  Lebron Catheter: not present  Code Status: Full code             Juan Gentile, DO  [facility]    ______________________________________________________________________    Chief Complaint   Weakness    History is obtained from the patient and the emergency room provider.    History of Present Illness   Hubert Tamayo is a 89 year old male who has a past medical history significant for schizophrenia, hypertension, Alzheimer's dementia, coronary artery disease, COPD, hyperlipidemia, GERD, and diabetes mellitus.  He is able  to provide me with some history.  Although, he is a poor overall medical historian.  Information in this section is a mixture of what he is able to tell me, information from previous records, and information from discussion with emergency room provider.  Patient apparently has been having diarrhea for the past several days.  Unclear exactly how long this is been going on.  He had told emergency room provider that he had noted some red blood in the stools.  He does not remember having blood when I talk with him.  He does feel weak.  Has not been eating or drinking well.  He is unsure exactly how long he has not been eating or drinking well.  Does state that he has an occasional cough.  Cough is nonproductive.  He does not remember having fevers or chills.  Does not feel short of breath.  Denies chest pain.  Does not know of any family members have been sick.  He is not sure exactly where he is.  Does feel better after medications given in the ER.  No other acute complaints.    Review of Systems    The 10 point Review of Systems is negative other than noted in the HPI     Past Medical History    I have reviewed this patient's medical history and updated it with pertinent information if needed.   Past Medical History:   Diagnosis Date     CAD (coronary artery disease)      Dementia (H)      Diabetes mellitus type II      Essential hypertension      Osteoporosis      Paranoid schizophrenia      Pulmonary emphysema        Past Surgical History   I have reviewed this patient's surgical history and updated it with pertinent information if needed.  Past Surgical History:   Procedure Laterality Date     CATARACT REMOVAL Bilateral        Social History   I have reviewed this patient's social history and updated it with pertinent information if needed.  Denies smoking.  Denies alcohol use.    Family History     No significant family history the patient is able to tell me about at this time.    Prior to Admission Medications    Prior to Admission Medications   Prescriptions Last Dose Informant Patient Reported? Taking?   Donepezil HCl (ARICEPT PO)  Pharmacy Yes No   Sig: Take 5 mg by mouth daily    OLANZapine (ZYPREXA) 20 MG tablet   Yes No   Sig: Take 20 mg by mouth At Bedtime   Petrolatum OINT  Pharmacy Yes No   Sig: Externally apply topically 4 times daily as needed (dry lips)   Tiotropium Bromide Monohydrate (SPIRIVA HANDIHALER IN)  Pharmacy Yes No   Sig: Inhale 1 capsule into the lungs daily    acetaminophen (TYLENOL) 500 MG tablet   Yes No   Sig: Take 500 mg by mouth every 6 hours as needed for mild pain   albuterol (PROAIR HFA/PROVENTIL HFA/VENTOLIN HFA) 108 (90 Base) MCG/ACT inhaler  Pharmacy Yes No   Sig: Inhale 2 puffs into the lungs every 4 hours as needed for shortness of breath / dyspnea or wheezing   amLODIPine (NORVASC) 2.5 MG tablet   Yes No   Sig: Take 2.5 mg by mouth daily   artificial saliva (BIOTENE DRY MOUTHWASH) LIQD liquid  Pharmacy Yes No   Sig: Swish and spit 5-10 mLs in mouth 4 times daily as needed for dry mouth   atorvastatin (LIPITOR) 80 MG tablet  Pharmacy Yes No   Sig: Take 80 mg by mouth daily   budesonide-formoterol (SYMBICORT) 160-4.5 MCG/ACT Inhaler  Pharmacy Yes No   Sig: Inhale 2 puffs into the lungs 2 times daily   cholecalciferol 1000 units TABS  Pharmacy Yes No   Sig: Take 1,000 Units by mouth daily   clopidogrel (PLAVIX) 75 MG tablet   No No   Sig: Take 1 tablet (75 mg) by mouth daily   emollient (VANICREAM) external cream  Pharmacy Yes No   Sig: Apply topically daily Apply to dry skin   haloperidol (HALDOL) 5 MG tablet   Yes No   Sig: Take 2.5 mg by mouth 2 times daily   hydrocortisone (CORTAID) 1 % external cream  Pharmacy Yes No   Sig: Apply topically 2 times daily Apply thin layer to itchy scaling on left inner elbow and nose   metoprolol tartrate (LOPRESSOR) 50 MG tablet   No No   Sig: Take 1 tablet (50 mg) by mouth 2 times daily   mometasone (NASONEX) 50 MCG/ACT nasal spray  Pharmacy Yes No    Sig: Spray 2 sprays into both nostrils daily as needed (allergy symptoms / runny nose)   nitroGLYcerin (NITROSTAT) 0.4 MG sublingual tablet  Pharmacy Yes No   Sig: Place 0.4 mg under the tongue every 5 minutes as needed for chest pain For chest pain place 1 tablet under the tongue every 5 minutes for 3 doses. If symptoms persist 5 minutes after 1st dose call 911.   nystatin (MYCOSTATIN) 022496 UNIT/GM external cream  Pharmacy Yes No   Sig: Apply topically 2 times daily as needed (Intertrigo) Apply thin layer to groin folds   omeprazole (PRILOSEC) 20 MG DR capsule   No No   Sig: Take 1 capsule (20 mg) by mouth every morning (before breakfast)   senna (SENOKOT) 8.6 MG tablet   Yes No   Sig: Take 1 tablet by mouth every morning   senna (SENOKOT) 8.6 MG tablet   Yes No   Sig: Take 1 tablet by mouth every evening as needed for constipation   sertraline (ZOLOFT) 100 MG tablet   No No   Sig: Take 1.5 tablets (150 mg) by mouth daily   tamsulosin (FLOMAX) 0.4 MG capsule  Pharmacy Yes No   Sig: Take 0.4 mg by mouth daily      Facility-Administered Medications: None     Allergies   Allergies   Allergen Reactions     Crustaceans      Loxapine      Nifedipine      Shellfish Allergy      Sulfa Drugs      Sulfamethoxazole-Trimethoprim      Trimethoprim        Physical Exam   Vital Signs: Temp: 98.1  F (36.7  C) Temp src: Oral BP: (!) 187/91 Pulse: 58 Heart Rate: 65 Resp: 20 SpO2: 95 %      Weight: 0 lbs 0 oz    Gen: Thin, NAD, A&Ox1 to self.  Not oriented to place or time.  Eyes:  PERRL, sclera anicteric.  OP:  MMM, no lesions.  Neck:  Supple.  CV:  Regular, no murmurs.  Lung:  CTA b/l, normal effort.  Ab:  +BS, soft.  Skin:  Warm, dry to touch.  No rash.  Ext:  No pitting edema LE b/l.      Data   Data reviewed today: I reviewed all medications, new labs and imaging results over the last 24 hours.    Recent Labs   Lab 06/26/20 2051   WBC 8.6   HGB 11.7*   MCV 94   *      POTASSIUM 4.6   CHLORIDE 109   CO2 28   BUN  35*   CR 2.31*   ANIONGAP 3   RAMON 9.0   GLC 90

## 2020-06-27 NOTE — DISCHARGE SUMMARY
"    [facility]  Discharge Summary  Hospitalist      Date of Admission:  6/26/2020  Date of Discharge:  6/27/2020  Provider:  Marcus Mackenzie MD. [facility]  Date of Service (when I last saw the patient): 06/27/20      Primary Provider: Saige Cortez          Discharge Diagnosis:     Discharge Diagnoses   Acute kidney injury secondary to dehydration  Gastroenteritis  Schizophrenia  Dementia      Other medical issues:  Past Medical History:   Diagnosis Date     CAD (coronary artery disease)      Dementia (H)      Diabetes mellitus type II      Essential hypertension      Osteoporosis      Paranoid schizophrenia      Pulmonary emphysema          Please see the admission history and physical for full details.     Hospital Course     Hubert Tamayo was admitted on 6/26/2020.  The following problems were addressed during his hospitalization:    89 year old male admitted on 6/26/2020. He has a past medical history significant for schizophrenia, hypertension, Alzheimer's dementia, coronary artery disease, COPD, hyperlipidemia, GERD, and diabetes mellitus.  He was brought to the hospital due to diarrhea, weakness and possible blood in his stools.  He was noted to have acute kidney injury.  Patient was kept on observation, had gentle hydration with IV fluid, creatinine did improve from 2.3 down to 1.8, patient baseline is around 1.6.  He had a formed brown bowel movement here in the hospital was no evidence of blood or any bleeding in the hospital.  His hemoglobin remained stable above 11.  He was having regular diet well-tolerated, feeling \"great\" today and is ready to discharge home.  I recommended to his son Aldair who I spoke with on the phone a follow-up visit with his primary care early next week to make sure patient continues to improve.  If he has any evidence of ongoing symptoms specially blood in stool he should have a referral to gastroenterology.     COVID-19 test was sent results is still pending in " asymptomatic patient.    Pending Results   Unresulted Labs Ordered in the Past 30 Days of this Admission     Date and Time Order Name Status Description    6/26/2020 2337 Asymptomatic COVID-19 Virus (Coronavirus) by PCR In process           Discharge Orders      Reason for your hospital stay    Gastroenteritis dehydration     Follow-up and recommended labs and tests     Follow-up with primary care physician early next week, telehealth visit is acceptable     Activity    Your activity upon discharge: activity as tolerated     Diet    Follow this diet upon discharge: Orders Placed This Encounter      Regular Diet Adult       Code Status   Full Code       Primary Care Physician   Saige Cortez    Physical Exam   Temp: 96.6  F (35.9  C) Temp src: Oral BP: (!) 158/71 Pulse: 69 Heart Rate: 63 Resp: 18 SpO2: 95 % O2 Device: None (Room air)    Vitals:    06/27/20 0100   Weight: 65.3 kg (144 lb)     Vital Signs with Ranges  Temp:  [95.6  F (35.3  C)-98.1  F (36.7  C)] 96.6  F (35.9  C)  Pulse:  [57-69] 69  Heart Rate:  [63-65] 63  Resp:  [16-20] 18  BP: (158-187)/(71-96) 158/71  SpO2:  [93 %-98 %] 95 %  No intake/output data recorded.    Constitutional:  alert, cooperative, no apparent distress  Respiratory: No increased work of breathing, good air exchange, no crackles or wheezing.  Cardiovascular: apical impulse,normal S1 and S2  GI: bowel sounds present, soft, non-distended, non-tender      Discharge Disposition   Discharged to home    Consultations This Hospital Stay   None    Time Spent on this Encounter   I, Marcus Mackenzie MD, personally saw the patient today and spent greater than 30 minutes discharging this patient.      Discharge Medications   Current Discharge Medication List      CONTINUE these medications which have NOT CHANGED    Details   acetaminophen (TYLENOL) 500 MG tablet Take 500 mg by mouth every 6 hours as needed for mild pain      albuterol (PROAIR HFA/PROVENTIL HFA/VENTOLIN HFA) 108 (90 Base)  MCG/ACT inhaler Inhale 2 puffs into the lungs every 4 hours as needed for shortness of breath / dyspnea or wheezing    Comments: Pharmacy may dispense brand covered by insurance (Proair, or proventil or ventolin or generic albuterol inhaler)      amLODIPine (NORVASC) 2.5 MG tablet Take 2.5 mg by mouth daily      artificial saliva (BIOTENE DRY MOUTHWASH) LIQD liquid Swish and spit 5-10 mLs in mouth 4 times daily as needed for dry mouth      atorvastatin (LIPITOR) 80 MG tablet Take 80 mg by mouth daily      budesonide-formoterol (SYMBICORT) 160-4.5 MCG/ACT Inhaler Inhale 2 puffs into the lungs 2 times daily      cholecalciferol 1000 units TABS Take 1,000 Units by mouth daily      clopidogrel (PLAVIX) 75 MG tablet Take 1 tablet (75 mg) by mouth daily    Associated Diagnoses: NSTEMI (non-ST elevated myocardial infarction) (H)      Donepezil HCl (ARICEPT PO) Take 5 mg by mouth daily       emollient (VANICREAM) external cream Apply topically daily Apply to dry skin      haloperidol (HALDOL) 5 MG tablet Take 2.5 mg by mouth 2 times daily      hydrocortisone (CORTAID) 1 % external cream Apply topically 2 times daily Apply thin layer to itchy scaling on left inner elbow and nose      metoprolol tartrate (LOPRESSOR) 50 MG tablet Take 1 tablet (50 mg) by mouth 2 times daily  Qty: 60 tablet, Refills: 1    Associated Diagnoses: NSTEMI (non-ST elevated myocardial infarction) (H)      mometasone (NASONEX) 50 MCG/ACT nasal spray Spray 2 sprays into both nostrils daily as needed (allergy symptoms / runny nose)      nitroGLYcerin (NITROSTAT) 0.4 MG sublingual tablet Place 0.4 mg under the tongue every 5 minutes as needed for chest pain For chest pain place 1 tablet under the tongue every 5 minutes for 3 doses. If symptoms persist 5 minutes after 1st dose call 911.      nystatin (MYCOSTATIN) 742340 UNIT/GM external cream Apply topically 2 times daily as needed (Intertrigo) Apply thin layer to groin folds      OLANZapine (ZYPREXA) 20  MG tablet Take 20 mg by mouth At Bedtime      omeprazole (PRILOSEC) 20 MG DR capsule Take 1 capsule (20 mg) by mouth every morning (before breakfast)  Qty: 30 capsule, Refills: 1    Associated Diagnoses: Anemia, unspecified type      Petrolatum OINT Externally apply topically 4 times daily as needed (dry lips)      !! senna (SENOKOT) 8.6 MG tablet Take 1 tablet by mouth every morning      !! senna (SENOKOT) 8.6 MG tablet Take 1 tablet by mouth every evening as needed for constipation      sertraline (ZOLOFT) 100 MG tablet Take 1.5 tablets (150 mg) by mouth daily  Qty: 30 tablet, Refills: 0    Associated Diagnoses: Paranoid schizophrenia (H)      tamsulosin (FLOMAX) 0.4 MG capsule Take 0.4 mg by mouth daily      Tiotropium Bromide Monohydrate (SPIRIVA HANDIHALER IN) Inhale 1 capsule into the lungs daily        !! - Potential duplicate medications found. Please discuss with provider.        Allergies   Allergies   Allergen Reactions     Crustaceans      Loxapine      Nifedipine      Shellfish Allergy      Sulfa Drugs      Sulfamethoxazole-Trimethoprim      Trimethoprim      Data   Most Recent 3 CBC's:  Recent Labs   Lab Test 06/27/20  0539 06/26/20 2051 11/18/19  0525   WBC 9.6 8.6 10.8   HGB 11.1* 11.7* 7.7*   MCV 94 94 87   * 145* 212      Most Recent 3 BMP's:  Recent Labs   Lab Test 06/27/20  0824 06/26/20 2051 11/18/19  0525    140 138   POTASSIUM 4.2 4.6 4.4   CHLORIDE 113* 109 106   CO2 24 28 30   BUN 31* 35* 15   CR 1.84* 2.31* 1.60*   ANIONGAP 5 3 2*   RAMON 8.5 9.0 9.3   GLC 84 90 86     Most Recent 2 LFT's:  Recent Labs   Lab Test 11/17/19  0521 11/01/19  0623   AST 59*  --    ALT 48  --    ALKPHOS 97  --    BILITOTAL 0.2 0.6     Most Recent INR's and Anticoagulation Dosing History:  Anticoagulation Dose History     Recent Dosing and Labs Latest Ref Rng & Units 9/16/2019 9/17/2019 9/18/2019 10/29/2019 10/30/2019 10/31/2019 11/10/2019    Warfarin 1 mg - - - - - 1 mg - -    Warfarin 2 mg - - 4 mg  - - - - -    Warfarin 3 mg - 3 mg - - - - - -    INR 0.86 - 1.14 2.32(H) 2.05(H) 1.95(H) >10.00(HH) 3.08(H) 1.23(H) 1.77(H)        Most Recent 3 Troponin's:  Recent Labs   Lab Test 11/10/19  1630 10/31/19  0611 10/31/19  0158   TROPI <0.015 4.319* 5.249*     Most Recent Cholesterol Panel:  Recent Labs   Lab Test 10/31/19  0611   CHOL 77   LDL 14   HDL 42   TRIG 103     Most Recent 6 Bacteria Isolates From Any Culture (See EPIC Reports for Culture Details):  Recent Labs   Lab Test 11/12/19  1215 11/10/19  1641 11/10/19  1630   CULT Light growth  Candida albicans / dubliniensis  Candida albicans and Candida dubliniensis are not routinely speciated  Susceptibility testing not routinely done  *  Light growth  Coagulase negative Staphylococcus  Susceptibility testing not routinely done  * No growth No growth     Most Recent TSH, T4 and A1c Labs:No lab results found.  Results for orders placed or performed during the hospital encounter of 06/26/20   CT Abdomen Pelvis w/o Contrast    Narrative    EXAM: CT ABDOMEN PELVIS W/O CONTRAST  LOCATION: [facility]  DATE/TIME: 6/26/2020 9:51 PM    INDICATION: Nausea, vomiting.  COMPARISON: CT from 11/10/2019.  TECHNIQUE: CT scan of the abdomen and pelvis was performed without IV contrast. Multiplanar reformats were obtained. Dose reduction techniques were used.  CONTRAST: None.    FINDINGS:   LOWER CHEST: There is a moderate to large hiatal hernia which is incompletely imaged. Emphysematous and fibrotic changes at the lung bases.    HEPATOBILIARY: Cholelithiasis. Nondistended gallbladder. Liver within normal limits.    PANCREAS: Normal.    SPLEEN: Normal.    ADRENAL GLANDS: Normal.    KIDNEYS/BLADDER: There is a 3.6 cm right midpole complex cyst with internal Hounsfield unit densities measuring 48 Hounsfield units, though this previously measured about 40 Hounsfield units on the prior CT. This likely reflects hemorrhagic or   proteinaceous material within the cyst.  There is no hydronephrosis or calcified ureteral or bladder stone. The bladder wall is thickened and there are diverticula superiorly.    BOWEL: No bowel obstruction. Normal appendix. No free air. Colonic diverticulosis.    LYMPH NODES: Normal.    VASCULATURE: Heavily calcified abdominal aorta with infrarenal abdominal aortic ectasia, stable.    PELVIC ORGANS: The prostate is enlarged. Fat-containing inguinal hernias.    MUSCULOSKELETAL: No vertebral body compression deformities.      Impression    IMPRESSION:   1.  Bladder wall thickening may be secondary to chronic outlet obstruction from enlarged prostate, though recommend correlation to exclude cystitis.    2.  Colonic diverticulosis.    3.  Cholelithiasis.    4.  Moderate to large hiatal hernia.     XR Chest 2 Views    Narrative    EXAM: XR CHEST 2 VW  LOCATION: [facility]  DATE/TIME: 6/27/2020 1:09 AM    INDICATION: Cough  COMPARISON: 10/29/2019      Impression    IMPRESSION: Changes of COPD/emphysema. No focal infiltrate, consolidation or pleural fluid. Esophageal hiatal hernia. Normal heart size. Normal pulmonary vascularity.           Disclaimer: This note consists of symbols derived from keyboarding, dictation and/or voice recognition software. As a result, there may be errors in the script that have gone undetected. Please consider this when interpreting information found in this chart.

## 2020-07-01 ENCOUNTER — HOSPITAL ENCOUNTER (OUTPATIENT)
Facility: CLINIC | Age: 85
Setting detail: OBSERVATION
Discharge: HOME | End: 2020-07-02
Attending: EMERGENCY MEDICINE | Admitting: INTERNAL MEDICINE
Payer: MEDICARE

## 2020-07-01 ENCOUNTER — APPOINTMENT (OUTPATIENT)
Dept: GENERAL RADIOLOGY | Facility: CLINIC | Age: 85
End: 2020-07-01
Attending: EMERGENCY MEDICINE
Payer: MEDICARE

## 2020-07-01 ENCOUNTER — APPOINTMENT (OUTPATIENT)
Dept: CT IMAGING | Facility: CLINIC | Age: 85
End: 2020-07-01
Attending: EMERGENCY MEDICINE
Payer: MEDICARE

## 2020-07-01 DIAGNOSIS — I10 ESSENTIAL HYPERTENSION: ICD-10-CM

## 2020-07-01 DIAGNOSIS — R40.0 SOMNOLENCE: ICD-10-CM

## 2020-07-01 DIAGNOSIS — N17.9 ACUTE KIDNEY INJURY (H): ICD-10-CM

## 2020-07-01 PROBLEM — R53.83 FATIGUE: Status: ACTIVE | Noted: 2020-07-01

## 2020-07-01 LAB
ALBUMIN SERPL-MCNC: 3.3 G/DL (ref 3.4–5)
ALBUMIN UR-MCNC: 30 MG/DL
ALP SERPL-CCNC: 175 U/L (ref 40–150)
ALT SERPL W P-5'-P-CCNC: 38 U/L (ref 0–70)
AMMONIA PLAS-SCNC: 41 UMOL/L (ref 10–50)
ANION GAP SERPL CALCULATED.3IONS-SCNC: 5 MMOL/L (ref 3–14)
APPEARANCE UR: CLEAR
AST SERPL W P-5'-P-CCNC: 39 U/L (ref 0–45)
BASE DEFICIT BLDV-SCNC: 1.3 MMOL/L
BASOPHILS # BLD AUTO: 0 10E9/L (ref 0–0.2)
BASOPHILS NFR BLD AUTO: 0.5 %
BILIRUB SERPL-MCNC: 0.2 MG/DL (ref 0.2–1.3)
BILIRUB UR QL STRIP: NEGATIVE
BUN SERPL-MCNC: 32 MG/DL (ref 7–30)
CALCIUM SERPL-MCNC: 8.7 MG/DL (ref 8.5–10.1)
CHLORIDE SERPL-SCNC: 108 MMOL/L (ref 94–109)
CO2 SERPL-SCNC: 27 MMOL/L (ref 20–32)
COLOR UR AUTO: ABNORMAL
CREAT SERPL-MCNC: 2.21 MG/DL (ref 0.66–1.25)
DIFFERENTIAL METHOD BLD: ABNORMAL
EOSINOPHIL # BLD AUTO: 0.2 10E9/L (ref 0–0.7)
EOSINOPHIL NFR BLD AUTO: 2.7 %
ERYTHROCYTE [DISTWIDTH] IN BLOOD BY AUTOMATED COUNT: 15.7 % (ref 10–15)
ETHANOL SERPL-MCNC: <0.01 G/DL
GFR SERPL CREATININE-BSD FRML MDRD: 25 ML/MIN/{1.73_M2}
GLUCOSE BLDC GLUCOMTR-MCNC: 104 MG/DL (ref 70–99)
GLUCOSE BLDC GLUCOMTR-MCNC: 120 MG/DL (ref 70–99)
GLUCOSE SERPL-MCNC: 106 MG/DL (ref 70–99)
GLUCOSE UR STRIP-MCNC: NEGATIVE MG/DL
HCO3 BLDV-SCNC: 27 MMOL/L (ref 21–28)
HCT VFR BLD AUTO: 37.9 % (ref 40–53)
HGB BLD-MCNC: 11.8 G/DL (ref 13.3–17.7)
HGB UR QL STRIP: NEGATIVE
HYALINE CASTS #/AREA URNS LPF: 1 /LPF (ref 0–2)
IMM GRANULOCYTES # BLD: 0 10E9/L (ref 0–0.4)
IMM GRANULOCYTES NFR BLD: 0.5 %
KETONES UR STRIP-MCNC: NEGATIVE MG/DL
LEUKOCYTE ESTERASE UR QL STRIP: NEGATIVE
LYMPHOCYTES # BLD AUTO: 1.8 10E9/L (ref 0.8–5.3)
LYMPHOCYTES NFR BLD AUTO: 20.8 %
MCH RBC QN AUTO: 29.6 PG (ref 26.5–33)
MCHC RBC AUTO-ENTMCNC: 31.1 G/DL (ref 31.5–36.5)
MCV RBC AUTO: 95 FL (ref 78–100)
MONOCYTES # BLD AUTO: 0.8 10E9/L (ref 0–1.3)
MONOCYTES NFR BLD AUTO: 9.1 %
NEUTROPHILS # BLD AUTO: 5.6 10E9/L (ref 1.6–8.3)
NEUTROPHILS NFR BLD AUTO: 66.4 %
NITRATE UR QL: NEGATIVE
NRBC # BLD AUTO: 0 10*3/UL
NRBC BLD AUTO-RTO: 0 /100
O2/TOTAL GAS SETTING VFR VENT: ABNORMAL %
OXYHGB MFR BLDV: 33 %
PCO2 BLDV: 60 MM HG (ref 40–50)
PH BLDV: 7.26 PH (ref 7.32–7.43)
PH UR STRIP: 5.5 PH (ref 5–7)
PLATELET # BLD AUTO: 137 10E9/L (ref 150–450)
PO2 BLDV: 23 MM HG (ref 25–47)
POTASSIUM SERPL-SCNC: 4.2 MMOL/L (ref 3.4–5.3)
PROT SERPL-MCNC: 7.7 G/DL (ref 6.8–8.8)
RBC # BLD AUTO: 3.98 10E12/L (ref 4.4–5.9)
RBC #/AREA URNS AUTO: <1 /HPF (ref 0–2)
SODIUM SERPL-SCNC: 140 MMOL/L (ref 133–144)
SOURCE: ABNORMAL
SP GR UR STRIP: 1.01 (ref 1–1.03)
TROPONIN I SERPL-MCNC: <0.015 UG/L (ref 0–0.04)
UROBILINOGEN UR STRIP-MCNC: NORMAL MG/DL (ref 0–2)
WBC # BLD AUTO: 8.5 10E9/L (ref 4–11)
WBC #/AREA URNS AUTO: <1 /HPF (ref 0–5)

## 2020-07-01 PROCEDURE — 70450 CT HEAD/BRAIN W/O DYE: CPT

## 2020-07-01 PROCEDURE — 80053 COMPREHEN METABOLIC PANEL: CPT | Performed by: EMERGENCY MEDICINE

## 2020-07-01 PROCEDURE — 25800030 ZZH RX IP 258 OP 636: Performed by: EMERGENCY MEDICINE

## 2020-07-01 PROCEDURE — 96361 HYDRATE IV INFUSION ADD-ON: CPT

## 2020-07-01 PROCEDURE — 99220 ZZC INITIAL OBSERVATION CARE,LEVL III: CPT | Performed by: INTERNAL MEDICINE

## 2020-07-01 PROCEDURE — 82140 ASSAY OF AMMONIA: CPT | Performed by: EMERGENCY MEDICINE

## 2020-07-01 PROCEDURE — 84484 ASSAY OF TROPONIN QUANT: CPT | Performed by: EMERGENCY MEDICINE

## 2020-07-01 PROCEDURE — 25800030 ZZH RX IP 258 OP 636: Performed by: INTERNAL MEDICINE

## 2020-07-01 PROCEDURE — 71045 X-RAY EXAM CHEST 1 VIEW: CPT

## 2020-07-01 PROCEDURE — 85025 COMPLETE CBC W/AUTO DIFF WBC: CPT | Performed by: EMERGENCY MEDICINE

## 2020-07-01 PROCEDURE — 36415 COLL VENOUS BLD VENIPUNCTURE: CPT | Performed by: EMERGENCY MEDICINE

## 2020-07-01 PROCEDURE — 82805 BLOOD GASES W/O2 SATURATION: CPT | Performed by: EMERGENCY MEDICINE

## 2020-07-01 PROCEDURE — 80320 DRUG SCREEN QUANTALCOHOLS: CPT | Performed by: EMERGENCY MEDICINE

## 2020-07-01 PROCEDURE — G0378 HOSPITAL OBSERVATION PER HR: HCPCS

## 2020-07-01 PROCEDURE — 93005 ELECTROCARDIOGRAM TRACING: CPT

## 2020-07-01 PROCEDURE — 81001 URINALYSIS AUTO W/SCOPE: CPT | Performed by: EMERGENCY MEDICINE

## 2020-07-01 PROCEDURE — 00000146 ZZHCL STATISTIC GLUCOSE BY METER IP

## 2020-07-01 PROCEDURE — 96360 HYDRATION IV INFUSION INIT: CPT

## 2020-07-01 PROCEDURE — 99285 EMERGENCY DEPT VISIT HI MDM: CPT | Mod: 25

## 2020-07-01 RX ORDER — SODIUM CHLORIDE 9 MG/ML
INJECTION, SOLUTION INTRAVENOUS CONTINUOUS
Status: ACTIVE | OUTPATIENT
Start: 2020-07-01 | End: 2020-07-02

## 2020-07-01 RX ORDER — ONDANSETRON 2 MG/ML
4 INJECTION INTRAMUSCULAR; INTRAVENOUS EVERY 6 HOURS PRN
Status: DISCONTINUED | OUTPATIENT
Start: 2020-07-01 | End: 2020-07-02 | Stop reason: HOSPADM

## 2020-07-01 RX ORDER — NALOXONE HYDROCHLORIDE 0.4 MG/ML
.1-.4 INJECTION, SOLUTION INTRAMUSCULAR; INTRAVENOUS; SUBCUTANEOUS
Status: DISCONTINUED | OUTPATIENT
Start: 2020-07-01 | End: 2020-07-02 | Stop reason: HOSPADM

## 2020-07-01 RX ORDER — ACETAMINOPHEN 325 MG/1
650 TABLET ORAL EVERY 4 HOURS PRN
Status: DISCONTINUED | OUTPATIENT
Start: 2020-07-01 | End: 2020-07-02 | Stop reason: HOSPADM

## 2020-07-01 RX ORDER — ONDANSETRON 4 MG/1
4 TABLET, ORALLY DISINTEGRATING ORAL EVERY 6 HOURS PRN
Status: DISCONTINUED | OUTPATIENT
Start: 2020-07-01 | End: 2020-07-02 | Stop reason: HOSPADM

## 2020-07-01 RX ORDER — POLYETHYLENE GLYCOL 3350 17 G/17G
17 POWDER, FOR SOLUTION ORAL DAILY PRN
Status: DISCONTINUED | OUTPATIENT
Start: 2020-07-01 | End: 2020-07-02 | Stop reason: HOSPADM

## 2020-07-01 RX ORDER — ACETAMINOPHEN 650 MG/1
650 SUPPOSITORY RECTAL EVERY 4 HOURS PRN
Status: DISCONTINUED | OUTPATIENT
Start: 2020-07-01 | End: 2020-07-02 | Stop reason: HOSPADM

## 2020-07-01 RX ADMIN — SODIUM CHLORIDE 100 ML/HR: 9 INJECTION, SOLUTION INTRAVENOUS at 23:03

## 2020-07-01 RX ADMIN — SODIUM CHLORIDE 1000 ML: 9 INJECTION, SOLUTION INTRAVENOUS at 17:13

## 2020-07-01 ASSESSMENT — ENCOUNTER SYMPTOMS
WEAKNESS: 1
FEVER: 0
FATIGUE: 1
WHEEZING: 0
DIARRHEA: 0
NAUSEA: 0
VOMITING: 0
ABDOMINAL PAIN: 0

## 2020-07-01 NOTE — ED PROVIDER NOTES
History     Chief Complaint:  Fatigue    History of Present Illness limited: patient is a poor historian    Hubert Tamayo is an anticoagulated 89 year old male with a history of hypertension and diabetes who presents via EMS with fatigue. The patient reports that he is more tired than usual and feels worse than he normally does.  EMS reported that the patient has been more somnolent than baseline according to family members.  He denies diarrhea and abdominal pain.  Patient is unable to provide any other significant history.    Allergies:  Loxapine  Nifedipine  Sulfa drugs  Sulfamethoxazole-trimethoprim  Trimethoprim     Medications:    Albuterol  Budesonide-formoterol  Cholecalciferol  Clopidogrel  Donepezil  Haloperidol  Metoprolol tartrate  Mometasone  Nitroglycerin  Nystatin  Olanzapine  Senna  Sertraline  tamsulosin  tiotropium bromide monohydrate    Past Medical History:    SLY   Healthcare associated bacterial pneumonia, possibly with gram negative organism  Anemia, likely multifactorial with inadequate RBC production  venous thromboembolism  Anticoagulated  Acute respiratory failure with hypoxia   NSTEMI   Lumbar transverse process fracture  Closed fracture of transverse process of lumbar vertebra  CAD    Dementia   Diabetes mellitus type II   Essential hypertension   Osteoporosis   Paranoid schizophrenia   Pulmonary emphysema     Past Surgical History:    Cataract removal    Family History:    History reviewed. No pertinent family history.     Social History:  Smoking status- former smoker  Alcohol use- no  Drug use- no    Marital Status:  Single [1]       Review of Systems   Constitutional: Positive for fatigue. Negative for fever.   Respiratory: Negative for wheezing.    Gastrointestinal: Negative for abdominal pain, diarrhea, nausea and vomiting.   Neurological: Positive for weakness.   All other systems reviewed and are negative.      Physical Exam     Patient Vitals for the past 24 hrs:   BP Temp Temp  src Pulse Resp SpO2   07/01/20 1527 (!) 150/79 98  F (36.7  C) Oral 66 18 100 %       Physical Exam    General:   Pleasant, age appropriate male resting comfortably in bed.  HEENT:    Oropharynx is dry  Eyes:    Conjunctiva normal     PERRL; EOMs intact  Neck:    Supple, no meningismus.     CV:     Regular rate and rhythm.      No murmurs, rubs or gallops.       No unilateral leg swelling.       2+ radial pulses bilateral.       No lower extremity edema.  PULM:    Clear to auscultation bilateral.       No respiratory distress.      Good air exchange.     No rales or wheezing.     No stridor.  ABD:    Soft, non-tender, non-distended.       No pulsatile masses.       No rebound, guarding or rigidity.  MSK:     No gross deformity to all four extremities.   LYMPH:   No cervical lymphadenopathy.  NEURO:   Mild somnolence     Oriented x 3     CN II-XII intact     Speech is soft and difficult to understand     No pronator drift.       Strength is 5/5 in all 4 extremities.     Sensation is intact.       Normal muscular tone, no tremor.  Skin:    Warm, dry and intact.    Psych:    Mood is good and affect is appropriate.        Emergency Department Course   ECG (15:46:41):  Rate 62 bpm. FL interval 172. QRS duration 78. QT/QTc 454/460. P-R-T axes 43 -72 -12. Normal sinus rhythm, left axis deviation, nonspecific T wave abnormality, prolonged QT, abnormal ECG. Interpreted at 1552 by Chinedu Olsen MD.    Imaging:  Radiology findings were communicated with the patient who voiced understanding of the findings.    CT Head wo contrast   Diffuse cerebral volume loss and cerebral white matter  changes consistent with chronic small vessel ischemic disease. No  evidence for acute intracranial pathology.    Reading per radiology    XR Chest Port 1 View:  Heart size and pulmonary vascularity normal. Emphysema with hyperinflated lungs. Scarring and/or atelectasis within the right midlung and both bases. No definite findings for  acute infiltrates. No pleural effusions.    As read by Radiology.    Laboratory:  Laboratory findings were communicated with the patient who voiced understanding of the findings.    CMP: glucose 106 (H), urea nitrogen 32 (H), GFR estimate 25 (L), albumin 3.3 (L), alkaline phosphate 175 (H) o/w WNL (Creatinine 2.21 (H))  CBC: HGB 11.8 (L),  (L), MCHC 31.1 (L), RDW 15.7 (H) o/w WNL (WBC 8.5)  Troponin (Collected 1613): <0.015  Alcohol level blood: <0.01  Glucose by meter (Collected 1554): 120 (H)    Blood gas venous and oxyhgb: pH 7.26 (L), pCO2 60 (H), pO2 23 (L), Bicarbonate 27, FIO2 RA, Oxyhemoglobin 33, Base Excess 1.3    Ammonia on ice: 41    UA with Microscopic: protein albumin - 30 o/w negative.     Interventions:  1713 NS 1L IV Bolus    Emergency Department Course:  Past medical records, nursing notes, and vitals reviewed.    1536 I performed an exam of the patient as documented above.      EKG obtained in the ED, see results above.      IV was inserted and blood was drawn for laboratory testing, results above.     The patient provided a urine sample here in the emergency department. This was sent for laboratory testing, findings above.     The patient was sent for a CT head while in the emergency department, results above.     1641 I rechecked the patient and discussed the results of his workup thus far.     174 I spoke with Aldair Tamayo regarding the patient's case.     I rechecked the patient and discussed the results of his workup thus far.      I spoke with Dr. Fischer regarding the patient's case.     Findings and plan explained to the Patient who consents to admission. Discussed the patient with Dr. Fischer, who will admit the patient to an observation bed for further monitoring, evaluation, and treatment.      Impression & Plan     Medical Decision Makin-year-old male presented to the ED with somnolence, generalized weakness as reported by family members.  Patient was mildly  somnolent upon presentation but is oriented when fully awakened.  No focal neurological deficit.  Head CT unremarkable.  Basic laboratory studies reveal acute kidney injury although relatively mild.  He does have some degree of acute respiratory acidosis.  No infectious pathology noted in the ED.      I did have the patient's son directly talk to the patient and he continues to feel that the patient is more somnolent than baseline.  They did not feel comfortable with discharge home.  Patient will be transferred to an observation bed.  I did discuss the possibility of 1 hour of BiPAP to try to reverse the respiratory acidosis but given patient's current level of alertness, noninvasive ventilation was not initiated.  Patient safe to transfer the floor.    Diagnosis:    ICD-10-CM   1. Somnolence  R40.0   2. Acute kidney injury (H)  N17.9   3. Essential hypertension  I10       Disposition:  Admitted to observation.    Scribe Disclosure:  Yasmine NEVES, am serving as a scribe at 3:36 PM on 7/1/2020 to document services personally performed by Chinedu Olsen MD based on my observations and the provider's statements to me.     Scribe Disclosure:  Skylar NEVES, am serving as a scribe at 9:21 PM on 7/1/2020 to document services personally performed by Chinedu Olsen MD based on my observations and the provider's statements to me.     Yasmine Whipple  7/1/2020   [facility] EMERGENCY DEPARTMENT       Chinedu Olsen MD  07/01/20 4829

## 2020-07-01 NOTE — ED NOTES
Bed: ED23  Expected date: 7/1/20  Expected time: 3:11 PM  Means of arrival: Ambulance  Comments:  [id number]  89yo fatigue

## 2020-07-01 NOTE — ED TRIAGE NOTES
89 year old patient presents via EMS for fatigue. Patient's home health nurse says patient is more tired than usual and drifts off to sleep easily. Patient states he does not feel more tired than usual. GCS 14.

## 2020-07-02 VITALS
SYSTOLIC BLOOD PRESSURE: 155 MMHG | DIASTOLIC BLOOD PRESSURE: 84 MMHG | OXYGEN SATURATION: 97 % | TEMPERATURE: 97.4 F | RESPIRATION RATE: 20 BRPM | HEART RATE: 70 BPM

## 2020-07-02 LAB
ANION GAP SERPL CALCULATED.3IONS-SCNC: 1 MMOL/L (ref 3–14)
BUN SERPL-MCNC: 28 MG/DL (ref 7–30)
CALCIUM SERPL-MCNC: 8.5 MG/DL (ref 8.5–10.1)
CHLORIDE SERPL-SCNC: 111 MMOL/L (ref 94–109)
CO2 SERPL-SCNC: 28 MMOL/L (ref 20–32)
CREAT SERPL-MCNC: 1.78 MG/DL (ref 0.66–1.25)
GFR SERPL CREATININE-BSD FRML MDRD: 33 ML/MIN/{1.73_M2}
GLUCOSE SERPL-MCNC: 81 MG/DL (ref 70–99)
INTERPRETATION ECG - MUSE: NORMAL
POTASSIUM SERPL-SCNC: 4.6 MMOL/L (ref 3.4–5.3)
SARS-COV-2 PCR COMMENT: NORMAL
SARS-COV-2 RNA SPEC QL NAA+PROBE: NEGATIVE
SARS-COV-2 RNA SPEC QL NAA+PROBE: NORMAL
SODIUM SERPL-SCNC: 140 MMOL/L (ref 133–144)
SPECIMEN SOURCE: NORMAL
SPECIMEN SOURCE: NORMAL

## 2020-07-02 PROCEDURE — 36415 COLL VENOUS BLD VENIPUNCTURE: CPT | Performed by: INTERNAL MEDICINE

## 2020-07-02 PROCEDURE — G0378 HOSPITAL OBSERVATION PER HR: HCPCS

## 2020-07-02 PROCEDURE — 99217 ZZC OBSERVATION CARE DISCHARGE: CPT | Performed by: HOSPITALIST

## 2020-07-02 PROCEDURE — 80048 BASIC METABOLIC PNL TOTAL CA: CPT | Performed by: INTERNAL MEDICINE

## 2020-07-02 PROCEDURE — 25000132 ZZH RX MED GY IP 250 OP 250 PS 637: Mod: GY | Performed by: HOSPITALIST

## 2020-07-02 PROCEDURE — 96361 HYDRATE IV INFUSION ADD-ON: CPT

## 2020-07-02 RX ORDER — SENNOSIDES 8.6 MG
1 TABLET ORAL EVERY MORNING
Status: DISCONTINUED | OUTPATIENT
Start: 2020-07-02 | End: 2020-07-02 | Stop reason: HOSPADM

## 2020-07-02 RX ORDER — DONEPEZIL HYDROCHLORIDE 10 MG/1
10 TABLET, FILM COATED ORAL DAILY
Status: DISCONTINUED | OUTPATIENT
Start: 2020-07-02 | End: 2020-07-02 | Stop reason: HOSPADM

## 2020-07-02 RX ORDER — METOPROLOL TARTRATE 50 MG
50 TABLET ORAL 2 TIMES DAILY
Status: DISCONTINUED | OUTPATIENT
Start: 2020-07-02 | End: 2020-07-02 | Stop reason: HOSPADM

## 2020-07-02 RX ORDER — TAMSULOSIN HYDROCHLORIDE 0.4 MG/1
0.4 CAPSULE ORAL DAILY
Status: DISCONTINUED | OUTPATIENT
Start: 2020-07-02 | End: 2020-07-02 | Stop reason: HOSPADM

## 2020-07-02 RX ORDER — CLOPIDOGREL BISULFATE 75 MG/1
75 TABLET ORAL DAILY
Status: DISCONTINUED | OUTPATIENT
Start: 2020-07-02 | End: 2020-07-02 | Stop reason: HOSPADM

## 2020-07-02 RX ADMIN — SERTRALINE HYDROCHLORIDE 150 MG: 100 TABLET ORAL at 11:09

## 2020-07-02 RX ADMIN — METOPROLOL TARTRATE 50 MG: 50 TABLET, FILM COATED ORAL at 11:10

## 2020-07-02 RX ADMIN — CLOPIDOGREL BISULFATE 75 MG: 75 TABLET ORAL at 11:10

## 2020-07-02 RX ADMIN — TAMSULOSIN HYDROCHLORIDE 0.4 MG: 0.4 CAPSULE ORAL at 11:09

## 2020-07-02 RX ADMIN — DONEPEZIL HYDROCHLORIDE 10 MG: 10 TABLET ORAL at 11:10

## 2020-07-02 RX ADMIN — STANDARDIZED SENNA CONCENTRATE 1 TABLET: 8.6 TABLET ORAL at 11:09

## 2020-07-02 NOTE — PLAN OF CARE
PRIMARY DIAGNOSIS: Somnolence   OUTPATIENT/OBSERVATION GOALS TO BE MET BEFORE DISCHARGE:  ADLs back to baseline: Yes    Activity and level of assistance: Up with standby assistance.    Pain status: Pain free.    Return to near baseline physical activity: Yes     Discharge Planner Nurse   Safe discharge environment identified: Yes  Barriers to discharge: No       Entered by: Juan Abdi 07/02/2020 3:05 AM     Please review provider order for any additional goals.   Nurse to notify provider when observation goals have been met and patient is ready for discharge.

## 2020-07-02 NOTE — PLAN OF CARE
PRIMARY DIAGNOSIS: GENERALIZED WEAKNESS    OUTPATIENT/OBSERVATION GOALS TO BE MET BEFORE DISCHARGE  1. Orthostatic performed: No    2. Tolerating PO medications: Yes    3. Return to near baseline physical activity: Yes    4. Cleared for discharge by consultants (if involved): Yes    Discharge Planner Nurse   Safe discharge environment identified: Yes  Barriers to discharge: No       Entered by: Case Silva 07/02/2020     Pt alert and oriented x4. He denies pain. Up sba w/ walker and GB. Walked halls well this am. Eating regular diet. SL. Will discharge this afternoon.    BP (!) 164/85   Pulse 59   Temp 97.4  F (36.3  C) (Axillary)   Resp 20   SpO2 96%        Please review provider order for any additional goals.   Nurse to notify provider when observation goals have been met and patient is ready for discharge.

## 2020-07-02 NOTE — H&P
Admitted:     07/01/2020      CHIEF COMPLAINT:  Fatigue/somnolence.      HISTORY OF PRESENT ILLNESS:  Mr. Tamayo is a 89-year-old male with a medical history notable for dementia and schizophrenia.  He is a somewhat difficult historian.  He has some garbled speech which apparently is his baseline.  He was brought in to the hospital today as family was concerned he was more somnolent and fatigued than usual.  Dr. Olsen of the ER, whom I spoke with, did discuss the case with the family members.  They did report that his speech is at its baseline, but their biggest concern was more tired appearing than usual.      ER staff reported that initially the patient would have periods of wakefulness and then fall asleep, but was arousable.  By the time I saw the patient he was completely awake and alert.  He was able to answer questions.  He was able to maintain wakefulness throughout my entire exam and visit with him.  He is not sure why he is in the Emergency Department and currently feels well without any concerns or complaints.      He has had no pain.  No shortness of breath.  No fevers or chills.  Denies any weakness.      On arrival to the ER, vital signs included blood pressure 150/80 with heart rate of 66.  Afebrile.  Saturation 100% on room air.  Workup in the ER included labs and imaging.  Hemoglobin 11.8.  White cell count normal.  Ammonia level was 41.  Creatinine is 2.2.  BUN is 32.  Sodium and potassium were normal.  Alcohol level is undetectable.  Troponin is undetectable.  VBG showed a pH of 7.26 and pCO2 of 60.  Urinalysis shows no evidence of infection.  Glucose level was 120.  Imaging included a chest x-ray showing no acute findings and head CT scan showing no acute findings.      EKG was also performed which showed a sinus rhythm with a heart rate of 62, a nonpathologic Q-wave in lead III and flattened ST segments with T-wave inversions in V3 and V4.  QT is 460.  TX interval is 172 and QRS interval 78.   No acute ST changes or pathologic Q-waves.      Plan is for admission to the hospital given the symptoms he presented with.      PAST MEDICAL HISTORY:   1.  Dementia.   2.  Schizophrenia.   3.  Hypertension.   4.  Coronary artery disease.   5.  Chronic obstructive pulmonary disease.   6.  Hyperlipidemia   7.  Reflux disease.   8.  Diabetes mellitus.      CURRENT MEDICATIONS:  Await Pharmacy reconciliation of medication list.  Medications appear to include the followin.  Albuterol.   2.  Symbicort.   3.  Cholecalciferol.   4.  Clopidogrel.   5.  Aricept.   6.  Hydrocortisone cream.   7.  Metoprolol.   8.  Nasonex.   9.  Nitroglycerin as needed.   10.  Nystatin.   11.  Olanzapine.   12.  Senokot.   13.  Tamsulosin.   14.  Spiriva.      ALLERGIES:   1.  CRUSTACEANS.   2.  LOXAPINE.   3.  NIFEDIPINE.   4.  SHELLFISH.   5.  SULFA.   6.  BACTRIM.   7.  TRIMETHOPRIM.   REACTIONS TO THESE ALLERGIES UNKNOWN.      FAMILY HISTORY:  Reviewed.  Nothing contributory to this admission.      SOCIAL HISTORY:  Does not smoke or drink any alcohol.      PREVIOUS CODE STATUS:  Full.      REVIEW OF SYSTEMS:  See HPI for details.  Comprehensive greater than 10-point review of systems is otherwise negative besides that detailed above.      PHYSICAL EXAMINATION:   VITAL SIGNS:  Blood pressure is currently 180/95 with a heart rate of 55.  No fever.  Saturation 95% on room air.   GENERAL:  The patient appears in no acute distress.  He does not appear toxic.  He is awake and alert when I am seeing him.  I do suspect some short-term memory loss given his dementia history.  He has garbled speech.  ER physician did confirm with family members that his speech is at its baseline and that he normally is somewhat challenging to understand.   HEAD AND NECK EXAM:  Head is atraumatic.  Sclerae are white.  Eyelids are normal.  Conjunctivae are normal.  Extraocular movements are intact.  Neck supple.  No cervical or supraclavicular lymphadenopathy.    HEART:  Regular rate and rhythm.  No significant murmurs.  No lower extremity edema.   LUNGS:  Clear to auscultation bilaterally.  No intercostal retractions.  No conversational dyspnea.   ABDOMEN:  Nontender, nondistended.  Soft.  No masses.  No organomegaly.   EXTREMITIES:  No edema.   SKIN:  Exam reveals no rashes.  No jaundice.  Skin is dry to touch.   NEUROLOGIC:  Cranial nerves II-XII are intact.  Moves all extremities appropriately.  Strength testing including , biceps, triceps, dorsi and plantar flexion and hip flexors are all 5/5 bilaterally and symmetric.  Sensation intact to light touch in the upper and lower extremities bilaterally.  Nonfocal neurologic exam.  The patient was able to follow commands during neuro exam.   PSYCHIATRIC:  Awake, alert.  I do detect some short-term memory loss related to his underlying dementia, but overall he does reasonably well answering questions.      LABORATORY AND IMAGING DATA:  Reviewed above in HPI.      IMPRESSION:  Mr. Tamayo is a pleasant 89-year-old male who was brought to the hospital today for concerns about fatigue and somnolence.  By the time I am seeing the patient he appears to be awake and alert and interactive.   1.  Fatigue and somnolence:  Unclear etiology.  May be due to some degree of dehydration as his renal function appears to be a bit worse than normal.  Other considerations would include medication indiscretion, which he denies.  Denies any alcohol use.  I do not suspect a stroke.  He has garbled speech which is apparently his baseline.  He has a nonfocal neurologic exam.  Head CT is unremarkable.   I do note VBG results are showing a mildly low pH and mildly elevated CO2.  However, given his current mental status when I am seeing the patient I do not suspect hypercapnia causing his symptoms.  I spoke to the ER doctor and initially had discussed possibly using BiPAP, but after seeing him I think we can hold off on that as he appears to be  significantly improved from what was initially reported.  If somnolence recurs, would recheck a VBG and consider initiating BiPAP at that point, depending on results.   2.  Dementia history.   3.  Schizophrenia history.   4.  Chronic obstructive pulmonary disease history.   5.  Diabetes mellitus history, though on medication review does not appear to be on any diabetic medications.  Pharmacy to confirm this with medication reconciliation   6.  Acute renal failure on chronic kidney disease.  It appears his baseline creatinine is somewhere in the 1.5-2 range historically.  Creatinine is slightly higher than usual today.  Possibly dehydration related.      PLAN:   1.  Admit to observation.   2.  IV fluids overnight.   3.  Repeat BMP in the morning.   4.  If somnolence recurs, I would consider repeating a VBG and attempt BiPAP.   5.  Resume home medications when reconciled by Pharmacy.   6.  Probable discharge tomorrow assuming mental status is at his baseline tomorrow morning.   7.  Doubt CVA, but if he develops focal neurologic findings or somnolence recurs, could consider brain MRI, but we can hold off on that for now.   8.  No evidence of infection currently.  If spikes a fever would initiate a workup for infectious etiology as a cause for his symptoms, but does not appear to be the case currently.  Urinalysis is unremarkable and chest x-ray is negative.   9.  Anticipate likely discharge tomorrow.         ALESSANDRA CHANG MD             D: 2020   T: 2020   MT: MIKA      Name:     VEDA RUSHING   MRN:      [id number]        Account:      [id number]   :      1929        Admitted:     2020                   Document: [id number]       cc: Saige Cortez NP

## 2020-07-02 NOTE — PLAN OF CARE
PRIMARY DIAGNOSIS: Somnolence   OUTPATIENT/OBSERVATION GOALS TO BE MET BEFORE DISCHARGE:  ADLs back to baseline: Yes    Activity and level of assistance: Up with standby assistance.    Pain status: Pain free.    Return to near baseline physical activity: Yes     Discharge Planner Nurse   Safe discharge environment identified: Yes  Barriers to discharge: Yes- IVF, SLY       Entered by: Juan Abdi 07/02/2020 1:19 AM     Please review provider order for any additional goals.   Nurse to notify provider when observation goals have been met and patient is ready for discharge.

## 2020-07-02 NOTE — ED NOTES
[facility]  ED Nurse Handoff Report    Hubert Tamayo is a 89 year old male   ED Chief complaint: Fatigue  . ED Diagnosis:   Final diagnoses:   Somnolence   Acute kidney injury (H)   Essential hypertension     Allergies:   Allergies   Allergen Reactions     Crustaceans      Loxapine      Nifedipine      Shellfish Allergy      Sulfa Drugs      Sulfamethoxazole-Trimethoprim      Trimethoprim        Code Status: Needs review-- charted as full code on 6/27/20  Activity level - Baseline/Home:  unknown Activity Level - Current:   Assist X 1. Lift room needed: No. Bariatric: No   Needed: No   Isolation: No. Infection: Not Applicable.     Vital Signs:   Vitals:    07/01/20 1711 07/01/20 1822 07/01/20 1830 07/01/20 2030   BP: (!) 159/85 (!) 181/94 (!) 184/95 (!) 179/96   Pulse: 57 57 56 55   Resp:       Temp:       TempSrc:       SpO2:  100% 97% 95%       Cardiac Rhythm:  ,      Pain level: 0-10 Pain Scale: 0  Patient confused: Yes. Patient Falls Risk: Yes.   Elimination Status: Has voided   Patient Report - Initial Complaint: Patient presented to ED via EMS for reports of decreased LOC, per home health nurse Focused Assessment: Patient is sleepy but awakens at sound of voice. He is confused at baseline. He has appropriate behavior. Walks well with standby assistance and walker. His lungs have scattered crackles in the bases.  Tests Performed: labs, head CT, chest xray  . Abnormal Results:   Labs Ordered and Resulted from Time of ED Arrival Up to the Time of Departure from the ED   ROUTINE UA WITH MICROSCOPIC - Abnormal; Notable for the following components:       Result Value    Protein Albumin Urine 30 (*)     All other components within normal limits   CBC WITH PLATELETS DIFFERENTIAL - Abnormal; Notable for the following components:    RBC Count 3.98 (*)     Hemoglobin 11.8 (*)     Hematocrit 37.9 (*)     MCHC 31.1 (*)     RDW 15.7 (*)     Platelet Count 137 (*)     All other components within normal  limits   COMPREHENSIVE METABOLIC PANEL - Abnormal; Notable for the following components:    Glucose 106 (*)     Urea Nitrogen 32 (*)     Creatinine 2.21 (*)     GFR Estimate 25 (*)     GFR Estimate If Black 29 (*)     Albumin 3.3 (*)     Alkaline Phosphatase 175 (*)     All other components within normal limits   GLUCOSE BY METER - Abnormal; Notable for the following components:    Glucose 120 (*)     All other components within normal limits   BLOOD GAS VENOUS AND OXYHGB - Abnormal; Notable for the following components:    Ph Venous 7.26 (*)     PCO2 Venous 60 (*)     PO2 Venous 23 (*)     All other components within normal limits   ALCOHOL ETHYL   AMMONIA   TROPONIN I   GLUCOSE MONITOR NURSING POCT   PERIPHERAL IV CATHETER   STRAIGHT CATH FOR URINE     XR Chest Port 1 View   Final Result   IMPRESSION: Heart size and pulmonary vascularity normal. Emphysema with hyperinflated lungs. Scarring and/or atelectasis within the right midlung and both bases. No definite findings for acute infiltrates. No pleural effusions.      Head CT w/o contrast   Final Result   IMPRESSION: Diffuse cerebral volume loss and cerebral white matter   changes consistent with chronic small vessel ischemic disease. No   evidence for acute intracranial pathology.         Radiation dose for this scan was reduced using automated exposure   control, adjustment of the mA and/or kV according to patient size, or   iterative reconstruction technique          RADHA HENDRIX MD        Treatments provided: 1 L NS IV  Family Comments: updated via phone by MD  OBS brochure/video discussed/provided to patient:  N/A  ED Medications:   Medications   0.9% sodium chloride BOLUS (1,000 mLs Intravenous New Bag 7/1/20 4308)     Drips infusing:  No  For the majority of the shift, the patient's behavior Green. Interventions performed were NA.    Sepsis treatment initiated: No       ED Nurse Name/Phone Number: Cherry Kikr RN,   9:20 PMRECEIVING UNIT ED HANDOFF  REVIEW    Above ED Nurse Handoff Report was reviewed: Yes  Reviewed by: Karen M. Lesch, RN on July 1, 2020 at 9:44 PM

## 2020-07-02 NOTE — PLAN OF CARE
PRIMARY DIAGNOSIS: GENERALIZED WEAKNESS     OUTPATIENT/OBSERVATION GOALS TO BE MET BEFORE DISCHARGE  1. Orthostatic performed: No     2. Tolerating PO medications: Yes     3. Return to near baseline physical activity: Yes     4. Cleared for discharge by consultants (if involved): Yes     Discharge Planner Nurse   Safe discharge environment identified: Yes  Barriers to discharge: No       Entered by: Case Silva 07/02/2020      Pt alert and oriented x4. He denies pain. Up sba w/ walker and GB. Walked halls well this am. Eating regular diet. SL. Will discharge this afternoon @ 2:30 pm.     BP (!) 155/84   Pulse 70   Temp 97.4  F (36.3  C) (Oral)   Resp 20   SpO2 97%         Please review provider order for any additional goals.   Nurse to notify provider when observation goals have been met and patient is ready for discharge.

## 2020-07-02 NOTE — DISCHARGE SUMMARY
[facility]  Hospitalist Discharge Summary      Date of Admission:  7/1/2020  Date of Discharge:  7/2/2020  Discharging Provider: Todd Velarde MD      Discharge Diagnoses   Fatigue, dehydration    Follow-ups Needed After Discharge   Follow-up Appointments     Follow-up and recommended labs and tests       Follow up with primary care provider, Saige Cortez, within 7 days for   hospital follow- up.  The following labs/tests are recommended: chem 7 to   check kidney function.             Unresulted Labs Ordered in the Past 30 Days of this Admission     No orders found for last 31 day(s).      These results will be followed up by NA    Discharge Disposition   Discharged to home  Condition at discharge: Stable      Hospital Course   Mr. Tamayo is a 89-year-old male with a medical history notable for dementia and schizophrenia.  He is a somewhat difficult historian.  He has some garbled speech which apparently is his baseline.  He was brought in to the hospital today as family was concerned he was more somnolent and fatigued than usual.  Dr. Olsen of the ER, whom I spoke with, did discuss the case with the family members.  They did report that his speech is at its baseline, but their biggest concern was more tired appearing than usual.      ER staff reported that initially the patient would have periods of wakefulness and then fall asleep, but was arousable.  By the time I saw the patient he was completely awake and alert.  He was able to answer questions.  He was able to maintain wakefulness throughout my entire exam and visit with him.  He is not sure why he is in the Emergency Department and currently feels well without any concerns or complaints.      He has had no pain.  No shortness of breath.  No fevers or chills.  Denies any weakness.      On arrival to the ER, vital signs included blood pressure 150/80 with heart rate of 66.  Afebrile.  Saturation 100% on room air.  Workup in the ER included  labs and imaging.  Hemoglobin 11.8.  White cell count normal.  Ammonia level was 41.  Creatinine is 2.2.  BUN is 32.  Sodium and potassium were normal.  Alcohol level is undetectable.  Troponin is undetectable.  VBG showed a pH of 7.26 and pCO2 of 60.  Urinalysis shows no evidence of infection.  Glucose level was 120.  Imaging included a chest x-ray showing no acute findings and head CT scan showing no acute findings.      EKG was also performed which showed a sinus rhythm with a heart rate of 62, a nonpathologic Q-wave in lead III and flattened ST segments with T-wave inversions in V3 and V4.  QT is 460.  GA interval is 172 and QRS interval 78.  No acute ST changes or pathologic Q-waves.      Patient appeared to have some mild dehydration with a slight increase in his Cr above his CKD. Today patient is alert and awake. He has had breakfast. He has ambulated in the halls. He denies chest pain, sob, abdo pain, n/v/d. No pain. I called his niece. He will discharge home.    Consultations This Hospital Stay   None    Code Status   Full Code    Time Spent on this Encounter   I, Todd Velarde MD, personally saw the patient today and spent greater than 30 minutes discharging this patient.       Todd Velarde MD  [facility]  ______________________________________________________________________    Physical Exam   Vital Signs: Temp: 97.4  F (36.3  C) Temp src: Axillary BP: (!) 187/90 Pulse: 59   Resp: 20 SpO2: 96 % O2 Device: None (Room air)    Weight: 0 lbs 0 oz  Constitutional: awake, alert, cooperative, no apparent distress, and appears stated age  Eyes: Lids and lashes normal, pupils equal, round and reactive to light, extra ocular muscles intact, sclera clear, conjunctiva normal  ENT: Normocephalic, without obvious abnormality, atraumatic, sinuses nontender on palpation, external ears without lesions, oral pharynx with moist mucous membranes, tonsils without erythema or exudates, gums normal and good  dentition.  Respiratory: No increased work of breathing, good air exchange, clear to auscultation bilaterally, no crackles or wheezing  Cardiovascular: Normal apical impulse, regular rate and rhythm, normal S1 and S2, no S3 or S4, and no murmur noted  GI: No scars, normal bowel sounds, soft, non-distended, non-tender, no masses palpated, no hepatosplenomegally  Skin: no bruising or bleeding       Primary Care Physician   Saige Cortez    Discharge Orders      Reason for your hospital stay    Fatigue, dehydration     Follow-up and recommended labs and tests     Follow up with primary care provider, Saige Cortez, within 7 days for hospital follow- up.  The following labs/tests are recommended: chem 7 to check kidney function.     Activity    Your activity upon discharge: activity as tolerated     Diet    Follow this diet upon discharge: Orders Placed This Encounter      Regular Diet Adult       Significant Results and Procedures   Most Recent 3 CBC's:  Recent Labs   Lab Test 07/01/20  1613 06/27/20  0539 06/26/20  2051   WBC 8.5 9.6 8.6   HGB 11.8* 11.1* 11.7*   MCV 95 94 94   * 135* 145*     Most Recent 3 BMP's:  Recent Labs   Lab Test 07/02/20  0522 07/01/20  1613 06/27/20  0824    140 142   POTASSIUM 4.6 4.2 4.2   CHLORIDE 111* 108 113*   CO2 28 27 24   BUN 28 32* 31*   CR 1.78* 2.21* 1.84*   ANIONGAP 1* 5 5   RAMON 8.5 8.7 8.5   GLC 81 106* 84     Most Recent 2 LFT's:  Recent Labs   Lab Test 07/01/20  1613 11/17/19  0521   AST 39 59*   ALT 38 48   ALKPHOS 175* 97   BILITOTAL 0.2 0.2     Most Recent Urinalysis:  Recent Labs   Lab Test 07/01/20  1850   COLOR Light Yellow   APPEARANCE Clear   URINEGLC Negative   URINEBILI Negative   URINEKETONE Negative   SG 1.012   UBLD Negative   URINEPH 5.5   PROTEIN 30*   NITRITE Negative   LEUKEST Negative   RBCU <1   WBCU <1   ,   Results for orders placed or performed during the hospital encounter of 07/01/20   Head CT w/o contrast    Narrative    CT OF THE HEAD  WITHOUT CONTRAST 7/1/2020 4:41 PM     COMPARISON: Head CT 9/14/2019.    HISTORY: AMS, somnolence.    TECHNIQUE: 5 mm thick axial CT images of the head were acquired  without IV contrast material.    FINDINGS: There is moderate diffuse cerebral volume loss. There are  subtle patchy areas of decreased density in the cerebral white matter  bilaterally that are consistent with sequela of chronic small vessel  ischemic disease.    The ventricles and basal cisterns are within normal limits in  configuration given the degree of cerebral volume loss.  There is no  midline shift. There are no extra-axial fluid collections.    No intracranial hemorrhage, mass or recent infarct.    The visualized paranasal sinuses are well-aerated. There is no  mastoiditis. There are no fractures of the visualized bones.      Impression    IMPRESSION: Diffuse cerebral volume loss and cerebral white matter  changes consistent with chronic small vessel ischemic disease. No  evidence for acute intracranial pathology.      Radiation dose for this scan was reduced using automated exposure  control, adjustment of the mA and/or kV according to patient size, or  iterative reconstruction technique       RADHA HENDRIX MD   XR Chest Port 1 View    Narrative    EXAM: XR CHEST PORT 1 VW  LOCATION: [facility]  DATE/TIME: 7/1/2020 6:10 PM    INDICATION: Cough  COMPARISON: 06/27/2020      Impression    IMPRESSION: Heart size and pulmonary vascularity normal. Emphysema with hyperinflated lungs. Scarring and/or atelectasis within the right midlung and both bases. No definite findings for acute infiltrates. No pleural effusions.       Discharge Medications   Current Discharge Medication List      CONTINUE these medications which have NOT CHANGED    Details   albuterol (PROAIR HFA/PROVENTIL HFA/VENTOLIN HFA) 108 (90 Base) MCG/ACT inhaler Inhale 2 puffs into the lungs every 4 hours as needed for shortness of breath / dyspnea or wheezing    Comments:  Pharmacy may dispense brand covered by insurance (Proair, or proventil or ventolin or generic albuterol inhaler)      budesonide-formoterol (SYMBICORT) 160-4.5 MCG/ACT Inhaler Inhale 2 puffs into the lungs 2 times daily      cholecalciferol 1000 units TABS Take 1,000 Units by mouth daily      clopidogrel (PLAVIX) 75 MG tablet Take 1 tablet (75 mg) by mouth daily    Associated Diagnoses: NSTEMI (non-ST elevated myocardial infarction) (H)      donepezil (ARICEPT) 10 MG tablet Take 10 mg by mouth daily       haloperidol (HALDOL) 5 MG tablet Take 2.5 mg by mouth 3 times daily       hydrocortisone (CORTAID) 1 % external cream Apply topically 2 times daily Apply thin layer to itchy scaling on left inner elbow and nose      metoprolol tartrate (LOPRESSOR) 50 MG tablet Take 1 tablet (50 mg) by mouth 2 times daily  Qty: 60 tablet, Refills: 1    Associated Diagnoses: NSTEMI (non-ST elevated myocardial infarction) (H)      mometasone (NASONEX) 50 MCG/ACT nasal spray Spray 2 sprays into both nostrils daily as needed (allergy symptoms / runny nose)      nystatin (MYCOSTATIN) 795472 UNIT/GM external cream Apply topically 2 times daily as needed (Intertrigo) Apply thin layer to groin folds      OLANZapine (ZYPREXA) 20 MG tablet Take 20 mg by mouth At Bedtime      Petrolatum OINT Externally apply topically 4 times daily as needed (dry lips)      !! senna (SENOKOT) 8.6 MG tablet Take 1 tablet by mouth every morning      !! senna (SENOKOT) 8.6 MG tablet Take 1 tablet by mouth every evening as needed for constipation      sertraline (ZOLOFT) 100 MG tablet Take 1.5 tablets (150 mg) by mouth daily  Qty: 30 tablet, Refills: 0    Associated Diagnoses: Paranoid schizophrenia (H)      Skin Protectants, Misc. (EUCERIN) cream Apply topically daily as needed for dry skin      tamsulosin (FLOMAX) 0.4 MG capsule Take 0.4 mg by mouth daily      Tiotropium Bromide Monohydrate (SPIRIVA HANDIHALER IN) Inhale 1 capsule into the lungs daily        nitroGLYcerin (NITROSTAT) 0.4 MG sublingual tablet Place 0.4 mg under the tongue every 5 minutes as needed for chest pain For chest pain place 1 tablet under the tongue every 5 minutes for 3 doses. If symptoms persist 5 minutes after 1st dose call 911.       !! - Potential duplicate medications found. Please discuss with provider.        Allergies   Allergies   Allergen Reactions     Crustaceans      Loxapine      Nifedipine      Shellfish Allergy      Sulfa Drugs      Sulfamethoxazole-Trimethoprim      Trimethoprim

## 2020-07-02 NOTE — PHARMACY-ADMISSION MEDICATION HISTORY
Admission medication history interview status for this patient is complete. See [facility] admission navigator for allergy information, prior to admission medications and immunization status.     Medication history interview done via telephone during Covid-19 pandemic, indicate source(s): Family - niAnny tate  Medication history resources (including written lists, pill bottles, clinic record): Discharge summary from 6/27/20, SureScripts, Care Everywhere    Changes made to PTA medication list:  Added: none  Deleted: none  Changed: none    Actions taken by pharmacist (provider contacted, etc): Called patient's niece to verify med list.     Additional medication history information: Per niece, patient feels sick if he does not eat before taking morning medications.    Medication reconciliation/reorder completed by provider prior to medication history?  N   (Y/N)     For patients on insulin therapy: N  (Y/N)      Prior to Admission medications    Medication Sig Last Dose Taking? Auth Provider   albuterol (PROAIR HFA/PROVENTIL HFA/VENTOLIN HFA) 108 (90 Base) MCG/ACT inhaler Inhale 2 puffs into the lungs every 4 hours as needed for shortness of breath / dyspnea or wheezing Past Month at Unknown time Yes Unknown, Entered By History   budesonide-formoterol (SYMBICORT) 160-4.5 MCG/ACT Inhaler Inhale 2 puffs into the lungs 2 times daily 7/1/2020 at am Yes Unknown, Entered By History   cholecalciferol 1000 units TABS Take 1,000 Units by mouth daily 7/1/2020 at am Yes Unknown, Entered By History   clopidogrel (PLAVIX) 75 MG tablet Take 1 tablet (75 mg) by mouth daily 7/1/2020 at am Yes Vamshi Fischer MD   donepezil (ARICEPT) 10 MG tablet Take 10 mg by mouth daily  7/1/2020 at am Yes Reported, Patient   haloperidol (HALDOL) 5 MG tablet Take 2.5 mg by mouth 3 times daily  7/1/2020 at am Yes Unknown, Entered By History   hydrocortisone (CORTAID) 1 % external cream Apply topically 2 times daily Apply thin layer to itchy scaling  on left inner elbow and nose 7/1/2020 at am Yes Unknown, Entered By History   metoprolol tartrate (LOPRESSOR) 50 MG tablet Take 1 tablet (50 mg) by mouth 2 times daily 7/1/2020 at am Yes Vamshi Fischer MD   mometasone (NASONEX) 50 MCG/ACT nasal spray Spray 2 sprays into both nostrils daily as needed (allergy symptoms / runny nose) Past Week at am Yes Unknown, Entered By History   nystatin (MYCOSTATIN) 412334 UNIT/GM external cream Apply topically 2 times daily as needed (Intertrigo) Apply thin layer to groin folds Past Week at Unknown time Yes Unknown, Entered By History   OLANZapine (ZYPREXA) 20 MG tablet Take 20 mg by mouth At Bedtime 6/30/2020 at pm Yes Unknown, Entered By History   Petrolatum OINT Externally apply topically 4 times daily as needed (dry lips) Past Week at Unknown time Yes Unknown, Entered By History   senna (SENOKOT) 8.6 MG tablet Take 1 tablet by mouth every morning 7/1/2020 at am Yes Unknown, Entered By History   senna (SENOKOT) 8.6 MG tablet Take 1 tablet by mouth every evening as needed for constipation Past Week at Unknown time Yes Unknown, Entered By History   sertraline (ZOLOFT) 100 MG tablet Take 1.5 tablets (150 mg) by mouth daily 7/1/2020 at am Yes Todd Velarde MD   Skin Protectants, Misc. (EUCERIN) cream Apply topically daily as needed for dry skin Past Week at Unknown time Yes Unknown, Entered By History   tamsulosin (FLOMAX) 0.4 MG capsule Take 0.4 mg by mouth daily 7/1/2020 at am Yes Reported, Patient   Tiotropium Bromide Monohydrate (SPIRIVA HANDIHALER IN) Inhale 1 capsule into the lungs daily  7/1/2020 at am Yes Reported, Patient   nitroGLYcerin (NITROSTAT) 0.4 MG sublingual tablet Place 0.4 mg under the tongue every 5 minutes as needed for chest pain For chest pain place 1 tablet under the tongue every 5 minutes for 3 doses. If symptoms persist 5 minutes after 1st dose call 911.   Unknown, Entered By History

## 2020-07-02 NOTE — PLAN OF CARE
ROOM # 224    Living Situation  Lives with extended family  Facility name:  : [facility]  [phone]    Activity level at baseline: cane/walker  Activity level on admit: gaitbelt walker, assist of one      Patient registered to observation; given Patient Bill of Rights; given the opportunity to ask questions about observation status and their plan of care.  Patient has been oriented to the observation room, bathroom and call light is in place.    Discussed discharge goals and expectations with patient/family.

## 2020-07-02 NOTE — PLAN OF CARE
Patient's After Visit Summary was reviewed with patient.   Patient verbalized understanding of After Visit Summary, recommended follow up and was given an opportunity to ask questions.   Discharge medications sent home with patient/family: Not applicable   Discharged with other:niece     Leaving via private transport.    OBSERVATION patient END time: 1430

## 2020-08-13 ENCOUNTER — HOSPITAL ENCOUNTER (EMERGENCY)
Facility: CLINIC | Age: 85
Discharge: HOME | End: 2020-08-13
Attending: PHYSICIAN ASSISTANT | Admitting: PHYSICIAN ASSISTANT
Payer: MEDICARE

## 2020-08-13 ENCOUNTER — APPOINTMENT (OUTPATIENT)
Dept: GENERAL RADIOLOGY | Facility: CLINIC | Age: 85
End: 2020-08-13
Attending: PHYSICIAN ASSISTANT
Payer: MEDICARE

## 2020-08-13 VITALS
DIASTOLIC BLOOD PRESSURE: 95 MMHG | TEMPERATURE: 97.7 F | SYSTOLIC BLOOD PRESSURE: 150 MMHG | RESPIRATION RATE: 20 BRPM | HEART RATE: 64 BPM | OXYGEN SATURATION: 97 %

## 2020-08-13 DIAGNOSIS — S60.449A TIGHT RING ON FINGER: ICD-10-CM
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
## 2020-08-13 DIAGNOSIS — R23.8 SKIN BREAKDOWN: ICD-10-CM

## 2020-08-13 PROCEDURE — 99283 EMERGENCY DEPT VISIT LOW MDM: CPT | Mod: 25

## 2020-08-13 PROCEDURE — 64450 NJX AA&/STRD OTHER PN/BRANCH: CPT | Mod: LT,GZ

## 2020-08-13 PROCEDURE — 73140 X-RAY EXAM OF FINGER(S): CPT | Mod: LT

## 2020-08-13 RX ORDER — BUPIVACAINE HYDROCHLORIDE 5 MG/ML
INJECTION, SOLUTION PERINEURAL
Status: DISCONTINUED
Start: 2020-08-13 | End: 2020-08-13 | Stop reason: HOSPADM

## 2020-08-13 NOTE — ED TRIAGE NOTES
Patient presents to ER with daughter for ring stuck on left 3rd finger for a couple weeks per report. ABC's intact.

## 2020-08-13 NOTE — ED PROVIDER NOTES
History     Chief Complaint:  Ring stuck on swollen finger    HPI   Hubert Tamayo is a 89 year old male with diabetes and hypertension who presents with a ring stuck on left 3rd finger. The ring has been stuck on the patient's third finger for a few weeks and it now swollen. The patient's daughter thinks that the finger is swollen because the patient has been falling recently. He has tried Vaseline and warm water with soap to get it off. He still has sensation in his finger and has put antibiotics on it.      Allergies:  Crustaceans  Loxapine  Nifedipine  Shellfish  Sulfa drugs  Trimethoprim    Medications:    Albuterol  Symbicort  Cholecalciferol  Plavix  Aricept  Haldol  Cortaid  Lopressor  Nasonex  Nitrostat  Mycostatin  Zyprexa  Petrolatum  Senokot  Zoloft  Eucerin  Flomax  Tiotropium bromide monohydrate    Past Medical History:    CAD  Dementia  Type 2 diabetes mellitus  Essential hypertension  Osteoporosis  Paranoid schizophrenia  Pulmonary emphysema  NSTEMI  Acute respiratory failure with hypoxia  Venous thromboembolism  Anemia  SLY  Fatigue  Bacterial pneumonia  Osteoporosis  Hypertension  Hyperlipidemia  Arrhythmia  COPD  ASCVD  Morphea    Past Surgical History:    Catatract removal    Family History:    No family history on file.    Social History:  The patient was accompanied to the ED by daughter.  Smoking Status: Former Smoker  Smokeless Tobacco: Never Used  Alcohol Use: Not Currently  Drug Use: Negative  PCP: Saige Cortez  Marital Status:  Single     Review of Systems   All other systems reviewed and are negative.    Physical Exam     Patient Vitals for the past 24 hrs:   BP Temp Temp src Pulse Resp SpO2   08/13/20 1815 (!) 150/95 97.7  F (36.5  C) Oral 64 20 97 %       Physical Exam  General: Alert and oriented.  There is a ring present at the base of the left middle finger which is mildly swollen.    Head: Normocephalic.  External ears and nose normal.    Eyes: Pupils equal and round.  Normal  tracking.    Pulmonary/Chest: Effort and rate normal    MSK:  .  Normal range of motion in MCP, PIP, DIP joints.    SKIN: Following removal of ring with ring cutter, there is some superficial skin breakdown with slight erythema but no fluctuance, lymphangitic streaking, swelling markedly improved.  Warm and dry with strong radial pulse and normal capillary refill.    NEURO:  Normal strength of flexion and extension at MCP, PIP, and DIP joints.  Normal sensation through the radial/ulnar/median nerve distributions.    PSYCH:  Normal affect      Emergency Department Course     Imaging:  Radiology findings were communicated with the patient who voiced understanding of the findings.    Fingers XR, 2-3 views, left  1.  Soft tissue swelling in the long finger. No evidence of fracture. Alignment is anatomic.    Procedures:     Digital Block     PROCEDURE:  Digital Block  LOCATION:  Left middle finger  ANESTHESIA: Digital block using 0.5% bupivacaine without epi, total of 2.5 mLs  PROCEDURE NOTE: The patient tolerated the procedure well with good relief of discomfort and there were no complications.  Emergency Department Course:    Past medical records, nursing notes, and vitals reviewed.  1817: I performed an exam of the patient and obtained history, as documented above.     The patient was sent for a XR while in the emergency department, findings above    Emergency department technician removed ring with ring cutter.    I rechecked and updated the patient.     I personally reviewed the imaging results with the Patient and answered all related questions prior to discharge.     Findings and plan explained to the Patient. Patient discharged home with instructions regarding supportive care, medications, and reasons to return. The importance of close follow-up was reviewed.    Impression & Plan      Medical Decision Making:  Hubert Tamayo is a 89 year old male who presents to the emergency department today for evaluation of ring  stuck on finger.  This was initially attempted to be removed with soap and water and floss however we subsequently had to cut it off with ring cutter.  CMS is intact in the finger and no evidence of ischemia.  X-rays negative for fracture or dislocation.  There is some skin breakdown, but no signs of cellulitis, lymphangitis, or infection at this time.  Will have bacitracin and dressing applied and follow-up with PCP in 2 to 3 days for wound recheck.  Return precautions for increased pain, redness, swelling, discoloration, inability to bend etc.    Diagnosis:    ICD-10-CM    1. Tight ring on finger  S60.449A     W49.04XA    2. Skin breakdown  L90.9        Disposition:   The patient is discharged to home.    Scribe Disclosure:  I, Xavier Gan, am serving as a scribe at 6:19 PM on 8/13/2020 to document services personally performed by Alex Ortega, based on my observations and the provider's statements to me.  [facility] EMERGENCY DEPARTMENT       Alex Ortega PA-C  08/13/20 2020

## 2020-08-13 NOTE — ED AVS SNAPSHOT
[facility] Emergency Department  [address]  [facility] [address]  Phone:  [phone]  Fax:  [phone]                                    Hubert Tamayo   MRN: [id number]    Department:  [facility] Emergency Department   Date of Visit:  8/13/2020           After Visit Summary Signature Page    I have received my discharge instructions, and my questions have been answered. I have discussed any challenges I see with this plan with the nurse or doctor.    ..........................................................................................................................................  Patient/Patient Representative Signature      ..........................................................................................................................................  Patient Representative Print Name and Relationship to Patient    ..................................................               ................................................  Date                                   Time    ..........................................................................................................................................  Reviewed by Signature/Title    ...................................................              ..............................................  Date                                               Time          22EPIC Rev 08/18

## 2020-08-14 NOTE — ED NOTES
Attempted to pull ring off using umbilical tape and soap. While wrapping finger with tape, the digit became too swollen so writer proposed cutting ring off to pt. Pt agreed. Writer successfully cut ring off and placed into bag for pt to take home.

## 2020-08-14 NOTE — DISCHARGE INSTRUCTIONS
Return if increased pain, swelling, discoloration, spreading redness.  You should apply bacitracin with a bandage daily.

## 2020-11-17 ENCOUNTER — HOSPITAL ENCOUNTER (INPATIENT)
Facility: CLINIC | Age: 85
LOS: 2 days | Discharge: NURSING, REHAB OR LONG-TERM CARE | DRG: 683 | End: 2020-11-21
Attending: EMERGENCY MEDICINE | Admitting: HOSPITALIST
Payer: MEDICARE

## 2020-11-17 DIAGNOSIS — F03.90 DEMENTIA WITHOUT BEHAVIORAL DISTURBANCE, UNSPECIFIED DEMENTIA TYPE: Primary | ICD-10-CM

## 2020-11-17 DIAGNOSIS — N17.9 ACUTE KIDNEY INJURY (H): ICD-10-CM
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
## 2020-11-17 DIAGNOSIS — R53.1 GENERALIZED WEAKNESS: ICD-10-CM
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
## 2020-11-17 PROCEDURE — 93005 ELECTROCARDIOGRAM TRACING: CPT

## 2020-11-17 PROCEDURE — 51798 US URINE CAPACITY MEASURE: CPT

## 2020-11-17 PROCEDURE — C9803 HOPD COVID-19 SPEC COLLECT: HCPCS

## 2020-11-17 PROCEDURE — 99285 EMERGENCY DEPT VISIT HI MDM: CPT | Mod: 25

## 2020-11-18 ENCOUNTER — APPOINTMENT (OUTPATIENT)
Dept: GENERAL RADIOLOGY | Facility: CLINIC | Age: 85
DRG: 683 | End: 2020-11-18
Attending: EMERGENCY MEDICINE
Payer: MEDICARE

## 2020-11-18 ENCOUNTER — APPOINTMENT (OUTPATIENT)
Dept: CT IMAGING | Facility: CLINIC | Age: 85
DRG: 683 | End: 2020-11-18
Attending: EMERGENCY MEDICINE
Payer: MEDICARE
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
## 2020-11-18 LAB
ALBUMIN SERPL-MCNC: 3.4 G/DL (ref 3.4–5)
ALBUMIN UR-MCNC: 70 MG/DL
ALP SERPL-CCNC: 116 U/L (ref 40–150)
ALT SERPL W P-5'-P-CCNC: 41 U/L (ref 0–70)
ANION GAP SERPL CALCULATED.3IONS-SCNC: 6 MMOL/L (ref 3–14)
APPEARANCE UR: CLEAR
AST SERPL W P-5'-P-CCNC: 49 U/L (ref 0–45)
BASOPHILS # BLD AUTO: 0 10E9/L (ref 0–0.2)
BASOPHILS NFR BLD AUTO: 0.3 %
BILIRUB SERPL-MCNC: 0.4 MG/DL (ref 0.2–1.3)
BILIRUB UR QL STRIP: NEGATIVE
BUN SERPL-MCNC: 37 MG/DL (ref 7–30)
CALCIUM SERPL-MCNC: 9.1 MG/DL (ref 8.5–10.1)
CHLORIDE SERPL-SCNC: 107 MMOL/L (ref 94–109)
CK SERPL-CCNC: 190 U/L (ref 30–300)
CO2 SERPL-SCNC: 27 MMOL/L (ref 20–32)
COLOR UR AUTO: ABNORMAL
CREAT SERPL-MCNC: 2.39 MG/DL (ref 0.66–1.25)
DIFFERENTIAL METHOD BLD: ABNORMAL
EOSINOPHIL # BLD AUTO: 0.1 10E9/L (ref 0–0.7)
EOSINOPHIL NFR BLD AUTO: 1.1 %
ERYTHROCYTE [DISTWIDTH] IN BLOOD BY AUTOMATED COUNT: 16.4 % (ref 10–15)
GFR SERPL CREATININE-BSD FRML MDRD: 23 ML/MIN/{1.73_M2}
GLUCOSE BLDC GLUCOMTR-MCNC: 112 MG/DL (ref 70–99)
GLUCOSE BLDC GLUCOMTR-MCNC: 114 MG/DL (ref 70–99)
GLUCOSE BLDC GLUCOMTR-MCNC: 118 MG/DL (ref 70–99)
GLUCOSE BLDC GLUCOMTR-MCNC: 89 MG/DL (ref 70–99)
GLUCOSE SERPL-MCNC: 110 MG/DL (ref 70–99)
GLUCOSE UR STRIP-MCNC: NEGATIVE MG/DL
HCT VFR BLD AUTO: 32.8 % (ref 40–53)
HGB BLD-MCNC: 10.2 G/DL (ref 13.3–17.7)
HGB BLD-MCNC: 10.5 G/DL (ref 13.3–17.7)
HGB UR QL STRIP: NEGATIVE
HYALINE CASTS #/AREA URNS LPF: 2 /LPF (ref 0–2)
IMM GRANULOCYTES # BLD: 0 10E9/L (ref 0–0.4)
IMM GRANULOCYTES NFR BLD: 0.4 %
INTERPRETATION ECG - MUSE: NORMAL
KETONES UR STRIP-MCNC: NEGATIVE MG/DL
LABORATORY COMMENT REPORT: NORMAL
LEUKOCYTE ESTERASE UR QL STRIP: NEGATIVE
LYMPHOCYTES # BLD AUTO: 1.6 10E9/L (ref 0.8–5.3)
LYMPHOCYTES NFR BLD AUTO: 14.4 %
MCH RBC QN AUTO: 29.8 PG (ref 26.5–33)
MCHC RBC AUTO-ENTMCNC: 31.1 G/DL (ref 31.5–36.5)
MCV RBC AUTO: 96 FL (ref 78–100)
MONOCYTES # BLD AUTO: 1.1 10E9/L (ref 0–1.3)
MONOCYTES NFR BLD AUTO: 9.4 %
MUCOUS THREADS #/AREA URNS LPF: PRESENT /LPF
NEUTROPHILS # BLD AUTO: 8.4 10E9/L (ref 1.6–8.3)
NEUTROPHILS NFR BLD AUTO: 74.4 %
NITRATE UR QL: NEGATIVE
NRBC # BLD AUTO: 0 10*3/UL
NRBC BLD AUTO-RTO: 0 /100
PH UR STRIP: 5.5 PH (ref 5–7)
PLATELET # BLD AUTO: 115 10E9/L (ref 150–450)
POTASSIUM SERPL-SCNC: 4.6 MMOL/L (ref 3.4–5.3)
PROT SERPL-MCNC: 7.6 G/DL (ref 6.8–8.8)
RBC # BLD AUTO: 3.42 10E12/L (ref 4.4–5.9)
RBC #/AREA URNS AUTO: <1 /HPF (ref 0–2)
SARS-COV-2 RNA SPEC QL NAA+PROBE: NEGATIVE
SARS-COV-2 RNA SPEC QL NAA+PROBE: NORMAL
SODIUM SERPL-SCNC: 140 MMOL/L (ref 133–144)
SOURCE: ABNORMAL
SP GR UR STRIP: 1.01 (ref 1–1.03)
SPECIMEN SOURCE: NORMAL
SPECIMEN SOURCE: NORMAL
SQUAMOUS #/AREA URNS AUTO: <1 /HPF (ref 0–1)
TROPONIN I SERPL-MCNC: 0.03 UG/L (ref 0–0.04)
UROBILINOGEN UR STRIP-MCNC: NORMAL MG/DL (ref 0–2)
WBC # BLD AUTO: 11.3 10E9/L (ref 4–11)
WBC #/AREA URNS AUTO: 1 /HPF (ref 0–5)

## 2020-11-18 PROCEDURE — 36569 INSJ PICC 5 YR+ W/O IMAGING: CPT

## 2020-11-18 PROCEDURE — 82550 ASSAY OF CK (CPK): CPT | Performed by: EMERGENCY MEDICINE

## 2020-11-18 PROCEDURE — 85018 HEMOGLOBIN: CPT | Performed by: PHYSICIAN ASSISTANT

## 2020-11-18 PROCEDURE — 250N000013 HC RX MED GY IP 250 OP 250 PS 637: Performed by: HOSPITALIST

## 2020-11-18 PROCEDURE — 250N000013 HC RX MED GY IP 250 OP 250 PS 637: Performed by: EMERGENCY MEDICINE

## 2020-11-18 PROCEDURE — 99233 SBSQ HOSP IP/OBS HIGH 50: CPT | Performed by: PHYSICIAN ASSISTANT

## 2020-11-18 PROCEDURE — 999N001017 HC STATISTIC GLUCOSE BY METER IP

## 2020-11-18 PROCEDURE — 272N000433 HC KIT CATH IV 18 OR 20G CM, POWERGLIDE W MAX BARRIER

## 2020-11-18 PROCEDURE — U0003 INFECTIOUS AGENT DETECTION BY NUCLEIC ACID (DNA OR RNA); SEVERE ACUTE RESPIRATORY SYNDROME CORONAVIRUS 2 (SARS-COV-2) (CORONAVIRUS DISEASE [COVID-19]), AMPLIFIED PROBE TECHNIQUE, MAKING USE OF HIGH THROUGHPUT TECHNOLOGIES AS DESCRIBED BY CMS-2020-01-R: HCPCS | Performed by: EMERGENCY MEDICINE

## 2020-11-18 PROCEDURE — 99207 PR APP CREDIT; MD BILLING SHARED VISIT: CPT | Performed by: PHYSICIAN ASSISTANT

## 2020-11-18 PROCEDURE — 36415 COLL VENOUS BLD VENIPUNCTURE: CPT | Performed by: PHYSICIAN ASSISTANT

## 2020-11-18 PROCEDURE — 80053 COMPREHEN METABOLIC PANEL: CPT | Performed by: EMERGENCY MEDICINE

## 2020-11-18 PROCEDURE — 250N000013 HC RX MED GY IP 250 OP 250 PS 637: Performed by: PHYSICIAN ASSISTANT

## 2020-11-18 PROCEDURE — 81001 URINALYSIS AUTO W/SCOPE: CPT | Performed by: EMERGENCY MEDICINE

## 2020-11-18 PROCEDURE — 258N000003 HC RX IP 258 OP 636: Performed by: HOSPITALIST

## 2020-11-18 PROCEDURE — 99220 PR INITIAL OBSERVATION CARE,LEVEL III: CPT | Mod: AI | Performed by: HOSPITALIST

## 2020-11-18 PROCEDURE — G0378 HOSPITAL OBSERVATION PER HR: HCPCS

## 2020-11-18 PROCEDURE — 84484 ASSAY OF TROPONIN QUANT: CPT | Performed by: EMERGENCY MEDICINE

## 2020-11-18 PROCEDURE — 96360 HYDRATION IV INFUSION INIT: CPT

## 2020-11-18 PROCEDURE — 85025 COMPLETE CBC W/AUTO DIFF WBC: CPT | Performed by: EMERGENCY MEDICINE

## 2020-11-18 PROCEDURE — 71045 X-RAY EXAM CHEST 1 VIEW: CPT

## 2020-11-18 PROCEDURE — 70450 CT HEAD/BRAIN W/O DYE: CPT

## 2020-11-18 PROCEDURE — 96361 HYDRATE IV INFUSION ADD-ON: CPT

## 2020-11-18 RX ORDER — OLANZAPINE 10 MG/1
20 TABLET ORAL AT BEDTIME
Status: DISCONTINUED | OUTPATIENT
Start: 2020-11-18 | End: 2020-11-21 | Stop reason: HOSPADM

## 2020-11-18 RX ORDER — VITAMIN B COMPLEX
1000 TABLET ORAL DAILY
Status: DISCONTINUED | OUTPATIENT
Start: 2020-11-18 | End: 2020-11-21 | Stop reason: HOSPADM

## 2020-11-18 RX ORDER — ATORVASTATIN CALCIUM 80 MG/1
80 TABLET, FILM COATED ORAL DAILY
COMMUNITY

## 2020-11-18 RX ORDER — AMLODIPINE BESYLATE 2.5 MG/1
2.5 TABLET ORAL DAILY
COMMUNITY
End: 2021-01-23

## 2020-11-18 RX ORDER — ACETAMINOPHEN 325 MG/1
650 TABLET ORAL EVERY 4 HOURS PRN
Status: DISCONTINUED | OUTPATIENT
Start: 2020-11-18 | End: 2020-11-21 | Stop reason: HOSPADM

## 2020-11-18 RX ORDER — TAMSULOSIN HYDROCHLORIDE 0.4 MG/1
0.4 CAPSULE ORAL DAILY
Status: DISCONTINUED | OUTPATIENT
Start: 2020-11-18 | End: 2020-11-21 | Stop reason: HOSPADM

## 2020-11-18 RX ORDER — ONDANSETRON 4 MG/1
4 TABLET, ORALLY DISINTEGRATING ORAL EVERY 6 HOURS PRN
Status: DISCONTINUED | OUTPATIENT
Start: 2020-11-18 | End: 2020-11-21 | Stop reason: HOSPADM

## 2020-11-18 RX ORDER — SENNOSIDES A AND B 8.6 MG/1
1 TABLET, FILM COATED ORAL EVERY MORNING
Status: DISCONTINUED | OUTPATIENT
Start: 2020-11-19 | End: 2020-11-18

## 2020-11-18 RX ORDER — ASPIRIN 81 MG/1
81 TABLET, CHEWABLE ORAL DAILY
Status: ON HOLD | COMMUNITY
End: 2021-01-27

## 2020-11-18 RX ORDER — ONDANSETRON 2 MG/ML
4 INJECTION INTRAMUSCULAR; INTRAVENOUS EVERY 6 HOURS PRN
Status: DISCONTINUED | OUTPATIENT
Start: 2020-11-18 | End: 2020-11-21 | Stop reason: HOSPADM

## 2020-11-18 RX ORDER — ALBUTEROL SULFATE 0.83 MG/ML
2.5 SOLUTION RESPIRATORY (INHALATION) EVERY 6 HOURS PRN
COMMUNITY

## 2020-11-18 RX ORDER — METOPROLOL TARTRATE 50 MG
50 TABLET ORAL 2 TIMES DAILY
Status: DISCONTINUED | OUTPATIENT
Start: 2020-11-18 | End: 2020-11-21 | Stop reason: HOSPADM

## 2020-11-18 RX ORDER — SENNOSIDES 8.6 MG
1 TABLET ORAL
Status: DISCONTINUED | OUTPATIENT
Start: 2020-11-18 | End: 2020-11-21 | Stop reason: HOSPADM

## 2020-11-18 RX ORDER — LIDOCAINE 40 MG/G
CREAM TOPICAL
Status: DISCONTINUED | OUTPATIENT
Start: 2020-11-18 | End: 2020-11-20

## 2020-11-18 RX ORDER — TIOTROPIUM BROMIDE 18 UG/1
18 CAPSULE ORAL; RESPIRATORY (INHALATION) DAILY
Status: DISCONTINUED | OUTPATIENT
Start: 2020-11-18 | End: 2020-11-21 | Stop reason: HOSPADM

## 2020-11-18 RX ORDER — ATORVASTATIN CALCIUM 40 MG/1
80 TABLET, FILM COATED ORAL DAILY
Status: DISCONTINUED | OUTPATIENT
Start: 2020-11-18 | End: 2020-11-21 | Stop reason: HOSPADM

## 2020-11-18 RX ORDER — DONEPEZIL HYDROCHLORIDE 5 MG/1
5 TABLET, FILM COATED ORAL DAILY
Status: DISCONTINUED | OUTPATIENT
Start: 2020-11-18 | End: 2020-11-21 | Stop reason: HOSPADM

## 2020-11-18 RX ORDER — HALOPERIDOL 1 MG/1
1 TABLET ORAL 3 TIMES DAILY
Status: DISCONTINUED | OUTPATIENT
Start: 2020-11-18 | End: 2020-11-21 | Stop reason: HOSPADM

## 2020-11-18 RX ORDER — HALOPERIDOL 1 MG/1
1 TABLET ORAL 3 TIMES DAILY
Status: ON HOLD | COMMUNITY
End: 2020-11-21

## 2020-11-18 RX ORDER — SODIUM CHLORIDE, SODIUM LACTATE, POTASSIUM CHLORIDE, CALCIUM CHLORIDE 600; 310; 30; 20 MG/100ML; MG/100ML; MG/100ML; MG/100ML
INJECTION, SOLUTION INTRAVENOUS CONTINUOUS
Status: DISCONTINUED | OUTPATIENT
Start: 2020-11-18 | End: 2020-11-20

## 2020-11-18 RX ORDER — AMLODIPINE BESYLATE 2.5 MG/1
2.5 TABLET ORAL DAILY
Status: DISCONTINUED | OUTPATIENT
Start: 2020-11-18 | End: 2020-11-21 | Stop reason: HOSPADM

## 2020-11-18 RX ORDER — CLOPIDOGREL BISULFATE 75 MG/1
75 TABLET ORAL DAILY
Status: DISCONTINUED | OUTPATIENT
Start: 2020-11-18 | End: 2020-11-18

## 2020-11-18 RX ORDER — BUDESONIDE AND FORMOTEROL FUMARATE DIHYDRATE 160; 4.5 UG/1; UG/1
2 AEROSOL RESPIRATORY (INHALATION) 2 TIMES DAILY
Status: DISCONTINUED | OUTPATIENT
Start: 2020-11-18 | End: 2020-11-21 | Stop reason: HOSPADM

## 2020-11-18 RX ORDER — FAMOTIDINE 20 MG/1
10 TABLET, FILM COATED ORAL DAILY
Status: ON HOLD | COMMUNITY
End: 2021-01-24

## 2020-11-18 RX ORDER — AMOXICILLIN 250 MG
2 CAPSULE ORAL 2 TIMES DAILY
Status: DISCONTINUED | OUTPATIENT
Start: 2020-11-18 | End: 2020-11-21 | Stop reason: HOSPADM

## 2020-11-18 RX ORDER — ACETAMINOPHEN 650 MG/1
650 SUPPOSITORY RECTAL EVERY 4 HOURS PRN
Status: DISCONTINUED | OUTPATIENT
Start: 2020-11-18 | End: 2020-11-20

## 2020-11-18 RX ORDER — CARBOXYMETHYLCELLULOSE SODIUM 5 MG/ML
2 SOLUTION/ DROPS OPHTHALMIC EVERY 4 HOURS PRN
Status: DISCONTINUED | OUTPATIENT
Start: 2020-11-18 | End: 2020-11-21 | Stop reason: HOSPADM

## 2020-11-18 RX ORDER — DONEPEZIL HYDROCHLORIDE 5 MG/1
5 TABLET, FILM COATED ORAL AT BEDTIME
COMMUNITY

## 2020-11-18 RX ORDER — AMOXICILLIN 250 MG
1 CAPSULE ORAL 2 TIMES DAILY
Status: DISCONTINUED | OUTPATIENT
Start: 2020-11-18 | End: 2020-11-21 | Stop reason: HOSPADM

## 2020-11-18 RX ORDER — CARBOXYMETHYLCELLULOSE SODIUM 5 MG/ML
2 SOLUTION/ DROPS OPHTHALMIC EVERY 4 HOURS PRN
COMMUNITY

## 2020-11-18 RX ORDER — ALBUTEROL SULFATE 90 UG/1
2 AEROSOL, METERED RESPIRATORY (INHALATION) EVERY 4 HOURS PRN
Status: DISCONTINUED | OUTPATIENT
Start: 2020-11-18 | End: 2020-11-21 | Stop reason: HOSPADM

## 2020-11-18 RX ORDER — ASPIRIN 81 MG/1
81 TABLET, CHEWABLE ORAL DAILY
Status: DISCONTINUED | OUTPATIENT
Start: 2020-11-18 | End: 2020-11-21 | Stop reason: HOSPADM

## 2020-11-18 RX ORDER — MULTIVITAMIN WITH IRON
1 TABLET ORAL DAILY
COMMUNITY

## 2020-11-18 RX ORDER — ACETAMINOPHEN 500 MG
1000 TABLET ORAL EVERY 6 HOURS PRN
COMMUNITY
End: 2021-01-23

## 2020-11-18 RX ORDER — POLYETHYLENE GLYCOL 3350 17 G/17G
17 POWDER, FOR SOLUTION ORAL DAILY
Status: DISCONTINUED | OUTPATIENT
Start: 2020-11-18 | End: 2020-11-21 | Stop reason: HOSPADM

## 2020-11-18 RX ADMIN — METOPROLOL TARTRATE 50 MG: 50 TABLET, FILM COATED ORAL at 13:51

## 2020-11-18 RX ADMIN — ATORVASTATIN CALCIUM 80 MG: 40 TABLET, FILM COATED ORAL at 15:02

## 2020-11-18 RX ADMIN — ASPIRIN 81 MG 81 MG: 81 TABLET ORAL at 15:03

## 2020-11-18 RX ADMIN — SODIUM CHLORIDE, POTASSIUM CHLORIDE, SODIUM LACTATE AND CALCIUM CHLORIDE: 600; 310; 30; 20 INJECTION, SOLUTION INTRAVENOUS at 21:04

## 2020-11-18 RX ADMIN — DOCUSATE SODIUM 50 MG AND SENNOSIDES 8.6 MG 1 TABLET: 8.6; 5 TABLET, FILM COATED ORAL at 20:09

## 2020-11-18 RX ADMIN — AMLODIPINE BESYLATE 2.5 MG: 2.5 TABLET ORAL at 15:03

## 2020-11-18 RX ADMIN — SODIUM CHLORIDE, POTASSIUM CHLORIDE, SODIUM LACTATE AND CALCIUM CHLORIDE: 600; 310; 30; 20 INJECTION, SOLUTION INTRAVENOUS at 10:32

## 2020-11-18 RX ADMIN — METOPROLOL TARTRATE 50 MG: 50 TABLET, FILM COATED ORAL at 20:09

## 2020-11-18 RX ADMIN — HALOPERIDOL 1 MG: 1 TABLET ORAL at 20:09

## 2020-11-18 RX ADMIN — HALOPERIDOL 1 MG: 1 TABLET ORAL at 15:55

## 2020-11-18 RX ADMIN — HALOPERIDOL 2.5 MG: 1 TABLET ORAL at 06:16

## 2020-11-18 RX ADMIN — OLANZAPINE 20 MG: 10 TABLET, FILM COATED ORAL at 22:13

## 2020-11-18 ASSESSMENT — ENCOUNTER SYMPTOMS
ABDOMINAL PAIN: 0
FEVER: 0
COUGH: 0
HEADACHES: 0

## 2020-11-18 NOTE — ED PROVIDER NOTES
"  History     Chief Complaint:  Fall      HPI     History is somewhat limited as the patient is a poor historian at baseline.     Hubert Tamayo is a 89 year old male with a complex medical history significant for dementia, CAD, hypertension, hyperlipidemia,  who presents after a fall. EMS reports that the patient fell between the toilet and the wall at some point today, possibly at 0200. He states that he fell because his walker gave out on him.  He has a life alert pendant, but did not activate it for help. He called out for help from his niece who found him and called for EMS. When they arrived they did not note any injuries on the patient. They did note that food seems to have been sitting out for days and he states that he only eats once a day.  His blood sugar was 101. The patient did not want to come to the ED tonight [facility]. However he was unable to stand on his own and didn't understand how to use his walker. So he was placed on an BALDOMERO and brought to the ED for evaluation. On examination, the patient's main complaint is that he has [had] \"mucous in his spit since 1953.\" He will not elaborate on this concern.     Allergies:  Crustaceans  Loxapine  Nifedipine  Shellfish Allergy  Sulfa Drugs  Sulfamethoxazole-Trimethoprim  Trimethoprim     Medications:    Albuterol  Symbicort  Cholecalciferol  Plavix  Aricept  Haldol  Cortaid  Lopressor  Nasonex  Nitrostat  Mycostatin  Zyprexa  Petrolatum  Senokot  Zoloft  Eucerin  Flomax  Tiotropium bromide monohydrate     Past Medical History:    CAD  Dementia  Type 2 diabetes mellitus  Essential hypertension  Osteoporosis  Paranoid schizophrenia  Pulmonary emphysema  NSTEMI  Acute respiratory failure with hypoxia  Venous thromboembolism  Anemia  SLY  Fatigue  Bacterial pneumonia  Osteoporosis  Hypertension  Hyperlipidemia  Arrhythmia  COPD  ASCVD  Morphea     Past Surgical History:    Catatract removal     Family History:    No family " history on file.     Social History:  The patient was accompanied to the ED by daughter.  Smoking Status: Former Smoker  Smokeless Tobacco: Never Used  Alcohol Use: Not Currently  Drug Use: Negative  PCP: Saige Cortez  Marital Status:  Single     Review of Systems   Unable to perform ROS: Dementia   Constitutional: Negative for fever.   Respiratory: Negative for cough.    Cardiovascular: Negative for chest pain.   Gastrointestinal: Negative for abdominal pain.   Neurological: Negative for headaches.       Physical Exam     Patient Vitals for the past 24 hrs:   BP Temp Temp src Pulse Resp SpO2 Weight   11/18/20 0756 (!) 159/95 -- -- 67 16 97 % 65.2 kg (143 lb 12.8 oz)   11/18/20 0730 -- -- -- 56 12 -- --   11/18/20 0615 -- -- -- 76 18 -- --   11/18/20 0600 -- -- -- 69 18 -- --   11/18/20 0545 -- -- -- 74 17 -- --   11/18/20 0530 -- -- -- 75 20 -- --   11/18/20 0515 -- -- -- 75 25 94 % --   11/18/20 0500 -- -- -- 65 11 96 % --   11/18/20 0445 -- -- -- 75 -- 95 % --   11/18/20 0430 -- -- -- 66 12 98 % --   11/18/20 0415 -- -- -- 69 18 96 % --   11/18/20 0400 -- -- -- 67 11 -- --   11/18/20 0345 -- -- -- 65 22 94 % --   11/18/20 0330 -- -- -- 84 26 91 % --   11/18/20 0315 -- -- -- 57 16 95 % --   11/18/20 0300 -- -- -- 58 16 96 % --   11/18/20 0230 -- -- -- 60 17 -- --   11/18/20 0215 -- -- -- 67 -- 97 % --   11/18/20 0145 -- -- -- 57 17 94 % --   11/18/20 0130 -- -- -- 57 15 94 % --   11/18/20 0115 (!) 151/130 -- -- 58 9 95 % --   11/18/20 0100 -- -- -- 66 13 96 % --   11/18/20 0030 -- -- -- 61 18 96 % --   11/18/20 0025 -- -- -- 67 16 -- --   11/18/20 0020 -- -- -- 66 24 (!) 23 % --   11/18/20 0015 -- -- -- 61 17 98 % --   11/18/20 0010 -- -- -- 68 15 99 % --   11/18/20 0005 -- -- -- -- -- 99 % --   11/18/20 0000 -- -- -- -- -- 98 % --   11/17/20 2357 -- 98.4  F (36.9  C) Oral -- -- -- --   11/17/20 2355 (!) 177/91 -- -- 64 -- 94 % --       Physical Exam  General: Alert, no acute distress; nontoxic appearing  Neuro:   Atraumatic.  Pupils pinpoint bilaterally.  EOMI.  No focal deficits; CN II-XII grossly intact  HEENT:  Atraumatic. Moist mucous membranes. Conjunctiva normal. TMs clear bilaterally  CV:  RRR, no m/r/g, skin warm and well perfused  Pulm:  CTAB, no wheezes/ronchi/rales.  No acute distress, breathing comfortably  GI:  Soft, nontender, nondistended.  No rebound or guarding.  Normal bowel sounds  MSK:  Moving all extremities.  No focal areas of edema, erythema; no obvious deformities.  No pain/tenderness with ROM of the upper and lower extremities bilaterally  Skin:  WWP, no rashes, no lower extremity edema, skin color normal, no diaphoresis  Psych:  normal affect, regular speech    Emergency Department Course   ECG:  Indication: Fall   Time: 0002  Vent. Rate 63 bpm. RI interval 184. QRS duration 80. QT/QTc 446/456. P-R-T axis 20 -22 4.  normal sinus rhythm. Normal ECG. No significant change compared to EKG dated 7/1/2020. Read time: 0015    Imaging:  Radiographic findings were communicated with the patient who voiced understanding of the findings.    CT Head without contrast:   1.  No acute intracranial hemorrhage or calvarial fracture.     2.  Mild to moderate volume loss and presumed chronic small vessel ischemic changes, as per radiology.    XR Chest 1 view PORT:   Negative chest, as per radiology.      Laboratory:  Asymptomatic COVID-19 Virus PCR Nasopharyngeal swab: pending     CBC: WBC: 11.3 (H), HGB: 10.2 (L), PLT: 115 (H)  CMP: Glucose 110 (H), BUN: 37 (H), Creatinine: 2.39 (H), GFR: 23 (L), and AST: 49 (L), o/w WNL       0107    Troponin: 0.028  0013    Glucose by Meter: 118 (H)     CK Total: 190     UA: Protein Albumin: 70 (H), Mucous: Present, o/w WNL    Emergency Department Course:  Nursing notes and vitals reviewed. (0003) I performed an exam of the patient as documented above.     IV inserted. Medicine administered as documented above. Blood drawn. Covid-19 swab collected. Urine sample collected.  This  was sent to the lab for further testing, results above.    The patient was sent for a head CT and a chest XR while in the emergency department, findings above.   EKG obtained in the ED, see results above.     (0052) I rechecked the patient. He was threatening to lilia the nurse if she took his blood. After discussion, and a reminder that in order to take care of our veterans some blood will need to be drawn, he agreed.     (0248) I reevaluated the patient and provided an update in regards to his ED course.       (0304)  I consulted with Dr. Santana of the hospitalist services. They are in agreement to accept the patient for admission.    Findings and plan explained to the Patient who consents to admission. Discussed the patient with Dr. Santana, who will admit the patient to an [facility] bed for further monitoring, evaluation, and treatment.    Impression & Plan      Covid-19  Hubert Tamayo was evaluated during a global COVID-19 pandemic, which necessitated consideration that the patient might be at risk for infection with the SARS-CoV-2 virus that causes COVID-19.   Applicable protocols for evaluation were followed during the patient's care.   COVID-19 was considered as part of the patient's evaluation. The plan for testing is:  a test was obtained during this visit.    Medical Decision Making:  Hubert Tamayo is a 89 year old male with history significant for dementia, CAD, hypertension, hyperlipidemia who presents to the ER for evaluation after mechanical fall at home with several hours of being down.  Please see HPI for specifics.  Is noted to be initially hypertensive on arrival but the rest of his vital signs are normal.  There are no signs of significant head injury however, given fall, CT head was obtained which fortunately is negative for acute pathology.  He denies any neck pain and his cervical spine is clinically cleared.  The rest of his head to toe trauma exam was unremarkable for significant injury or pain.  His  lab studies are remarkable for mild leukocytosis and acute kidney injury likely from dehydration. CK is normal. EKG shows sinus rhythm without any acute ST changes concerning for ischemia or infarct and troponin is normal.  Chest x-ray shows no signs of pneumonia or effusion.  UA not concerning for UTI.  Patient reports living at home alone and given fall, I do not feel that he would be a good candidate for discharge and therefore we will admit for observation for safe disposition.  Discussed case with Dr. Santana who accepted the patient.    Diagnosis:    ICD-10-CM    1. Fall at home, initial encounter  W19.XXXA     Y92.009    2. Generalized weakness  R53.1    3. Acute kidney injury (H)  N17.9        Disposition:  Admitted to Dr. Santana    Scribe Disclosure:  EDINSON, Lynne Hagan, am serving as a scribe on 11/18/2020 at 12:13 AM to personally document services performed by Winston Lay MD based on my observations and the provider's statements to me.     Lynne Hagan  11/17/2020   [facility] EMERGENCY DEPT       Winston Lay MD  11/18/20 0809

## 2020-11-18 NOTE — PHARMACY-ADMISSION MEDICATION HISTORY
Admission medication history interview status for this patient is complete. See [facility] admission navigator for allergy information, prior to admission medications and immunization status.     Medication history interview done via telephone during Covid-19 pandemic, indicate source(s): Patient and Family  Medication history resources (including written lists, pill bottles, clinic record): [facility]  [facility]    Changes made to PTA medication list:  Added: tylenol, albuterol nebs, norvasc, asa, lipitor, robitussin, magnesium, melatonin, camphor-menthol, refresh plus eye gtts, pepcid  Deleted: plavix (replaced by Asa 81mg), hydrocortisone cream, nystatin cream, petrolatum  Changed: denepezil, haldol, nasonex, eucerin    Actions taken by pharmacist (provider contacted, etc): Spoke to chemo Quinteros. Called Niece - no answer. Called pt's doctor Angely Coleman (number provided by chemo Quinteros [phone] --> went to [employer], did not leave a message), called pt's RN Chuyita (number provided by chemo Quinteros [phone]) - no answer. [facility], [facility] to [facility] ----> received fax back.    Additional medication history information: per patient: no meds pta for a week, per son - might not be accurate, however son did not know when pt took last doses of meds pta.    Medication reconciliation/reorder completed by provider prior to medication history?  yes  (Y/N)     For patients on insulin therapy: no  Prior to Admission medications    Medication Sig Taking? Auth Provider   acetaminophen (TYLENOL) 500 MG tablet Take 1,000 mg by mouth every 6 hours as needed for pain Yes Unknown, Entered By History   albuterol (PROAIR HFA/PROVENTIL HFA/VENTOLIN HFA) 108 (90 Base) MCG/ACT inhaler Inhale 2 puffs into the lungs every 4 hours as needed for shortness of breath / dyspnea or wheezing Yes Unknown, Entered By History   albuterol (PROVENTIL) (2.5 MG/3ML) 0.083% neb solution Take 2.5 mg by nebulization every 6  hours as needed for shortness of breath / dyspnea or wheezing Yes Unknown, Entered By History   amLODIPine (NORVASC) 2.5 MG tablet Take 2.5 mg by mouth daily Yes Unknown, Entered By History   aspirin (ASA) 81 MG chewable tablet Take 81 mg by mouth daily Yes Unknown, Entered By History   atorvastatin (LIPITOR) 80 MG tablet Take 80 mg by mouth daily Yes Unknown, Entered By History   budesonide-formoterol (SYMBICORT) 160-4.5 MCG/ACT Inhaler Inhale 2 puffs into the lungs 2 times daily Yes Unknown, Entered By History   camphor-menthol (DERMASARRA) 0.5-0.5 % external lotion Apply topically 2 times daily as needed (itching) Apply thin layer topically bid prn itching, apply after Eucerin. Yes Unknown, Entered By History   carboxymethylcellulose (REFRESH PLUS) 0.5 % SOLN ophthalmic solution Place 2 drops into both eyes every 4 hours as needed for dry eyes 6 times per day prn* Yes Unknown, Entered By History   cholecalciferol 1000 units TABS Take 1,000 Units by mouth daily Yes Unknown, Entered By History   donepezil (ARICEPT) 5 MG tablet Take 5 mg by mouth At Bedtime Yes Unknown, Entered By History   famotidine (PEPCID) 20 MG tablet Take 10 mg by mouth daily Yes Unknown, Entered By History   guaiFENesin (ROBITUSSIN) 100 MG/5ML SYRP Take 5 mLs by mouth At Bedtime Yes Unknown, Entered By History   haloperidol (HALDOL) 1 MG tablet Take 1 mg by mouth 3 times daily Yes Unknown, Entered By History   magnesium 250 MG tablet Take 1 tablet by mouth daily Patient takes on their own. Patient wants to buy from non[facility] pharmacy. Yes Unknown, Entered By History   melatonin 3 MG CAPS Take 6 mg by mouth At Bedtime Yes Unknown, Entered By History   metoprolol tartrate (LOPRESSOR) 50 MG tablet Take 1 tablet (50 mg) by mouth 2 times daily Yes Vamshi Fischer MD   mometasone (NASONEX) 50 MCG/ACT nasal spray Spray 2 sprays into both nostrils daily  Yes Unknown, Entered By History   nitroGLYcerin (NITROSTAT) 0.4 MG sublingual tablet Place 0.4  mg under the tongue every 5 minutes as needed for chest pain For chest pain place 1 tablet under the tongue every 5 minutes for 3 doses. If symptoms persist 5 minutes after 1st dose call 911. Yes Unknown, Entered By History   OLANZapine (ZYPREXA) 20 MG tablet Take 20 mg by mouth At Bedtime Yes Unknown, Entered By History   senna (SENOKOT) 8.6 MG tablet Take 1 tablet by mouth every morning Yes Unknown, Entered By History   senna (SENOKOT) 8.6 MG tablet Take 1 tablet by mouth every evening as needed for constipation Yes Unknown, Entered By History   sertraline (ZOLOFT) 100 MG tablet Take 1.5 tablets (150 mg) by mouth daily Yes Todd Velarde MD   Skin Protectants, Misc. (EUCERIN) cream Apply topically daily Apply to areas of dry skin topically every day ideally within 3 minutes after bath or shower. Yes Unknown, Entered By History   tamsulosin (FLOMAX) 0.4 MG capsule Take 0.4 mg by mouth daily Yes Reported, Patient   Tiotropium Bromide Monohydrate (SPIRIVA HANDIHALER IN) Inhale 1 capsule into the lungs daily  Yes Reported, Patient

## 2020-11-18 NOTE — ED NOTES
[facility]  ED Nurse Handoff Report    Hubert Tamayo is a 89 year old male   ED Chief complaint: Fall  . ED Diagnosis:   Final diagnoses:   Fall at home, initial encounter   Generalized weakness   Acute kidney injury (H)     Allergies:   Allergies   Allergen Reactions     Crustaceans      Loxapine      Nifedipine      Shellfish Allergy      Sulfa Drugs      Sulfamethoxazole-Trimethoprim      Trimethoprim        Code Status: Full Code  Activity level - Baseline/Home:  uses a walker. Activity Level - Current:   assist of 2. very weak. helps to roll self in bed. repositions self. not safe to stand. Lift room needed: No. Bariatric: No   Needed: No   Isolation: Yes. Infection: Not Applicable  COVID r/o and special precautions.     Vital Signs:   Vitals:    11/18/20 0345 11/18/20 0400 11/18/20 0415 11/18/20 0430   BP:       Pulse: 65 67 69 66   Resp: 22 11 18 12   Temp:       TempSrc:       SpO2: 94%  96% 98%       Cardiac Rhythm:  ,      Pain level:    Patient confused: Yes. Patient Falls Risk: Yes.   Elimination Status: Has voided and using urinal to void and depends in place   Patient Report - Initial Complaint: patient arrived for fall and generalized weakness, unable to care for self, vulnerable adult, unable to use walker. Focused Assessment: patient has garbled speech. Oriented but not. Fixated on [facility] the [facility] and that he is not recognized for being in the [employer]. Patient is grumpy and name calling at times.   Patient is complaining of constipation.  Patient is complaining of cough with Mucus.  Thinks he is here for routine check up  Tests Performed:   Head CT w/o contrast   Final Result   IMPRESSION:   1.  No acute intracranial hemorrhage or calvarial fracture.      2.  Mild to moderate volume loss and presumed chronic small vessel ischemic changes.         XR Chest Port 1 View   Final Result   IMPRESSION: Negative chest.        . Abnormal Results:   Labs Ordered and Resulted  from Time of ED Arrival Up to the Time of Departure from the ED   CBC WITH PLATELETS DIFFERENTIAL - Abnormal; Notable for the following components:       Result Value    WBC 11.3 (*)     RBC Count 3.42 (*)     Hemoglobin 10.2 (*)     Hematocrit 32.8 (*)     MCHC 31.1 (*)     RDW 16.4 (*)     Platelet Count 115 (*)     Absolute Neutrophil 8.4 (*)     All other components within normal limits   COMPREHENSIVE METABOLIC PANEL - Abnormal; Notable for the following components:    Glucose 110 (*)     Urea Nitrogen 37 (*)     Creatinine 2.39 (*)     GFR Estimate 23 (*)     GFR Estimate If Black 26 (*)     AST 49 (*)     All other components within normal limits   ROUTINE UA WITH MICROSCOPIC - Abnormal; Notable for the following components:    Protein Albumin Urine 70 (*)     Mucous Urine Present (*)     All other components within normal limits   GLUCOSE BY METER - Abnormal; Notable for the following components:    Glucose 118 (*)     All other components within normal limits   TROPONIN I   CK TOTAL   COVID-19 VIRUS (CORONAVIRUS) BY PCR   GLUCOSE MONITOR NURSING POCT   PERIPHERAL IV CATHETER   CARDIAC CONTINUOUS MONITORING     .   Treatments provided: none  Family Comments: kira Glass   [phone]  OBS brochure/video discussed/provided to patient:  Yes but doesn't understand, not sure why he is being admitted. Just here for routine care  ED Medications: Medications - No data to display  Drips infusing:  No  For the majority of the shift, the patient's behavior Yellow. Interventions performed were frequent re orientation to situation, reassurance and support.    Sepsis treatment initiated: No     Patient tested for COVID 19 prior to admission: YES    ED Nurse Name/Phone Number: Sharon Cortez RN,   4:34 AM    RECEIVING UNIT ED HANDOFF REVIEW    Above ED Nurse Handoff Report was reviewed: Yes  Reviewed by: Nayana Jones RN on November 18, 2020 at 7:35 AM

## 2020-11-18 NOTE — ED NOTES
Writer found patient standing independently wanting to use the commode. Patient able to pivot independently and sit. Patient briefs and commode had a small amount of watery brown stool, but patient insists he is constipated and continues to use commode. States he has a very large stool in his rectum. Brief changed.

## 2020-11-18 NOTE — ED NOTES
"Patient believes he is here for a routine check up.    Speaks of [facility] to [facility] the [facility]. \"they don't know how to take care of people\" \"they say I was never in the [employer]\"    Talks about black discrimination and Trump president.   "

## 2020-11-18 NOTE — ED TRIAGE NOTES
Pt arrives via EMS from home.  Fell at some point. On ground since 2 am?  Only eats once a day.  Stuck between toilet and wall. Had life alert pendant around neck. Never pushed for help. Called niece for help, niece called 911. Food sitting out for days.    Blood sugar 101  No injuries found  Pt did not want to go to hospital.   Placed on BALDOMERO.  Unable to use walker appropriately. Unable to stand by self.  Niece not helpful and didn't want to come and help.     [facility], [facility].  meds are in automatic pill dispenser.    Couldn't stand on own 2 feet, didn't understand how to use walker    Per EMS vulnerable adult. Unable to care for self at home

## 2020-11-18 NOTE — PLAN OF CARE
PRIMARY DIAGNOSIS: GENERALIZED WEAKNESS    OUTPATIENT/OBSERVATION GOALS TO BE MET BEFORE DISCHARGE  1. Orthostatic performed: Yes:          Lying Orthostatic BP: 154/85         Sitting Orthostatic BP: 134/81         Standing Orthostatic BP: 165/91     2. Tolerating PO medications: Yes    3. Return to near baseline physical activity: no    4. Cleared for discharge by consultants (if involved): no    Discharge Planner Nurse   Safe discharge environment identified: Yes  Barriers to discharge: Yes       Entered by: Case Silva 11/18/2020     Pt alert to self and place, confused. He denies pain. Up with assist x1 and walker. Incontinent at times. BP elevated 180/93 - provider notified, will cont to monitor BP. Mid line WNL, LR @ 100 ml/hr. On tele. Regular diet, eating well. Will cont supportive cares.   Please review provider order for any additional goals.   Nurse to notify provider when observation goals have been met and patient is ready for discharge.

## 2020-11-18 NOTE — ED NOTES
"Patient placed on bedpan. Patient continues to be fixated on pooping and the [facility] \"stuff\". Difficult to redirect. Patient has not slept at all  "

## 2020-11-18 NOTE — ED NOTES
Patient had a very large, formed stool. Able to stand and wipe independently. Able to lay down in bed independently. Patient resting.

## 2020-11-18 NOTE — PROGRESS NOTES
PRIMARY DIAGNOSIS: GENERALIZED WEAKNESS    OUTPATIENT/OBSERVATION GOALS TO BE MET BEFORE DISCHARGE  1. Orthostatic performed: Yes:          Lying Orthostatic BP: 154/85         Sitting Orthostatic BP: 134/81         Standing Orthostatic BP: 165/91     2. Tolerating PO medications: Yes    3. Return to near baseline physical activity: No    4. Cleared for discharge by consultants (if involved): No    Discharge Planner Nurse   Safe discharge environment identified: Lives at home alone  Barriers to discharge: Yes       Entered by: Nayana Jones 11/18/2020 2:23 PM     Please review provider order for any additional goals.   Nurse to notify provider when observation goals have been met and patient is ready for discharge.    Patient is alert, but confused. Patient very pleasant. /78 and Metoprolol given. Lung sounds clear in all lobes and patient is on RA. Denies SOB. Active bowel sounds in all 4 quadrants with LBM this morning (brown small). Patient wears a brief for dribbling. Patient is a 1 assist to transfer to the bathroom. Patient is able to let staff know when he needs to void. Baseline patient uses a walker/cane to ambulate at home. Fall precautions in place. Vascular access placed a midline this morning. IV fluids infusing at 100 ml/hr. Regular diet and tolerating. Tele in place and patient is SR/SB HR 59/60 bpm. Denies pain. BS today have been 89 and 114. Plan: SW/PT consults.     COVID- NEGATIVE    BP (!) 156/78 (BP Location: Left arm)   Pulse 72   Temp 98.4  F (36.9  C) (Oral)   Resp 14   Wt 65.2 kg (143 lb 12.8 oz)   SpO2 96%   BMI 21.86 kg/m

## 2020-11-18 NOTE — PROCEDURES
[facility]    Single Lumen Midline Placement    Date/Time: 11/18/2020 10:32 AM  Performed by: Mercy Johnson RN  Authorized by: Todd Velarde MD   Indications: vascular access    UNIVERSAL PROTOCOL   Site Marked: NA  Prior Images Obtained and Reviewed:  Yes  Required items: Required blood products, implants, devices and special equipment available    Patient identity confirmed:  Verbally with patient, arm band and hospital-assigned identification number  Patient was reevaluated immediately before administering moderate or deep sedation or anesthesia  Confirmation Checklist:  Patient's identity using two indicators, relevant allergies and correct equipment/implants were available  Universal Protocol: the Joint Commission Universal Protocol was followed    Preparation: Patient was prepped and draped in usual sterile fashion    ESBL (mL):  5         ANESTHESIA    Anesthesia: Local infiltration  Local Anesthetic:  Lidocaine 1% without epinephrine  Anesthetic Total (mL):  1      SEDATION    Patient Sedated: No        Preparation: skin prepped with ChloraPrep  Skin prep agent: skin prep agent completely dried prior to procedure  Sterile barriers: maximum sterile barriers were used: cap, mask, sterile gown, sterile gloves, and large sterile sheet  Hand hygiene: hand hygiene performed prior to central venous catheter insertion  Type of line used: Midline  Catheter type: single lumen  Lumen type: non-valved  Catheter size: 4 Fr  Brand: Bard  Lot number: [id number]  Placement method: venipuncture, MST and ultrasound  Number of attempts: 1  Successful placement: yes  Orientation: right  Location: brachial vein (medial) (vein diameter 0.49cm)  Arm circumference: adults 10 cm  Extremity circumference: 26.5  Visible catheter length: 0  Internal length: 15 cm  Total catheter length: 15  Dressing and securement: chlorhexidine patch applied, blood cleaned with CHG, securement device, site cleaned,  statlock and sterile dressing applied  Post procedure assessment: free fluid flow and blood return through all ports  PROCEDURE   Patient Tolerance:  Patient tolerated the procedure well with no immediate complications  Describe Procedure: Ok to use MIDLINE

## 2020-11-18 NOTE — PLAN OF CARE
ROOM # 221    Living Situation (if not independent, order SW consult): Lives at home alone (per patient statement) but has services.  Facility name:  : [facility] (son) [phone]                             [facility] (niece) [phone]                             [facility] (son) [phone]    Activity level at baseline: walker and cane  Activity level on admit: 1 assist with walker and gait belt.       Patient registered to observation; given Patient Bill of Rights; given the opportunity to ask questions about observation status and their plan of care.  Patient has been oriented to the observation room, bathroom and call light is in place.    Discussed discharge goals and expectations with patient/family.

## 2020-11-18 NOTE — PROGRESS NOTES
[facility]  Hospitalist Progress Note  Naheed Kam PA-C 11/18/2020    Reason for Stay (Diagnosis): Fall at home         Assessment and Plan:      Summary of Stay: Hubert Tamayo is a 89 year old male who lives independently with history of dementia, CAD, hypertension and hyperlipidemia who was was admitted on 11/18 after being found down at home for multiple hours.  On admission he is afebrile with pulse of 64, blood pressure 177/91 and breathing comfortably on room air without hypoxia.  Urinalysis is negative, CT head is unremarkable and portable chest x-ray is negative.  Lab work remarkable for creatinine of 2.39, BUN 37, glucose 110, AST 49 with other LFTs being normal, troponin 0 0.028, WBC 11.3 and hemoglobin 10.2.  CK was normal at 190.    Asymptomatic Covid pending      #Suspect mechanical fall without clear cause  #Generalized weakness  Patient states he has been falling more at home and had a fall last night between his toilet and wall where he laid for a few hours.  He has no complaints of pain and CT of his head is negative.  No infectious symptoms on exam or work-up.  He denies loss of consciousness or dizziness prior to falling.  Lab work is overall unremarkable with the exception of creatinine of 2.39 and elevated BUN.  -Treat SLY as below  -Orthostatic blood pressure  -Monitor on telemetry  -PT and social work consults    #SLY  Baseline creatinine between 1.7-1.8 with current creatinine of 2.39.  Clinically looks dehydrated but CK level normal.  -Lactated Ringer's at 100 mL/h  -Avoid nephrotoxins    #Complaint of black stools  She states his stool has been darker almost black.  He denies history of GI bleed does not have any risk factors other than taking Plavix for GI bleed.  Hemoglobin is 10.2 and was recently 11.8 in July.  -Watch for black stools  -Recheck hemoglobin this afternoon  -Added omeprazole 20 mg daily  -May need GI consult    #Dementia  Lives with niece.  Also has  diagnosis of paranoid schizophrenia but does not seem paranoid at this time.  -Still awaiting pharmacy med rec but it appears patient has been on Haldol, olanzapine, Zoloft and donepezil    #History of CAD  #Hypertension  No complaints of chest pain  -We will continue metoprolol and Plavix for now  -If nurses note black stool will discontinue Plavix    #BPH  -Continue Flomax once med rec is complete    #History of COPD/asthma  No breathing complaints  -Hopefully  family can bring in home inhalers        DVT Prophylaxis: Pneumatic Compression Devices  Code Status: Full Code  Discharge Dispo: Will stay tonight. PT to assess          Interval History (Subjective):      Has no complaints today. States he is falling more frequently at home and has noted black stools without abdominal pain.                   Physical Exam:      Last Vital Signs:  BP (!) 159/95 (BP Location: Right arm)   Pulse 67   Temp 98.4  F (36.9  C) (Oral)   Resp 16   Wt 65.2 kg (143 lb 12.8 oz)   SpO2 97%   BMI 21.86 kg/m      No intake or output data in the 24 hours ending 11/18/20 1155    Constitutional: Awake, alert, cooperative, no apparent distress   Respiratory: Clear to auscultation bilaterally, no crackles or wheezing   Cardiovascular: Regular rate and rhythm, normal S1 and S2, and no murmur noted   Abdomen: Normal bowel sounds, soft, non-distended, non-tender   Skin: No rashes, no cyanosis, dry to touch   Neuro: Alert and oriented x3, no weakness, numbness, memory loss   Extremities: No edema, normal range of motion   Other(s):        All other systems: Negative          Medications:      All current medications were reviewed with changes reflected in problem list.         Data:      All new lab and imaging data was reviewed.   Labs:  Recent Labs   Lab 11/18/20  0107      POTASSIUM 4.6   CHLORIDE 107   CO2 27   ANIONGAP 6   *   BUN 37*   CR 2.39*   GFRESTIMATED 23*   GFRESTBLACK 26*   RAMON 9.1     Recent Labs   Lab  11/18/20  0107      POTASSIUM 4.6   CHLORIDE 107   CO2 27   ANIONGAP 6   *   BUN 37*   CR 2.39*   GFRESTIMATED 23*   GFRESTBLACK 26*   RAMON 9.1   PROTTOTAL 7.6   ALBUMIN 3.4   BILITOTAL 0.4   ALKPHOS 116   AST 49*   ALT 41     Recent Labs   Lab 11/18/20  0225   COLOR Light Yellow   APPEARANCE Clear   URINEGLC Negative   URINEBILI Negative   URINEKETONE Negative   SG 1.015   UBLD Negative   URINEPH 5.5   PROTEIN 70*   NITRITE Negative   LEUKEST Negative   RBCU <1   WBCU 1      Imaging:   Recent Results (from the past 24 hour(s))   XR Chest Port 1 View    Narrative    EXAM: XR CHEST PORT 1 VW  LOCATION: [facility]  DATE/TIME: 11/18/2020 12:56 AM    INDICATION: Cough.  COMPARISON: None.      Impression    IMPRESSION: Negative chest.   Head CT w/o contrast    Narrative    EXAM: CT HEAD W/O CONTRAST  LOCATION: [facility]  DATE/TIME: 11/18/2020 1:54 AM    INDICATION: Head trauma, minor, GCS >= 13, high clinical risk, initial exam.    COMPARISON: 7/1/2020.    TECHNIQUE: Routine without IV contrast. Multiplanar reformats. Dose reduction techniques were used.    FINDINGS:  INTRACRANIAL CONTENTS: No intracranial hemorrhage, extra-axial collection, or mass effect. No CT evidence of acute infarct. Mild to moderate presumed chronic small vessel ischemic changes. Mild to moderate generalized volume loss. No hydrocephalus.     VISUALIZED ORBITS/SINUSES/MASTOIDS: No intraorbital abnormality. No paranasal sinus mucosal disease. No middle ear or mastoid effusion.    BONES/SOFT TISSUES: No acute abnormality.      Impression    IMPRESSION:  1.  No acute intracranial hemorrhage or calvarial fracture.    2.  Mild to moderate volume loss and presumed chronic small vessel ischemic changes.

## 2020-11-18 NOTE — ED NOTES
0240  Complete bed change. Patient helpful in moving in bed. Urinated. Attempted to use bedpan to poop. Urine sample collected.  0315 covid sent  0351 Tereza Maria Luisa updated with patient's permission.  0410 Hospitalist at bedside.

## 2020-11-18 NOTE — H&P
[facility]    History and Physical  Hospitalist       Date of Admission:  11/17/2020    Assessment & Plan   Hubert Tamayo is a 89 year old male with a past medical history of dementia, CAD, hypertension and hyperlipidemia who presents after a fall at home.  [facility]    #Generalized Weakness/Fall/Confusion: EMS reports that patient fell in his bathroom.  He was between his toilet and the wall.  The patient does remember falling and states that his walker gave out.  He did not activate his life alert button.  He called for help from his niece who did find him and called EMS.  Patient notes that he was probably down for 4 to 5 hours.  No injuries were noted on the scene.    Patient was able to stand on his own in the ED but did not quite comprehend how to use his walker.  Patient denies any chest pain or shortness of breath.  No worsening cough.  No nausea or vomiting.  No abdominal pain.  He does endorse some constipation.  No dysuria.  -ED, patient afebrile nontachycardic.  A bit hypertensive with systolic blood pressures in the 150-170 range.  Labs notable for BMP showing creatinine of 2.39 with baseline noted between 1.8-2.3.  CK nl.  CBC unremarkable.  UA without sign of infection.  CT of the head without acute abnormality.  Chest x-ray clear.  -Patient does appear dehydrated on exam.  Suspect fall is secondary to deconditioned state, dehydration and underlying dementia.  Will give gentle IV fluids today.  Will have patient work with PT and OT.  -Hold antibiotics as no evidence of infection.    #Asymptomatic COVID-19 in process: No new symptoms.  Has chronic cough he states he has had for decades.  No SOB, fever, change in taste/smell.  No sore throat.    Chronic Med Conditions  #Dementia, also noted diagnosis of paranoid shizophrenia: Seems to be fairly advanced.  Awaiting med rec.  What is documented here thus far patient has on Haldol, olanzapine, Zoloft along with  donepezil.  Will need to discuss with niece with whom he lives about living situation during the daytime.  #HTN/CAD/: We will continue home metoprolol, Plavix  #BPH: Continue tamsulosin  #COPD/Asthma: We will continue home Spiriva, Symbicort and albuterol as needed.  No sign of exacerbation.    DVT Prophylaxis: Pneumatic Compression Devices  Code Status: Full Code, patient does note he would want to be resuscitated on admission.  Dispo: Admit to observation    Vamshi Santana MD    Primary Care Physician   \Saige Cortez    Chief Complaint   Fall    History is obtained from the patient, patient's chart discussed with ER physician    History of Present Illness   Hubert Tamayo is a 89 year old male with a past medical history of dementia, CAD, hypertension and hyperlipidemia who presents after a fall at home.    EMS reports that patient fell in his bathroom.  He was between his toilet and the wall.  The patient does remember falling and states that his walker gave out.  He did not activate his life alert button.  He called for help from his niece who did find him and called EMS.  Patient notes that he was probably down for 4 to 5 hours.  No injuries were noted on the scene.  Per ED report the EMS did say that food seem to be sitting up for days.  Patient does state that he does not eat very much.  Patient was able to stand on his own in the ED but did not quite comprehend how to use his walker.  Patient denies any chest pain or shortness of breath.  No worsening cough.  No nausea or vomiting.  No abdominal pain.  He does endorse some constipation.  No dysuria.    In the ED, patient afebrile nontachycardic.  A bit hypertensive with systolic blood pressures in the 150-1 70 range.  Labs notable for BMP showing creatinine of 2.39 with baseline noted between 1.8-2.3.  CBC unremarkable.  UA without sign of infection.  CT of the head without acute abnormality.  Chest x-ray clear.    Past Medical History    I have reviewed this  patient's medical history and updated it with pertinent information if needed.   Past Medical History:   Diagnosis Date     CAD (coronary artery disease)      Dementia (H)      Diabetes mellitus type II      Essential hypertension      Osteoporosis      Paranoid schizophrenia      Pulmonary emphysema        Past Surgical History   I have reviewed this patient's surgical history and updated it with pertinent information if needed.  Past Surgical History:   Procedure Laterality Date     CATARACT REMOVAL Bilateral        Prior to Admission Medications   Prior to Admission Medications   Prescriptions Last Dose Informant Patient Reported? Taking?   OLANZapine (ZYPREXA) 20 MG tablet   Yes No   Sig: Take 20 mg by mouth At Bedtime   Petrolatum OINT  Pharmacy Yes No   Sig: Externally apply topically 4 times daily as needed (dry lips)   Skin Protectants, Misc. (EUCERIN) cream   Yes No   Sig: Apply topically daily as needed for dry skin   Tiotropium Bromide Monohydrate (SPIRIVA HANDIHALER IN)  Pharmacy Yes No   Sig: Inhale 1 capsule into the lungs daily    albuterol (PROAIR HFA/PROVENTIL HFA/VENTOLIN HFA) 108 (90 Base) MCG/ACT inhaler  Pharmacy Yes No   Sig: Inhale 2 puffs into the lungs every 4 hours as needed for shortness of breath / dyspnea or wheezing   budesonide-formoterol (SYMBICORT) 160-4.5 MCG/ACT Inhaler  Pharmacy Yes No   Sig: Inhale 2 puffs into the lungs 2 times daily   cholecalciferol 1000 units TABS  Pharmacy Yes No   Sig: Take 1,000 Units by mouth daily   clopidogrel (PLAVIX) 75 MG tablet   No No   Sig: Take 1 tablet (75 mg) by mouth daily   donepezil (ARICEPT) 10 MG tablet  Pharmacy Yes No   Sig: Take 10 mg by mouth daily    haloperidol (HALDOL) 5 MG tablet   Yes No   Sig: Take 2.5 mg by mouth 3 times daily    hydrocortisone (CORTAID) 1 % external cream  Pharmacy Yes No   Sig: Apply topically 2 times daily Apply thin layer to itchy scaling on left inner elbow and nose   metoprolol tartrate (LOPRESSOR) 50 MG  tablet   No No   Sig: Take 1 tablet (50 mg) by mouth 2 times daily   mometasone (NASONEX) 50 MCG/ACT nasal spray  Pharmacy Yes No   Sig: [place] 2 sprays into both nostrils daily as needed (allergy symptoms / runny nose)   nitroGLYcerin (NITROSTAT) 0.4 MG sublingual tablet  Pharmacy Yes No   Sig: Place 0.4 mg under the tongue every 5 minutes as needed for chest pain For chest pain place 1 tablet under the tongue every 5 minutes for 3 doses. If symptoms persist 5 minutes after 1st dose call 911.   nystatin (MYCOSTATIN) 462343 UNIT/GM external cream  Pharmacy Yes No   Sig: Apply topically 2 times daily as needed (Intertrigo) Apply thin layer to groin folds   senna (SENOKOT) 8.6 MG tablet   Yes No   Sig: Take 1 tablet by mouth every morning   senna (SENOKOT) 8.6 MG tablet   Yes No   Sig: Take 1 tablet by mouth every evening as needed for constipation   sertraline (ZOLOFT) 100 MG tablet   No No   Sig: Take 1.5 tablets (150 mg) by mouth daily   tamsulosin (FLOMAX) 0.4 MG capsule  Pharmacy Yes No   Sig: Take 0.4 mg by mouth daily      Facility-Administered Medications: None     Allergies   Allergies   Allergen Reactions     Crustaceans      Loxapine      Nifedipine      Shellfish Allergy      Sulfa Drugs      Sulfamethoxazole-Trimethoprim      Trimethoprim        Social History   I have reviewed this patient's social history and updated it with pertinent information if needed. Hubert Tamayo  reports that he has quit smoking. He smoked 0.00 packs per day. He has never used smokeless tobacco. He reports previous alcohol use. He reports that he does not use drugs.    Family History   I have reviewed this patient's family history and updated it with pertinent information if needed.   Noncontributory    Review of Systems   The 10 point Review of Systems is negative other than noted in the HPI or here.     Physical Exam   Temp: 98.4  F (36.9  C) Temp src: Oral BP: (!) 151/130 Pulse: 66   Resp: 12 SpO2: 98 %      Vital Signs with  Ranges  Temp:  [98.4  F (36.9  C)] 98.4  F (36.9  C)  Pulse:  [57-84] 66  Resp:  [9-26] 12  BP: (151-177)/() 151/130  SpO2:  [23 %-99 %] 98 %  0 lbs 0 oz    Constitutional: Elderly male, NAD. Confused but pleasant  HEENT: Normocephalic, mucous membranes dry. No elevation of JVd noted  Respiratory: Nl WOB, Clear bilaterally, No wheezes, no crackles  Cardiovascular: Regular, no murmur  GI: BS+, NT, ND, no rebound or guarding  Lymph/Hematologic: No bruising. No cervical LAD  Skin: No rash  Musculoskeletal: Nl Tone, No edema noted  Neurologic: A&Ox3. Does not know why he was brought to hospital.  Moves all extremities. No tremor is noted  Psychiatric: Calm    Data   Data reviewed today:  I personally reviewed  Recent Labs   Lab 11/18/20  0107   WBC 11.3*   HGB 10.2*   MCV 96   *      POTASSIUM 4.6   CHLORIDE 107   CO2 27   BUN 37*   CR 2.39*   ANIONGAP 6   RAMON 9.1   *   ALBUMIN 3.4   PROTTOTAL 7.6   BILITOTAL 0.4   ALKPHOS 116   ALT 41   AST 49*   TROPI 0.028       Recent Results (from the past 24 hour(s))   XR Chest Port 1 View    Narrative    EXAM: XR CHEST PORT 1 VW  LOCATION: [facility]  DATE/TIME: 11/18/2020 12:56 AM    INDICATION: Cough.  COMPARISON: None.      Impression    IMPRESSION: Negative chest.   Head CT w/o contrast    Narrative    EXAM: CT HEAD W/O CONTRAST  LOCATION: [facility]  DATE/TIME: 11/18/2020 1:54 AM    INDICATION: Head trauma, minor, GCS >= 13, high clinical risk, initial exam.    COMPARISON: 7/1/2020.    TECHNIQUE: Routine without IV contrast. Multiplanar reformats. Dose reduction techniques were used.    FINDINGS:  INTRACRANIAL CONTENTS: No intracranial hemorrhage, extra-axial collection, or mass effect. No CT evidence of acute infarct. Mild to moderate presumed chronic small vessel ischemic changes. Mild to moderate generalized volume loss. No hydrocephalus.     VISUALIZED ORBITS/SINUSES/MASTOIDS: No intraorbital abnormality. No paranasal  sinus mucosal disease. No middle ear or mastoid effusion.    BONES/SOFT TISSUES: No acute abnormality.      Impression    IMPRESSION:  1.  No acute intracranial hemorrhage or calvarial fracture.    2.  Mild to moderate volume loss and presumed chronic small vessel ischemic changes.

## 2020-11-18 NOTE — ED NOTES
"Patient moving around a lot in bed. \"you aren't doing anything for me here\" I want to go. Re oriented to that we are waiting for his bed. Charge RN and Dr. Lay updated. Patient has not slept, becoming restless. No home medications were received. Home dose of haldol ordered  "

## 2020-11-18 NOTE — ED NOTES
Patient up to bedside commode with ERT. At this time able to move with assist of one. Fixated on pooping. Able to be redirected

## 2020-11-19 ENCOUNTER — APPOINTMENT (OUTPATIENT)
Dept: PHYSICAL THERAPY | Facility: CLINIC | Age: 85
DRG: 683 | End: 2020-11-19
Attending: HOSPITALIST
Payer: MEDICARE

## 2020-11-19 PROBLEM — R53.1 GENERALIZED WEAKNESS: Status: ACTIVE | Noted: 2020-11-19

## 2020-11-19 PROBLEM — N17.9 ACUTE KIDNEY INJURY (H): Status: ACTIVE | Noted: 2020-11-19

## 2020-11-19 LAB
ALBUMIN SERPL-MCNC: 2.9 G/DL (ref 3.4–5)
ALP SERPL-CCNC: 106 U/L (ref 40–150)
ALT SERPL W P-5'-P-CCNC: 35 U/L (ref 0–70)
ANION GAP SERPL CALCULATED.3IONS-SCNC: 1 MMOL/L (ref 3–14)
AST SERPL W P-5'-P-CCNC: 41 U/L (ref 0–45)
BASOPHILS # BLD AUTO: 0 10E9/L (ref 0–0.2)
BASOPHILS NFR BLD AUTO: 0.3 %
BILIRUB SERPL-MCNC: 0.5 MG/DL (ref 0.2–1.3)
BUN SERPL-MCNC: 36 MG/DL (ref 7–30)
CALCIUM SERPL-MCNC: 8.8 MG/DL (ref 8.5–10.1)
CHLORIDE SERPL-SCNC: 109 MMOL/L (ref 94–109)
CO2 SERPL-SCNC: 29 MMOL/L (ref 20–32)
CREAT SERPL-MCNC: 1.99 MG/DL (ref 0.66–1.25)
DIFFERENTIAL METHOD BLD: ABNORMAL
EOSINOPHIL # BLD AUTO: 0.2 10E9/L (ref 0–0.7)
EOSINOPHIL NFR BLD AUTO: 2 %
ERYTHROCYTE [DISTWIDTH] IN BLOOD BY AUTOMATED COUNT: 16.2 % (ref 10–15)
GFR SERPL CREATININE-BSD FRML MDRD: 28 ML/MIN/{1.73_M2}
GLUCOSE SERPL-MCNC: 97 MG/DL (ref 70–99)
HCT VFR BLD AUTO: 32.6 % (ref 40–53)
HGB BLD-MCNC: 10.5 G/DL (ref 13.3–17.7)
IMM GRANULOCYTES # BLD: 0.1 10E9/L (ref 0–0.4)
IMM GRANULOCYTES NFR BLD: 0.5 %
LYMPHOCYTES # BLD AUTO: 2.1 10E9/L (ref 0.8–5.3)
LYMPHOCYTES NFR BLD AUTO: 22.5 %
MCH RBC QN AUTO: 30.2 PG (ref 26.5–33)
MCHC RBC AUTO-ENTMCNC: 32.2 G/DL (ref 31.5–36.5)
MCV RBC AUTO: 94 FL (ref 78–100)
MONOCYTES # BLD AUTO: 1.1 10E9/L (ref 0–1.3)
MONOCYTES NFR BLD AUTO: 12.2 %
NEUTROPHILS # BLD AUTO: 5.8 10E9/L (ref 1.6–8.3)
NEUTROPHILS NFR BLD AUTO: 62.5 %
NRBC # BLD AUTO: 0 10*3/UL
NRBC BLD AUTO-RTO: 0 /100
PLATELET # BLD AUTO: 120 10E9/L (ref 150–450)
POTASSIUM SERPL-SCNC: 4.4 MMOL/L (ref 3.4–5.3)
PROT SERPL-MCNC: 7 G/DL (ref 6.8–8.8)
RBC # BLD AUTO: 3.48 10E12/L (ref 4.4–5.9)
SODIUM SERPL-SCNC: 139 MMOL/L (ref 133–144)
WBC # BLD AUTO: 9.3 10E9/L (ref 4–11)

## 2020-11-19 PROCEDURE — 85025 COMPLETE CBC W/AUTO DIFF WBC: CPT | Performed by: PHYSICIAN ASSISTANT

## 2020-11-19 PROCEDURE — 97116 GAIT TRAINING THERAPY: CPT | Mod: GP

## 2020-11-19 PROCEDURE — 250N000013 HC RX MED GY IP 250 OP 250 PS 637: Performed by: PHYSICIAN ASSISTANT

## 2020-11-19 PROCEDURE — 36569 INSJ PICC 5 YR+ W/O IMAGING: CPT

## 2020-11-19 PROCEDURE — G0378 HOSPITAL OBSERVATION PER HR: HCPCS

## 2020-11-19 PROCEDURE — 250N000013 HC RX MED GY IP 250 OP 250 PS 637: Performed by: HOSPITALIST

## 2020-11-19 PROCEDURE — 36415 COLL VENOUS BLD VENIPUNCTURE: CPT | Performed by: PHYSICIAN ASSISTANT

## 2020-11-19 PROCEDURE — 97161 PT EVAL LOW COMPLEX 20 MIN: CPT | Mod: GP

## 2020-11-19 PROCEDURE — 99207 PR CDG-CODE CATEGORY CHANGED: CPT | Performed by: PHYSICIAN ASSISTANT

## 2020-11-19 PROCEDURE — 272N000433 HC KIT CATH IV 18 OR 20G CM, POWERGLIDE W MAX BARRIER

## 2020-11-19 PROCEDURE — 80053 COMPREHEN METABOLIC PANEL: CPT | Performed by: PHYSICIAN ASSISTANT

## 2020-11-19 PROCEDURE — 120N000004 HC R&B MS OVERFLOW

## 2020-11-19 RX ADMIN — POLYETHYLENE GLYCOL 3350 17 G: 17 POWDER, FOR SOLUTION ORAL at 07:55

## 2020-11-19 RX ADMIN — DOCUSATE SODIUM 50 MG AND SENNOSIDES 8.6 MG 1 TABLET: 8.6; 5 TABLET, FILM COATED ORAL at 07:54

## 2020-11-19 RX ADMIN — METOPROLOL TARTRATE 50 MG: 50 TABLET, FILM COATED ORAL at 20:20

## 2020-11-19 RX ADMIN — HALOPERIDOL 1 MG: 1 TABLET ORAL at 20:20

## 2020-11-19 RX ADMIN — DOCUSATE SODIUM 50 MG AND SENNOSIDES 8.6 MG 1 TABLET: 8.6; 5 TABLET, FILM COATED ORAL at 20:20

## 2020-11-19 RX ADMIN — HALOPERIDOL 1 MG: 1 TABLET ORAL at 14:47

## 2020-11-19 RX ADMIN — ATORVASTATIN CALCIUM 80 MG: 40 TABLET, FILM COATED ORAL at 07:54

## 2020-11-19 RX ADMIN — ASPIRIN 81 MG 81 MG: 81 TABLET ORAL at 07:54

## 2020-11-19 RX ADMIN — AMLODIPINE BESYLATE 2.5 MG: 2.5 TABLET ORAL at 07:54

## 2020-11-19 RX ADMIN — DONEPEZIL HYDROCHLORIDE 5 MG: 5 TABLET ORAL at 07:54

## 2020-11-19 RX ADMIN — SERTRALINE HYDROCHLORIDE 150 MG: 100 TABLET ORAL at 07:54

## 2020-11-19 RX ADMIN — HALOPERIDOL 1 MG: 1 TABLET ORAL at 07:54

## 2020-11-19 RX ADMIN — METOPROLOL TARTRATE 50 MG: 50 TABLET, FILM COATED ORAL at 07:54

## 2020-11-19 RX ADMIN — OMEPRAZOLE 20 MG: 20 CAPSULE, DELAYED RELEASE ORAL at 06:57

## 2020-11-19 RX ADMIN — TAMSULOSIN HYDROCHLORIDE 0.4 MG: 0.4 CAPSULE ORAL at 07:54

## 2020-11-19 RX ADMIN — Medication 1000 UNITS: at 07:54

## 2020-11-19 RX ADMIN — OLANZAPINE 20 MG: 10 TABLET, FILM COATED ORAL at 23:39

## 2020-11-19 NOTE — PLAN OF CARE
PRIMARY DIAGNOSIS: GENERALIZED WEAKNESS    OUTPATIENT/OBSERVATION GOALS TO BE MET BEFORE DISCHARGE  1. Orthostatic performed: Yes:          Lying Orthostatic BP: 160/82         Sitting Orthostatic BP: 141/84         Standing Orthostatic BP: 102/91     2. Tolerating PO medications: Yes    3. Return to near baseline physical activity: No    4. Cleared for discharge by consultants (if involved): No, PT to evaluate    BP (!) 160/82 (BP Location: Left arm)   Pulse 69   Temp 97.7  F (36.5  C) (Oral)   Resp 16   Wt 65.2 kg (143 lb 12.8 oz)   SpO2 95%   BMI 21.86 kg/m        Discharge Planner Nurse   Safe discharge environment identified: No  Barriers to discharge: Yes       Entered by: Judi Mathew 11/19/2020 12:25 AM     Please review provider order for any additional goals.   Nurse to notify provider when observation goals have been met and patient is ready for discharge.    Patient alert but confused. Denies pain. No dark stools noted, incontinent of urine at times. Up with 1 assist, walker, and gait belt. IVF infusing at 100 mL/hr into R midline. Tolerating regular diet. Telemetry reading SR with HR in the 60s. SW and PT consulted, PT to evaluate patient tomorrow. Will continue with plan of care.

## 2020-11-19 NOTE — PROCEDURES
[facility]    Single Lumen Midline Placement    Date/Time: 11/19/2020 8:51 AM  Performed by: Katalina Ruvalcaba RN  Authorized by: Vamshi Santana MD   Indications: vascular access    UNIVERSAL PROTOCOL   Site Marked: No  Prior Images Obtained and Reviewed:  No  Required items: Required blood products, implants, devices and special equipment available    Patient identity confirmed:  Verbally with patient and arm band  NA - No sedation, light sedation, or local anesthesia  Confirmation Checklist:  Patient's identity using two indicators, relevant allergies, procedure was appropriate and matched the consent or emergent situation and correct equipment/implants were available  Time out: Immediately prior to the procedure a time out was called    Universal Protocol: the Joint Commission Universal Protocol was followed    Preparation: Patient was prepped and draped in usual sterile fashion    ESBL (mL):  2         ANESTHESIA    Anesthesia: See MAR for details  Local Anesthetic:  Lidocaine 1% without epinephrine  Anesthetic Total (mL):  2      SEDATION    Patient Sedated: No        Preparation: skin prepped with ChloraPrep  Skin prep agent: skin prep agent completely dried prior to procedure  Sterile barriers: maximum sterile barriers were used: cap, mask, sterile gown, sterile gloves, and large sterile sheet  Hand hygiene: hand hygiene performed prior to central venous catheter insertion  Type of line used: Midline  Catheter type: single lumen  Lumen type: non-valved  Catheter size: 18g.  Brand: [employer]  Lot number: [id number]  Placement method: venipuncture, ultrasound and MST  Number of attempts: 2  Successful placement: yes  Orientation: right  Location: brachial vein (medial)  Arm circumference: adults 10 cm  Extremity circumference: 27.5  Visible catheter length: 0  Internal length: 10 cm  Total catheter length: 10  Dressing and securement: blood cleaned with CHG, dressing applied, chlorhexidine  patch applied, occlusive dressing applied, statlock and sterile dressing applied  Post procedure assessment: blood return through all ports and free fluid flow  PROCEDURE   Patient Tolerance:  Patient tolerated the procedure well with no immediate complications  Describe Procedure: Midline is good to use

## 2020-11-19 NOTE — PROGRESS NOTES
[facility]  Hospitalist Progress Note  Naheed Kam PA-C 11/19/2020    Reason for Stay (Diagnosis): Fall at home         Assessment and Plan:      Summary of Stay: Hubert Tamayo is a 89 year old male who lives independently with history of dementia, CAD, hypertension and hyperlipidemia who was was admitted on 11/18 after being found down at home for multiple hours.  On admission he is afebrile with pulse of 64, blood pressure 177/91 and breathing comfortably on room air without hypoxia.  Urinalysis is negative, CT head is unremarkable and portable chest x-ray is negative.  Lab work remarkable for creatinine of 2.39, BUN 37, glucose 110, AST 49 with other LFTs being normal, troponin 0.028, WBC 11.3 and hemoglobin 10.2.  CK was normal at 190.     Asymptomatic Covid pending     #Suspect mechanical fall without clear cause  #Generalized weakness  Patient states he has been falling more at home and had a fall last night between his toilet and wall where he laid for a few hours.  He has no complaints of pain and CT of his head is negative.  No infectious symptoms on exam or work-up.  He denies loss of consciousness or dizziness prior to falling.  Lab work is overall unremarkable with the exception of creatinine of 2.39 and elevated BUN.  -Treat SLY as below  -Orthostatic blood pressure negative  -Discontinue Telemetry  -PT recommends rehab   -Social work consult     #SLY  Baseline creatinine between 1.7-1.8 with admission creatinine of 2.39.  Clinically looks dehydrated but CK level normal.  -Cr improving  -Lactated Ringer's at 100 mL/h for 10 more hours  -Avoid nephrotoxins     #Complaint of black stools  He states his stool has been darker almost black.  He denies history of GI bleed does not have any risk factors other than taking Plavix for GI bleed.  Hemoglobin is 10.2 and was recently 11.8 in July.  -No reported black stools  -Hemoglobins stable  -Added omeprazole 20 mg  daily     #Dementia  Lives with niece but apparently moved out 1 week ago.  Also has diagnosis of paranoid schizophrenia but does not seem paranoid at this time.  -Resumed Haldol, olanzapine, Zoloft and donepezil     #History of CAD  #Hypertension  No complaints of chest pain  -We will continue metoprolol and Plavix for now  -If nurses note black stool will discontinue Plavix     #BPH  -Continue Flomax once med rec is complete     #History of COPD/asthma  No breathing complaints  -Hopefully  family can bring in home inhalers      DVT Prophylaxis: Pneumatic Compression Devices  Code Status: Full Code  Discharge Dispo: Will stay tonight. PT to assess        Interval History (Subjective):      No complaints today but feels weak                  Physical Exam:      Last Vital Signs:  /82 (BP Location: Left arm)   Pulse 57   Temp 97.3  F (36.3  C) (Oral)   Resp 20   Wt 65.2 kg (143 lb 12.8 oz)   SpO2 98%   BMI 21.86 kg/m      No intake or output data in the 24 hours ending 11/19/20 1239    Constitutional: Awake, alert, cooperative, no apparent distress   Respiratory: Clear to auscultation bilaterally, no crackles or wheezing   Cardiovascular: Regular rate and rhythm, normal S1 and S2, and no murmur noted   Abdomen: Normal bowel sounds, soft, non-distended, non-tender   Skin: No rashes, no cyanosis, dry to touch   Neuro: Alert and oriented x3, no weakness, numbness, memory loss   Extremities: No edema, normal range of motion   Other(s):        All other systems: Negative          Medications:      All current medications were reviewed with changes reflected in problem list.         Data:      All new lab and imaging data was reviewed.   Labs:  Recent Labs   Lab 11/19/20  0517      POTASSIUM 4.4   CHLORIDE 109   CO2 29   ANIONGAP 1*   GLC 97   BUN 36*   CR 1.99*   GFRESTIMATED 28*   GFRESTBLACK 33*   RAMON 8.8     Recent Labs   Lab 11/19/20  0517   WBC 9.3   HGB 10.5*   HCT 32.6*   MCV 94   *      Recent Labs   Lab 11/18/20  0225   COLOR Light Yellow   APPEARANCE Clear   URINEGLC Negative   URINEBILI Negative   URINEKETONE Negative   SG 1.015   UBLD Negative   URINEPH 5.5   PROTEIN 70*   NITRITE Negative   LEUKEST Negative   RBCU <1   WBCU 1      Imaging:   Results for orders placed or performed during the hospital encounter of 11/17/20   Head CT w/o contrast    Narrative    EXAM: CT HEAD W/O CONTRAST  LOCATION: [facility]  DATE/TIME: 11/18/2020 1:54 AM    INDICATION: Head trauma, minor, GCS >= 13, high clinical risk, initial exam.    COMPARISON: 7/1/2020.    TECHNIQUE: Routine without IV contrast. Multiplanar reformats. Dose reduction techniques were used.    FINDINGS:  INTRACRANIAL CONTENTS: No intracranial hemorrhage, extra-axial collection, or mass effect. No CT evidence of acute infarct. Mild to moderate presumed chronic small vessel ischemic changes. Mild to moderate generalized volume loss. No hydrocephalus.     VISUALIZED ORBITS/SINUSES/MASTOIDS: No intraorbital abnormality. No paranasal sinus mucosal disease. No middle ear or mastoid effusion.    BONES/SOFT TISSUES: No acute abnormality.      Impression    IMPRESSION:  1.  No acute intracranial hemorrhage or calvarial fracture.    2.  Mild to moderate volume loss and presumed chronic small vessel ischemic changes.     XR Chest Port 1 View    Narrative    EXAM: XR CHEST PORT 1 VW  LOCATION: [facility]  DATE/TIME: 11/18/2020 12:56 AM    INDICATION: Cough.  COMPARISON: None.      Impression    IMPRESSION: Negative chest.

## 2020-11-19 NOTE — PROGRESS NOTES
PRIMARY DIAGNOSIS: GENERALIZED WEAKNESS    OUTPATIENT/OBSERVATION GOALS TO BE MET BEFORE DISCHARGE  1. Orthostatic performed: Yes:          Lying Orthostatic BP: 137/74         Sitting Orthostatic BP: 102/71         Standing Orthostatic BP: 128/68     2. Tolerating PO medications: Yes    3. Return to near baseline physical activity: still weak     4. Cleared for discharge by consultants (if involved): No    Discharge Planner Nurse   Safe discharge environment identified: Yes  Barriers to discharge: Yes       Entered by: Nayana Jones 11/19/2020 2:57 PM     Please review provider order for any additional goals.   Nurse to notify provider when observation goals have been met and patient is ready for discharge.    Patient is alert, but confused. Patient very pleasant. Lung sounds clear in all lobes and patient is on RA. Denies SOB. Active bowel sounds in all 4 quadrants with LBM yesterday. Patient wears a brief for periods of incontinence. Patient is a 1 assist to transfer to the bathroom. Baseline patient uses a walker/cane to ambulate at home. Fall precautions in place. Vascular access placed another midline this morning. LR infusing at 100 ml/hr. Regular diet and tolerating. Denies pain. Plan: SW and PT recommends rehab.      /82 (BP Location: Left arm)   Pulse 57   Temp 97.3  F (36.3  C) (Oral)   Resp 20   Wt 65.2 kg (143 lb 12.8 oz)   SpO2 98%   BMI 21.86 kg/m

## 2020-11-19 NOTE — CONSULTS
Care Management Initial Consult    General Information  Assessment completed with: Patient;Family, (chemo Quinteros)  Type of CM/SW Visit: Initial Assessment  Primary Care Provider verified and updated as needed: Yes   Readmission within the last 30 days:        Reason for Consult: care coordination/care conference  Advance Care Planning:        NA    Communication Assessment  Patient's communication style:      Hearing Difficulty or Deaf: yes        Cognitive  Cognitive/Neuro/Behavioral: Patient alert, able to state name and address, but could not follow converstation  Living Environment:   People in home: alone     Current living Arrangements: apartment      Able to return to prior arrangements: no       Family/Social Support:  Care provided by: self  Provides care for: no one, unable/limited ability to care for self     Children        Chemo Quinteros  Description of Support System: Supportive;Involved    Support Assessment: Adequate family and caregiver support    Current Resources:   Skilled Home Care Services:  none  Community Resources: None  Equipment currently used at home: walker, standard;cane, straight  Supplies currently used at home:  TBD    Employment/Financial:  Employment Status:     retired  Employment/ Comments: (served in the )  Financial Concerns:      no identified       Lifestyle & Psychosocial Needs:        Socioeconomic History     Marital status: Single     Spouse name: Not on file     Number of children: Not on file     Years of education: Not on file     Highest education level: Not on file     Tobacco Use     Smoking status: Former Smoker     Packs/day: 0.00     Smokeless tobacco: Never Used   Substance and Sexual Activity     Alcohol use: Not Currently     Drug use: Never       Functional Status:  Prior to admission patient needed assistance:    Patient had been living at home by himself.  His niece Anny had been living with him, but recently moved.      Mental Health Status:     "  NA    Chemical Dependency Status:            NA    Values/Beliefs:  Spiritual, Cultural Beliefs, [employer] Practices, Values that affect care:       NA          Additional Information:  CTS met with patient to discuss discharge plans, but he was not able to follow the conversation and requested he speak with his family.  PT recommending TCUCTS called and spoke with Anny [phone] who said that she could not make these decisions and I needed to speak with his son Aldair.  CTS called and spoke with Aldair about discharge planning.  Aldair was concerned that the hospital would \"kick the patient out tonight\".  ,Based on providers note, assured him the plan was to keep patient overnight to create a safe discharge plan. They have a history of distrust with the health care system. CTS provided him CTS direct phone number. Patient had been at [facility] in the past and they were dissatisfied with the care and did NOT want referral sent there. CTS did educate Aldair on what a TCU is and the services they provide.  We did discuss no visitors due to COVID and that most TCU had 14 day quarantine period. He agreed to referrals being sent to TCU in the area.  [facility] and [place].  Aldair was going to discuss plan with the family.  He agreed to referrals being sent, but not sending patient. He would like to discuss tomorrow.  He and the family will also be discussing transportation.    Pt/family was provided with the Medicare Compare list for SNF.  Discussed associated Medicare star ratings to assist with choice for referrals/discharge planning Yes    Education was given to pt/family that star ratings are updated/maintained by Medicare and can be reviewed by visiting www.medicare.gov Yes    Reviewed out of pocket cost for [employer] transport, $75 for base rate and $5 per mile to the destination. Pt/family expressed understanding and are discussing transportation " options.    Mendy Torres RN, BSN, PHN, CTS  Care Coordinator  [facility]  [phone]

## 2020-11-19 NOTE — UTILIZATION REVIEW
Admission Status; Secondary Review Determination       Under the authority of the Utilization Management Committee, the utilization review process indicated a secondary review on the above patient. The review outcome is based on review of the medical records, discussions with staff, and applying clinical experience noted on the date of the review.     (x) Inpatient Status Appropriate - This patient's medical care is consistent with medical management for inpatient care and reasonable inpatient medical practice.     RATIONALE FOR DETERMINATION    89 year old male who lives independently with history of dementia, CAD, hypertension and hyperlipidemia who was was admitted on 11/18 after being found down at home for multiple hours. Lab work remarkable for creatinine of 2.39, BUN 37, glucose 110, AST 49 with other LFTs being normal, troponin 0 0.028, WBC 11.3 Patient states he has been falling more at home and had a fall last night between his toilet and wall where he laid for a few hours. Baseline creatinine between 1.7-1.8 with admission creatinine of 2.39.     This is a conditional review for a Medicare patient, at the time of this review patient is anticipated to require at least 1 more night of hospital care; however if plan changes and discharges before 2 midnights please send for post discharge review.    This document was produced using voice recognition software       The information on this document is developed by the utilization review team in order for the business office to ensure compliance. This only denotes the appropriateness of proper admission status and does not reflect the quality of care rendered.   The definitions of Inpatient Status and Observation Status used in making the determination above are those provided in the CMS Coverage Manual, Chapter 1 and Chapter 6, section 70.4.   Sincerely,   HUANG BLANDON MD   System Medical Director   Utilization Management   [facility]  Services.

## 2020-11-19 NOTE — PLAN OF CARE
PRIMARY DIAGNOSIS: GENERALIZED WEAKNESS    OUTPATIENT/OBSERVATION GOALS TO BE MET BEFORE DISCHARGE  1. Orthostatic performed: Yes:          Lying Orthostatic BP: 160/82         Sitting Orthostatic BP: 141/84         Standing Orthostatic BP: 102/91     2. Tolerating PO medications: Yes    3. Return to near baseline physical activity: No    4. Cleared for discharge by consultants (if involved): No, PT to evaluate    /83 (BP Location: Left arm)   Pulse 67   Temp 97.5  F (36.4  C) (Oral)   Resp 16   Wt 65.2 kg (143 lb 12.8 oz)   SpO2 98%   BMI 21.86 kg/m        Discharge Planner Nurse   Safe discharge environment identified: No  Barriers to discharge: Yes       Entered by: Judi Mathew 11/19/2020 4:22 AM     Please review provider order for any additional goals.   Nurse to notify provider when observation goals have been met and patient is ready for discharge.    Patient alert but confused. Denies pain. No dark stools noted, incontinent of urine at times. Up with 1 assist, walker, and gait belt. Patient pulled midline out which was placed by the vascular access team today, fluids stopped. Tolerating regular diet. Telemetry reading SB with HR in the 50s. SW and PT consulted, PT to evaluate patient today. Will continue with plan of care.

## 2020-11-19 NOTE — PLAN OF CARE
PRIMARY DIAGNOSIS: GENERALIZED WEAKNESS    OUTPATIENT/OBSERVATION GOALS TO BE MET BEFORE DISCHARGE  1. Orthostatic performed: Yes:          Lying Orthostatic BP: 154/85         Sitting Orthostatic BP: 134/81         Standing Orthostatic BP: 165/91     2. Tolerating PO medications: Yes    3. Return to near baseline physical activity: no    4. Cleared for discharge by consultants (if involved): no    Discharge Planner Nurse   Safe discharge environment identified: Yes  Barriers to discharge: Yes       Entered by: Case Silva 11/18/2020     Pt alert to self and place, confused. He denies pain. Up with assist x1 and walker. Incontinent at times - voiding well. BP elevated this evening - given evening metoprolol - now in 160's. Mid line WNL, LR @ 100 ml/hr. On tele - SR/ SB. Regular diet, eating well. Will cont supportive cares.   Please review provider order for any additional goals.   Nurse to notify provider when observation goals have been met and patient is ready for discharge.

## 2020-11-19 NOTE — PROGRESS NOTES
11/19/20 1028   Quick Adds   Type of Visit Initial PT Evaluation   Living Environment   People in home alone   Current Living Arrangements house   Home Accessibility stairs within home   Number of Stairs, Within Home, Primary 7   Stair Railings, Within Home, Primary railing on left side (ascending)   Self-Care   Usual Activity Tolerance moderate   Current Activity Tolerance moderate   Equipment Currently Used at Home walker, standard;cane, straight   Activity/Exercise/Self-Care Comment Per medical chart pt recieves services at home, however pt denies any services. Patient is poor historian and unable to provide info on mobility in the home at baseline.    Disability/Function   Fall history within last six months yes   Number of times patient has fallen within last six months 2   General Information   Onset of Illness/Injury or Date of Surgery 11/17/20   Referring Physician Vamshi Santana   Patient/Family Therapy Goals Statement (PT) return home    Pertinent History of Current Problem (include personal factors and/or comorbidities that impact the POC) Per medical chart: pt is a 89 year old male who lives independently with history of dementia, CAD, hypertension and hyperlipidemia who was was admitted on 11/18 after being found down at home for multiple hours.    Existing Precautions/Restrictions fall   Cognition   Orientation Status (Cognition) oriented to;person   Affect/Mental Status (Cognition) confused   Follows Commands (Cognition) follows one-step commands;repetition of directions required;verbal cues/prompting required   Safety Deficit (Cognition) at risk behavior observed;awareness of need for assistance;impulsivity;insight into deficits/self-awareness;judgment;problem-solving   Pain Assessment   Patient Currently in Pain No   Posture    Posture Forward head position;Protracted shoulders   Range of Motion (ROM)   ROM Quick Adds ROM WFL   Strength   Strength Comments Global weakness as noted with decreased  ambulation tolerance.    Bed Mobility   Comment (Bed Mobility) SBA    Transfers   Transfer Safety Comments CGA with 2WW    Gait/Stairs (Locomotion)   Distance in Feet (Required for LE Total Joints) 150   Comment (Gait/Stairs) CGA with 2WW    Balance   Balance Comments No balance deficits noted during ambulation.    Clinical Impression   Criteria for Skilled Therapeutic Intervention yes, treatment indicated   PT Diagnosis (PT) Decreased independence and safety with all mobility.    Influenced by the following impairments Global weakness, impaired memory and safety awareness.    Functional limitations due to impairments Decreased strength, impaired memory and safety awareness.    Clinical Presentation Stable/Uncomplicated   Clinical Presentation Rationale Patient had moderately complex medical history, was stable in session, and has no support at home.    Clinical Decision Making (Complexity) low complexity   Therapy Frequency (PT) 5x/week   Predicted Duration of Therapy Intervention (days/wks) 3 days    Planned Therapy Interventions (PT) bed mobility training;gait training;home exercise program;patient/family education;stair training;strengthening;transfer training   Risk & Benefits of therapy have been explained evaluation/treatment results reviewed;care plan/treatment goals reviewed;risks/benefits reviewed;current/potential barriers reviewed;participants voiced agreement with care plan;participants included;patient   PT Discharge Planning    PT Discharge Recommendation (DC Rec) Transitional Care Facility   PT Rationale for DC Rec Patient is below baseline for mobility and unable to mobilize safetly, has no support at home. Patient would benefit from TCU to increase strength and activity tolerance. Patient would benefit from transition to detention. If TCU is refused, 24/7 assist recommended for patient to return home safely.    PT Brief overview of current status  CGA with all transfers and mobility. Gulshan for STS off  toilet. Patient lacks safety awareness.    Total Evaluation Time   Total Evaluation Time (Minutes) 5

## 2020-11-20 LAB
ANION GAP SERPL CALCULATED.3IONS-SCNC: 3 MMOL/L (ref 3–14)
BUN SERPL-MCNC: 35 MG/DL (ref 7–30)
CALCIUM SERPL-MCNC: 8.9 MG/DL (ref 8.5–10.1)
CHLORIDE SERPL-SCNC: 108 MMOL/L (ref 94–109)
CO2 SERPL-SCNC: 30 MMOL/L (ref 20–32)
CREAT SERPL-MCNC: 1.91 MG/DL (ref 0.66–1.25)
ERYTHROCYTE [DISTWIDTH] IN BLOOD BY AUTOMATED COUNT: 16.3 % (ref 10–15)
GFR SERPL CREATININE-BSD FRML MDRD: 30 ML/MIN/{1.73_M2}
GLUCOSE SERPL-MCNC: 113 MG/DL (ref 70–99)
HCT VFR BLD AUTO: 33.5 % (ref 40–53)
HGB BLD-MCNC: 10.5 G/DL (ref 13.3–17.7)
MCH RBC QN AUTO: 30.2 PG (ref 26.5–33)
MCHC RBC AUTO-ENTMCNC: 31.3 G/DL (ref 31.5–36.5)
MCV RBC AUTO: 96 FL (ref 78–100)
PLATELET # BLD AUTO: 122 10E9/L (ref 150–450)
POTASSIUM SERPL-SCNC: 4.3 MMOL/L (ref 3.4–5.3)
RBC # BLD AUTO: 3.48 10E12/L (ref 4.4–5.9)
SODIUM SERPL-SCNC: 141 MMOL/L (ref 133–144)
WBC # BLD AUTO: 8.8 10E9/L (ref 4–11)

## 2020-11-20 PROCEDURE — 80048 BASIC METABOLIC PNL TOTAL CA: CPT | Performed by: PHYSICIAN ASSISTANT

## 2020-11-20 PROCEDURE — 36415 COLL VENOUS BLD VENIPUNCTURE: CPT | Performed by: PHYSICIAN ASSISTANT

## 2020-11-20 PROCEDURE — 85027 COMPLETE CBC AUTOMATED: CPT | Performed by: PHYSICIAN ASSISTANT

## 2020-11-20 PROCEDURE — 120N000004 HC R&B MS OVERFLOW

## 2020-11-20 PROCEDURE — 258N000002 HC RX IP 258 OP 250: Performed by: PHYSICIAN ASSISTANT

## 2020-11-20 PROCEDURE — 250N000013 HC RX MED GY IP 250 OP 250 PS 637: Performed by: PHYSICIAN ASSISTANT

## 2020-11-20 PROCEDURE — 99233 SBSQ HOSP IP/OBS HIGH 50: CPT | Performed by: PHYSICIAN ASSISTANT

## 2020-11-20 PROCEDURE — 250N000013 HC RX MED GY IP 250 OP 250 PS 637: Performed by: HOSPITALIST

## 2020-11-20 RX ORDER — FAMOTIDINE 10 MG
10 TABLET ORAL DAILY
Status: DISCONTINUED | OUTPATIENT
Start: 2020-11-20 | End: 2020-11-21 | Stop reason: HOSPADM

## 2020-11-20 RX ORDER — SODIUM CHLORIDE 450 MG/100ML
INJECTION, SOLUTION INTRAVENOUS CONTINUOUS
Status: ACTIVE | OUTPATIENT
Start: 2020-11-20 | End: 2020-11-20

## 2020-11-20 RX ORDER — HYDRALAZINE HYDROCHLORIDE 20 MG/ML
10 INJECTION INTRAMUSCULAR; INTRAVENOUS EVERY 4 HOURS PRN
Status: DISCONTINUED | OUTPATIENT
Start: 2020-11-20 | End: 2020-11-21 | Stop reason: HOSPADM

## 2020-11-20 RX ORDER — DONEPEZIL HYDROCHLORIDE 5 MG/1
5 TABLET, FILM COATED ORAL AT BEDTIME
Status: DISCONTINUED | OUTPATIENT
Start: 2020-11-20 | End: 2020-11-20

## 2020-11-20 RX ADMIN — OMEPRAZOLE 20 MG: 20 CAPSULE, DELAYED RELEASE ORAL at 09:53

## 2020-11-20 RX ADMIN — METOPROLOL TARTRATE 50 MG: 50 TABLET, FILM COATED ORAL at 21:34

## 2020-11-20 RX ADMIN — HALOPERIDOL 1 MG: 1 TABLET ORAL at 09:54

## 2020-11-20 RX ADMIN — FAMOTIDINE 10 MG: 10 TABLET, FILM COATED ORAL at 14:49

## 2020-11-20 RX ADMIN — METOPROLOL TARTRATE 50 MG: 50 TABLET, FILM COATED ORAL at 09:54

## 2020-11-20 RX ADMIN — SERTRALINE HYDROCHLORIDE 150 MG: 100 TABLET ORAL at 09:53

## 2020-11-20 RX ADMIN — DOCUSATE SODIUM AND SENNOSIDES 2 TABLET: 8.6; 5 TABLET ORAL at 09:59

## 2020-11-20 RX ADMIN — Medication 1000 UNITS: at 09:53

## 2020-11-20 RX ADMIN — ASPIRIN 81 MG 81 MG: 81 TABLET ORAL at 09:54

## 2020-11-20 RX ADMIN — DOCUSATE SODIUM 50 MG AND SENNOSIDES 8.6 MG 1 TABLET: 8.6; 5 TABLET, FILM COATED ORAL at 21:29

## 2020-11-20 RX ADMIN — AMLODIPINE BESYLATE 2.5 MG: 2.5 TABLET ORAL at 09:54

## 2020-11-20 RX ADMIN — TAMSULOSIN HYDROCHLORIDE 0.4 MG: 0.4 CAPSULE ORAL at 09:54

## 2020-11-20 RX ADMIN — ATORVASTATIN CALCIUM 80 MG: 40 TABLET, FILM COATED ORAL at 09:53

## 2020-11-20 RX ADMIN — HALOPERIDOL 1 MG: 1 TABLET ORAL at 21:35

## 2020-11-20 RX ADMIN — SODIUM CHLORIDE: 4.5 INJECTION, SOLUTION INTRAVENOUS at 14:50

## 2020-11-20 RX ADMIN — POLYETHYLENE GLYCOL 3350 17 G: 17 POWDER, FOR SOLUTION ORAL at 09:55

## 2020-11-20 RX ADMIN — Medication 1 MG: at 01:13

## 2020-11-20 RX ADMIN — DONEPEZIL HYDROCHLORIDE 5 MG: 5 TABLET ORAL at 09:54

## 2020-11-20 RX ADMIN — OLANZAPINE 20 MG: 10 TABLET, FILM COATED ORAL at 21:34

## 2020-11-20 RX ADMIN — GUAIFENESIN 5 ML: 100 SOLUTION ORAL at 21:35

## 2020-11-20 RX ADMIN — HALOPERIDOL 1 MG: 1 TABLET ORAL at 14:50

## 2020-11-20 NOTE — PROGRESS NOTES
[facility]  Hospitalist Progress Note  Cecille Blum PA-C 11/20/2020    Reason for Stay (Diagnosis): s/p fall          Assessment and Plan:      Hubert Tamayo is a 89 year old male who lives independently with history of dementia, CAD, hypertension and hyperlipidemia who was was admitted on 11/18 after being found down at home for multiple hours.    No obvious infectious etiology (UA negative, CT head/CXR negative).  Labs were ok including normal CK, except for acute on chronic renal failure with Cr at 2.39 from baseline of 1.7-1.8.   He was admitted for further cares.    Asymptomatic Covid--NEGATIVE     #S/p Mechanical fall   #Generalized weakness  Patient states he has been falling more at home and had a fall last night between his toilet and wall where he laid for a few hours.  He has no complaints of pain and CT of his head is negative.  No infectious symptoms on exam or work-up.  He denies loss of consciousness or dizziness prior to falling.   -Pt has progressive dysmotility at baseline  -PT recommends rehab   -Social work consult looking for beds     #SLY likely due to dehydration and poor po intake   Baseline creatinine between 1.7-1.8 with admission creatinine of 2.39.    -Cr improving to 1.91 today, still not at baseline   -Cont IVF for additional 10 more hours  -Avoid nephrotoxins     #Complaint of black stools?   He states his stool has been darker almost black.  He denies history of GI bleed does not have any risk factors other than taking Plavix for GI bleed.    --Hgb remains stable.   --No reported black stools  --Omeprazole 20 mg daily added this admission     #Dementia  Lives with niece but apparently moved out 1 week ago.  Also has diagnosis of paranoid schizophrenia but does not seem paranoid at this time.  -Resumed Haldol, olanzapine, Zoloft and donepezil     #History of CAD  #Hypertension  -BP somewhat labile between 's-203. Mostly in the 140's.   -Prefer not to add or  increase BP meds for now given sporadic and labile nature of BP  -Cont PTA metoprolol 50 mg BID and Norvasc 2.5 mg use PRN hydralazine if needed, if SBP is persistent >160 then consider increasing Norvasc to 5 mg   -Cont Plavix for now  -If nurses note black stool will discontinue Plavix     #BPH  -Continue Flomax once med rec is complete     #History of COPD/asthma  Stable, No breathing complaints  -Cont PTA Spiriva, Symbicort and PRN albuterol   -Encourage IS      DVT Prophylaxis: Pneumatic Compression Devices  Code Status: Full Code  Discharge Dispo: Will stay tonight. PT to assess        Interval History (Subjective):      Sleepy today, will awake with questioning.   Pt up in bed eating bkfst this am per RN.                  Physical Exam:      Last Vital Signs:  BP (!) 164/79 (BP Location: Left arm)   Pulse 60   Temp 97.9  F (36.6  C) (Oral)   Resp 18   Wt 65.2 kg (143 lb 12.8 oz)   SpO2 94%   BMI 21.86 kg/m        Constitutional: Sleepy, cooperative, no apparent distress   Respiratory: Dec BS, poor inspiratory effort, bilaterally, no crackles or wheezing   Cardiovascular: Regular rate and rhythm, normal S1 and S2, and no murmur noted   Abdomen: Normal bowel sounds, soft, non-distended, non-tender   Skin: No rashes, no cyanosis, dry to touch   Neuro: Alert and oriented x3, no weakness, numbness, memory loss   Extremities: No edema, normal range of motion   Other(s):        All other systems: Negative          Medications:      All current medications were reviewed with changes reflected in problem list.         Data:      All new lab and imaging data was reviewed.   Labs:       Lab Results   Component Value Date     11/19/2020     11/18/2020     07/02/2020    Lab Results   Component Value Date    CHLORIDE 109 11/19/2020    CHLORIDE 107 11/18/2020    CHLORIDE 111 07/02/2020    Lab Results   Component Value Date    BUN 36 11/19/2020    BUN 37 11/18/2020    BUN 28 07/02/2020      Lab  Results   Component Value Date    POTASSIUM 4.4 11/19/2020    POTASSIUM 4.6 11/18/2020    POTASSIUM 4.6 07/02/2020    Lab Results   Component Value Date    CO2 29 11/19/2020    CO2 27 11/18/2020    CO2 28 07/02/2020    Lab Results   Component Value Date    CR 1.99 11/19/2020    CR 2.39 11/18/2020    CR 1.78 07/02/2020        Recent Labs   Lab 11/19/20  0517 11/18/20  1540 11/18/20  0107   WBC 9.3  --  11.3*   HGB 10.5* 10.5* 10.2*   HCT 32.6*  --  32.8*   MCV 94  --  96   *  --  115*       Recent Labs   Lab 11/19/20  0517 11/18/20  2309 11/18/20  1350 11/18/20  0805 11/18/20  0107 11/18/20  0013   GLC 97  --   --   --  110*  --    BGM  --  112* 114* 89  --  118*     Recent Labs   Lab 11/18/20  0107   TROPI 0.028       Recent Labs   Lab 11/18/20  0225   COLOR Light Yellow   APPEARANCE Clear   URINEGLC Negative   URINEBILI Negative   URINEKETONE Negative   SG 1.015   UBLD Negative   URINEPH 5.5   PROTEIN 70*   NITRITE Negative   LEUKEST Negative   RBCU <1   WBCU 1      Imaging:   Results for orders placed or performed during the hospital encounter of 11/17/20   Head CT w/o contrast    Narrative    EXAM: CT HEAD W/O CONTRAST  LOCATION: [facility]  DATE/TIME: 11/18/2020 1:54 AM    INDICATION: Head trauma, minor, GCS >= 13, high clinical risk, initial exam.    COMPARISON: 7/1/2020.    TECHNIQUE: Routine without IV contrast. Multiplanar reformats. Dose reduction techniques were used.    FINDINGS:  INTRACRANIAL CONTENTS: No intracranial hemorrhage, extra-axial collection, or mass effect. No CT evidence of acute infarct. Mild to moderate presumed chronic small vessel ischemic changes. Mild to moderate generalized volume loss. No hydrocephalus.     VISUALIZED ORBITS/SINUSES/MASTOIDS: No intraorbital abnormality. No paranasal sinus mucosal disease. No middle ear or mastoid effusion.    BONES/SOFT TISSUES: No acute abnormality.      Impression    IMPRESSION:  1.  No acute intracranial hemorrhage or  calvarial fracture.    2.  Mild to moderate volume loss and presumed chronic small vessel ischemic changes.     XR Chest Port 1 View    Narrative    EXAM: XR CHEST PORT 1 VW  LOCATION: [facility]  DATE/TIME: 11/18/2020 12:56 AM    INDICATION: Cough.  COMPARISON: None.      Impression    IMPRESSION: Negative chest.

## 2020-11-20 NOTE — PLAN OF CARE
[id number]    Inpatient Progress Note:    BP (!) 149/86 (BP Location: Left arm)   Pulse 66   Temp 97.1  F (36.2  C) (Axillary)   Resp 20   Wt 65.2 kg (143 lb 12.8 oz)   SpO2 99%   BMI 21.86 kg/m          Orientation: Intermittently confused, can recall year, and situation, intermittently agitated  Neuro: Intact, gave scheduled haloperidol and zyprexa   Pain status: Denies  Activity: Assist of 1 with WW and GB, unsteady  Peripheral edema: None  Resp: CTAB  Cardiac: WDL  GI: WDL  :  Nocturia, occasionally incontinent  Skin: Intact  LDA: Midline, protected with nono  Infusions: SL after IVF  Pertinent Labs: None  Diet: Regular  Consults: PT, SW   Discharge Plan: Recommending TCU placement,      Pt's son called and was concerned of him going to a TCU due to COVID concerns     Will continue to monitor and provide cares.      Jaosn Ling RN

## 2020-11-20 NOTE — PROGRESS NOTES
Discharge Planner   Discharge Plans in progress: yes  Barriers to discharge plan: patient requiring MC and he is a   Follow up plan:   CTS received a call from Lali RODRIGUEZ from [phone] who has been following patient for years.  She said the patient kicked th niece out of the house Monday. Lali had been encouraging family to look for alternatives and does not believe it is safe for the patient to discharge home.  Lali is emailing CTS list of [place] TCU's.     Mendy Torres RN, BSN, PHN, CTS  Care Coordinator  [facility]  [phone]           Entered by: Mendy Torres 11/20/2020 11:32 AM

## 2020-11-20 NOTE — PLAN OF CARE
Care Management Discharge Note    Discharge Date: 11/21/20    Discharge Disposition: Skilled Nursing Facilty    Discharge Services:  Accepted at [facility]    Discharge DME:  none facility to provide    Discharge Transportation: family or friend will provide    Private pay costs discussed: transportation costs ,family has decided to transport    PAS Confirmation Code:   [id number]  Patient/family educated on Medicare website which has current facility and service quality ratings: yes    Education Provided on the Discharge Plan:  yes  Persons Notified of Discharge Plans: patient, son, Aldair, Niece Anny, unit and Physician  Patient/Family in Agreement with the Plan:  yes    Handoff Referral Completed: No    Additional Information:  Accepted at [facility] for Sat. 11/21/20, family to transport @ 11am  SonAldair has approved the TCU. Family is attempting to team together to care for patient after TCU placement.    MARII Porras   Inpatient Care Coordination   Supervisor  [facility]  [phone]        MARIO Montero

## 2020-11-20 NOTE — PROGRESS NOTES
"Care Management Follow Up    Length of Stay (days): 1    Expected Discharge Date: 11/20/20     Concerns to be Addressed:  Discharge planning  Patient plan of care discussed at interdisciplinary rounds: Yes    Anticipated Discharge Disposition:  TCU     Patient/family educated on Medicare website which has current facility and service quality ratings:  Yes    Referrals Placed by CM/SW:  TCU    Additional Information:  SW placed call to son Aldair. Discussed that TCU referrals are pending, and that SW will send out additional TCU referrals. Discussed that bed availability is limited at nearby TCU facilities, but referrals will be sent, and CC/SW will follow up with any updates. Additional referrals sent to: [facility], [facility], [facility], [facility], and [facility].     Pt has been declined from [facility], [facility], [facility], and [facility] due to bed availability/MC need.    Son expressed concern about TCU placement d/t fear of COVID. He expressed that he could stay with pt for the weekend, but not beyond that. Son lives in [place]. Pt's niece Maria Luisa no longer lives with pt. Son confirms that prior to admission, pt still had [facility] services for \"a few hours/day\", but was unable to confirm any more information than that. He requested that SW continue to search for TCU placement at this time, and he will talk with family.     1215: Additional referrals sent to additional [facility] contracted nursing homes: [facility], [facility], [facility], and [facility].     LAMAR Henao        "

## 2020-11-20 NOTE — PROGRESS NOTES
Your information has been submitted on November 20th, 2020 at 03:28:07 PM CST. The confirmation number is [id number]    Juana Dawson  Care Management Coordinator  [facility]  [phone]

## 2020-11-21 VITALS
OXYGEN SATURATION: 95 % | BODY MASS INDEX: 21.86 KG/M2 | DIASTOLIC BLOOD PRESSURE: 86 MMHG | WEIGHT: 143.8 LBS | RESPIRATION RATE: 16 BRPM | TEMPERATURE: 97.5 F | HEART RATE: 64 BPM | SYSTOLIC BLOOD PRESSURE: 157 MMHG

## 2020-11-21 LAB
ANION GAP SERPL CALCULATED.3IONS-SCNC: 2 MMOL/L (ref 3–14)
BUN SERPL-MCNC: 34 MG/DL (ref 7–30)
CALCIUM SERPL-MCNC: 8.4 MG/DL (ref 8.5–10.1)
CHLORIDE SERPL-SCNC: 109 MMOL/L (ref 94–109)
CO2 SERPL-SCNC: 31 MMOL/L (ref 20–32)
CREAT SERPL-MCNC: 1.93 MG/DL (ref 0.66–1.25)
ERYTHROCYTE [DISTWIDTH] IN BLOOD BY AUTOMATED COUNT: 15.9 % (ref 10–15)
GFR SERPL CREATININE-BSD FRML MDRD: 30 ML/MIN/{1.73_M2}
GLUCOSE SERPL-MCNC: 81 MG/DL (ref 70–99)
HCT VFR BLD AUTO: 31.6 % (ref 40–53)
HGB BLD-MCNC: 10.1 G/DL (ref 13.3–17.7)
MCH RBC QN AUTO: 30.3 PG (ref 26.5–33)
MCHC RBC AUTO-ENTMCNC: 32 G/DL (ref 31.5–36.5)
MCV RBC AUTO: 95 FL (ref 78–100)
PLATELET # BLD AUTO: 117 10E9/L (ref 150–450)
POTASSIUM SERPL-SCNC: 4.3 MMOL/L (ref 3.4–5.3)
RBC # BLD AUTO: 3.33 10E12/L (ref 4.4–5.9)
SODIUM SERPL-SCNC: 142 MMOL/L (ref 133–144)
WBC # BLD AUTO: 9 10E9/L (ref 4–11)

## 2020-11-21 PROCEDURE — 80048 BASIC METABOLIC PNL TOTAL CA: CPT | Performed by: PHYSICIAN ASSISTANT

## 2020-11-21 PROCEDURE — 250N000013 HC RX MED GY IP 250 OP 250 PS 637: Performed by: PHYSICIAN ASSISTANT

## 2020-11-21 PROCEDURE — 36415 COLL VENOUS BLD VENIPUNCTURE: CPT | Performed by: PHYSICIAN ASSISTANT

## 2020-11-21 PROCEDURE — 85027 COMPLETE CBC AUTOMATED: CPT | Performed by: PHYSICIAN ASSISTANT

## 2020-11-21 PROCEDURE — 250N000013 HC RX MED GY IP 250 OP 250 PS 637: Performed by: HOSPITALIST

## 2020-11-21 PROCEDURE — 99238 HOSP IP/OBS DSCHRG MGMT 30/<: CPT | Performed by: PHYSICIAN ASSISTANT

## 2020-11-21 RX ORDER — HALOPERIDOL 1 MG/1
1 TABLET ORAL 3 TIMES DAILY
Qty: 10 TABLET | Refills: 0 | Status: SHIPPED | OUTPATIENT
Start: 2020-11-21 | End: 2021-01-23

## 2020-11-21 RX ADMIN — ATORVASTATIN CALCIUM 80 MG: 40 TABLET, FILM COATED ORAL at 08:42

## 2020-11-21 RX ADMIN — FAMOTIDINE 10 MG: 10 TABLET, FILM COATED ORAL at 08:42

## 2020-11-21 RX ADMIN — Medication 1000 UNITS: at 08:42

## 2020-11-21 RX ADMIN — DONEPEZIL HYDROCHLORIDE 5 MG: 5 TABLET ORAL at 08:41

## 2020-11-21 RX ADMIN — TAMSULOSIN HYDROCHLORIDE 0.4 MG: 0.4 CAPSULE ORAL at 08:42

## 2020-11-21 RX ADMIN — ASPIRIN 81 MG 81 MG: 81 TABLET ORAL at 08:42

## 2020-11-21 RX ADMIN — SERTRALINE HYDROCHLORIDE 150 MG: 100 TABLET ORAL at 08:41

## 2020-11-21 RX ADMIN — METOPROLOL TARTRATE 50 MG: 50 TABLET, FILM COATED ORAL at 08:42

## 2020-11-21 RX ADMIN — AMLODIPINE BESYLATE 2.5 MG: 2.5 TABLET ORAL at 08:42

## 2020-11-21 RX ADMIN — HALOPERIDOL 1 MG: 1 TABLET ORAL at 08:42

## 2020-11-21 RX ADMIN — OMEPRAZOLE 20 MG: 20 CAPSULE, DELAYED RELEASE ORAL at 08:42

## 2020-11-21 RX ADMIN — DOCUSATE SODIUM 50 MG AND SENNOSIDES 8.6 MG 1 TABLET: 8.6; 5 TABLET, FILM COATED ORAL at 08:42

## 2020-11-21 NOTE — PLAN OF CARE
BP (!) 157/86 (BP Location: Left arm)   Pulse 64   Temp 97.5  F (36.4  C) (Oral)   Resp 16   Wt 65.2 kg (143 lb 12.8 oz)   SpO2 95%   BMI 21.86 kg/m          Neuro: Confused, alert self, place situation  Pain : Denies  Activity: Assist of 1 with WW and GB  Resp: WDL  Cardiac: WDL  GI: WDL  : Urgency  Skin: Intact  LDA: Midline SL   Infusions: SL  Diet: Regular  Consults: PT, SW   Discharge Plan: TCU but son does not want him to discharge to [facility] home related to covid.

## 2020-11-21 NOTE — DISCHARGE SUMMARY
Discharge Summary  Hospitalist Service      Hubert Tamayo MRN# [id number]   YOB: 1929 Age: 89 year old     Date of Admission:  11/17/2020  Date of Discharge:  11/21/2020  Admitting Physician: Derek Bal MD  Discharge Physician: Naheed Kam PA-C   Discharging Service: Hospitalist Service     Primary Provider: Saige Cortez  Primary Care Physician Phone Number: [phone]         Discharge Diagnoses/Problem Oriented Hospital Course (Providers):    Hubert Tamayo was admitted on 11/17/2020 by Derek Bal MD and I would refer you to their history and physical.  Briefly, patient was admitted after being found down at home for multiple hours.  On admission he is afebrile with pulse of 64, blood pressure 177/91 and breathing comfortably on room air without hypoxia.  Urinalysis is negative, CT head is unremarkable and portable chest x-ray is negative.  Lab work remarkable for creatinine of 2.39, BUN 37, glucose 110, AST 49 with other LFTs being normal, troponin 0.028, WBC 11.3 and hemoglobin 10.2.  CK was normal at 190. Received IV fluids with improvement of creatinine. PT assessed patient and is recommending rehab.     Asymptomatic Covid pending     #Suspect mechanical fall without clear cause  #Generalized weakness  Patient states he has been falling more at home and had a fall last night between his toilet and wall where he laid for a few hours.  He has no complaints of pain and CT of his head is negative.  No infectious symptoms on exam or work-up.  He denies loss of consciousness or dizziness prior to falling.  Lab work is overall unremarkable with the exception of creatinine of 2.39 and elevated BUN.  -PT recommends rehab        #SLY--resolved  Baseline creatinine between 1.7-1.8 with admission creatinine of 2.39.  Clinically looked dehydrated on admission but CK level normal. IV fluids given.  -Cr at time of discharge was 1.93     #Complaint of black stools  He states his stool  has been darker almost black.  He denies history of GI bleed does not have any risk factors other than taking Plavix.  Hemoglobin is 10.2 and was recently 11.8 in July.  -No reported black stools  -Hemoglobins stable  -Added omeprazole 20 mg daily     #Dementia  Lives with niece but apparently moved out 1 week ago.  Also has diagnosis of paranoid schizophrenia but does not seem paranoid at this time.  -Resumed Haldol, olanzapine, Zoloft and donepezil     #History of CAD  #Hypertension  No complaints of chest pain  -We will continue metoprolol and Plavix for now  -If nurses note black stool will discontinue Plavix     #BPH  -Continue Flomax once med rec is complete     #History of COPD/asthma  No breathing complaints                  Code Status:      Full Code              Pending Results:        Unresulted Labs Ordered in the Past 30 Days of this Admission     No orders found from 10/18/2020 to 11/18/2020.               Discharge Instructions and Follow-Up:      Follow-up Appointments     Follow Up and recommended labs and tests      Recommend follow up with nursing home physician and recheck labs BMP, CBC                    Discharge Disposition:      Discharged to rehabilitation facility          Discharge Medications:        Current Discharge Medication List      CONTINUE these medications which have CHANGED    Details   haloperidol (HALDOL) 1 MG tablet Take 1 tablet (1 mg) by mouth 3 times daily  Qty: 10 tablet, Refills: 0    Associated Diagnoses: Dementia without behavioral disturbance, unspecified dementia type (H)         CONTINUE these medications which have NOT CHANGED    Details   acetaminophen (TYLENOL) 500 MG tablet Take 1,000 mg by mouth every 6 hours as needed for pain      albuterol (PROAIR HFA/PROVENTIL HFA/VENTOLIN HFA) 108 (90 Base) MCG/ACT inhaler Inhale 2 puffs into the lungs every 4 hours as needed for shortness of breath / dyspnea or wheezing    Comments: Pharmacy may dispense brand covered by  insurance (Proair, or proventil or ventolin or generic albuterol inhaler)      albuterol (PROVENTIL) (2.5 MG/3ML) 0.083% neb solution Take 2.5 mg by nebulization every 6 hours as needed for shortness of breath / dyspnea or wheezing      amLODIPine (NORVASC) 2.5 MG tablet Take 2.5 mg by mouth daily      aspirin (ASA) 81 MG chewable tablet Take 81 mg by mouth daily      atorvastatin (LIPITOR) 80 MG tablet Take 80 mg by mouth daily      budesonide-formoterol (SYMBICORT) 160-4.5 MCG/ACT Inhaler Inhale 2 puffs into the lungs 2 times daily      camphor-menthol (DERMASARRA) 0.5-0.5 % external lotion Apply topically 2 times daily as needed (itching) Apply thin layer topically bid prn itching, apply after Eucerin.      carboxymethylcellulose (REFRESH PLUS) 0.5 % SOLN ophthalmic solution Place 2 drops into both eyes every 4 hours as needed for dry eyes 6 times per day prn*      cholecalciferol 1000 units TABS Take 1,000 Units by mouth daily      donepezil (ARICEPT) 5 MG tablet Take 5 mg by mouth At Bedtime      famotidine (PEPCID) 20 MG tablet Take 10 mg by mouth daily      guaiFENesin (ROBITUSSIN) 100 MG/5ML SYRP Take 5 mLs by mouth At Bedtime      magnesium 250 MG tablet Take 1 tablet by mouth daily Patient takes on their own. Patient wants to buy from non-[facility] pharmacy.      melatonin 3 MG CAPS Take 6 mg by mouth At Bedtime      metoprolol tartrate (LOPRESSOR) 50 MG tablet Take 1 tablet (50 mg) by mouth 2 times daily  Qty: 60 tablet, Refills: 1    Associated Diagnoses: NSTEMI (non-ST elevated myocardial infarction) (H)      mometasone (NASONEX) 50 MCG/ACT nasal spray Spray 2 sprays into both nostrils daily       nitroGLYcerin (NITROSTAT) 0.4 MG sublingual tablet Place 0.4 mg under the tongue every 5 minutes as needed for chest pain For chest pain place 1 tablet under the tongue every 5 minutes for 3 doses. If symptoms persist 5 minutes after 1st dose call 911.      OLANZapine (ZYPREXA) 20 MG tablet Take 20 mg by mouth At  Bedtime      !! senna (SENOKOT) 8.6 MG tablet Take 1 tablet by mouth every morning      !! senna (SENOKOT) 8.6 MG tablet Take 1 tablet by mouth every evening as needed for constipation      sertraline (ZOLOFT) 100 MG tablet Take 1.5 tablets (150 mg) by mouth daily  Qty: 30 tablet, Refills: 0    Associated Diagnoses: Paranoid schizophrenia (H)      Skin Protectants, Misc. (EUCERIN) cream Apply topically daily Apply to areas of dry skin topically every day ideally within 3 minutes after bath or shower.      tamsulosin (FLOMAX) 0.4 MG capsule Take 0.4 mg by mouth daily      Tiotropium Bromide Monohydrate (SPIRIVA HANDIHALER IN) Inhale 1 capsule into the lungs daily        !! - Potential duplicate medications found. Please discuss with provider.               Allergies:         Allergies   Allergen Reactions     Crustaceans      Loxapine      Nifedipine      Shellfish Allergy      Sulfa Drugs      Sulfamethoxazole-Trimethoprim      Trimethoprim             Consultations This Hospital Stay:      No consultations were requested during this admission          Condition and Physical Exam on Discharge:      Discharge condition: Stable   Discharge vitals: Blood pressure (!) 157/86, pulse 64, temperature 97.5  F (36.4  C), temperature source Oral, resp. rate 16, weight 65.2 kg (143 lb 12.8 oz), SpO2 95 %.     Constitutional: Alert and orientated x 3   Lungs: CTAB   Cardiovascular: RRR with no murmur   Abdomen: Bowel sounds are present with no tenderness   Skin: No rash or open sores   Other:            Discharge Orders for Skilled Facility (from Discharge Orders):        After Care Instructions     Activity - Up with assistive device      Activity - Up with nursing assistance      Advance Diet as Tolerated      Follow this diet upon discharge: Regular         General info for SNF      Length of Stay Estimate: Short Term Care: Estimated # of Days <30  Condition at Discharge: Stable  Level of care:skilled   Rehabilitation  Potential: Good  Admission H&P remains valid and up-to-date: Yes  Recent Chemotherapy: N/A  Use Nursing Home Standing Orders: Yes         Mantoux instructions      Give two-step Mantoux (PPD) Per Facility Policy Yes                    Rehab orders for Skilled Facility (from Discharge Orders):      Referrals     Future Labs/Procedures    Occupational Therapy Adult Consult     Comments:    Evaluate and treat as clinically indicated.    Reason:  Weakness    Physical Therapy Adult Consult     Comments:    Evaluate and treat as clinically indicated.    Reason:  Weakness             Discharge Time:      Less than 30 minutes.        Image Results From This Hospital Stay (For Non-EPIC Providers):        Results for orders placed or performed during the hospital encounter of 11/17/20   Head CT w/o contrast    Narrative    EXAM: CT HEAD W/O CONTRAST  LOCATION: [facility]  DATE/TIME: 11/18/2020 1:54 AM    INDICATION: Head trauma, minor, GCS >= 13, high clinical risk, initial exam.    COMPARISON: 7/1/2020.    TECHNIQUE: Routine without IV contrast. Multiplanar reformats. Dose reduction techniques were used.    FINDINGS:  INTRACRANIAL CONTENTS: No intracranial hemorrhage, extra-axial collection, or mass effect. No CT evidence of acute infarct. Mild to moderate presumed chronic small vessel ischemic changes. Mild to moderate generalized volume loss. No hydrocephalus.     VISUALIZED ORBITS/SINUSES/MASTOIDS: No intraorbital abnormality. No paranasal sinus mucosal disease. No middle ear or mastoid effusion.    BONES/SOFT TISSUES: No acute abnormality.      Impression    IMPRESSION:  1.  No acute intracranial hemorrhage or calvarial fracture.    2.  Mild to moderate volume loss and presumed chronic small vessel ischemic changes.     XR Chest Port 1 View    Narrative    EXAM: XR CHEST PORT 1 VW  LOCATION: [facility]  DATE/TIME: 11/18/2020 12:56 AM    INDICATION: Cough.  COMPARISON: None.      Impression     IMPRESSION: Negative chest.           Most Recent Lab Results In EPIC (For Non-EPIC Providers):    Most Recent 3 CBC's:  Recent Labs   Lab Test 11/21/20  0526 11/20/20  1559 11/19/20  0517   WBC 9.0 8.8 9.3   HGB 10.1* 10.5* 10.5*   MCV 95 96 94   * 122* 120*      Most Recent 3 BMP's:  Recent Labs   Lab Test 11/21/20  0526 11/20/20  1559 11/19/20  0517    141 139   POTASSIUM 4.3 4.3 4.4   CHLORIDE 109 108 109   CO2 31 30 29   BUN 34* 35* 36*   CR 1.93* 1.91* 1.99*   ANIONGAP 2* 3 1*   RAMON 8.4* 8.9 8.8   GLC 81 113* 97     Most Recent 3 Troponin's:No lab results found.  Most Recent 3 INR's:  Recent Labs   Lab Test 11/10/19  1630 10/31/19  0611 10/30/19  0618   INR 1.77* 1.23* 3.08*     Most Recent 2 LFT's:  Recent Labs   Lab Test 11/19/20  0517 11/18/20  0107   AST 41 49*   ALT 35 41   ALKPHOS 106 116   BILITOTAL 0.5 0.4     Most Recent Cholesterol Panel:  Recent Labs   Lab Test 10/31/19  0611   CHOL 77   LDL 14   HDL 42   TRIG 103     Most Recent 6 Bacteria Isolates From Any Culture (See EPIC Reports for Culture Details):  Recent Labs   Lab Test 11/12/19  1215 11/10/19  1641 11/10/19  1630   CULT Light growth  Candida albicans / dubliniensis  Candida albicans and Candida dubliniensis are not routinely speciated  Susceptibility testing not routinely done  *  Light growth  Coagulase negative Staphylococcus  Susceptibility testing not routinely done  * No growth No growth     Most Recent TSH, T4 and HgbA1c:No lab results found.

## 2020-11-21 NOTE — PROGRESS NOTES
Care Management Discharge Note    Discharge Date: 11/21/20  Expected Time of Departure: TBF    Discharge Disposition: Skilled Nursing Facilty    Discharge Transportation: family or friend will provide    PAS Confirmation Code: [id number]  Patient/family educated on Medicare website which has current facility and service quality ratings: yes    Spoke with son Aldair via phone. Aldair expressed concerns about patient going to TCU. SW explained patient being medically ready for discharge. SW discussed options available for patient. Aldair preferred patient returning home but noted will speak with family and provide update about discharge.     JENNIFER received call from Anny. Anny noted family being in agreement with patient going to TCU until they can figure out how they will be able to provide 24 hour care. Anny noted being on the way to take patient to TCU.     Patient discharging to [facility].     Nursing to fax discharge orders to [phone].    NOE Varma  Care Transitions Services  [facility]    [phone]

## 2020-11-21 NOTE — PLAN OF CARE
/79 (BP Location: Left arm)   Pulse 74   Temp 97.2  F (36.2  C) (Axillary)   Resp 16   Wt 65.2 kg (143 lb 12.8 oz)   SpO2 96%   BMI 21.86 kg/m     Orientation: Intermittently confused, can recall year, and situation  Neuro: Intact  Pain status: Denies  Activity: Assist of 1 with WW and GB  Peripheral edema: None  Resp: WDL  Cardiac: WDL  GI: WDL  : Calls appropriately to void, urgency  Skin: Intact  LDA: Midline, infusing at 100/hr  Infusions: SL after IVF  Pertinent Labs: None  Diet: Regular  Consults: PT, SW   Discharge Plan: Recommending TCU placement has bed for tomorrow.     Will continue to monitor and provide cares.      Naheed Delgadillo RN

## 2020-11-21 NOTE — PLAN OF CARE
Patient nibebeto called stated that the family did not want him discharged to the [facility] in [place] simply because they did not do enough research on the facility. Nurse stated patient was medically stable to go to the TCU and that patient's son has agreed to placement at this TCU. The provider was speaking to Aldair patient's son on the phone at the same time nurse informed the niece of this and that they would need to connect since Aldair makes the decisions for Hubert's care. Tereza became angry and promptly hung up on nurse.

## 2020-11-21 NOTE — PLAN OF CARE
Physical Therapy Discharge Summary    Reason for therapy discharge:    Discharged to transitional care facility.    Progress towards therapy goal(s). See goals on Care Plan in [facility] electronic health record for goal details.  Goals partially met.  Barriers to achieving goals:   limited tolerance for therapy and discharge from facility.    Therapy recommendation(s):    Continued therapy is recommended.  Rationale/Recommendations:  TCU to maximize return to PLOF.

## 2021-01-23 ENCOUNTER — HOSPITAL ENCOUNTER (EMERGENCY)
Facility: CLINIC | Age: 86
Discharge: ANOTHER HOSPITAL | End: 2021-01-24
Attending: PHYSICIAN ASSISTANT | Admitting: PHYSICIAN ASSISTANT
Payer: COMMERCIAL

## 2021-01-23 ENCOUNTER — APPOINTMENT (OUTPATIENT)
Dept: CT IMAGING | Facility: CLINIC | Age: 86
End: 2021-01-23
Attending: PHYSICIAN ASSISTANT
Payer: COMMERCIAL

## 2021-01-23 ENCOUNTER — APPOINTMENT (OUTPATIENT)
Dept: MRI IMAGING | Facility: CLINIC | Age: 86
End: 2021-01-23
Attending: PHYSICIAN ASSISTANT
Payer: COMMERCIAL

## 2021-01-23 ENCOUNTER — APPOINTMENT (OUTPATIENT)
Dept: GENERAL RADIOLOGY | Facility: CLINIC | Age: 86
End: 2021-01-23
Attending: PHYSICIAN ASSISTANT
Payer: COMMERCIAL

## 2021-01-23 DIAGNOSIS — N28.9 ACUTE ON CHRONIC RENAL INSUFFICIENCY: ICD-10-CM

## 2021-01-23 DIAGNOSIS — J18.9 PNEUMONIA: ICD-10-CM

## 2021-01-23 DIAGNOSIS — R79.89 ELEVATED TROPONIN: ICD-10-CM

## 2021-01-23 DIAGNOSIS — S06.5XAA SUBDURAL HEMATOMA (H): ICD-10-CM

## 2021-01-23 DIAGNOSIS — N18.9 ACUTE ON CHRONIC RENAL INSUFFICIENCY: ICD-10-CM

## 2021-01-23 LAB
ALBUMIN SERPL-MCNC: 2.7 G/DL (ref 3.4–5)
ALBUMIN UR-MCNC: 100 MG/DL
ALP SERPL-CCNC: 134 U/L (ref 40–150)
ALT SERPL W P-5'-P-CCNC: 18 U/L (ref 0–70)
ANION GAP SERPL CALCULATED.3IONS-SCNC: 2 MMOL/L (ref 3–14)
APPEARANCE UR: CLEAR
AST SERPL W P-5'-P-CCNC: 44 U/L (ref 0–45)
BASOPHILS # BLD AUTO: 0 10E9/L (ref 0–0.2)
BASOPHILS NFR BLD AUTO: 0.2 %
BILIRUB SERPL-MCNC: 0.4 MG/DL (ref 0.2–1.3)
BILIRUB UR QL STRIP: NEGATIVE
BUN SERPL-MCNC: 60 MG/DL (ref 7–30)
CALCIUM SERPL-MCNC: 9.9 MG/DL (ref 8.5–10.1)
CHLORIDE SERPL-SCNC: 104 MMOL/L (ref 94–109)
CO2 SERPL-SCNC: 30 MMOL/L (ref 20–32)
COLOR UR AUTO: ABNORMAL
CREAT SERPL-MCNC: 2.68 MG/DL (ref 0.66–1.25)
DIFFERENTIAL METHOD BLD: ABNORMAL
EOSINOPHIL # BLD AUTO: 0.1 10E9/L (ref 0–0.7)
EOSINOPHIL NFR BLD AUTO: 0.4 %
ERYTHROCYTE [DISTWIDTH] IN BLOOD BY AUTOMATED COUNT: 15.9 % (ref 10–15)
GFR SERPL CREATININE-BSD FRML MDRD: 20 ML/MIN/{1.73_M2}
GLUCOSE SERPL-MCNC: 106 MG/DL (ref 70–99)
GLUCOSE UR STRIP-MCNC: NEGATIVE MG/DL
HCT VFR BLD AUTO: 30.2 % (ref 40–53)
HGB BLD-MCNC: 9.8 G/DL (ref 13.3–17.7)
HGB UR QL STRIP: ABNORMAL
HYALINE CASTS #/AREA URNS LPF: 1 /LPF (ref 0–2)
IMM GRANULOCYTES # BLD: 0.2 10E9/L (ref 0–0.4)
IMM GRANULOCYTES NFR BLD: 1.1 %
INR PPP: 1.18 (ref 0.86–1.14)
KETONES UR STRIP-MCNC: NEGATIVE MG/DL
LABORATORY COMMENT REPORT: NORMAL
LEUKOCYTE ESTERASE UR QL STRIP: NEGATIVE
LYMPHOCYTES # BLD AUTO: 1.9 10E9/L (ref 0.8–5.3)
LYMPHOCYTES NFR BLD AUTO: 12.3 %
MCH RBC QN AUTO: 30.9 PG (ref 26.5–33)
MCHC RBC AUTO-ENTMCNC: 32.5 G/DL (ref 31.5–36.5)
MCV RBC AUTO: 95 FL (ref 78–100)
MONOCYTES # BLD AUTO: 1.7 10E9/L (ref 0–1.3)
MONOCYTES NFR BLD AUTO: 11.3 %
NEUTROPHILS # BLD AUTO: 11.4 10E9/L (ref 1.6–8.3)
NEUTROPHILS NFR BLD AUTO: 74.7 %
NITRATE UR QL: NEGATIVE
NRBC # BLD AUTO: 0 10*3/UL
NRBC BLD AUTO-RTO: 0 /100
NT-PROBNP SERPL-MCNC: 2874 PG/ML (ref 0–1800)
PH UR STRIP: 6.5 PH (ref 5–7)
PLATELET # BLD AUTO: 144 10E9/L (ref 150–450)
POTASSIUM SERPL-SCNC: 4.8 MMOL/L (ref 3.4–5.3)
PROT SERPL-MCNC: 7.8 G/DL (ref 6.8–8.8)
RBC # BLD AUTO: 3.17 10E12/L (ref 4.4–5.9)
RBC #/AREA URNS AUTO: 1 /HPF (ref 0–2)
SARS-COV-2 RNA RESP QL NAA+PROBE: NEGATIVE
SODIUM SERPL-SCNC: 136 MMOL/L (ref 133–144)
SOURCE: ABNORMAL
SP GR UR STRIP: 1.02 (ref 1–1.03)
SPECIMEN SOURCE: NORMAL
SQUAMOUS #/AREA URNS AUTO: <1 /HPF (ref 0–1)
TROPONIN I SERPL-MCNC: 0.19 UG/L (ref 0–0.04)
TROPONIN I SERPL-MCNC: 0.24 UG/L (ref 0–0.04)
UROBILINOGEN UR STRIP-MCNC: NORMAL MG/DL (ref 0–2)
WBC # BLD AUTO: 15.2 10E9/L (ref 4–11)
WBC #/AREA URNS AUTO: 1 /HPF (ref 0–5)

## 2021-01-23 PROCEDURE — 83880 ASSAY OF NATRIURETIC PEPTIDE: CPT | Mod: GZ | Performed by: PHYSICIAN ASSISTANT

## 2021-01-23 PROCEDURE — 96360 HYDRATION IV INFUSION INIT: CPT | Mod: 59

## 2021-01-23 PROCEDURE — 85025 COMPLETE CBC W/AUTO DIFF WBC: CPT | Performed by: PHYSICIAN ASSISTANT

## 2021-01-23 PROCEDURE — 36415 COLL VENOUS BLD VENIPUNCTURE: CPT | Performed by: PHYSICIAN ASSISTANT

## 2021-01-23 PROCEDURE — 85610 PROTHROMBIN TIME: CPT | Performed by: PHYSICIAN ASSISTANT

## 2021-01-23 PROCEDURE — 93005 ELECTROCARDIOGRAM TRACING: CPT

## 2021-01-23 PROCEDURE — 71250 CT THORAX DX C-: CPT | Mod: ME

## 2021-01-23 PROCEDURE — C9803 HOPD COVID-19 SPEC COLLECT: HCPCS

## 2021-01-23 PROCEDURE — 84484 ASSAY OF TROPONIN QUANT: CPT | Mod: 91 | Performed by: PHYSICIAN ASSISTANT

## 2021-01-23 PROCEDURE — 99285 EMERGENCY DEPT VISIT HI MDM: CPT | Mod: 25

## 2021-01-23 PROCEDURE — 80053 COMPREHEN METABOLIC PANEL: CPT | Performed by: PHYSICIAN ASSISTANT

## 2021-01-23 PROCEDURE — 70450 CT HEAD/BRAIN W/O DYE: CPT | Mod: ME

## 2021-01-23 PROCEDURE — 87635 SARS-COV-2 COVID-19 AMP PRB: CPT | Performed by: PHYSICIAN ASSISTANT

## 2021-01-23 PROCEDURE — 71046 X-RAY EXAM CHEST 2 VIEWS: CPT

## 2021-01-23 PROCEDURE — 70551 MRI BRAIN STEM W/O DYE: CPT | Mod: ME

## 2021-01-23 PROCEDURE — 81001 URINALYSIS AUTO W/SCOPE: CPT | Performed by: PHYSICIAN ASSISTANT

## 2021-01-23 RX ORDER — HALOPERIDOL 1 MG/1
1 TABLET ORAL
COMMUNITY

## 2021-01-23 RX ORDER — METRONIDAZOLE 500 MG/1
500 TABLET ORAL 3 TIMES DAILY
Status: ON HOLD | COMMUNITY
Start: 2021-01-22 | End: 2021-01-27

## 2021-01-23 RX ORDER — LEVOFLOXACIN 750 MG/1
750 TABLET, FILM COATED ORAL DAILY
Status: ON HOLD | COMMUNITY
Start: 2021-01-23 | End: 2021-01-27

## 2021-01-23 RX ORDER — ACETAMINOPHEN 325 MG/1
650 TABLET ORAL 4 TIMES DAILY
COMMUNITY

## 2021-01-23 RX ORDER — MENTHOL AND METHYL SALICYLATE 10; 30 G/100G; G/100G
CREAM TOPICAL 3 TIMES DAILY PRN
COMMUNITY

## 2021-01-23 RX ORDER — HALOPERIDOL 0.5 MG/1
0.5 TABLET ORAL 3 TIMES DAILY
COMMUNITY

## 2021-01-23 RX ORDER — TRAMADOL HYDROCHLORIDE 50 MG/1
50 TABLET ORAL 2 TIMES DAILY
Status: ON HOLD | COMMUNITY
End: 2021-01-27

## 2021-01-23 NOTE — ED PROVIDER NOTES
History   Chief Complaint:  Facial Droop       History limited due to altered mental status.         Hubert Tamayo is a 89 year old male with history of dementia type 2 diabetes, hypertension, hyperlipidemia, and COPD who presents with facial droop. Patient comes from [facility] where staff noticed that he had a left sided facial droop starting on Thursday. It continued through today prompting ED evaluation. He was started on oxygen at that time. No staff or further information are available.       Review of Systems   Unable to perform ROS: Dementia   All other systems reviewed and are negative.    Allergies:  Crustaceans  Loxapine  Nifedipine  Shellfish Allergy  Sulfa Drugs  Sulfamethoxazole-Trimethoprim  Trimethoprim      Medications:    Albuterol  Symbicort  Cholecalciferol  Plavix  Aricept  Haldol  Cortaid  Lopressor  Nasonex  Nitrostat  Mycostatin  Zyprexa  Petrolatum  Senokot  Zoloft  Eucerin  Flomax  Tiotropium bromide monohydrate     Past Medical History:    CAD  Dementia  Type 2 diabetes mellitus  Essential hypertension  Osteoporosis  Paranoid schizophrenia  Pulmonary emphysema  NSTEMI  Acute respiratory failure with hypoxia  Venous thromboembolism  Anemia  SLY  Fatigue  Bacterial pneumonia  Osteoporosis  Hypertension  Hyperlipidemia  Arrhythmia  COPD  ASCVD  Morphea     Past Surgical History:    Catatract removal     Family History:    No family history on file.     Social History:  The patient comes the ED alone.   Smoking Status: Former Smoker      Physical Exam     Patient Vitals for the past 24 hrs:   BP Temp Temp src Pulse Resp SpO2   01/23/21 2300 (!) 166/96 -- -- 83 -- 100 %   01/23/21 2245 136/83 -- -- 84 -- 100 %   01/23/21 2240 -- -- -- -- -- 100 %   01/23/21 2230 (!) 168/95 -- -- 88 -- 100 %   01/23/21 2215 (!) 167/93 -- -- 84 -- 100 %   01/23/21 2149 -- -- -- -- -- 100 %   01/23/21 2130 (!) 193/108 -- -- 89 -- 100 %   01/23/21 2120 -- -- -- -- -- 99 %   01/23/21 2119 (!) 193/106 --  -- 89 -- --   01/23/21 2100 (!) 160/94 -- -- 85 -- 98 %   01/23/21 2045 (!) 166/91 -- -- 82 -- 99 %   01/23/21 2000 (!) 189/103 -- -- 93 -- 95 %   01/23/21 1956 -- -- -- -- -- (!) 87 %   01/23/21 1904 -- -- -- -- -- 98 %   01/23/21 1721 (!) 172/94 98.2  F (36.8  C) Oral 80 20 95 %     Physical Exam  Vitals signs and nursing note reviewed.   Constitutional:       Appearance: He is not diaphoretic.   HENT:      Head: Normocephalic and atraumatic.   Eyes:      General: No scleral icterus.     Extraocular Movements: Extraocular movements intact.      Pupils: Pupils are equal, round, and reactive to light.   Cardiovascular:      Rate and Rhythm: Normal rate and regular rhythm.      Pulses: Normal pulses.      Heart sounds: Normal heart sounds.   Pulmonary:      Effort: Pulmonary effort is normal. No respiratory distress.      Breath sounds: Normal breath sounds.   Abdominal:      General: There is no distension.      Palpations: Abdomen is soft.      Tenderness: There is no abdominal tenderness. There is no guarding.   Musculoskeletal:         General: No tenderness.   Skin:     General: Skin is warm.      Findings: No rash.   Neurological:      Mental Status: He is alert.      Cranial Nerves: Cranial nerve deficit present.      Comments: L sided facial droop. Unable to keep his arms raised to perform evaluation of upper extremities. Legs drift to bed but unsure of baseline. Difficulty with fluency of speech and minimally communicative.        Emergency Department Course     ECG:  ECG taken at 1745, ECG read at 1820  Normal sinus rhythm. Nonspecific ST and T wave abnormality.   Abnormal ECG  Rate 84 bpm. NJ interval 158 ms. QRS duration 78 ms. QT/QTc 412/486 ms. P-R-T axes 20 -2 64.     Imaging:  XR chest 2 views:  No change. Lungs hyperinflated consistent with COPD. Slight interstitial prominence at lung bases unchanged favored to represent mild fibrosis. No new pneumonia. Reading per radiology.     MRI brain without  and with contrast:  1.  Thin left cerebral convexity subacute subdural hematoma extends inferiorly to the left temporal lobe. There is extension into the posterior aspect of the interhemispheric fissure, detailed above. No significant mass effect. Findings called to Mahendra Guallpa at 8:12 PM. Suggest noncontrast head CT as a baseline.     2.  No evidence of acute intraparenchymal process.     3.  Atrophy and chronic small vessel vascular changes. Reading per radiology.     CT chest without contrast:  1.  New mild foci of developing infiltrate right lung consistent with recurrent pneumonia. Abundant mucous secretions are seen within the endobronchial tree.  2.  Severe baseline emphysema. Reading per radiology.     CT head without contrast:  1. No evidence of acute intracranial hemorrhage or mass effect. 2. Mild to moderate nonspecific white matter changes. 3. Mild brain parenchymal volume loss. Reading per radiology.     Laboratory:  CBC: WBC: 15.2 (H), HGB: 9.8 (low), PLT: 144 (low)  CMP: Glucose 106 (H), Anion Gap: 2 (low), Urea Nitrogen: 60 (H), GFR: 20 (low), Albumin: 2.7 (low), Creatinine: 2.68 (H) o/w WNL   UA: Blood: trace (!), Protein Albumin: 100 (!), o/w Negative  Troponin (1757): 0.239 (H!)  Troponin (2149): 0.188 (H!)  Aymptomatic SARS-CoV-2-COVID-19 by PCR: negative  INR: 1.18 (H)  BNP: 2874 (H)    Emergency Department Course:    Reviewed:  nursing notes, vitals, past medical history and care everywhere    Assessments:    1730 Initial assessment     2050 I rechecked the patient and discussed the results of her workup thus far.     Consults:     2011 I spoke with the JESSICA Nance of [facility], who accepts the patient for admission.    2257 I spoke with the hospitalist Dr. Moise from [facility], who accepts the patient for admission.    Interventions:  None     Disposition:  The patient was transferred to [facility] under the care of Dr. Moise.    Impression & Plan    Covid-19  Hubert Tamayo was evaluated during a global COVID-19 pandemic, which necessitated consideration that the patient might be at risk for infection with the SARS-CoV-2 virus that causes COVID-19.   Applicable protocols for evaluation were followed during the patient's care.   COVID-19 was considered as part of the patient's evaluation. The plan for testing is:  a test was obtained during this visit.    Medical Decision Making:  He presents for evaluation of altered mental status. Staff from the facility could not be reached and on his initial examination there is difficulty with any sort of history being obtained. He mostly stared and did not speak. He was able to follow basic commands. Given his presentation concern for large vessel occlusion CVA resulting in his symptoms. Due to the duration of symptoms not tPA candidate or for intervention. MR imaging initially obtained without evidence of an ischemic CVA however findings of subacute subdural hematoma are present. CT imaging was obtained and confirms this diagnosis.   His diagnostic labs demonstrate acute on chronic renal insufficiency. He has an elevated but down trending troponin which at this time does not appear to be clinically relevant to his presentation. It will continue to be trended. His UA is without UTI at this time however he is presently receiving Levofloxacin and Flagyl for an unknown reason. Upon performance of CT imaging given his new found hypoxemia, he is determined to have RLL pneumonia and changes of chornic lung disease. He did receive antibiotics prior to arrival and I do not believe he has had a treatment failure at this time.   Neurosurgery was consulted, patient arranged for transfer to [facility] for further care. Family, son, was updated on plan of care.     Critical Care Time: was 0 minutes for this patient excluding procedures    Diagnosis:    ICD-10-CM    1. Subdural hematoma (H)  S06.5X9A Troponin I   2. Acute on  chronic renal insufficiency  N28.9     N18.9    3. Pneumonia  J18.9    4. Elevated troponin  R77.8        Scribe Disclosure:  I, Saurabh Downs, am serving as a scribe at 5:28 PM on 1/23/2021 to document services personally performed by Mahendra Guallpa based on my observations and the provider's statements to me.            Mahendra Guallpa PA-C  01/24/21 0105     0

## 2021-01-23 NOTE — ED TRIAGE NOTES
Patient from [facility]. Patient with left sided facial droop on Thursday. Also started on oxygen when  facial droop started.     Son notified.     Hx dementia.

## 2021-01-23 NOTE — ED NOTES
Bed: ED29  Expected date: 1/23/21  Expected time: 5:09 PM  Means of arrival: Ambulance  Comments:  A 518   unable to understand please room.

## 2021-01-24 ENCOUNTER — HOSPITAL ENCOUNTER (INPATIENT)
Facility: CLINIC | Age: 86
LOS: 3 days | Discharge: NURSING, REHAB OR LONG-TERM CARE | DRG: 085 | End: 2021-01-27
Attending: INTERNAL MEDICINE | Admitting: INTERNAL MEDICINE
Payer: MEDICARE

## 2021-01-24 ENCOUNTER — HOSPITAL ENCOUNTER (OUTPATIENT)
Dept: NEUROLOGY | Facility: CLINIC | Age: 86
DRG: 085 | End: 2021-01-24
Attending: PSYCHIATRY & NEUROLOGY | Admitting: INTERNAL MEDICINE
Payer: MEDICARE

## 2021-01-24 ENCOUNTER — APPOINTMENT (OUTPATIENT)
Dept: CT IMAGING | Facility: CLINIC | Age: 86
DRG: 085 | End: 2021-01-24
Attending: INTERNAL MEDICINE
Payer: MEDICARE

## 2021-01-24 ENCOUNTER — APPOINTMENT (OUTPATIENT)
Dept: CARDIOLOGY | Facility: CLINIC | Age: 86
DRG: 085 | End: 2021-01-24
Attending: INTERNAL MEDICINE
Payer: MEDICARE

## 2021-01-24 ENCOUNTER — APPOINTMENT (OUTPATIENT)
Dept: SPEECH THERAPY | Facility: CLINIC | Age: 86
DRG: 085 | End: 2021-01-24
Attending: INTERNAL MEDICINE
Payer: MEDICARE

## 2021-01-24 VITALS
OXYGEN SATURATION: 97 % | TEMPERATURE: 98.2 F | DIASTOLIC BLOOD PRESSURE: 73 MMHG | SYSTOLIC BLOOD PRESSURE: 123 MMHG | RESPIRATION RATE: 20 BRPM | HEART RATE: 89 BPM

## 2021-01-24 DIAGNOSIS — J69.0 ASPIRATION PNEUMONIA, UNSPECIFIED ASPIRATION PNEUMONIA TYPE, UNSPECIFIED LATERALITY, UNSPECIFIED PART OF LUNG (H): ICD-10-CM

## 2021-01-24 DIAGNOSIS — S06.5XAA SDH (SUBDURAL HEMATOMA) (H): ICD-10-CM

## 2021-01-24 DIAGNOSIS — F03.91 DEMENTIA WITH BEHAVIORAL DISTURBANCE, UNSPECIFIED DEMENTIA TYPE: ICD-10-CM

## 2021-01-24 LAB
ANION GAP SERPL CALCULATED.3IONS-SCNC: 6 MMOL/L (ref 3–14)
BUN SERPL-MCNC: 56 MG/DL (ref 7–30)
CALCIUM SERPL-MCNC: 9.9 MG/DL (ref 8.5–10.1)
CHLORIDE SERPL-SCNC: 106 MMOL/L (ref 94–109)
CO2 SERPL-SCNC: 25 MMOL/L (ref 20–32)
CREAT SERPL-MCNC: 2.54 MG/DL (ref 0.66–1.25)
ERYTHROCYTE [DISTWIDTH] IN BLOOD BY AUTOMATED COUNT: 16 % (ref 10–15)
GFR SERPL CREATININE-BSD FRML MDRD: 21 ML/MIN/{1.73_M2}
GLUCOSE BLDC GLUCOMTR-MCNC: 115 MG/DL (ref 70–99)
GLUCOSE BLDC GLUCOMTR-MCNC: 117 MG/DL (ref 70–99)
GLUCOSE BLDC GLUCOMTR-MCNC: 144 MG/DL (ref 70–99)
GLUCOSE SERPL-MCNC: 140 MG/DL (ref 70–99)
HCT VFR BLD AUTO: 30.3 % (ref 40–53)
HGB BLD-MCNC: 10.1 G/DL (ref 13.3–17.7)
LACTATE BLD-SCNC: 1 MMOL/L (ref 0.7–2)
MCH RBC QN AUTO: 30.9 PG (ref 26.5–33)
MCHC RBC AUTO-ENTMCNC: 33.3 G/DL (ref 31.5–36.5)
MCV RBC AUTO: 93 FL (ref 78–100)
PLATELET # BLD AUTO: 161 10E9/L (ref 150–450)
POTASSIUM SERPL-SCNC: 4.6 MMOL/L (ref 3.4–5.3)
RBC # BLD AUTO: 3.27 10E12/L (ref 4.4–5.9)
SODIUM SERPL-SCNC: 137 MMOL/L (ref 133–144)
TROPONIN I SERPL-MCNC: 0.16 UG/L (ref 0–0.04)
TROPONIN I SERPL-MCNC: 0.18 UG/L (ref 0–0.04)
TROPONIN I SERPL-MCNC: 0.29 UG/L (ref 0–0.04)
WBC # BLD AUTO: 14.1 10E9/L (ref 4–11)

## 2021-01-24 PROCEDURE — 255N000002 HC RX 255 OP 636: Performed by: INTERNAL MEDICINE

## 2021-01-24 PROCEDURE — 93325 DOPPLER ECHO COLOR FLOW MAPG: CPT | Mod: 26 | Performed by: INTERNAL MEDICINE

## 2021-01-24 PROCEDURE — 120N000001 HC R&B MED SURG/OB

## 2021-01-24 PROCEDURE — 250N000011 HC RX IP 250 OP 636: Performed by: INTERNAL MEDICINE

## 2021-01-24 PROCEDURE — 250N000013 HC RX MED GY IP 250 OP 250 PS 637: Performed by: INTERNAL MEDICINE

## 2021-01-24 PROCEDURE — 80048 BASIC METABOLIC PNL TOTAL CA: CPT | Performed by: INTERNAL MEDICINE

## 2021-01-24 PROCEDURE — G0463 HOSPITAL OUTPT CLINIC VISIT: HCPCS | Performed by: PHYSICIAN ASSISTANT

## 2021-01-24 PROCEDURE — 70450 CT HEAD/BRAIN W/O DYE: CPT | Mod: MG

## 2021-01-24 PROCEDURE — 36415 COLL VENOUS BLD VENIPUNCTURE: CPT | Performed by: PHYSICIAN ASSISTANT

## 2021-01-24 PROCEDURE — 93321 DOPPLER ECHO F-UP/LMTD STD: CPT | Mod: 26 | Performed by: INTERNAL MEDICINE

## 2021-01-24 PROCEDURE — 999N001017 HC STATISTIC GLUCOSE BY METER IP

## 2021-01-24 PROCEDURE — 92610 EVALUATE SWALLOWING FUNCTION: CPT | Mod: GN

## 2021-01-24 PROCEDURE — 99221 1ST HOSP IP/OBS SF/LOW 40: CPT | Mod: GC | Performed by: PSYCHIATRY & NEUROLOGY

## 2021-01-24 PROCEDURE — 258N000003 HC RX IP 258 OP 636: Performed by: PHYSICIAN ASSISTANT

## 2021-01-24 PROCEDURE — 83605 ASSAY OF LACTIC ACID: CPT | Performed by: INTERNAL MEDICINE

## 2021-01-24 PROCEDURE — 85027 COMPLETE CBC AUTOMATED: CPT | Performed by: INTERNAL MEDICINE

## 2021-01-24 PROCEDURE — 84484 ASSAY OF TROPONIN QUANT: CPT | Performed by: PHYSICIAN ASSISTANT

## 2021-01-24 PROCEDURE — 99223 1ST HOSP IP/OBS HIGH 75: CPT | Mod: AI | Performed by: INTERNAL MEDICINE

## 2021-01-24 PROCEDURE — 999N000208 ECHOCARDIOGRAM LIMITED

## 2021-01-24 PROCEDURE — 95816 EEG AWAKE AND DROWSY: CPT

## 2021-01-24 PROCEDURE — 96360 HYDRATION IV INFUSION INIT: CPT | Mod: 59

## 2021-01-24 PROCEDURE — 93308 TTE F-UP OR LMTD: CPT | Mod: 26 | Performed by: INTERNAL MEDICINE

## 2021-01-24 PROCEDURE — 84484 ASSAY OF TROPONIN QUANT: CPT | Performed by: INTERNAL MEDICINE

## 2021-01-24 PROCEDURE — 36415 COLL VENOUS BLD VENIPUNCTURE: CPT | Performed by: INTERNAL MEDICINE

## 2021-01-24 PROCEDURE — 258N000003 HC RX IP 258 OP 636: Performed by: INTERNAL MEDICINE

## 2021-01-24 RX ORDER — LEVOFLOXACIN 500 MG/1
500 TABLET, FILM COATED ORAL
Status: DISCONTINUED | OUTPATIENT
Start: 2021-01-25 | End: 2021-01-27 | Stop reason: HOSPADM

## 2021-01-24 RX ORDER — HYDRALAZINE HYDROCHLORIDE 20 MG/ML
10 INJECTION INTRAMUSCULAR; INTRAVENOUS EVERY 4 HOURS PRN
Status: DISCONTINUED | OUTPATIENT
Start: 2021-01-24 | End: 2021-01-24

## 2021-01-24 RX ORDER — NITROGLYCERIN 0.4 MG/1
0.4 TABLET SUBLINGUAL EVERY 5 MIN PRN
Status: DISCONTINUED | OUTPATIENT
Start: 2021-01-24 | End: 2021-01-27 | Stop reason: HOSPADM

## 2021-01-24 RX ORDER — ALBUTEROL SULFATE 0.83 MG/ML
2.5 SOLUTION RESPIRATORY (INHALATION) EVERY 6 HOURS PRN
Status: DISCONTINUED | OUTPATIENT
Start: 2021-01-24 | End: 2021-01-27 | Stop reason: HOSPADM

## 2021-01-24 RX ORDER — TIOTROPIUM BROMIDE 18 UG/1
18 CAPSULE ORAL; RESPIRATORY (INHALATION) DAILY
Status: DISCONTINUED | OUTPATIENT
Start: 2021-01-24 | End: 2021-01-24

## 2021-01-24 RX ORDER — FAMOTIDINE 10 MG
10 TABLET ORAL DAILY
Status: DISCONTINUED | OUTPATIENT
Start: 2021-01-24 | End: 2021-01-27 | Stop reason: HOSPADM

## 2021-01-24 RX ORDER — BISACODYL 10 MG
10 SUPPOSITORY, RECTAL RECTAL DAILY PRN
Status: DISCONTINUED | OUTPATIENT
Start: 2021-01-24 | End: 2021-01-27 | Stop reason: HOSPADM

## 2021-01-24 RX ORDER — POLYETHYLENE GLYCOL 3350 17 G/17G
17 POWDER, FOR SOLUTION ORAL DAILY PRN
Status: DISCONTINUED | OUTPATIENT
Start: 2021-01-24 | End: 2021-01-27 | Stop reason: HOSPADM

## 2021-01-24 RX ORDER — METOPROLOL TARTRATE 50 MG
50 TABLET ORAL 2 TIMES DAILY
Status: DISCONTINUED | OUTPATIENT
Start: 2021-01-24 | End: 2021-01-27 | Stop reason: HOSPADM

## 2021-01-24 RX ORDER — HALOPERIDOL 0.5 MG/1
0.5 TABLET ORAL 3 TIMES DAILY
Status: DISCONTINUED | OUTPATIENT
Start: 2021-01-24 | End: 2021-01-27 | Stop reason: HOSPADM

## 2021-01-24 RX ORDER — SENNOSIDES A AND B 8.6 MG/1
1 TABLET, FILM COATED ORAL
Status: DISCONTINUED | OUTPATIENT
Start: 2021-01-24 | End: 2021-01-24

## 2021-01-24 RX ORDER — SENNOSIDES 8.6 MG
1 TABLET ORAL EVERY MORNING
Status: DISCONTINUED | OUTPATIENT
Start: 2021-01-24 | End: 2021-01-27 | Stop reason: HOSPADM

## 2021-01-24 RX ORDER — DONEPEZIL HYDROCHLORIDE 5 MG/1
5 TABLET, FILM COATED ORAL AT BEDTIME
Status: DISCONTINUED | OUTPATIENT
Start: 2021-01-24 | End: 2021-01-27 | Stop reason: HOSPADM

## 2021-01-24 RX ORDER — LIDOCAINE 40 MG/G
CREAM TOPICAL
Status: DISCONTINUED | OUTPATIENT
Start: 2021-01-24 | End: 2021-01-24

## 2021-01-24 RX ORDER — FAMOTIDINE 10 MG
10 TABLET ORAL DAILY
COMMUNITY

## 2021-01-24 RX ORDER — CARBOXYMETHYLCELLULOSE SODIUM 10 MG/ML
2 GEL OPHTHALMIC EVERY 4 HOURS PRN
Status: DISCONTINUED | OUTPATIENT
Start: 2021-01-24 | End: 2021-01-27 | Stop reason: HOSPADM

## 2021-01-24 RX ORDER — ONDANSETRON 4 MG/1
4 TABLET, ORALLY DISINTEGRATING ORAL EVERY 6 HOURS PRN
Status: DISCONTINUED | OUTPATIENT
Start: 2021-01-24 | End: 2021-01-27 | Stop reason: HOSPADM

## 2021-01-24 RX ORDER — HYDRALAZINE HYDROCHLORIDE 20 MG/ML
10-20 INJECTION INTRAMUSCULAR; INTRAVENOUS EVERY 4 HOURS PRN
Status: DISCONTINUED | OUTPATIENT
Start: 2021-01-24 | End: 2021-01-27 | Stop reason: HOSPADM

## 2021-01-24 RX ORDER — AMOXICILLIN 250 MG
1 CAPSULE ORAL 2 TIMES DAILY PRN
Status: DISCONTINUED | OUTPATIENT
Start: 2021-01-24 | End: 2021-01-27 | Stop reason: HOSPADM

## 2021-01-24 RX ORDER — LIDOCAINE 40 MG/G
CREAM TOPICAL
Status: DISCONTINUED | OUTPATIENT
Start: 2021-01-24 | End: 2021-01-27 | Stop reason: HOSPADM

## 2021-01-24 RX ORDER — LABETALOL HYDROCHLORIDE 5 MG/ML
10 INJECTION, SOLUTION INTRAVENOUS
Status: DISCONTINUED | OUTPATIENT
Start: 2021-01-24 | End: 2021-01-27 | Stop reason: HOSPADM

## 2021-01-24 RX ORDER — OLANZAPINE 5 MG/1
5-10 TABLET ORAL AT BEDTIME
Status: DISCONTINUED | OUTPATIENT
Start: 2021-01-24 | End: 2021-01-24

## 2021-01-24 RX ORDER — PROCHLORPERAZINE MALEATE 5 MG
5 TABLET ORAL EVERY 6 HOURS PRN
Status: DISCONTINUED | OUTPATIENT
Start: 2021-01-24 | End: 2021-01-27 | Stop reason: HOSPADM

## 2021-01-24 RX ORDER — ACETAMINOPHEN 650 MG/1
650 SUPPOSITORY RECTAL EVERY 4 HOURS PRN
Status: DISCONTINUED | OUTPATIENT
Start: 2021-01-24 | End: 2021-01-27 | Stop reason: HOSPADM

## 2021-01-24 RX ORDER — HALOPERIDOL 0.5 MG/1
1 TABLET ORAL DAILY
Status: DISCONTINUED | OUTPATIENT
Start: 2021-01-24 | End: 2021-01-27 | Stop reason: HOSPADM

## 2021-01-24 RX ORDER — ATORVASTATIN CALCIUM 80 MG/1
80 TABLET, FILM COATED ORAL DAILY
Status: DISCONTINUED | OUTPATIENT
Start: 2021-01-24 | End: 2021-01-27 | Stop reason: HOSPADM

## 2021-01-24 RX ORDER — PROCHLORPERAZINE 25 MG
12.5 SUPPOSITORY, RECTAL RECTAL EVERY 12 HOURS PRN
Status: DISCONTINUED | OUTPATIENT
Start: 2021-01-24 | End: 2021-01-27 | Stop reason: HOSPADM

## 2021-01-24 RX ORDER — OLANZAPINE 5 MG/1
10 TABLET ORAL AT BEDTIME
Status: DISCONTINUED | OUTPATIENT
Start: 2021-01-24 | End: 2021-01-26

## 2021-01-24 RX ORDER — TAMSULOSIN HYDROCHLORIDE 0.4 MG/1
0.4 CAPSULE ORAL DAILY
Status: DISCONTINUED | OUTPATIENT
Start: 2021-01-24 | End: 2021-01-27 | Stop reason: HOSPADM

## 2021-01-24 RX ORDER — AMOXICILLIN 250 MG
2 CAPSULE ORAL 2 TIMES DAILY PRN
Status: DISCONTINUED | OUTPATIENT
Start: 2021-01-24 | End: 2021-01-27 | Stop reason: HOSPADM

## 2021-01-24 RX ORDER — SODIUM CHLORIDE 9 MG/ML
INJECTION, SOLUTION INTRAVENOUS CONTINUOUS
Status: DISCONTINUED | OUTPATIENT
Start: 2021-01-24 | End: 2021-01-24

## 2021-01-24 RX ORDER — ALBUTEROL SULFATE 90 UG/1
2 AEROSOL, METERED RESPIRATORY (INHALATION) EVERY 4 HOURS PRN
Status: DISCONTINUED | OUTPATIENT
Start: 2021-01-24 | End: 2021-01-27 | Stop reason: HOSPADM

## 2021-01-24 RX ORDER — ONDANSETRON 2 MG/ML
4 INJECTION INTRAMUSCULAR; INTRAVENOUS EVERY 6 HOURS PRN
Status: DISCONTINUED | OUTPATIENT
Start: 2021-01-24 | End: 2021-01-27 | Stop reason: HOSPADM

## 2021-01-24 RX ORDER — ACETAMINOPHEN 325 MG/1
650 TABLET ORAL EVERY 4 HOURS PRN
Status: DISCONTINUED | OUTPATIENT
Start: 2021-01-24 | End: 2021-01-27 | Stop reason: HOSPADM

## 2021-01-24 RX ADMIN — SODIUM CHLORIDE: 9 INJECTION, SOLUTION INTRAVENOUS at 02:38

## 2021-01-24 RX ADMIN — ATORVASTATIN CALCIUM 80 MG: 80 TABLET, FILM COATED ORAL at 14:29

## 2021-01-24 RX ADMIN — LABETALOL HYDROCHLORIDE 10 MG: 5 INJECTION, SOLUTION INTRAVENOUS at 05:33

## 2021-01-24 RX ADMIN — METOPROLOL TARTRATE 50 MG: 50 TABLET, FILM COATED ORAL at 14:30

## 2021-01-24 RX ADMIN — GUAIFENESIN 5 ML: 200 SOLUTION ORAL at 21:21

## 2021-01-24 RX ADMIN — SODIUM CHLORIDE 1000 ML: 9 INJECTION, SOLUTION INTRAVENOUS at 00:12

## 2021-01-24 RX ADMIN — UMECLIDINIUM 1 PUFF: 62.5 AEROSOL, POWDER ORAL at 15:24

## 2021-01-24 RX ADMIN — ACETAMINOPHEN 650 MG: 325 TABLET, FILM COATED ORAL at 15:59

## 2021-01-24 RX ADMIN — METRONIDAZOLE 500 MG: 500 INJECTION, SOLUTION INTRAVENOUS at 08:21

## 2021-01-24 RX ADMIN — OLANZAPINE 10 MG: 5 TABLET, FILM COATED ORAL at 21:21

## 2021-01-24 RX ADMIN — HALOPERIDOL 0.5 MG: 0.5 TABLET ORAL at 21:21

## 2021-01-24 RX ADMIN — STANDARDIZED SENNA CONCENTRATE 1 TABLET: 8.6 TABLET ORAL at 14:29

## 2021-01-24 RX ADMIN — METRONIDAZOLE 500 MG: 500 INJECTION, SOLUTION INTRAVENOUS at 20:08

## 2021-01-24 RX ADMIN — METOPROLOL TARTRATE 50 MG: 50 TABLET, FILM COATED ORAL at 21:21

## 2021-01-24 RX ADMIN — DONEPEZIL HYDROCHLORIDE 5 MG: 5 TABLET, FILM COATED ORAL at 21:21

## 2021-01-24 RX ADMIN — HYDRALAZINE HYDROCHLORIDE 10 MG: 20 INJECTION INTRAMUSCULAR; INTRAVENOUS at 04:13

## 2021-01-24 RX ADMIN — SERTRALINE HYDROCHLORIDE 150 MG: 100 TABLET ORAL at 14:29

## 2021-01-24 RX ADMIN — FAMOTIDINE 10 MG: 10 TABLET ORAL at 14:29

## 2021-01-24 RX ADMIN — HUMAN ALBUMIN MICROSPHERES AND PERFLUTREN 9 ML: 10; .22 INJECTION, SOLUTION INTRAVENOUS at 12:00

## 2021-01-24 RX ADMIN — FLUTICASONE FUROATE AND VILANTEROL TRIFENATATE 1 PUFF: 200; 25 POWDER RESPIRATORY (INHALATION) at 15:25

## 2021-01-24 RX ADMIN — HALOPERIDOL 0.5 MG: 0.5 TABLET ORAL at 14:29

## 2021-01-24 ASSESSMENT — ACTIVITIES OF DAILY LIVING (ADL)
ADLS_ACUITY_SCORE: 29

## 2021-01-24 ASSESSMENT — MIFFLIN-ST. JEOR: SCORE: 1239.5

## 2021-01-24 NOTE — CONSULTS
[facility]  [facility]    Neurology Consultation    Hubert Tamayo MRN# [id number]   YOB: 1929 Age: 89 year old    Code Status:Full Code   Date of Admission: 1/24/2021  Date of Consult:01/24/2021                                                                                       Assessment and Plan:                                         #. Left facial droop-not clear that neurological.  Difficult to fully participate as lethargic and mucous membrane very dry.  May relate to lax skin/perioral tissues.    --monitor  #. Encephalopathic-chronic dementia and comorbid medical conditions(TBI w left SDH, kidney disease, pneumonia, ?altered hydration status, chronic schizophrenia on chronic psych meds) etc  --optimize comorbid medical conditions.   Will order EEG to r/o sz as cause  Check tsh.   #. Known hx of dementia associated with cognitive and motor decline over several years ( in rehab/snf since 11/20 admission.   #L sdh,   --mgmt per neurosurgery  #. DVT Prophylaxis  --per hospitalist  #. PT/OT/Speech  --as tolerated  #. Nutrition / GI Prophylaxis  --Per recommendations of speech therapy    Dr. Traci Martinez to follow tomorrow.      ----------------------------------------------------------------------------------  ----------------------------------------------------------------------------------  Reason for consult: as above       Chief Complaint:   Unable  Hx obtained from son by telephone  History is obtained from the patient / chart       History of Present Illness:   This patient is a 89 year old male who presents with hx:     Stroke service:    PMH of HTN, HLD, T2DM, previously on warfarin for a history of DVT- living in an assisted living facility admitted with 2 days of left facial droop prior to arrival and was found to have a 3-mm SDH over the left occipital lobe and a left parafalcine subdural hematoma posteriorly - who is seen as a consult  for concern of stroke.      The patient had a  MRI brain w/o contrast on 1/23/21 (2-3 days after onset of facial droop) which did not show acute stroke. He did have moderate small vessel disease periventricularly and in the ifrah.  GRE sequences confirmed the SDH seen on head CT     Impression  Left facial droop     Recommendations  No further inpatient stroke work-up indicated at this point.  There is no clinical evidence of seizures at this point that would warrant antiepileptic therapy.  May consider routine EEG, if there is concern for seizures.  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  May consider a general neurology evaluation for further work-up of suspected seizures and 7th nerve palsy      Son reports hx of dementia [facility]5 years, [facility]  Also chronic hx of schizophrenia.  Living with cousin as caregiver till 11/2021 admission (gradualy decline in mentation and ambulation with frequent falls Prior)  [facility]    Son reports about 7 falls, most recent 1/18.  Last in person visit last week. More confused, lethargic.    Psych meds being adjusted last week, treatment for uti, and pneumonia.  Left facial droop noted as well leading  To evaluation.           Past Medical History:     Past Medical History:   Diagnosis Date     CAD (coronary artery disease)      Dementia (H)      Diabetes mellitus type II      Essential hypertension      Osteoporosis      Paranoid schizophrenia      Pulmonary emphysema          Past Surgical History:     Past Surgical History:   Procedure Laterality Date     CATARACT REMOVAL Bilateral           Social History:     Social History     Socioeconomic History     Marital status: Single     Spouse name: Not on file     Number of children: Not on file     Years of education: Not on file     Highest education level: Not on file   Occupational History     Not on file   Social Needs     Financial resource strain: Not on file     Food insecurity      Worry: Not on file     Inability: Not on file     Transportation needs     Medical: Not on file     Non-medical: Not on file   Tobacco Use     Smoking status: Former Smoker     Packs/day: 0.00     Smokeless tobacco: Never Used   Substance and Sexual Activity     Alcohol use: Not Currently     Drug use: Never     Sexual activity: Not on file   Lifestyle     Physical activity     Days per week: Not on file     Minutes per session: Not on file     Stress: Not on file   Relationships     Social connections     Talks on phone: Not on file     Gets together: Not on file     Attends Restorationism service: Not on file     Active member of club or organization: Not on file     Attends meetings of clubs or organizations: Not on file     Relationship status: Not on file     Intimate partner violence     Fear of current or ex partner: Not on file     Emotionally abused: Not on file     Physically abused: Not on file     Forced sexual activity: Not on file   Other Topics Concern     Not on file   Social History Narrative     Not on file     Patient denies smoking, no significant alcohol intake, denies illicit drugs use       Family History:   No family history on file.  Reviewed and not felt to be contributory.        Home Medications:     Prior to Admission Medications   Prescriptions Last Dose Informant Patient Reported? Taking?   Menthol-Methyl Salicylate (ICY HOT EXTRA STRENGTH) 10-30 % CREA 1/23/2021 at am Nursing Home Yes Yes   Sig: Externally apply topically 3 times daily Apply to bilateral thighs   Nutritional Supplements (ENSURE COMPLETE PO) 1/22/2021 at pm Nursing Home Yes Yes   Sig: Take 1 Bottle by mouth 2 times daily (with meals)   OLANZapine (ZYPREXA) 20 MG tablet 1/22/2021 at pm Nursing Home Yes Yes   Sig: Take 20 mg by mouth At Bedtime   Skin Protectants, Misc. (EUCERIN) cream 1/23/2021 at am Nursing Home Yes Yes   Sig: Apply topically daily Apply to areas of dry skin topically every day ideally within 3 minutes  after bath or shower.   Tiotropium Bromide Monohydrate (SPIRIVA HANDIHALER IN) 1/23/2021 at am Nursing Home Yes Yes   Sig: Inhale 1 capsule into the lungs daily    acetaminophen (TYLENOL) 325 MG tablet 1/23/2021 at am Nursing Home Yes Yes   Sig: Take 650 mg by mouth 4 times daily   albuterol (PROAIR HFA/PROVENTIL HFA/VENTOLIN HFA) 108 (90 Base) MCG/ACT inhaler Unknown at Unknown time senior living Yes No   Sig: Inhale 2 puffs into the lungs every 4 hours as needed for shortness of breath / dyspnea or wheezing   albuterol (PROVENTIL) (2.5 MG/3ML) 0.083% neb solution Unknown at Unknown time senior living Yes No   Sig: Take 2.5 mg by nebulization every 6 hours as needed for shortness of breath / dyspnea or wheezing   aspirin (ASA) 81 MG chewable tablet 1/23/2021 at am Nursing Home Yes Yes   Sig: Take 81 mg by mouth daily   atorvastatin (LIPITOR) 80 MG tablet 1/23/2021 at am Nursing Home Yes Yes   Sig: Take 80 mg by mouth daily   camphor-menthol (DERMASARRA) 0.5-0.5 % external lotion Unknown at Unknown time senior living Yes No   Sig: Apply topically 2 times daily as needed (itching) Apply thin layer topically bid prn itching, apply after Eucerin.   carboxymethylcellulose (REFRESH PLUS) 0.5 % SOLN ophthalmic solution Unknown at Unknown time senior living Yes No   Sig: Place 2 drops into both eyes every 4 hours as needed for dry eyes 6 times per day prn*   cholecalciferol 1000 units TABS 1/23/2021 at am Nursing Home Yes Yes   Sig: Take 1,000 Units by mouth daily   donepezil (ARICEPT) 5 MG tablet 1/22/2021 at pm Nursing Home Yes Yes   Sig: Take 5 mg by mouth At Bedtime   famotidine (PEPCID) 10 MG tablet 1/23/2021 at am  Yes Yes   Sig: Take 10 mg by mouth daily   fluticasone-salmeterol (ADVAIR) 500-50 MCG/DOSE inhaler 1/23/2021 at am Nursing Home Yes Yes   Sig: Inhale 1 puff into the lungs 2 times daily Rinse mouth after use.   guaiFENesin (ROBITUSSIN) 100 MG/5ML SYRP 1/22/2021 at pm Nursing Home Yes Yes   Sig: Take 5 mLs by  mouth At Bedtime   haloperidol (HALDOL) 0.5 MG tablet 1/23/2021 at am Nursing Home Yes Yes   Sig: Take 0.5 mg by mouth 3 times daily   haloperidol (HALDOL) 1 MG tablet 1/22/2021 at pm Nursing Home Yes Yes   Sig: Take 1 mg by mouth daily at 2 pm   levofloxacin (LEVAQUIN) 750 MG tablet 1/23/2021 at am Nursing Home Yes Yes   Sig: Take 750 mg by mouth daily For 5 days   magnesium 250 MG tablet 1/23/2021 at am Nursing Home Yes Yes   Sig: Take 1 tablet by mouth daily Patient takes on their own. [facility].   metoprolol tartrate (LOPRESSOR) 50 MG tablet 1/23/2021 at am Nursing Home No Yes   Sig: Take 1 tablet (50 mg) by mouth 2 times daily   metroNIDAZOLE (FLAGYL) 500 MG tablet 1/23/2021 at am Nursing Home Yes Yes   Sig: Take 500 mg by mouth 3 times daily For 7 days   nitroGLYcerin (NITROSTAT) 0.4 MG sublingual tablet Unknown at Unknown time group home Yes No   Sig: Place 0.4 mg under the tongue every 5 minutes as needed for chest pain For chest pain place 1 tablet under the tongue every 5 minutes for 3 doses. If symptoms persist 5 minutes after 1st dose call 911.   senna (SENOKOT) 8.6 MG tablet 1/23/2021 at am Nursing Home Yes Yes   Sig: Take 1 tablet by mouth every morning   senna (SENOKOT) 8.6 MG tablet Unknown at Unknown time group home Yes No   Sig: Take 1 tablet by mouth every evening as needed for constipation   sertraline (ZOLOFT) 100 MG tablet 1/23/2021 at am Nursing Home No Yes   Sig: Take 1.5 tablets (150 mg) by mouth daily   tamsulosin (FLOMAX) 0.4 MG capsule 1/23/2021 at am Nursing Home Yes Yes   Sig: Take 0.4 mg by mouth daily   traMADol (ULTRAM) 50 MG tablet 1/23/2021 at am Nursing Home Yes Yes   Sig: Take 50 mg by mouth 2 times daily      Facility-Administered Medications: None          Allergy:     Allergies   Allergen Reactions     Crustaceans      Loxapine      Nifedipine      Shellfish Allergy      Sulfa Drugs      Sulfamethoxazole-Trimethoprim      Trimethoprim         "   Inpatient Medications:   Scheduled Meds:    atorvastatin  80 mg Oral Daily     donepezil  5 mg Oral At Bedtime     famotidine  10 mg Oral Daily     fluticasone-vilanterol  1 puff Inhalation Daily     guaiFENesin  5 mL Oral At Bedtime     haloperidol  0.5 mg Oral TID     haloperidol  1 mg Oral Daily     [START ON 1/25/2021] levofloxacin  500 mg Oral Q48H     metoprolol tartrate  50 mg Oral BID     metroNIDAZOLE  500 mg Intravenous Q12H     OLANZapine  5-10 mg Oral At Bedtime     sennosides  1 tablet Oral QAM     sertraline  150 mg Oral Daily     sodium chloride (PF)  3 mL Intracatheter Q8H     tamsulosin  0.4 mg Oral Daily     umeclidinium  1 puff Inhalation Daily     PRN Meds: acetaminophen, acetaminophen, albuterol, albuterol, bisacodyl, carboxymethylcellulose PF, hydrALAZINE, labetalol, lidocaine 4%, lidocaine (buffered or not buffered), melatonin, nitroGLYcerin, ondansetron **OR** ondansetron, polyethylene glycol, prochlorperazine **OR** prochlorperazine **OR** prochlorperazine, senna-docusate **OR** senna-docusate, sodium chloride (PF)        Review of Systems    The Review of Systems is negative other than noted in the HPI  Unable due to confusion.   NEURO: see HPI       Physical Exam:   Physical Exam   Vitals:  Height:5' 8\"  Weight:134 lbs 7.69 oz   Temp: 98.1  F (36.7  C) Temp src: Oral BP: (!) 131/92 Pulse: 102   Resp: 18 SpO2: 100 % O2 Device: Nasal cannula Oxygen Delivery: 1 LPM  General Appearance:  No acute distress, not able to consistently awaken/participat.  Mouth open, mucous membranedry.  Left nasolabial fold drooped compared to right but able to move with grimace smile.    Neuro:       Mental Status Exam:    Lethargic, but arousable, inconsistent response. oriented to self.  Not place or time.  Dysarthric, low volume.  Language-unable to fully participate, but follows simple commands with encouragment.         Cranial Nerves:   Vision grossly intact. EOMI, tongue midline.  No jaw devialtion  " Eyebrow movement symmetric  Closes eyes symmetrically.  Shoulder elevation symmetric. Hearing intact.             Motor:   Rigid tone x4, moves extremities x4, difficlty raising legs.  Atrophymild x4           Reflexes:  Decreased x4.  No ankle jerk. Plantar nl No clonus       Sensory:  Unable to fully cooperate                   Coordination:   Moves x4 to command, unable to fully partipate       Gait:  unable  Neck: no nuchal rigidity, normal thyroid. No carotid bruits.    Cardiovascular: Regular rate and rhythm, no m/r/g  Extremities: No clubbing, no cyanosis, no edema       Data:   ROUTINE IP LABS   CBC RESULTS:     Recent Labs   Lab 01/24/21  0610 01/23/21  1757   WBC 14.1* 15.2*   RBC 3.27* 3.17*   HGB 10.1* 9.8*   HCT 30.3* 30.2*    144*     Basic Metabolic Panel:   Recent Labs   Lab Test 01/24/21  0610 01/23/21  1757 11/21/20  0526    136 142   POTASSIUM 4.6 4.8 4.3   CHLORIDE 106 104 109   CO2 25 30 31   BUN 56* 60* 34*   CR 2.54* 2.68* 1.93*   * 106* 81   RAMON 9.9 9.9 8.4*     Liver panel:  Recent Labs   Lab Test 01/23/21  1757 11/19/20  0517 11/18/20  0107 07/01/20  1613 11/17/19  0521   PROTTOTAL 7.8 7.0 7.6 7.7 6.8   ALBUMIN 2.7* 2.9* 3.4 3.3* 2.4*   BILITOTAL 0.4 0.5 0.4 0.2 0.2   ALKPHOS 134 106 116 175* 97   AST 44 41 49* 39 59*   ALT 18 35 41 38 48     INR:  Recent Labs   Lab Test 01/23/21  1757 11/10/19  1630 10/31/19  0611 10/30/19  0618 10/29/19  1925   INR 1.18* 1.77* 1.23* 3.08* >10.00*      Lipid Profile:  Recent Labs   Lab Test 10/31/19  0611   CHOL 77   HDL 42   LDL 14   TRIG 103     Vitamin B12:   Recent Labs   Lab Test 11/16/19  0953   B12 582      Troponin I:   Recent Labs   Lab Test 01/24/21  1159 01/24/21  0610 01/24/21  0030 01/23/21  2149 01/23/21  1757 11/18/20  0107 07/01/20  1613 11/10/19  1630   TROPI 0.292* 0.160* 0.181* 0.188* 0.239* 0.028 <0.015 <0.015          IMAGING:   All imaging studies were reviewed personally  CT head:   IMPRESSION:      1.  Redemonstrated subdural hematoma involving the left occipital lobe and left paramedian posterior falx, not significantly changed from the prior exam dated 01/23/2021    MRI 1/23/21.  Thin left cerebral convexity subacute subdural hematoma extends inferiorly to the left temporal lobe. There is extension into the posterior aspect of the interhemispheric fissure, detailed above. No significant mass effect. Findings called to Mahendra Guallpa at 8:12 PM. Suggest noncontrast head CT as a baseline.     2.  No evidence of acute intraparenchymal process.     3.  Atrophy and chronic small vessel vascular changes    Sherry Mckay M.D.  [facility]  Office [phone]

## 2021-01-24 NOTE — PROGRESS NOTES
No charge note today. 89 yr old male with h/o frequent recurent falls at the care facility admitted with facial droop and found to have left subdural hematoma with no mass effect. Recently diagnosed with aspiration Pneumonia restarted on levaquin and metronidazole. Speech path consult placed . Echo pending. Repeat CT head sowed stable hematoma. Pt on PTA on zyprexa 20mg at bedtime and haldol 0.5mg PO TID and 1mg at 2pm daily   At risk of sedation and risk of falls with these meds so reduced zyprexa to 10mg po a bedtime and continued haldol     Patient's troponin at 0.188-0.16-0.292  Pt is asymptomatic from cardiac stand point. Elevated troponin most likley due to his CKD with Cr of 2.54 and freqeunt falls and subdural hematoma   With the subdural hematoma anticoagulation was contraindicated. Pt had previous h/o GI bleed and was taken off of blood thinners as per discussion with son Brisa   Continue metoprolol and statin for his heart     Son brisa was called and updated by me today and discussed with him about switching pt's code status from Full code to DNR/DNI with patient's advanced age and fraility     Son wants us to continue code status to Full code for now and he will think about it and let us know if wants to change it     Continue current cares     >30 minutes were spent in taking care of him today     Lilliana Mar MD

## 2021-01-24 NOTE — PLAN OF CARE
OT - Attempted to see pt X2 this am, first time sleeping soundly and second time with echo. Attempted a third time in PM, pt reports he is tired and appears agitated, declines OT intervention.

## 2021-01-24 NOTE — ED NOTES
Pt's son contacted RN by phone for an update on the patient's status. Update provided and questions answered. Son states he will call again in the morning. Also states that he wished to communicate the patient's morning medications should be taken with food as he has a history of emesis when taking them without food.

## 2021-01-24 NOTE — H&P
Admitted:     01/24/2021      PRIMARY CARE PHYSICIAN:  Dr. Carolee Bales      CODE STATUS:  FULL CODE at this point, per recent chart review.      CHIEF COMPLAINT:  Left facial droop.      HISTORY OF PRESENT ILLNESS:  Mr. Tamayo is a 89-year-old male with a past medical history significant for dementia, diabetes, hypertension, who presents to the Emergency Department at [facility] from his assisted living facility with concerns of left facial droop that began 2 days ago, on Thursday.  History is obtained through discussion with the ED physician, as well as the patient, who, however, is unable to provide much history.  Per report, there was noticed a left facial droop on Thursday, which did not resolve today, so prompted a visit to the ED.  The patient was reportedly recently taken off Coumadin and has an INR that is just above 1.  No reported history of trauma or falls.  The patient was noted to be a little more confused than normal as well.  Upon presentation, the patient was a bit hypoxic in the ED at [facility], but this improved with nasal cannula oxygen.  He was reportedly recently started on Levaquin and Flagyl, though the exact duration at this point is unknown nor is the last dose known at this point.  The patient was having a little bit more difficulty speaking, per report from the ED, but this also improved after getting nasal cannula oxygen.  Head CT showed evidence of a 3 mm left-sided subdural hematoma, which after discussing with the neurosurgeons at [facility] recommended transfer to this facility for further evaluation.  Troponin was also elevated to 0.239, but on repeat, it come down to 0.188.  The patient denied any chest pain at that point and he denies any chest pain currently at [facility].      I went upstairs to see the patient and he is lying in bed.  It was reported that he was having hypertension issues en route.  The blood pressure here is 140 systolic.  The patient is answering yes and no  questions, but is not very conversant, otherwise.  Unclear how far off his baseline this might be.  The patient denies any chest pain, shortness of breath, headache or any other pain at this point.  He is unable to tell me exactly where he is.  Labs were otherwise fairly unremarkable at [facility], except for an elevated creatinine of 2.68, elevated troponin, and elevated BNP.  COVID swab was negative and UA was bland.      PAST MEDICAL HISTORY:   1.  Dementia.   2.  Diabetes, type 2.   3.  Hypertension.   4.  COPD.   5.  Osteoporosis.   6.  Paranoid schizophrenia.   7.  Coronary artery disease with reported NSTEMI in 2019, though I do not see any evidence of an angiogram.   8.  Chronic kidney disease, stage III, with a baseline creatinine of roughly 2.      MEDICATIONS:    Prior to Admission medications    Medication Sig Last Dose Taking? Auth Provider   acetaminophen (TYLENOL) 325 MG tablet Take 650 mg by mouth 4 times daily 1/23/2021 at am Yes Unknown, Entered By History   aspirin (ASA) 81 MG chewable tablet Take 81 mg by mouth daily 1/23/2021 at am Yes Unknown, Entered By History   atorvastatin (LIPITOR) 80 MG tablet Take 80 mg by mouth daily 1/23/2021 at am Yes Unknown, Entered By History   cholecalciferol 1000 units TABS Take 1,000 Units by mouth daily 1/23/2021 at am Yes Unknown, Entered By History   donepezil (ARICEPT) 5 MG tablet Take 5 mg by mouth At Bedtime 1/22/2021 at pm Yes Unknown, Entered By History   famotidine (PEPCID) 20 MG tablet Take 10 mg by mouth daily 1/23/2021 at am Yes Unknown, Entered By History   fluticasone-salmeterol (ADVAIR) 500-50 MCG/DOSE inhaler Inhale 1 puff into the lungs 2 times daily Rinse mouth after use. 1/23/2021 at am Yes Unknown, Entered By History   guaiFENesin (ROBITUSSIN) 100 MG/5ML SYRP Take 5 mLs by mouth At Bedtime 1/22/2021 at pm Yes Unknown, Entered By History   haloperidol (HALDOL) 0.5 MG tablet Take 0.5 mg by mouth 3 times daily 1/23/2021 at am Yes Unknown, Entered By  History   haloperidol (HALDOL) 1 MG tablet Take 1 mg by mouth daily at 2 pm 1/22/2021 at pm Yes Unknown, Entered By History   levofloxacin (LEVAQUIN) 750 MG tablet Take 750 mg by mouth daily For 5 days 1/23/2021 at am Yes Unknown, Entered By History   magnesium 250 MG tablet Take 1 tablet by mouth daily Patient takes on their own. Patient wants to buy from [facility] pharmacy. 1/23/2021 at am Yes Unknown, Entered By History   Menthol-Methyl Salicylate (ICY HOT EXTRA STRENGTH) 10-30 % CREA Externally apply topically 3 times daily Apply to bilateral thighs 1/23/2021 at am Yes Unknown, Entered By History   metoprolol tartrate (LOPRESSOR) 50 MG tablet Take 1 tablet (50 mg) by mouth 2 times daily 1/23/2021 at am Yes Vamshi Fischer MD   metroNIDAZOLE (FLAGYL) 500 MG tablet Take 500 mg by mouth 3 times daily For 7 days 1/23/2021 at am Yes Unknown, Entered By History   Nutritional Supplements (ENSURE COMPLETE PO) Take 1 Bottle by mouth 2 times daily (with meals) 1/22/2021 at pm Yes Unknown, Entered By History   OLANZapine (ZYPREXA) 20 MG tablet Take 20 mg by mouth At Bedtime 1/22/2021 at pm Yes Unknown, Entered By History   senna (SENOKOT) 8.6 MG tablet Take 1 tablet by mouth every morning 1/23/2021 at am Yes Unknown, Entered By History   sertraline (ZOLOFT) 100 MG tablet Take 1.5 tablets (150 mg) by mouth daily 1/23/2021 at am Yes Todd Velarde MD   Skin Protectants, Misc. (EUCERIN) cream Apply topically daily Apply to areas of dry skin topically every day ideally within 3 minutes after bath or shower. 1/23/2021 at am Yes Unknown, Entered By History   tamsulosin (FLOMAX) 0.4 MG capsule Take 0.4 mg by mouth daily 1/23/2021 at am Yes Reported, Patient   Tiotropium Bromide Monohydrate (SPIRIVA HANDIHALER IN) Inhale 1 capsule into the lungs daily  1/23/2021 at am Yes Reported, Patient   traMADol (ULTRAM) 50 MG tablet Take 50 mg by mouth 2 times daily 1/23/2021 at am Yes Unknown, Entered By History   albuterol (PROAIR  HFA/PROVENTIL HFA/VENTOLIN HFA) 108 (90 Base) MCG/ACT inhaler Inhale 2 puffs into the lungs every 4 hours as needed for shortness of breath / dyspnea or wheezing Unknown at Unknown time   Unknown, Entered By History   albuterol (PROVENTIL) (2.5 MG/3ML) 0.083% neb solution Take 2.5 mg by nebulization every 6 hours as needed for shortness of breath / dyspnea or wheezing Unknown at Unknown time   Unknown, Entered By History   camphor-menthol (DERMASARRA) 0.5-0.5 % external lotion Apply topically 2 times daily as needed (itching) Apply thin layer topically bid prn itching, apply after Eucerin. Unknown at Unknown time   Unknown, Entered By History   carboxymethylcellulose (REFRESH PLUS) 0.5 % SOLN ophthalmic solution Place 2 drops into both eyes every 4 hours as needed for dry eyes 6 times per day prn* Unknown at Unknown time   Unknown, Entered By History   nitroGLYcerin (NITROSTAT) 0.4 MG sublingual tablet Place 0.4 mg under the tongue every 5 minutes as needed for chest pain For chest pain place 1 tablet under the tongue every 5 minutes for 3 doses. If symptoms persist 5 minutes after 1st dose call 911. Unknown at Unknown time   Unknown, Entered By History   senna (SENOKOT) 8.6 MG tablet Take 1 tablet by mouth every evening as needed for constipation Unknown at Unknown time   Unknown, Entered By History               SOCIAL HISTORY:  The patient lives at [facility], which is an assisted living facility.  No reported history of tobacco or alcohol use.      FAMILY HISTORY:  The patient is unable to give me any history at this point.      ALLERGIES:  NIFEDIPINE, LOXAPINE, SHELLFISH, SULFA, BACTRIM.      REVIEW OF SYSTEMS:  A review of systems is negative, except as reported in the HPI.      PHYSICAL EXAMINATION:   VITAL SIGNS:  Blood pressure of 140/98, heart rate 89, respirations 18, satting 97% on room air, temperature 98.4 degrees Fahrenheit.   GENERAL:  The patient is lying in bed and answering yes and no  questions.   HEENT:  Pupils equal, round, reactive to light.  Oropharynx is clear with dry mucous membranes.   NECK:  No lymphadenopathy or thyromegaly.   CARDIOVASCULAR:  Heart is regular rate and rhythm without any murmur, rub or gallop.   PULMONARY:  Lungs are clear to auscultation bilaterally without wheeze or rhonchi.   GASTROINTESTINAL:  Positive bowel sounds.  Soft, nontender and nondistended.   SKIN:  No new rashes or lesions.   LYMPHATICS:  No peripheral edema.   PSYCHIATRIC:  The patient opens eyes to voice and is oriented to self.   NEUROLOGIC:  The patient moves all extremities on command.  Difficult to appreciate if there is any ongoing facial droop.      LABORATORY DATA:  WBC count 15.2, hemoglobin 9.8 and platelets of 144.  Sodium 136, potassium 4.8, chloride 104, CO2 30, BUN 60, creatinine 2.68 with unremarkable LFTs.  BNP is 2800.  Troponin is 0.239, trending down to 0.181.      MRI of the brain and head CT were done showing a thin left cerebral convexity subacute subdural hematoma extending inferiorly to the left temporal lobe, further clarified on head CT approximately 3 mm.      CT of the chest shows a new mild foci of developing infiltrate in the right lung consistent with recurrent pneumonia with mucous secretions in the endobronchial trees and severe baseline emphysema.      ASSESSMENT AND PLAN:  Mr. Tamayo is a 89-year-old male with a past medical history significant for dementia, hypertension, chronic obstructive pulmonary disease, reported coronary artery disease who presents to the Emergency Department with left facial droop and found to have a left subdural hematoma.      1.  Left subdural hematoma:  3 mm noted on CT and MRI.  Was previously on Coumadin, but currently has an INR just above I and no reported history of recent falls or trauma, though in chart review, I note he has had falls in the past.  For now, we will continue on neuro special care with neuro checks, avoid all  anticoagulation, and control blood pressure with goal systolic less than 160.  Neurosurgery has been consulted and will plan for repeat head CT in the morning.   2.  Right lower lobe pneumonia:  Concerning for possible aspiration pneumonia.  He reportedly has already been started on Levaquin and Flagyl, though I am unsure when last doses were given.  I will start Flagyl for the morning at b.i.d. dosing, given his renal function, but will not start Levaquin until we confirm when his last dose was given and then can renally adjust.  Of note, COVID-19 screen was negative.   3.  Coronary artery disease with elevated troponin:  The patient is asymptomatic, and troponin was trending down from 0.239 to 0.181.  I do not see any history of angiogram, but will further trend his troponins and monitor on telemetry.  Echocardiogram will be ordered for the morning.  No antiplatelets for now, given his lack of symptoms and the subdural above.   4.  Acute kidney injury on chronic kidney disease:  Baseline creatinine appears to be closer to 2 and was 2.68 today with an elevated BUN.  We will continue with normal saline hydration and recheck a BMP in the morning.  I do note that his BNP is elevated at 2800, but he does look dry, so we will monitor his respiratory status closely.  A chest CT did not show any evidence of pulmonary edema and lungs were otherwise clear.   5.  Dementia:  Appears to be on Aricept, which can continue once confirmed.   6.  Chronic obstructive pulmonary disease:  We will continue with his inhaler and nebulizer regimen once confirmed.   7.  Hypertension:  Goal systolic less than 160, so p.r.n. hydralazine has been ordered.  Consider adding labetalol as needed and we will start PTA regimen once confirmed.   8.  Deep venous thrombosis prophylaxis:  SCDs.         KASHIF SILVA DO             D: 01/24/2021   T: 01/24/2021   MT: AJAY      Name:     VEDA RUSHING   MRN:      [id number]        Account:       [id number]   :      1929        Admitted:     2021                   Document: [id number]       cc: Carolee Bales MD

## 2021-01-24 NOTE — ED NOTES
"Report received from Nilda CHAN RN.     Went in to assess patient, patient was resting with eyes closed. Patient became alert when I spoke with him and when asked how he was feeling he stated \"I feel fine\". Patients VSS.     Spoke with MD about the fluid bolus that was ordered for the patient at 2025 and he stated that he would still like the bolus to be started here prior to transfer. NS fluid bolus started.   "

## 2021-01-24 NOTE — PLAN OF CARE
Nursing shift note  Pt here with small L SDH. Alert to self only, hx of dementia. Neuros ex slight L facial droop, fixed, 1mm pupils, inconsistent w/ following commands. VSS ex hypertension - SBP parameter <150, IV hydralazine given X1, IV labetalol given X1. On 2L O2 via NC. Tele NSR. NPO diet. Has not been OOB yet, reported to be total care at baseline. Turn and repo. Incontinent of bowel and bladder. Pt scoring green on the Aggression Stop Light Tool. Plan for repeat head CT, ECHO. Discharge pending.     Behavior & Aggression Tool color: Green      Pt's belongings:  Pt had no belongings upon arrival      Home medications: N

## 2021-01-24 NOTE — ED NOTES
[facility]  ED Nurse Handoff Report    Hubert Tamayo is a 89 year old male   ED Chief complaint: Facial Droop  . ED Diagnosis:   Final diagnoses:   None     Allergies:   Allergies   Allergen Reactions     Crustaceans      Loxapine      Nifedipine      Shellfish Allergy      Sulfa Drugs      Sulfamethoxazole-Trimethoprim      Trimethoprim        Code Status: Full Code  Activity level - Baseline/Home:  Total Care. Activity Level - Current:   Total Care. Lift room needed: No. Bariatric: No   Needed: No   Isolation: No. Infection: Not Applicable.     Vital Signs:   Vitals:    01/23/21 1721   BP: (!) 172/94   Pulse: 80   Resp: 20   Temp: 98.2  F (36.8  C)   TempSrc: Oral   SpO2: 95%       Cardiac Rhythm:  ,      Pain level:    Patient confused: Yes. Patient Falls Risk: Yes.   Elimination Status: Incontient, straight cath done for urine   Patient Report - Initial Complaint: Facial Droop. Focused Assessment: Left sided facial droop, decreased verbal response since Thursday.  Tests Performed:   Labs Ordered and Resulted from Time of ED Arrival Up to the Time of Departure from the ED   CBC WITH PLATELETS DIFFERENTIAL - Abnormal; Notable for the following components:       Result Value    WBC 15.2 (*)     RBC Count 3.17 (*)     Hemoglobin 9.8 (*)     Hematocrit 30.2 (*)     RDW 15.9 (*)     Platelet Count 144 (*)     Absolute Neutrophil 11.4 (*)     Absolute Monocytes 1.7 (*)     All other components within normal limits   INR - Abnormal; Notable for the following components:    INR 1.18 (*)     All other components within normal limits   COMPREHENSIVE METABOLIC PANEL - Abnormal; Notable for the following components:    Anion Gap 2 (*)     Glucose 106 (*)     Urea Nitrogen 60 (*)     Creatinine 2.68 (*)     GFR Estimate 20 (*)     GFR Estimate If Black 23 (*)     Albumin 2.7 (*)     All other components within normal limits   TROPONIN I - Abnormal; Notable for the following components:    Troponin I ES  0.239 (*)     All other components within normal limits   UA MACROSCOPIC WITH REFLEX TO MICRO AND CULTURE - Abnormal; Notable for the following components:    Blood Urine Trace (*)     Protein Albumin Urine 100 (*)     All other components within normal limits   NT PROBNP INPATIENT - Abnormal; Notable for the following components:    N-Terminal Pro BNP Inpatient 2,874 (*)     All other components within normal limits   SARS-COV-2 (COVID-19) VIRUS RT-PCR     XR Chest 2 Views    (Results Pending)   MR Brain w/o Contrast    (Results Pending)     Treatments provided: See MAR  Family Comments: No family contact available  OBS brochure/video discussed/provided to patient:  N/A  ED Medications: Medications - No data to display  Drips infusing:  No  For the majority of the shift, the patient's behavior Green. Interventions performed were monitor.    Sepsis treatment initiated: No     Patient tested for COVID 19 prior to admission: YES    ED Nurse Name/Phone Number: Tim Ferrer RN,   7:50 PM

## 2021-01-24 NOTE — PROGRESS NOTES
[facility] routine EEG #  portable, ordered by Dr. Mckay.   Pt is awake, drowsy on recordin.   Lethargic, somnolent-appearing individual, but he is able to follow simple commands.     No activations.

## 2021-01-24 NOTE — ED NOTES
Pt returned from MRI. Room air oxygen saturations down to 88%. Placed on 2L NC. Pt more awake and talking. Able to answer questions, but is oriented only to person.

## 2021-01-24 NOTE — CONSULTS
"[facility]    Stroke Consult Note    Reason for Consult:  SDH     Chief Complaint: No chief complaint on file.       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     The patient had a  MRI brain w/o contrast on 1/23/21 (2-3 days after onset of facial droop) which did not show acute stroke. He did have moderate small vessel disease periventricularly and in the ifrah.  GRE sequences confirmed the SDH seen on head CT    Impression  Left facial droop    Recommendations  No further inpatient stroke work-up indicated at this point.  There is no clinical evidence of seizures at this point that would warrant antiepileptic therapy.  May consider routine EEG, if there is concern for seizures.  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  May consider a general neurology evaluation for further work-up of suspected seizures and 7th nerve palsy    Patient Follow-up    Follow-up with PCP, [facility] neurology.    Thank you for this consult. No further stroke evaluation is recommended, so we will sign off. Please contact us with any additional questions.    The Stroke Staff is Dr. Flaherty.    JESUS CASTANEDA MD  Vascular Neurology Fellow  To page me or covering stroke neurology team member, click here: AMCOM   Choose \"On Call\" tab at top, then search dropdown box for \"Neurology Adult\", select location, press Enter, then look for stroke/neuro ICU/telestroke.    _____________________________________________________    Past Medical History   Past Medical History:   Diagnosis Date     CAD (coronary artery disease)      Dementia (H)      Diabetes mellitus type II      Essential " hypertension      Osteoporosis      Paranoid schizophrenia      Pulmonary emphysema      Past Surgical History   Past Surgical History:   Procedure Laterality Date     CATARACT REMOVAL Bilateral      Medications   Home Meds  Prior to Admission medications    Medication Sig Start Date End Date Taking? Authorizing Provider   acetaminophen (TYLENOL) 325 MG tablet Take 650 mg by mouth 4 times daily   Yes Unknown, Entered By History   aspirin (ASA) 81 MG chewable tablet Take 81 mg by mouth daily   Yes Unknown, Entered By History   atorvastatin (LIPITOR) 80 MG tablet Take 80 mg by mouth daily   Yes Unknown, Entered By History   cholecalciferol 1000 units TABS Take 1,000 Units by mouth daily   Yes Unknown, Entered By History   donepezil (ARICEPT) 5 MG tablet Take 5 mg by mouth At Bedtime   Yes Unknown, Entered By History   famotidine (PEPCID) 10 MG tablet Take 10 mg by mouth daily   Yes Unknown, Entered By History   fluticasone-salmeterol (ADVAIR) 500-50 MCG/DOSE inhaler Inhale 1 puff into the lungs 2 times daily Rinse mouth after use.   Yes Unknown, Entered By History   guaiFENesin (ROBITUSSIN) 100 MG/5ML SYRP Take 5 mLs by mouth At Bedtime   Yes Unknown, Entered By History   haloperidol (HALDOL) 0.5 MG tablet Take 0.5 mg by mouth 3 times daily   Yes Unknown, Entered By History   haloperidol (HALDOL) 1 MG tablet Take 1 mg by mouth daily at 2 pm   Yes Unknown, Entered By History   levofloxacin (LEVAQUIN) 750 MG tablet Take 750 mg by mouth daily For 5 days 1/23/21 1/28/21 Yes Unknown, Entered By History   magnesium 250 MG tablet Take 1 tablet by mouth daily Patient takes on their own. Patient wants to buy from non-[facility] pharmacy.   Yes Unknown, Entered By History   Menthol-Methyl Salicylate (ICY HOT EXTRA STRENGTH) 10-30 % CREA Externally apply topically 3 times daily Apply to bilateral thighs   Yes Unknown, Entered By History   metoprolol tartrate (LOPRESSOR) 50 MG tablet Take 1 tablet (50 mg) by mouth 2 times daily 11/1/19   Yes Vamshi Fischer MD   metroNIDAZOLE (FLAGYL) 500 MG tablet Take 500 mg by mouth 3 times daily For 7 days 1/22/21 1/29/21 Yes Unknown, Entered By History   Nutritional Supplements (ENSURE COMPLETE PO) Take 1 Bottle by mouth 2 times daily (with meals)   Yes Unknown, Entered By History   OLANZapine (ZYPREXA) 20 MG tablet Take 20 mg by mouth At Bedtime   Yes Unknown, Entered By History   senna (SENOKOT) 8.6 MG tablet Take 1 tablet by mouth every morning   Yes Unknown, Entered By History   sertraline (ZOLOFT) 100 MG tablet Take 1.5 tablets (150 mg) by mouth daily 9/18/19  Yes Todd Velarde MD   Skin Protectants, Misc. (EUCERIN) cream Apply topically daily Apply to areas of dry skin topically every day ideally within 3 minutes after bath or shower.   Yes Unknown, Entered By History   tamsulosin (FLOMAX) 0.4 MG capsule Take 0.4 mg by mouth daily   Yes Reported, Patient   Tiotropium Bromide Monohydrate (SPIRIVA HANDIHALER IN) Inhale 1 capsule into the lungs daily    Yes Reported, Patient   traMADol (ULTRAM) 50 MG tablet Take 50 mg by mouth 2 times daily   Yes Unknown, Entered By History   albuterol (PROAIR HFA/PROVENTIL HFA/VENTOLIN HFA) 108 (90 Base) MCG/ACT inhaler Inhale 2 puffs into the lungs every 4 hours as needed for shortness of breath / dyspnea or wheezing    Unknown, Entered By History   albuterol (PROVENTIL) (2.5 MG/3ML) 0.083% neb solution Take 2.5 mg by nebulization every 6 hours as needed for shortness of breath / dyspnea or wheezing    Unknown, Entered By History   camphor-menthol (DERMASARRA) 0.5-0.5 % external lotion Apply topically 2 times daily as needed (itching) Apply thin layer topically bid prn itching, apply after Eucerin.    Unknown, Entered By History   carboxymethylcellulose (REFRESH PLUS) 0.5 % SOLN ophthalmic solution Place 2 drops into both eyes every 4 hours as needed for dry eyes 6 times per day prn*    Unknown, Entered By History   nitroGLYcerin (NITROSTAT) 0.4 MG sublingual  tablet Place 0.4 mg under the tongue every 5 minutes as needed for chest pain For chest pain place 1 tablet under the tongue every 5 minutes for 3 doses. If symptoms persist 5 minutes after 1st dose call 911.    Unknown, Entered By History   senna (SENOKOT) 8.6 MG tablet Take 1 tablet by mouth every evening as needed for constipation    Unknown, Entered By History       Scheduled Meds    atorvastatin  80 mg Oral Daily     donepezil  5 mg Oral At Bedtime     famotidine  10 mg Oral Daily     fluticasone-vilanterol  1 puff Inhalation Daily     guaiFENesin  5 mL Oral At Bedtime     haloperidol  0.5 mg Oral TID     haloperidol  1 mg Oral Daily     [START ON 1/25/2021] levofloxacin  500 mg Oral Q48H     metoprolol tartrate  50 mg Oral BID     metroNIDAZOLE  500 mg Intravenous Q12H     OLANZapine  5-10 mg Oral At Bedtime     sennosides  1 tablet Oral QAM     sertraline  150 mg Oral Daily     sodium chloride (PF)  3 mL Intracatheter Q8H     tamsulosin  0.4 mg Oral Daily     umeclidinium  1 puff Inhalation Daily       Infusion Meds    sodium chloride 75 mL/hr at 01/24/21 0238       PRN Meds  acetaminophen, acetaminophen, albuterol, albuterol, bisacodyl, carboxymethylcellulose PF, hydrALAZINE, labetalol, lidocaine 4%, lidocaine (buffered or not buffered), melatonin, nitroGLYcerin, ondansetron **OR** ondansetron, polyethylene glycol, prochlorperazine **OR** prochlorperazine **OR** prochlorperazine, senna-docusate **OR** senna-docusate, sodium chloride (PF)    Allergies   Allergies   Allergen Reactions     Crustaceans      Loxapine      Nifedipine      Shellfish Allergy      Sulfa Drugs      Sulfamethoxazole-Trimethoprim      Trimethoprim      Family History   No family history on file.  Social History   Social History     Tobacco Use     Smoking status: Former Smoker     Packs/day: 0.00     Smokeless tobacco: Never Used   Substance Use Topics     Alcohol use: Not Currently     Drug use: Never       Review of Systems   The 10  point Review of Systems is negative other than noted in the HPI or here.        PHYSICAL EXAMINATION   Temp:  [97.3  F (36.3  C)-98.4  F (36.9  C)] 97.3  F (36.3  C)  Pulse:  [] 104  Resp:  [18-20] 18  BP: (123-197)/() 143/91  SpO2:  [92 %-100 %] 96 %    Elderly man lying in bed in no acute distress.  He is awake, alert and follows simple commands consistently.  He is able to name and repeat short phrases.  Mildly dysarthric.  No obvious neglect or apraxia.  He has a left upper and lower facial droop.  Antigravity strength in all 4 extremities.  Sensation grossly intact to touch bilaterally symmetrical.  No obvious dysmetria on finger-to-nose test.  Gait exam deferred.    Imaging  I personally reviewed all imaging; relevant findings per HPI.    Labs Data   CBC  Recent Labs   Lab 01/24/21  0610 01/23/21  1757   WBC 14.1* 15.2*   RBC 3.27* 3.17*   HGB 10.1* 9.8*   HCT 30.3* 30.2*    144*     Basic Metabolic Panel   Recent Labs   Lab 01/24/21  0610 01/23/21  1757    136   POTASSIUM 4.6 4.8   CHLORIDE 106 104   CO2 25 30   BUN 56* 60*   CR 2.54* 2.68*   * 106*   RAMON 9.9 9.9     Liver Panel  Recent Labs   Lab 01/23/21  1757   PROTTOTAL 7.8   ALBUMIN 2.7*   BILITOTAL 0.4   ALKPHOS 134   AST 44   ALT 18     INR    Recent Labs   Lab Test 01/23/21  1757 11/10/19  1630 10/31/19  0611   INR 1.18* 1.77* 1.23*      Lipid Profile    Recent Labs   Lab Test 10/31/19  0611   CHOL 77   HDL 42   LDL 14   TRIG 103     A1C  No lab results found.  Troponin I    Recent Labs   Lab 01/24/21  0610 01/24/21  0030 01/23/21  2149   TROPI 0.160* 0.181* 0.188*

## 2021-01-24 NOTE — CONSULTS
NEUROSURGERY CONSULT    Neurosurgery was asked by Dr. Moise to consult for a subdural hematoma.    ADDENDUM:    Updated head CT reviewed. No change in plan at this time. No indication for surgical intervention.    CT HEAD W/O CONTRAST - 1/24/2021 6:53 AM  Redemonstrated subdural hematoma involving the left occipital lobe and left paramedian posterior falx, not significantly changed from the prior exam dated 01/23/2021.    PLAN:  Mr. Tamayo exhibits a subdural hematoma overlying the left occipital lobe sulci measuring 3 mm and a left parafalcine subdural hematoma posteriorly. From a Neurosurgical standpoint, we feel that it would be in his best interest to keep his head of the bed at 30 degrees at all times and the systolic blood pressure should be maintained at 150 mmHg or less at all times. We will repeat a head CT today to assess the stability of the bleed. Please refrain from using any Aspirin or anti-inflammatory products. Stroke neurology consult should be considered.    In the meantime, he should not lift anything greater than 5-10 lbs., avoid putting his head below his heart, and avoid straining and sneezing with his mouth closed.   We did discuss signs of a worsening problem that a repeat evaluation should be obtained, but Im not certain he had a good understanding of the situation. We will give further recommendation once the updated head CT has been obtained.     It has been a pleasure meeting Hubert Tamayo. Thank you for having us be involved in his  care.    ______________________________________________________________________    HPI:    Hubert Tamayo is a pleasant 89 year old male who presented to the [facility] Emergency Department and subsequently transferred here for consultation on subdural hematoma. Mr. Tamayo resides at the [facility] and apparently the staff had noted a left facial droop on Wednesday but did not bring him to the hospital until last evening. There was no witness fall  and really no other information available at this time.     There are no bowel or bladder changes. No other concerns are voiced.    Past Medical History:   Diagnosis Date     CAD (coronary artery disease)      Dementia (H)      Diabetes mellitus type II      Essential hypertension      Osteoporosis      Paranoid schizophrenia      Pulmonary emphysema        Past Surgical History:   Procedure Laterality Date     CATARACT REMOVAL Bilateral        Allergies   Allergen Reactions     Crustaceans      Loxapine      Nifedipine      Shellfish Allergy      Sulfa Drugs      Sulfamethoxazole-Trimethoprim      Trimethoprim        Social History     Tobacco Use     Smoking status: Former Smoker     Packs/day: 0.00     Smokeless tobacco: Never Used   Substance Use Topics     Alcohol use: Not Currently       No family history on file.    Scheduled Medications      metroNIDAZOLE  500 mg Intravenous Q12H     sodium chloride (PF)  3 mL Intracatheter Q8H     sodium chloride (PF)  3 mL Intracatheter Q8H       Home Medications  Albuterol  Symbicort  Cholecalciferol  Plavix  Aricept  Haldol  Cortaid  Lopressor  Nasonex  Nitrostat  Mycostatin  Zyprexa  Petrolatum  Senokot  Zoloft  Eucerin  Flomax  Tiotropium bromide monohydrate    PRN Medications    acetaminophen, acetaminophen, bisacodyl, hydrALAZINE, labetalol, lidocaine 4%, lidocaine 4%, lidocaine (buffered or not buffered), lidocaine (buffered or not buffered), melatonin, nitroGLYcerin, ondansetron **OR** ondansetron, polyethylene glycol, prochlorperazine **OR** prochlorperazine **OR** prochlorperazine, senna-docusate **OR** senna-docusate, sodium chloride (PF), sodium chloride (PF)    ROS: 10 point ROS neg other than the symptoms noted above in the HPI.    Vitals:    BP (!) 183/107   Pulse 95   Temp 97.4  F (36.3  C) (Axillary)   Resp 18   SpO2 96%   There is no height or weight on file to calculate BMI.  No intake/output data recorded.    Exam:    Pt examined in 741. Pt appears  comfortable and in no apparent distress, moving all extremities. Not great participation and not very communicative.      Head: Normocephalic, without obvious abnormality, atraumatic, no facial asymmetry.   Eyes: conjunctivae/corneas clear. PERRL, EOM's intact.   Throat: lips, mucosa, and tongue normal; teeth and gums normal.   Neck: supple, symmetrical, trachea midline, no adenopathy and thyroid: not enlarged, symmetric, no tenderness/mass/nodules.   Lungs: clear to auscultation bilaterally.   Heart: regular rate and rhythm.   Abdomen: soft, non-tender; bowel sounds normal; no masses, no organomegaly.   Pulses: 2+ and symmetric.   Skin: Skin color, texture, turgor normal. No rashes or lesions.     Alert and oriented although known dementia.   CN II: Able to read nametag, VF full with gross confrontation.   CN III,IV,VI: HETAL, Extraocular movements full, absent for nystagmus, absent for ptosis.   CN V: Full sensation in V1,V2,V3, jaw clench symmetrical.   CN VII: Face symmetrical and with equal strength, able to puff cheeks out - left droop.   CN VIII: Able to hear conversation.   CN IX: Able to push tongue against bilateral cheeks.   CN X: Palate elevates symmetrically, uvula midline.   CN XI: Elevate shoulders symmetrical, full strength when turning head from side to side.   CNXII: Tongue protrudes midline, absent for fasciculation.   Able to name 3/3 objects.   Finger to nose mostly accurate.   Rapid hand movements slow.   Does not appear to be an upper extremity drift, but difficult to be certain. Poor participation.     GCS:   Eye: eyes open spontaneously (4)   Motor: obeys commands (6)   Verbal: oriented (5)   Composite score: 15     Bilateral upper extremities 5/5. Bilateral bicep and triceps reflexes 2/4. Sensation intact throughout. Normal ROM.   Bilateral lower extremities 5/5. Normal sensation t/o bilaterally. Bilateral patellar 2/4 and achilles reflex 1/4.   Calves soft and non-tender.      ECG/Telemetry:  ECG taken at 1745, ECG read at 1820  Normal sinus rhythm. Nonspecific ST and T wave abnormality.   Abnormal ECG  Rate 84 bpm. MI interval 158 ms. QRS duration 78 ms. QT/QTc 412/486 ms. P-R-T axes 20 -2 64.     Imaging: CT HEAD W/O CONTRAST - 1/23/2021 9:18 PM  Impression per radiology read - I have personally reviewed the images with the patient.    IMPRESSION:    1.  No evidence of acute intracranial hemorrhage or mass effect.  2.  Mild to moderate nonspecific white matter changes.  3.  Mild brain parenchymal volume loss.    Addendum/  impression:     Head CT was compared to MRI performed later in the day. Retrospective there is a thin subdural hematoma overlying the left occipital lobe sulci measuring 3 mm on sagittal image #21, series 7. The known left parafalcine subdural hematoma posteriorly is   not very conspicuous by CT imaging in retrospect.    Available labs at time of consult:       Recent Labs   Lab 01/23/21  1757   WBC 15.2*   HGB 9.8*   HCT 30.2*   MCV 95   *     Recent Labs   Lab 01/23/21  1757   WBC 15.2*   HGB 9.8*   HCT 30.2*   MCV 95   *     Recent Labs   Lab 01/23/21  1757   HGB 9.8*     Recent Labs   Lab 01/23/21  1757   INR 1.18*     Recent Labs   Lab 01/23/21  1757   *     Recent Labs   Lab 01/23/21  1757   WBC 15.2*         Respectfully,    AILEEN Blank, PARYAN  [facility] Neurosurgery  [facility]     Tel: [phone]      All imaging, physical findings, and the above plan have been reviewed with Dr. Rivers.

## 2021-01-24 NOTE — PROVIDER NOTIFICATION
MD Notification    Notified Person: MD    Notified Person Name: Dr. Moise    Notification Date/Time: 0506 1/24/21    Notification Interaction: Page    Purpose of Notification: 741-2 EF - Pt remains hypertensive after dose of PRN IV hydralazine given. Can we get IV labetalol ordered?    Orders Received: IV labetalol ordered, IV hydralazine order adjusted    Comments:

## 2021-01-24 NOTE — PLAN OF CARE
Pt here with left facial droop, subdural hematoma. Alert, confused, slurred speech.  UE and LE strength 4-5/5. VSS, except hypertensive, within parameters. Tele ST. DD1 diet with thin liquids. Takes pills crushed with applesauce or pudding. Denies pain. Incontinent of bowel and bladder. IVF infusing. Pt scoring green on the Aggression Stop Light Tool. General neurology following, plan for EEG this afternoon.

## 2021-01-24 NOTE — PHARMACY-ADMISSION MEDICATION HISTORY
Pharmacy Medication History  Admission medication history interview status for the 1/24/2021  admission is complete. See EPIC admission navigator for prior to admission medications     Location of Interview: Medicaiton History was completed by Pharmacy Staff at [facility] Emergency Room prior to transfer to [facility].   Medication history sources: See note and documentation from [facility] ED  Medication history source reliability: Good  Adherence assessment: Good    Significant changes made to the medication list:  See Below information was based on [facility] ED Pharmacy Intern medication history        Additional medication history information:     Note from [facility] Pharmacist:   Attestation signed by Payal Garcia [facility] at 1/23/2021 8:27 PM   Med hx was done by Claudia Yee, Pharmacy intern then reviewed by Yoko Garcia PharmD January 23, 2021              Admission medication history interview status for this patient is complete. See [facility] admission navigator for allergy information, prior to admission medications and immunization status.      Medication history interview done via telephone during Covid-19 pandemic, indicate source(s): Caregiver - [facility]   Medication history resources (including written lists, pill bottles, clinic record): Care Everywhere and med list from [facility]  [facility]     Changes made to PTA medication list:  Added: Ensure, metronidazole, icy hot extra strength, levofloxacin, tramadol, fluticasone-salmeterol  Deleted: amlodipine, symbicort, melatonin, nasonex  Changed: APAP 1000mg Q6 hours prn --> 650mg QID. Haloperidol 1mg TID --> 0.5mg TID + 1mg every afternoon     Actions taken by pharmacist (provider contacted, etc): Verified home med list per list provided by [facility].      Additional medication history information: Pt was on haloperidol 0.5mg BID until today, 1/23, it was increased to 0.5mg TID, the 1mg  in the afternoon was started 1/12/21. Pt has taken 1/5 doses of levofloxacin and 4/ 21 doses of metronidazole.     Medication reconciliation/reorder completed by provider prior to medication history?  N   (Y/N)                Medication reconciliation completed by provider prior to medication history? No    Time spent in this activity: 45 minutes    Prior to Admission medications    Medication Sig Last Dose Taking? Auth Provider   acetaminophen (TYLENOL) 325 MG tablet Take 650 mg by mouth 4 times daily 1/23/2021 at am Yes Unknown, Entered By History   aspirin (ASA) 81 MG chewable tablet Take 81 mg by mouth daily 1/23/2021 at am Yes Unknown, Entered By History   atorvastatin (LIPITOR) 80 MG tablet Take 80 mg by mouth daily 1/23/2021 at am Yes Unknown, Entered By History   cholecalciferol 1000 units TABS Take 1,000 Units by mouth daily 1/23/2021 at am Yes Unknown, Entered By History   donepezil (ARICEPT) 5 MG tablet Take 5 mg by mouth At Bedtime 1/22/2021 at pm Yes Unknown, Entered By History   famotidine (PEPCID) 10 MG tablet Take 10 mg by mouth daily 1/23/2021 at am Yes Unknown, Entered By History   fluticasone-salmeterol (ADVAIR) 500-50 MCG/DOSE inhaler Inhale 1 puff into the lungs 2 times daily Rinse mouth after use. 1/23/2021 at am Yes Unknown, Entered By History   guaiFENesin (ROBITUSSIN) 100 MG/5ML SYRP Take 5 mLs by mouth At Bedtime 1/22/2021 at pm Yes Unknown, Entered By History   haloperidol (HALDOL) 0.5 MG tablet Take 0.5 mg by mouth 3 times daily 1/23/2021 at am Yes Unknown, Entered By History   haloperidol (HALDOL) 1 MG tablet Take 1 mg by mouth daily at 2 pm 1/22/2021 at pm Yes Unknown, Entered By History   levofloxacin (LEVAQUIN) 750 MG tablet Take 750 mg by mouth daily For 5 days 1/23/2021 at am Yes Unknown, Entered By History   magnesium 250 MG tablet Take 1 tablet by mouth daily Patient takes on their own. Patient [facility]. 1/23/2021 at am Yes Unknown, Entered By History    Menthol-Methyl Salicylate (ICY HOT EXTRA STRENGTH) 10-30 % CREA Externally apply topically 3 times daily Apply to bilateral thighs 1/23/2021 at am Yes Unknown, Entered By History   metoprolol tartrate (LOPRESSOR) 50 MG tablet Take 1 tablet (50 mg) by mouth 2 times daily 1/23/2021 at am Yes Vamshi Fischer MD   metroNIDAZOLE (FLAGYL) 500 MG tablet Take 500 mg by mouth 3 times daily For 7 days 1/23/2021 at am Yes Unknown, Entered By History   Nutritional Supplements (ENSURE COMPLETE PO) Take 1 Bottle by mouth 2 times daily (with meals) 1/22/2021 at pm Yes Unknown, Entered By History   OLANZapine (ZYPREXA) 20 MG tablet Take 20 mg by mouth At Bedtime 1/22/2021 at pm Yes Unknown, Entered By History   senna (SENOKOT) 8.6 MG tablet Take 1 tablet by mouth every morning 1/23/2021 at am Yes Unknown, Entered By History   sertraline (ZOLOFT) 100 MG tablet Take 1.5 tablets (150 mg) by mouth daily 1/23/2021 at am Yes Todd Velarde MD   Skin Protectants, Misc. (EUCERIN) cream Apply topically daily Apply to areas of dry skin topically every day ideally within 3 minutes after bath or shower. 1/23/2021 at am Yes Unknown, Entered By History   tamsulosin (FLOMAX) 0.4 MG capsule Take 0.4 mg by mouth daily 1/23/2021 at am Yes Reported, Patient   Tiotropium Bromide Monohydrate (SPIRIVA HANDIHALER IN) Inhale 1 capsule into the lungs daily  1/23/2021 at am Yes Reported, Patient   traMADol (ULTRAM) 50 MG tablet Take 50 mg by mouth 2 times daily 1/23/2021 at am Yes Unknown, Entered By History   albuterol (PROAIR HFA/PROVENTIL HFA/VENTOLIN HFA) 108 (90 Base) MCG/ACT inhaler Inhale 2 puffs into the lungs every 4 hours as needed for shortness of breath / dyspnea or wheezing Unknown at Unknown time  Unknown, Entered By History   albuterol (PROVENTIL) (2.5 MG/3ML) 0.083% neb solution Take 2.5 mg by nebulization every 6 hours as needed for shortness of breath / dyspnea or wheezing Unknown at Unknown time  Unknown, Entered By History    camphor-menthol (DERMASARRA) 0.5-0.5 % external lotion Apply topically 2 times daily as needed (itching) Apply thin layer topically bid prn itching, apply after Eucerin. Unknown at Unknown time  Unknown, Entered By History   carboxymethylcellulose (REFRESH PLUS) 0.5 % SOLN ophthalmic solution Place 2 drops into both eyes every 4 hours as needed for dry eyes 6 times per day prn* Unknown at Unknown time  Unknown, Entered By History   nitroGLYcerin (NITROSTAT) 0.4 MG sublingual tablet Place 0.4 mg under the tongue every 5 minutes as needed for chest pain For chest pain place 1 tablet under the tongue every 5 minutes for 3 doses. If symptoms persist 5 minutes after 1st dose call 911. Unknown at Unknown time  Unknown, Entered By History   senna (SENOKOT) 8.6 MG tablet Take 1 tablet by mouth every evening as needed for constipation Unknown at Unknown time  Unknown, Entered By History

## 2021-01-24 NOTE — ED NOTES
Attempted to reach the patient's care facility, [facility] at [phone] in order to determine why the patient was currently taking Flagyl and Levaquin. Unable to make contact with any persons at the facility.

## 2021-01-24 NOTE — PHARMACY-ADMISSION MEDICATION HISTORY
Admission medication history interview status for this patient is complete. See [facility] admission navigator for allergy information, prior to admission medications and immunization status.     Medication history interview done via telephone during Covid-19 pandemic, indicate source(s): Caregiver - [facility]   Medication history resources (including written lists, pill bottles, clinic record): Care Everywhere and [facility]  [facility]    Changes made to PTA medication list:  Added: Ensure, metronidazole, icy hot extra strength, levofloxacin, tramadol, fluticasone-salmeterol  Deleted: amlodipine, symbicort, melatonin, nasonex  Changed: APAP 1000mg Q6 hours prn --> 650mg QID. Haloperidol 1mg TID --> 0.5mg TID + 1mg every afternoon    Actions taken by pharmacist (provider contacted, etc): Verified home med list [facility].     Additional medication history information: Pt was on haloperidol 0.5mg BID until today, 1/23, it was increased to 0.5mg TID, the 1mg in the afternoon was started 1/12/21. Pt has taken 1/5 doses of levofloxacin and 4/ 21 doses of metronidazole.    Medication reconciliation/reorder completed by provider prior to medication history?  N   (Y/N)       Prior to Admission medications    Medication Sig Last Dose Taking? Auth Provider   acetaminophen (TYLENOL) 325 MG tablet Take 650 mg by mouth 4 times daily 1/23/2021 at am Yes Unknown, Entered By History   aspirin (ASA) 81 MG chewable tablet Take 81 mg by mouth daily 1/23/2021 at am Yes Unknown, Entered By History   atorvastatin (LIPITOR) 80 MG tablet Take 80 mg by mouth daily 1/23/2021 at am Yes Unknown, Entered By History   cholecalciferol 1000 units TABS Take 1,000 Units by mouth daily 1/23/2021 at am Yes Unknown, Entered By History   donepezil (ARICEPT) 5 MG tablet Take 5 mg by mouth At Bedtime 1/22/2021 at pm Yes Unknown, Entered By History   famotidine (PEPCID) 20 MG  tablet Take 10 mg by mouth daily 1/23/2021 at am Yes Unknown, Entered By History   fluticasone-salmeterol (ADVAIR) 500-50 MCG/DOSE inhaler Inhale 1 puff into the lungs 2 times daily Rinse mouth after use. 1/23/2021 at am Yes Unknown, Entered By History   guaiFENesin (ROBITUSSIN) 100 MG/5ML SYRP Take 5 mLs by mouth At Bedtime 1/22/2021 at pm Yes Unknown, Entered By History   haloperidol (HALDOL) 0.5 MG tablet Take 0.5 mg by mouth 3 times daily 1/23/2021 at am Yes Unknown, Entered By History   haloperidol (HALDOL) 1 MG tablet Take 1 mg by mouth daily at 2 pm 1/22/2021 at pm Yes Unknown, Entered By History   levofloxacin (LEVAQUIN) 750 MG tablet Take 750 mg by mouth daily For 5 days 1/23/2021 at am Yes Unknown, Entered By History   magnesium 250 MG tablet Take 1 tablet by mouth daily Patient takes on their own. Patient [facility]. 1/23/2021 at am Yes Unknown, Entered By History   Menthol-Methyl Salicylate (ICY HOT EXTRA STRENGTH) 10-30 % CREA Externally apply topically 3 times daily Apply to bilateral thighs 1/23/2021 at am Yes Unknown, Entered By History   metoprolol tartrate (LOPRESSOR) 50 MG tablet Take 1 tablet (50 mg) by mouth 2 times daily 1/23/2021 at am Yes Vamshi Fischer MD   metroNIDAZOLE (FLAGYL) 500 MG tablet Take 500 mg by mouth 3 times daily For 7 days 1/23/2021 at am Yes Unknown, Entered By History   Nutritional Supplements (ENSURE COMPLETE PO) Take 1 Bottle by mouth 2 times daily (with meals) 1/22/2021 at pm Yes Unknown, Entered By History   OLANZapine (ZYPREXA) 20 MG tablet Take 20 mg by mouth At Bedtime 1/22/2021 at pm Yes Unknown, Entered By History   senna (SENOKOT) 8.6 MG tablet Take 1 tablet by mouth every morning 1/23/2021 at am Yes Unknown, Entered By History   sertraline (ZOLOFT) 100 MG tablet Take 1.5 tablets (150 mg) by mouth daily 1/23/2021 at am Yes Todd Velarde MD   Skin Protectants, Misc. (EUCERIN) cream Apply topically daily Apply to areas of dry skin  topically every day ideally within 3 minutes after bath or shower. 1/23/2021 at am Yes Unknown, Entered By History   tamsulosin (FLOMAX) 0.4 MG capsule Take 0.4 mg by mouth daily 1/23/2021 at am Yes Reported, Patient   Tiotropium Bromide Monohydrate (SPIRIVA HANDIHALER IN) Inhale 1 capsule into the lungs daily  1/23/2021 at am Yes Reported, Patient   traMADol (ULTRAM) 50 MG tablet Take 50 mg by mouth 2 times daily 1/23/2021 at am Yes Unknown, Entered By History   albuterol (PROAIR HFA/PROVENTIL HFA/VENTOLIN HFA) 108 (90 Base) MCG/ACT inhaler Inhale 2 puffs into the lungs every 4 hours as needed for shortness of breath / dyspnea or wheezing Unknown at Unknown time  Unknown, Entered By History   albuterol (PROVENTIL) (2.5 MG/3ML) 0.083% neb solution Take 2.5 mg by nebulization every 6 hours as needed for shortness of breath / dyspnea or wheezing Unknown at Unknown time  Unknown, Entered By History   camphor-menthol (DERMASARRA) 0.5-0.5 % external lotion Apply topically 2 times daily as needed (itching) Apply thin layer topically bid prn itching, apply after Eucerin. Unknown at Unknown time  Unknown, Entered By History   carboxymethylcellulose (REFRESH PLUS) 0.5 % SOLN ophthalmic solution Place 2 drops into both eyes every 4 hours as needed for dry eyes 6 times per day prn* Unknown at Unknown time  Unknown, Entered By History   nitroGLYcerin (NITROSTAT) 0.4 MG sublingual tablet Place 0.4 mg under the tongue every 5 minutes as needed for chest pain For chest pain place 1 tablet under the tongue every 5 minutes for 3 doses. If symptoms persist 5 minutes after 1st dose call 911. Unknown at Unknown time  Unknown, Entered By History   senna (SENOKOT) 8.6 MG tablet Take 1 tablet by mouth every evening as needed for constipation Unknown at Unknown time  Unknown, Entered By History

## 2021-01-24 NOTE — PROGRESS NOTES
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   Past History of Dysphagia No documented dysphagia   Type of Evaluation   Type of Evaluation Swallow Evaluation   Oral Motor   Oral Musculature anomalies present   Structural Abnormalities present   Mucosal Quality good   Dentition (Oral Motor)   Dentition (Oral Motor) edentulous   Facial Symmetry (Oral Motor)   Facial Symmetry (Oral Motor) left side impairment   Left Side Facial Asymmetry moderate impairment   Lip Function (Oral Motor)   Lip Range of Motion (Oral Motor) retraction impairment   Retraction, Lip Range of Motion left side   Tongue Function (Oral Motor)   Tongue ROM (Oral Motor) unable/difficult to assess;abnormal movements present   Protrusion, Tongue ROM Impairment (Oral Motor) moderate impairment   Comment, Tongue Function (Oral Motor) Patient demonstrated attempt at tongue protrusion, did not follow directions for lateralization/unable to lateralize.    Jaw Function (Oral Motor)   Jaw Function (Oral Motor) WNL   Cough/Swallow/Gag Reflex (Oral Motor)   Volitional Throat Clear/Cough (Oral Motor) reduced strength   Volitional Swallow (Oral Motor) mildly delayed   Vocal Quality/Secretion Management (Oral Motor)   Vocal Quality (Oral Motor) hoarse   Secretion Management (Oral Motor) WNL   General Swallowing Observations   Current Diet/Method of Nutritional Intake (General Swallowing Observations, NIS) NPO   Respiratory " Support (General Swallowing Observations) nasal cannula   Swallowing Evaluation Clinical swallow evaluation   Clinical Swallow Evaluation   Feeding Assistance dependent   Clinical Swallow Evaluation Textures Trialed Thin Liquids;Puree Textures   Clinical Swallow Eval: Thin Liquid Texture Trial   Mode of Presentation, Thin Liquids spoon;cup;fed by clinician   Volume of Liquid or Food Presented 2-3oz   Oral Phase of Swallow WFL   Pharyngeal Phase of Swallow impaired;reduction in laryngeal movement;repeated swallows   Diagnostic Statement SLP noted mild delay in pharyngeal response and elevation with thin, however no overt s/s of aspiration noted.   Clinical Swallow Evaluation: Puree Solid Texture Trial   Mode of Presentation, Puree spoon;self-fed   Volume of Puree Presented 1 teaspoon   Oral Phase, Puree WFL   Pharyngeal Phase, Puree intact   Diagnostic Statement Patient tolerated single teaspoon of puree with no overt s/s of aspiration or overt difficulty/delay   Esophageal Phase of Swallow   Patient reports or presents with symptoms of esophageal dysphagia No   Swallowing Recommendations   Diet Consistency Recommendations dysphagia level 1 (pureed);thin liquids   Supervision Level for Intake 1:1 supervision needed   Mode of Delivery Recommendations bolus size, small;no straws;slow rate of intake   Swallowing Maneuver Recommendations alternate food and liquid intake   Monitoring/Assistance Required (Eating/Swallowing) stop eating activities when fatigue is present;monitor for cough or change in vocal quality with intake   Recommended Feeding/Eating Techniques (Swallow Eval) maintain upright posture during/after eating for 30 minutes;place speaking valve on for intake   Medication Administration Recommendations, Swallowing (SLP) As tolerated.    Instrumental Assessment Recommendations instrumental evaluation not recommended at this time   General Therapy Interventions   Planned Therapy Interventions Dysphagia  Treatment   Dysphagia treatment Modified diet education;Instruction of safe swallow strategies   SLP Therapy Assessment/Plan   Criteria for Skilled Therapeutic Interventions Met (SLP Eval) yes;treatment indicated   SLP Diagnosis Mild to moderate oral and pharyngeal dysphagia   Rehab Potential (SLP Eval) good, to achieve stated therapy goals   Therapy Frequency (SLP Eval) 5 times/wk   Predicted Duration of Therapy Intervention (SLP Eval) 1 week   Comment, Therapy Assessment/Plan (SLP) Patient presents with mild to moderate oral and pharyngeal dysphagia, characterized by left facial weakness, reduced tongue ROM and mild delay in pharyngeal response. Patient refused solid trial during evaluation, so DD1 and thin diet ordered, to assess advanced textures as able in treatment for safe upgrade. Cautious introduction with 1:1 supervision and assist as needed, small bites and sips when fully alert and upright.    Therapy Plan Review/Discharge Plan (SLP)   Therapy Plan Review (SLP) evaluation/treatment results reviewed;care plan/treatment goals reviewed;participants included;patient;participants voiced agreement with care plan   SLP Discharge Planning    SLP Discharge Recommendation (DC Rec) Transitional Care Facility   SLP Rationale for DC Rec Patient below baseline, will likely require assist and ongoing SLP upon discharge to return to least restrictive diet.    SLP Brief overview of current status  Patient with mild to moderate oral and pharyngeal dysphagia. Recommend 1. Dysphagia Diet Level 1 with thin liquids. 2. 1:1 supervision and assist. must be fully upright and alert for PO intake, encourage small bites and alternation of liquids and solids.     Total Evaluation Time   Total Evaluation Time (Minutes) 20

## 2021-01-25 ENCOUNTER — APPOINTMENT (OUTPATIENT)
Dept: PHYSICAL THERAPY | Facility: CLINIC | Age: 86
DRG: 085 | End: 2021-01-25
Attending: INTERNAL MEDICINE
Payer: MEDICARE

## 2021-01-25 ENCOUNTER — APPOINTMENT (OUTPATIENT)
Dept: OCCUPATIONAL THERAPY | Facility: CLINIC | Age: 86
DRG: 085 | End: 2021-01-25
Attending: INTERNAL MEDICINE
Payer: MEDICARE

## 2021-01-25 ENCOUNTER — APPOINTMENT (OUTPATIENT)
Dept: SPEECH THERAPY | Facility: CLINIC | Age: 86
DRG: 085 | End: 2021-01-25
Attending: INTERNAL MEDICINE
Payer: MEDICARE

## 2021-01-25 LAB
ANION GAP SERPL CALCULATED.3IONS-SCNC: 6 MMOL/L (ref 3–14)
BUN SERPL-MCNC: 54 MG/DL (ref 7–30)
CALCIUM SERPL-MCNC: 9.5 MG/DL (ref 8.5–10.1)
CHLORIDE SERPL-SCNC: 111 MMOL/L (ref 94–109)
CO2 SERPL-SCNC: 24 MMOL/L (ref 20–32)
CREAT SERPL-MCNC: 2.7 MG/DL (ref 0.66–1.25)
ERYTHROCYTE [DISTWIDTH] IN BLOOD BY AUTOMATED COUNT: 15.9 % (ref 10–15)
GFR SERPL CREATININE-BSD FRML MDRD: 20 ML/MIN/{1.73_M2}
GLUCOSE SERPL-MCNC: 92 MG/DL (ref 70–99)
HCT VFR BLD AUTO: 30.6 % (ref 40–53)
HGB BLD-MCNC: 9.7 G/DL (ref 13.3–17.7)
INTERPRETATION ECG - MUSE: NORMAL
MCH RBC QN AUTO: 30.1 PG (ref 26.5–33)
MCHC RBC AUTO-ENTMCNC: 31.7 G/DL (ref 31.5–36.5)
MCV RBC AUTO: 95 FL (ref 78–100)
PLATELET # BLD AUTO: 161 10E9/L (ref 150–450)
POTASSIUM SERPL-SCNC: 4.5 MMOL/L (ref 3.4–5.3)
RBC # BLD AUTO: 3.22 10E12/L (ref 4.4–5.9)
SODIUM SERPL-SCNC: 141 MMOL/L (ref 133–144)
TSH SERPL DL<=0.005 MIU/L-ACNC: 1.44 MU/L (ref 0.4–4)
WBC # BLD AUTO: 11.3 10E9/L (ref 4–11)

## 2021-01-25 PROCEDURE — 97161 PT EVAL LOW COMPLEX 20 MIN: CPT | Mod: GP

## 2021-01-25 PROCEDURE — 80048 BASIC METABOLIC PNL TOTAL CA: CPT | Performed by: PSYCHIATRY & NEUROLOGY

## 2021-01-25 PROCEDURE — 99207 PR CDG-CODE CATEGORY CHANGED: CPT | Performed by: INTERNAL MEDICINE

## 2021-01-25 PROCEDURE — 97530 THERAPEUTIC ACTIVITIES: CPT | Mod: GP

## 2021-01-25 PROCEDURE — 85027 COMPLETE CBC AUTOMATED: CPT | Performed by: PSYCHIATRY & NEUROLOGY

## 2021-01-25 PROCEDURE — 99223 1ST HOSP IP/OBS HIGH 75: CPT | Performed by: NURSE PRACTITIONER

## 2021-01-25 PROCEDURE — 97530 THERAPEUTIC ACTIVITIES: CPT | Mod: GO

## 2021-01-25 PROCEDURE — 84443 ASSAY THYROID STIM HORMONE: CPT | Performed by: PSYCHIATRY & NEUROLOGY

## 2021-01-25 PROCEDURE — 120N000001 HC R&B MED SURG/OB

## 2021-01-25 PROCEDURE — 258N000003 HC RX IP 258 OP 636: Performed by: INTERNAL MEDICINE

## 2021-01-25 PROCEDURE — 97166 OT EVAL MOD COMPLEX 45 MIN: CPT | Mod: GO

## 2021-01-25 PROCEDURE — 250N000013 HC RX MED GY IP 250 OP 250 PS 637: Performed by: INTERNAL MEDICINE

## 2021-01-25 PROCEDURE — 36415 COLL VENOUS BLD VENIPUNCTURE: CPT | Performed by: PSYCHIATRY & NEUROLOGY

## 2021-01-25 PROCEDURE — 99233 SBSQ HOSP IP/OBS HIGH 50: CPT | Performed by: INTERNAL MEDICINE

## 2021-01-25 PROCEDURE — 250N000011 HC RX IP 250 OP 636: Performed by: INTERNAL MEDICINE

## 2021-01-25 PROCEDURE — 92526 ORAL FUNCTION THERAPY: CPT | Mod: GN | Performed by: SPEECH-LANGUAGE PATHOLOGIST

## 2021-01-25 RX ORDER — SODIUM CHLORIDE 9 MG/ML
INJECTION, SOLUTION INTRAVENOUS CONTINUOUS
Status: DISCONTINUED | OUTPATIENT
Start: 2021-01-25 | End: 2021-01-26

## 2021-01-25 RX ADMIN — SERTRALINE HYDROCHLORIDE 150 MG: 100 TABLET ORAL at 08:46

## 2021-01-25 RX ADMIN — LEVOFLOXACIN 500 MG: 500 TABLET, FILM COATED ORAL at 08:46

## 2021-01-25 RX ADMIN — OLANZAPINE 10 MG: 5 TABLET, FILM COATED ORAL at 21:02

## 2021-01-25 RX ADMIN — METOPROLOL TARTRATE 50 MG: 50 TABLET, FILM COATED ORAL at 08:46

## 2021-01-25 RX ADMIN — HALOPERIDOL 0.5 MG: 0.5 TABLET ORAL at 21:02

## 2021-01-25 RX ADMIN — FAMOTIDINE 10 MG: 10 TABLET ORAL at 08:47

## 2021-01-25 RX ADMIN — SODIUM CHLORIDE: 9 INJECTION, SOLUTION INTRAVENOUS at 16:23

## 2021-01-25 RX ADMIN — TAMSULOSIN HYDROCHLORIDE 0.4 MG: 0.4 CAPSULE ORAL at 08:46

## 2021-01-25 RX ADMIN — GUAIFENESIN 5 ML: 200 SOLUTION ORAL at 21:02

## 2021-01-25 RX ADMIN — METRONIDAZOLE 500 MG: 500 INJECTION, SOLUTION INTRAVENOUS at 19:42

## 2021-01-25 RX ADMIN — HALOPERIDOL 0.5 MG: 0.5 TABLET ORAL at 16:36

## 2021-01-25 RX ADMIN — METOPROLOL TARTRATE 50 MG: 50 TABLET, FILM COATED ORAL at 21:02

## 2021-01-25 RX ADMIN — METRONIDAZOLE 500 MG: 500 INJECTION, SOLUTION INTRAVENOUS at 07:53

## 2021-01-25 RX ADMIN — STANDARDIZED SENNA CONCENTRATE 1 TABLET: 8.6 TABLET ORAL at 08:46

## 2021-01-25 RX ADMIN — ATORVASTATIN CALCIUM 80 MG: 80 TABLET, FILM COATED ORAL at 08:46

## 2021-01-25 RX ADMIN — FLUTICASONE FUROATE AND VILANTEROL TRIFENATATE 1 PUFF: 200; 25 POWDER RESPIRATORY (INHALATION) at 09:05

## 2021-01-25 RX ADMIN — HALOPERIDOL 0.5 MG: 0.5 TABLET ORAL at 08:46

## 2021-01-25 RX ADMIN — UMECLIDINIUM 1 PUFF: 62.5 AEROSOL, POWDER ORAL at 08:55

## 2021-01-25 RX ADMIN — HALOPERIDOL 1 MG: 0.5 TABLET ORAL at 16:30

## 2021-01-25 RX ADMIN — DONEPEZIL HYDROCHLORIDE 5 MG: 5 TABLET, FILM COATED ORAL at 21:02

## 2021-01-25 ASSESSMENT — ACTIVITIES OF DAILY LIVING (ADL)
ADLS_ACUITY_SCORE: 29

## 2021-01-25 NOTE — PROGRESS NOTES
Care Management Follow Up    Length of Stay (days): 1    Expected Discharge Date: 01/26/21     Concerns to be Addressed:       Patient plan of care discussed at interdisciplinary rounds: Yes    Anticipated Discharge Disposition:       Anticipated Discharge Services:    Anticipated Discharge DME:      Patient/family educated on Medicare website which has current facility and service quality ratings:    Education Provided on the Discharge Plan:    Patient/Family in Agreement with the Plan:      Referrals Placed by CM/SW:    Private pay costs discussed: Not applicable    Additional Information:    JENNIFER initial consult remains pending; Pt reportedly from [facility]. Therapies recommending TCU however, palliative has been consulted. Awaiting palliative update. JENNIFER also left VM for [facility] liaison asking if pt is from LTC, however, pt has MCR only listed in Epic. Pt does reportedly have VA benefits. LTC may be paid under VA benefit; wondering if pt is on a bed hold. JENNIFER also left VM for LTC .      Jessenia Macedo [facility]  [phone]    UPDATE@[id number]: JENNIFER received a call back from Xochitl, who reports patient came from [facility] TCU and is reportedly on a bed hold, however, their staff discussed this patient today in their morning meeting and believes pt would be better served in a [facility] facility that has a memory care-LTC. Per Xochitl, either the [facility] or [facility] could possible accommodate this pt who could still receive rehab under Part B Medicare, but room and board would be paid thru the VA, as both of these facilities have a VA contract. JENNIFER will await outcome of palliative consult and proceed accordingly.

## 2021-01-25 NOTE — PLAN OF CARE
Nursing shift note  Pt here with SDH, frequent falls. A&Ox2-3. Neuros lethargic, inconsistent with following commands, L facial droop, slurred speech, UE tremors at times. VSS. Tele NSR. DD1 diet, thin liquids. Takes pills whole with water. Not been out of bed yet, A2/lift. Denies pain. Turn and repo. Incontinent of bladder overnight. Pt scoring yellow on the Aggression Stop Light Tool, for confusion. Continue to monitor.     Pt's belongings:   Belongings at bedside with patient      Home medications: No

## 2021-01-25 NOTE — PLAN OF CARE
Lethargic, awake for brief periods. Followed some simple commands. Fair strength in all extremities. Slurred speech. L facial droop, more pronounced when sleeping than when awake. Drank juice and had a few bites of food at change of shift, too drowsy for safe intake at dinnertime. Strawberry Boost with meals ordered per son's request. On DD1 with thin liquids, requires assistance. Complained of back pain, received Tylenol x1. VSS, SBP below parameter of 160. Son updated by phone. Scoring green on aggression screening tool.

## 2021-01-25 NOTE — PROGRESS NOTES
"[facility]    Neurology Progress Note    Date of Service (when I saw the patient): 01/25/2021     Today's developments: no significant clinical changes.  EEG showed moderate slowing, no focal or epileptiform features.  No further acute neuro workup.  Gen neuro will sign off.  Please call if any questions or concerns.    Assessment & Plan   Hubert Tamayo is a 89 year old male who was admitted on 1/24/2021 with left facial droop and encephalopathy.  He has a history of dementia, tbi, sdh, recent pna, schizophrenia.    #. Left facial droop-not clear that neurological.  Difficult to fully participate as lethargic and mucous membrane very dry.  No change today.  --continue to monitor  ---MRI brain on admission showed no stroke.  #. Encephalopathic-chronic dementia and comorbid medical conditions(TBI w left SDH, kidney disease, pneumonia, ?altered hydration status, chronic schizophrenia on chronic psych meds) etc  --optimize comorbid medical conditions.   --EEG showed moderate slowing c/w encephalopathy  --TSH normal  #. Known hx of dementia associated with cognitive and motor decline over several years ( in rehab/snf since 11/20 admission.   #L sdh,   --CTH unchanged on 1-24  --mgmt per neurosurgery  #. DVT Prophylaxis  --per hospitalist  #. PT/OT/Speech  --as tolerated  #. Nutrition / GI Prophylaxis  --Per recommendations of speech therapy    I discussed the above diagnosis, assessment, testing, and results with the patient.  Total time: 25  Minutes, with more than 50% of the time counseling the patient or coordination of care.     eNetu Reese MD  [facility]  Office Phone [phone]              Interval History   Hubert tells me that he has pain \"all over\".  Denies any focal problems.  Does not notice any facial weakness.  He did not cooperate with all of the exam today.  He did ask \"where has all the time gone?\"    Physical Exam   Temp: 97.7  F (36.5  C) Temp src: Axillary BP: " "(!) 140/75 Pulse: 67   Resp: 18 SpO2: 100 % O2 Device: Nasal cannula Oxygen Delivery: 1 LPM  Vitals:    01/24/21 1223   Weight: 61 kg (134 lb 7.7 oz)     Vital Signs with Ranges  Temp:  [97.7  F (36.5  C)-98.4  F (36.9  C)] 97.7  F (36.5  C)  Pulse:  [] 67  Resp:  [16-18] 18  BP: (131-198)/() 140/75  SpO2:  [97 %-100 %] 100 %  I/O last 3 completed shifts:  In: 250 [P.O.:250]  Out: -       General Appearance:  No acute distress  Neuro:       Mental Status Exam:    Not oriented to place ([place] but knows he is in a hospital), or time- \"Sept 1991\".  He told me he had been sitting in this bed for 2 months- he was admitted yesterday.       Cranial Nerves:   PERRL, EOMI, mild L decrease in smile.  When he fully smiles it is full bilaterally, a small smile shows R side moves more than left.  Forehead furrow and eye closure normal- patient only cooperated with some of this exam, which limits findings somewhat.           Motor:   5/5 BUE          Sensory:  Nl lt BUE                   Gait:  Deferred- fall risk   CV: RRR nl s1, s2  Resp: CTAB    Extremities: No clubbing, no cyanosis, no edema    Medications       atorvastatin  80 mg Oral Daily     donepezil  5 mg Oral At Bedtime     famotidine  10 mg Oral Daily     fluticasone-vilanterol  1 puff Inhalation Daily     guaiFENesin  5 mL Oral At Bedtime     haloperidol  0.5 mg Oral TID     haloperidol  1 mg Oral Daily     levofloxacin  500 mg Oral Q48H     metoprolol tartrate  50 mg Oral BID     metroNIDAZOLE  500 mg Intravenous Q12H     OLANZapine  10 mg Oral At Bedtime     sennosides  1 tablet Oral QAM     sertraline  150 mg Oral Daily     sodium chloride (PF)  3 mL Intracatheter Q8H     tamsulosin  0.4 mg Oral Daily     umeclidinium  1 puff Inhalation Daily       Data   Results for orders placed or performed during the hospital encounter of 01/24/21 (from the past 24 hour(s))   Echo Limited    Narrative    [id number]  [id number]  [id number]  [id number]^RICARDO^KASHIF      "      [facility]  Echocardiography Laboratory  [address] [place] [address]        Name: VEDA RUSHING  MRN: [id number]  : 1929  Study Date: 2021 11:13 AM  Age: 89 yrs  Gender: Male  Patient [facility]  Reason For Study: CAD  Ordering Physician: KASHIF SILVA  Referring Physician: MICHELLE BETH  Performed By: Omero Mixon     BSA: 1.8 m2  Height: 68 in  Weight: 143 lb  HR: 90  BP: 140/98 mmHg  _____________________________________________________________________________  __        Procedure  Limited Portable Echo Adult. Optison (NDC #9619-0793) given intravenously.  _____________________________________________________________________________  __        Interpretation Summary     Extremely poor image quality  Left ventricular systolic function is normal.  Due to the poor quality of the echocardiogram, an assessment of left  ventricular ejection fraction cannot be made.  Regional wall motion abnormalities cannot be excluded due to limited  visualization.  _____________________________________________________________________________  __        Left Ventricle  The left ventricular cavity is small. Left ventricular systolic function is  normal. Due to the poor quality of the echocardiogram, an assessment of left  ventricular ejection fraction cannot be made. Regional wall motion  abnormalities cannot be excluded due to limited visualization.     Right Ventricle  Right ventricular function cannot be assessed due to poor image quality.     Atria  The left atrium is not well visualized. Right atrium not well visualized.     Mitral Valve  The mitral valve is not well visualized. There is trace mitral regurgitation.        Tricuspid Valve  The tricuspid valve is not well visualized. The right ventricular systolic  pressure is approximated at 23.4 mmHg plus the right atrial pressure.     Aortic Valve  The aortic valve is not well visualized. The aortic valve is  trileaflet. No  aortic stenosis is present.     Pulmonic Valve  The pulmonic valve is not well visualized. Normal pulmonic valve velocity.     Vessels  Normal size aorta. The inferior vena cava is normal.     Pericardium  There is no pericardial effusion.     _____________________________________________________________________________  __  MMode/2D Measurements & Calculations  IVSd: 1.1 cm  LVIDd: 3.3 cm  LVIDs: 1.6 cm  LVPWd: 1.0 cm  FS: 52.0 %  LV mass(C)d: 102.0 grams  LV mass(C)dI: 57.5 grams/m2     Ao root diam: 3.6 cm  asc Aorta Diam: 3.2 cm  LVOT diam: 2.0 cm  LVOT area: 3.3 cm2  RWT: 0.61        Doppler Measurements & Calculations  PA acc time: 0.08 sec  TR max diamond: 241.6 cm/sec  TR max P.4 mmHg           _____________________________________________________________________________  __           Report approved by: Gloria Pandya 2021 02:55 PM      Troponin I   Result Value Ref Range    Troponin I ES 0.292 (HH) 0.000 - 0.045 ug/L   Neurology IP Consult: General (non-stroke/non-ICU); Patient to be seen: Routine - within 24 hours; Facial droop in a pt with subdural hematoma; Consultant may enter orders: Yes; Requesting provider? Hospitalist (if different from attending physici...    Narrative    Sherry Mckay MD     2021  2:08 PM            [facility] [place]  [facility]    Neurology Consultation    Hubert Tamayo MRN# [id number]   YOB: 1929 Age: 89 year old    Code Status:Full Code   Date of Admission: 2021  Date of Consult:2021                                                                                         Assessment and Plan:                                         #. Left facial droop-not clear that neurological.  Difficult to   fully participate as lethargic and mucous membrane very dry.  May   relate to lax skin/perioral tissues.    --monitor  #. Encephalopathic-chronic dementia and comorbid medical    conditions(TBI w left SDH, kidney disease, pneumonia, ?altered   hydration status, chronic schizophrenia on chronic psych meds)   etc  --optimize comorbid medical conditions.   Will order EEG to r/o sz as cause  Check tsh.   #. Known hx of dementia associated with cognitive and motor   decline over several years ( in rehab/snf since 11/20 admission.   #L sdh,   --mgmt per neurosurgery  #. DVT Prophylaxis  --per hospitalist  #. PT/OT/Speech  --as tolerated  #. Nutrition / GI Prophylaxis  --Per recommendations of speech therapy    Dr. Traci Martinez to follow tomorrow.      ------------------------------------------------------------------  ----------------  ------------------------------------------------------------------  ----------------  Reason for consult: as above       Chief Complaint:   Unable  Hx obtained from son by telephone  History is obtained from the patient / chart       History of Present Illness:   This patient is a 89 year old male who presents with hx:     Stroke service:    PMH of HTN, HLD, T2DM, previously on warfarin for a history of   DVT- living in an assisted living facility admitted with 2 days   of left facial droop prior to arrival and was found to have a   3-mm SDH over the left occipital lobe and a left parafalcine   subdural hematoma posteriorly - who is seen as a consult for   concern of stroke.      The patient had a  MRI brain w/o contrast on 1/23/21 (2-3 days   after onset of facial droop) which did not show acute stroke. He   did have moderate small vessel disease periventricularly and in   the ifrah.  GRE sequences confirmed the SDH seen on head CT     Impression  Left facial droop     Recommendations  No further inpatient stroke work-up indicated at this point.    There is no clinical evidence of seizures at this point that   would warrant antiepileptic therapy.  May consider routine EEG,   if there is concern for seizures.  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  May consider a general neurology   evaluation for further work-up of suspected seizures and 7th   nerve palsy      Son reports hx of dementia [facility]5 years[facility]  Also   chronic hx of schizophrenia.  Living with cousin as caregiver till 11/2021 admission (gradualy   decline in mentation and ambulation with frequent falls Prior)  [facility] [place] [facility].    Son reports about 7 falls, most recent 1/18.  Last in person visit last week. More confused, lethargic.    Psych meds being adjusted last week, treatment for uti, and   pneumonia.  Left facial droop noted as well leading  To evaluation.           Past Medical History:     Past Medical History:   Diagnosis Date     CAD (coronary artery disease)      Dementia (H)      Diabetes mellitus type II      Essential hypertension      Osteoporosis      Paranoid schizophrenia      Pulmonary emphysema          Past Surgical History:     Past Surgical History:   Procedure Laterality Date     CATARACT REMOVAL Bilateral           Social History:     Social History     Socioeconomic History     Marital status: Single     Spouse name: Not on file     Number of children: Not on file     Years of education: Not on file     Highest education level: Not on file   Occupational History     Not on file   Social Needs     Financial resource strain: Not on file     Food insecurity     Worry: Not on file     Inability: Not on file     Transportation needs     Medical: Not on file     Non-medical: Not on file   Tobacco Use     Smoking status: Former Smoker     Packs/day: 0.00     Smokeless tobacco: Never Used   Substance and Sexual Activity     Alcohol use: Not Currently     Drug use: Never     Sexual activity: Not on file   Lifestyle     Physical activity     Days per week: Not on file     Minutes per session: Not on file     Stress: Not on file   Relationships     Social connections     Talks on phone: Not on file     Gets together: Not on file     Attends Buddhism  service: Not on file     Active member of club or organization: Not on file     Attends meetings of clubs or organizations: Not on file     Relationship status: Not on file     Intimate partner violence     Fear of current or ex partner: Not on file     Emotionally abused: Not on file     Physically abused: Not on file     Forced sexual activity: Not on file   Other Topics Concern     Not on file   Social History Narrative     Not on file     Patient denies smoking, no significant alcohol intake, denies   illicit drugs use       Family History:   No family history on file.  Reviewed and not felt to be contributory.        Home Medications:     Prior to Admission Medications   Prescriptions Last Dose Informant Patient Reported? Taking?   Menthol-Methyl Salicylate (ICY HOT EXTRA STRENGTH) 10-30 % CREA   1/23/2021 at am Nursing Home Yes Yes   Sig: Externally apply topically 3 times daily Apply to bilateral   thighs   Nutritional Supplements (ENSURE COMPLETE PO) 1/22/2021 at pm   Nursing Home Yes Yes   Sig: Take 1 Bottle by mouth 2 times daily (with meals)   OLANZapine (ZYPREXA) 20 MG tablet 1/22/2021 at pm Nursing Home   Yes Yes   Sig: Take 20 mg by mouth At Bedtime   Skin Protectants, Misc. (EUCERIN) cream 1/23/2021 at am Nursing   Home Yes Yes   Sig: Apply topically daily Apply to areas of dry skin topically   every day ideally within 3 minutes after bath or shower.   Tiotropium Bromide Monohydrate (SPIRIVA HANDIHALER IN) 1/23/2021   at am Nursing Home Yes Yes   Sig: Inhale 1 capsule into the lungs daily    acetaminophen (TYLENOL) 325 MG tablet 1/23/2021 at am Nursing   Home Yes Yes   Sig: Take 650 mg by mouth 4 times daily   albuterol (PROAIR HFA/PROVENTIL HFA/VENTOLIN HFA) 108 (90 Base)   MCG/ACT inhaler Unknown at Unknown time shelter Yes No   Sig: Inhale 2 puffs into the lungs every 4 hours as needed for   shortness of breath / dyspnea or wheezing   albuterol (PROVENTIL) (2.5 MG/3ML) 0.083% neb solution  Unknown at   Unknown time Nursing Home Yes No   Sig: Take 2.5 mg by nebulization every 6 hours as needed for   shortness of breath / dyspnea or wheezing   aspirin (ASA) 81 MG chewable tablet 1/23/2021 at am Nursing Home   Yes Yes   Sig: Take 81 mg by mouth daily   atorvastatin (LIPITOR) 80 MG tablet 1/23/2021 at am Nursing Home   Yes Yes   Sig: Take 80 mg by mouth daily   camphor-menthol (DERMASARRA) 0.5-0.5 % external lotion Unknown at   Unknown time Nursing Home Yes No   Sig: Apply topically 2 times daily as needed (itching) Apply thin   layer topically bid prn itching, apply after Eucerin.   carboxymethylcellulose (REFRESH PLUS) 0.5 % SOLN ophthalmic   solution Unknown at Unknown time retirement Yes No   Sig: Place 2 drops into both eyes every 4 hours as needed for dry   eyes 6 times per day prn*   cholecalciferol 1000 units TABS 1/23/2021 at am Nursing Home Yes   Yes   Sig: Take 1,000 Units by mouth daily   donepezil (ARICEPT) 5 MG tablet 1/22/2021 at pm Nursing Home Yes   Yes   Sig: Take 5 mg by mouth At Bedtime   famotidine (PEPCID) 10 MG tablet 1/23/2021 at am  Yes Yes   Sig: Take 10 mg by mouth daily   fluticasone-salmeterol (ADVAIR) 500-50 MCG/DOSE inhaler 1/23/2021   at am Nursing Home Yes Yes   Sig: Inhale 1 puff into the lungs 2 times daily Rinse mouth after   use.   guaiFENesin (ROBITUSSIN) 100 MG/5ML SYRP 1/22/2021 at pm Nursing   Home Yes Yes   Sig: Take 5 mLs by mouth At Bedtime   haloperidol (HALDOL) 0.5 MG tablet 1/23/2021 at am Nursing Home   Yes Yes   Sig: Take 0.5 mg by mouth 3 times daily   haloperidol (HALDOL) 1 MG tablet 1/22/2021 at pm Nursing Home Yes   Yes   Sig: Take 1 mg by mouth daily at 2 pm   levofloxacin (LEVAQUIN) 750 MG tablet 1/23/2021 at am Nursing   Home Yes Yes   Sig: Take 750 mg by mouth daily For 5 days   magnesium 250 MG tablet 1/23/2021 at am Nursing Home Yes Yes   Sig: Take 1 tablet by mouth daily Patient takes on their own.   Patient [facility].    metoprolol tartrate (LOPRESSOR) 50 MG tablet 1/23/2021 at am   Nursing Home No Yes   Sig: Take 1 tablet (50 mg) by mouth 2 times daily   metroNIDAZOLE (FLAGYL) 500 MG tablet 1/23/2021 at am Nursing Home   Yes Yes   Sig: Take 500 mg by mouth 3 times daily For 7 days   nitroGLYcerin (NITROSTAT) 0.4 MG sublingual tablet Unknown at   Unknown time Nursing Home Yes No   Sig: Place 0.4 mg under the tongue every 5 minutes as needed for   chest pain For chest pain place 1 tablet under the tongue every 5   minutes for 3 doses. If symptoms persist 5 minutes after 1st dose   call 911.   senna (SENOKOT) 8.6 MG tablet 1/23/2021 at am Nursing Home Yes   Yes   Sig: Take 1 tablet by mouth every morning   senna (SENOKOT) 8.6 MG tablet Unknown at Unknown time Nursing   Home Yes No   Sig: Take 1 tablet by mouth every evening as needed for   constipation   sertraline (ZOLOFT) 100 MG tablet 1/23/2021 at am Nursing Home No   Yes   Sig: Take 1.5 tablets (150 mg) by mouth daily   tamsulosin (FLOMAX) 0.4 MG capsule 1/23/2021 at am Nursing Home   Yes Yes   Sig: Take 0.4 mg by mouth daily   traMADol (ULTRAM) 50 MG tablet 1/23/2021 at am Nursing Home Yes   Yes   Sig: Take 50 mg by mouth 2 times daily      Facility-Administered Medications: None          Allergy:     Allergies   Allergen Reactions     Crustaceans      Loxapine      Nifedipine      Shellfish Allergy      Sulfa Drugs      Sulfamethoxazole-Trimethoprim      Trimethoprim           Inpatient Medications:   Scheduled Meds:    atorvastatin  80 mg Oral Daily     donepezil  5 mg Oral At Bedtime     famotidine  10 mg Oral Daily     fluticasone-vilanterol  1 puff Inhalation Daily     guaiFENesin  5 mL Oral At Bedtime     haloperidol  0.5 mg Oral TID     haloperidol  1 mg Oral Daily     [START ON 1/25/2021] levofloxacin  500 mg Oral Q48H     metoprolol tartrate  50 mg Oral BID     metroNIDAZOLE  500 mg Intravenous Q12H     OLANZapine  5-10 mg Oral At Bedtime     sennosides  1 tablet Oral  "QAM     sertraline  150 mg Oral Daily     sodium chloride (PF)  3 mL Intracatheter Q8H     tamsulosin  0.4 mg Oral Daily     umeclidinium  1 puff Inhalation Daily     PRN Meds: acetaminophen, acetaminophen, albuterol, albuterol,   bisacodyl, carboxymethylcellulose PF, hydrALAZINE, labetalol,   lidocaine 4%, lidocaine (buffered or not buffered), melatonin,   nitroGLYcerin, ondansetron **OR** ondansetron, polyethylene   glycol, prochlorperazine **OR** prochlorperazine **OR**   prochlorperazine, senna-docusate **OR** senna-docusate, sodium   chloride (PF)        Review of Systems    The Review of Systems is negative other than noted in the HPI  Unable due to confusion.   NEURO: see HPI       Physical Exam:   Physical Exam   Vitals:  Height:5' 8\"  Weight:134 lbs 7.69 oz   Temp: 98.1  F (36.7  C) Temp src: Oral BP: (!) 131/92 Pulse: 102     Resp: 18 SpO2: 100 % O2 Device: Nasal cannula Oxygen Delivery: 1   LPM  General Appearance:  No acute distress, not able to consistently   awaken/participat.  Mouth open, mucous membranedry.  Left   nasolabial fold drooped compared to right but able to move with   grimace smile.    Neuro:       Mental Status Exam:    Lethargic, but arousable,   inconsistent response. oriented to self.  Not place or time.    Dysarthric, low volume.  Language-unable to fully participate,   but follows simple commands with encouragment.         Cranial Nerves:   Vision grossly intact. EOMI, tongue   midline.  No jaw devialtion  Eyebrow movement symmetric  Closes   eyes symmetrically.  Shoulder elevation symmetric. Hearing   intact.             Motor:   Rigid tone x4, moves extremities x4, difficlty   raising legs.  Atrophymild x4           Reflexes:  Decreased x4.  No ankle jerk. Plantar nl No   clonus       Sensory:  Unable to fully cooperate                   Coordination:   Moves x4 to command, unable to fully   partipate       Gait:  unable  Neck: no nuchal rigidity, normal thyroid. No carotid " bruits.    Cardiovascular: Regular rate and rhythm, no m/r/g  Extremities: No clubbing, no cyanosis, no edema       Data:   ROUTINE IP LABS   CBC RESULTS:     Recent Labs   Lab 01/24/21  0610 01/23/21  1757   WBC 14.1* 15.2*   RBC 3.27* 3.17*   HGB 10.1* 9.8*   HCT 30.3* 30.2*    144*     Basic Metabolic Panel:   Recent Labs   Lab Test 01/24/21  0610 01/23/21  1757 11/21/20  0526    136 142   POTASSIUM 4.6 4.8 4.3   CHLORIDE 106 104 109   CO2 25 30 31   BUN 56* 60* 34*   CR 2.54* 2.68* 1.93*   * 106* 81   RAMON 9.9 9.9 8.4*     Liver panel:  Recent Labs   Lab Test 01/23/21  1757 11/19/20  0517 11/18/20  0107 07/01/20  1613 11/17/19  0521   PROTTOTAL 7.8 7.0 7.6 7.7 6.8   ALBUMIN 2.7* 2.9* 3.4 3.3* 2.4*   BILITOTAL 0.4 0.5 0.4 0.2 0.2   ALKPHOS 134 106 116 175* 97   AST 44 41 49* 39 59*   ALT 18 35 41 38 48     INR:  Recent Labs   Lab Test 01/23/21  1757 11/10/19  1630 10/31/19  0611 10/30/19  0618 10/29/19  1925   INR 1.18* 1.77* 1.23* 3.08* >10.00*      Lipid Profile:  Recent Labs   Lab Test 10/31/19  0611   CHOL 77   HDL 42   LDL 14   TRIG 103     Vitamin B12:   Recent Labs   Lab Test 11/16/19  0953   B12 582      Troponin I:   Recent Labs   Lab Test 01/24/21  1159 01/24/21  0610 01/24/21  0030 01/23/21  2149 01/23/21  1757 11/18/20  0107 07/01/20  1613 11/10/19  1630   TROPI 0.292* 0.160* 0.181* 0.188* 0.239* 0.028 <0.015 <0.015          IMAGING:   All imaging studies were reviewed personally  CT head:   IMPRESSION:      1. Redemonstrated subdural hematoma involving the left occipital   lobe and left paramedian posterior falx, not significantly   changed from the prior exam dated 01/23/2021    MRI 1/23/21.  Thin left cerebral convexity subacute subdural   hematoma extends inferiorly to the left temporal lobe. There is   extension into the posterior aspect of the interhemispheric   fissure, detailed above. No significant mass effect. Findings   called to Mahendra Guallpa at 8:12 PM. Suggest  noncontrast head CT as a baseline.     2.  No evidence of acute intraparenchymal process.     3.  Atrophy and chronic small vessel vascular changes    Sherry Mckay M.D.  [facility]  Office [phone]              Glucose by meter   Result Value Ref Range    Glucose 115 (H) 70 - 99 mg/dL   Lactic acid level STAT   Result Value Ref Range    Lactate for Sepsis Protocol 1.0 0.7 - 2.0 mmol/L   EEG Routine    Narrative    EEG Routine Result    EEG DATE: 21  EEG LO  EEG DAY#: 1  EEG SOURCE FILE DURATION: 23 min.    PATIENT INFORMATION: Hubert Tamayo is a 89 year old year old male who   presents with confusion and left facial droop EEG is being done to   evaluate for seizure activity.     MEDICATIONS: see chart      TECHNICAL SUMMARY: This routine EEG was recorded from 10-20 scalp   electrodes placed according to the 10-20 international system. Additional   electrodes were used for referencing, EKG, and to record from other   cerebral regions as appropriate.    BACKGROUND ACTIVITY:  The background activity consists of intermixed   polymorphic delta and theta rhythms ranging from 3-6 Hx. The posterior   dominant rhythm attenuated with eye opening. Background waxed and waned   during course of recording.       ACTIVATION PROCEDURE: None.      No sharp wave or paroxysmal abnormalities occurred during the recording.     IMPRESSION: This electroencephalogram is abnormal due to the presence of   moderate slowing which is consistent with generalized encephalopathy.    This is consistent with toxic/metabolic encephalopathy or effects of   chronic neurodegenerative brain condition.    No electrographic seizures or epileptiform discharges were recorded.   Clinical correlation is advised.       Sherry Mckay MD   TSH   Result Value Ref Range    TSH 1.44 0.40 - 4.00 mU/L   CBC with platelets   Result Value Ref Range    WBC 11.3 (H) 4.0 - 11.0 10e9/L    RBC Count 3.22 (L) 4.4 - 5.9  "10e12/L    Hemoglobin 9.7 (L) 13.3 - 17.7 g/dL    Hematocrit 30.6 (L) 40.0 - 53.0 %    MCV 95 78 - 100 fl    MCH 30.1 26.5 - 33.0 pg    MCHC 31.7 31.5 - 36.5 g/dL    RDW 15.9 (H) 10.0 - 15.0 %    Platelet Count 161 150 - 450 10e9/L   Basic metabolic panel   Result Value Ref Range    Sodium 141 133 - 144 mmol/L    Potassium 4.5 3.4 - 5.3 mmol/L    Chloride 111 (H) 94 - 109 mmol/L    Carbon Dioxide 24 20 - 32 mmol/L    Anion Gap 6 3 - 14 mmol/L    Glucose 92 70 - 99 mg/dL    Urea Nitrogen 54 (H) 7 - 30 mg/dL    Creatinine 2.70 (H) 0.66 - 1.25 mg/dL    GFR Estimate 20 (L) >60 mL/min/[1.73_m2]    GFR Estimate If Black 23 (L) >60 mL/min/[1.73_m2]    Calcium 9.5 8.5 - 10.1 mg/dL         Images and Procedures:  I personally reviewed the images of the following studies:  CT 1-24  \"Redemonstrated subdural hematoma involving the left occipital lobe and left paramedian posterior falx, not significantly changed from the prior exam dated 01/23/2021.\"  "

## 2021-01-25 NOTE — PROGRESS NOTES
"   01/25/21 0841   Quick Adds   Type of Visit Initial Occupational Therapy Evaluation   Living Environment   Living Environment Comments Pt states he lives alone in an apt and typically uses a cane for mobility. 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   Existing Precautions/Restrictions fall  (SBP <160)   Cognitive Status Examination   Cognitive Status Comments pt oriented to self/birthday, correctly stated he lives in [place]   Visual Perception   Impact of Vision Impairment on Function (Vision) Difficult to assess, pt often closing eyes, did not really follow commands for visual tracking. Appeared to not realize OT was holding hand up for a high 5 for awhile   Sensory   Sensory Comments denied numbness/tingling   Pain Assessment   Patient Currently in Pain   (stated \"ouch\" but unable to localize )   Range of Motion Comprehensive   Comment, General Range of Motion unable to fully assess as pt not wanting to participate w/ ROM screen, would only raise shoulders to ~ 50 degrees shoulder flexion   Strength Comprehensive (MMT)   Comment, General Manual Muscle Testing (MMT) Assessment  weak bilaterally but L stronger than R   Bed Mobility   Comment (Bed Mobility) max A x 2   Transfers "   Transfer Comments min-mod A x 2   Balance   Balance Comments impaired sitting and standing balance   Activities of Daily Living   BADL Assessment/Intervention lower body dressing;upper body dressing;grooming;toileting   Upper Body Dressing Assessment/Training   [place] Level (Upper Body Dressing) moderate assist (50% patient effort)   Lower Body Dressing Assessment/Training   [place] Level (Lower Body Dressing) dependent (less than 25% patient effort)   Grooming Assessment/Training   [place] Level (Grooming) moderate assist (50% patient effort)   Toileting   [place] Level (Toileting) dependent (less than 25% patient effort)   Clinical Impression   Criteria for Skilled Therapeutic Interventions Met (OT) yes   OT Diagnosis dec ind/ease w/ ADL's and functional mobility   OT Problem List-Impairments impacting ADL balance;activity tolerance impaired;mobility;strength   Assessment of Occupational Performance 3-5 Performance Deficits   Identified Performance Deficits dec ind/safety w/ bed mobility, functional transfers/mobility, dressing, g/h, feeding, toileting, bathing   Planned Therapy Interventions (OT) ADL retraining;transfer training;progressive activity/exercise;stretching;ROM   Clinical Decision Making Complexity (OT) moderate complexity   Therapy Frequency (OT) 5x/week   Predicted Duration of Therapy 6 days   Risks and Benefits of Treatment have been explained. Yes   Patient, Family & other staff in agreement with plan of care Yes   OT Discharge Planning    OT Discharge Recommendation (DC Rec) Transitional Care Facility   OT Rationale for DC Rec pt continues to be far from original baseline and will need continued therapy at TCU setting. Would anticipate pt needing more supportive living environment following TCU stay,   Total Evaluation Time (Minutes)   Total Evaluation Time (Minutes) 8

## 2021-01-25 NOTE — PROGRESS NOTES
01/25/21 1011   Quick Adds   Type of Visit Initial PT Evaluation   Living Environment   Living Environment Comments From TCU (since Nov), per chart pt previously was living with his cousin caregiver.    Self-Care   Activity/Exercise/Self-Care Comment Prior mobility unclear, following SW/CC.    Disability/Function   Fall history within last six months yes  (recurrent)   Change in Functional Status Since Onset of Current Illness/Injury yes   General Information   Onset of Illness/Injury or Date of Surgery 01/24/21   Referring Physician Vlad Moise, DO   Patient/Family Therapy Goals Statement (PT) To talk to his son, to lie down.    Pertinent History of Current Problem (include personal factors and/or comorbidities that impact the POC) L facial droop - MRI negative, EEG results pending - neuro not suspecting stroke nor seizure, possible 7th CN palsy. Enceph, dementia, TBI with L SDH, CKD, pneumonia, altered hydration?, schizophrenia.    Existing Precautions/Restrictions fall   Weight-Bearing Status - LUE full weight-bearing  (all)   Cognition   Affect/Mental Status (Cognition) agitated;confused   Follows Commands (Cognition) follows one-step commands;initiation impaired;increased processing time needed;delayed response/completion;physical/tactile prompts required;repetition of directions required;verbal cues/prompting required   Behavioral Issues combative/physical outbursts;difficulty managing stress;disinhibition;inappropriate language;overwhelmed easily;verbal outbursts   Safety Deficit (Cognition) impulsivity;insight into deficits/self-awareness;judgment;problem-solving;safety precautions awareness;safety precautions follow-through/compliance;moderate deficit;severe deficit   Posture    Posture Comments Impaired controlled mobility.    Range of Motion (ROM)   ROM Comment WFL   Strength   Strength Comments Weak - UE assist to power to stand, poor activity tolerance. On 1 LPM NC upon entry but O2 90s on RA at  rest and with activity - RN and PT in agreement to leave O2 off.    Bed Mobility   Comment (Bed Mobility) ModA given poor external initiation despite prompting, cues, confusion.    Transfers   Transfer Safety Comments Gulshan sit-stands given imbalance, poor brown dpalcement relative to walker, pivot ModA given impulsivity to sit for safety.    Gait/Stairs (Locomotion)   Distance in Feet (Required for LE Total Joints) 2' to R and 1' to L with RW Gulshan unsteady, impulsive, feels weak, too quick to discern asymmetry. Pt then pushing to sit down at bed 1' away.   Balance   Balance Comments Ax1 & AD, impulsive, assist to prevent fall - high falls risk.    Coordination   Coordination Comments TBD   Muscle Tone   Muscle Tone Comments TBD   Clinical Impression   Criteria for Skilled Therapeutic Intervention yes, treatment indicated   PT Diagnosis (PT) Impaired mobility   Influenced by the following impairments Impaired balance, activity tolerance, safety awareness, safe decision-making, initiation.    Functional limitations due to impairments Impaired mobility   Clinical Presentation Evolving/Changing   Clinical Presentation Rationale Professional discretion.    Clinical Decision Making (Complexity) low complexity   Therapy Frequency (PT) Daily   Predicted Duration of Therapy Intervention (days/wks) 1 week   Planned Therapy Interventions (PT) balance training;bed mobility training;gait training;home exercise program;motor coordination training;neuromuscular re-education;patient/family education;postural re-education;stair training;strengthening;transfer training   Anticipated Equipment Needs at Discharge (PT) walker, rolling;wheelchair;gait belt   Risk & Benefits of therapy have been explained evaluation/treatment results reviewed;care plan/treatment goals reviewed;risks/benefits reviewed;current/potential barriers reviewed;participants voiced agreement with care plan;participants included;patient   PT Discharge Planning    PT  Discharge Recommendation (DC Rec) Transitional Care Facility   PT Rationale for DC Rec Anticipate some long-term needs given confusion, poor safety, impaired mobility.    PT Brief overview of current status  Bed mobility ModA, transfers and 2' twice RW Gulshan.    Total Evaluation Time   Total Evaluation Time (Minutes) 10

## 2021-01-25 NOTE — CONSULTS
[facility]  Palliative Care Consultation Note    Patient: Hubert Tamayo  Date of Admission:  1/24/2021    Requesting Clinician / Team: Dr Mar  Reason for consult: Goals of care    Recommendations:    Goals remain restorative, discharge to TCU when medically stable    Pt's son continues to request pt remain FULL code, will further discuss with his father     Pt's son shared that his father loves Jazz music    Pt's son shared that his father would not find comfort in visits from a     Pt's son requests his father receive Boost supplements TID, and feels he would do well with a stationary pedal exerciser     These recommendations have been discussed with Dr Mar, pt's bedside RN.      Thank you for the opportunity to participate in the care of this patient and family. Our team: does not plan on following further, however do not hesitate to call or re-consult if we can be of further assistance to the patient/family.     During regular M-F work hours -- if you are not sure who specifically to contact -- please contact us on our team pager at [phone].    After regular work hours and on weekends/holidays, you can call our answering service at [phone]. Also, who's on call for us is available in [facility] Smart Web.     AREN Monge CNP  [facility]  Contact information available via [facility] Paging/Directory        Attestation:  Total time on the floor involved in the patient's care: 70 minutes  Total time spent in counseling/care coordination: >50%      Assessments:  Hubert Tamayo is a 89 year old male with a past medical history significant for dementia, hypertension, chronic obstructive pulmonary disease, reported coronary artery disease who presents to the Emergency Department with left facial droop and found to have a left subdural hematoma.     Today, the patient was seen for:  Goals of Care    Contacted pt's son Aldair this afternoon, introduced the role  of the Palliative Care team. Aldair tells me up until recently he hadn't seen his father since February due to COVID. His father was living with a niece who was helping to care for him up until This past November when he fell and was admitted to the hospital. He subsequently discharged to TCU and has been there every since. Aldair tells me his father has not gotten better while there, in fact he feels his father has gotten weaker (in fact he has fallen 7 times) and he feels strongly this is because the care facility has not been working with him. We spent time discussing some of his father's medical conditions including the subdural hematoma, HTN, renal dysfunction, and his dysphagia which could put him at higher risk of aspiration events. Aldair wanted to make sure his father is receiving Boost supplements and requested stationary pedal exercise equipment be used to help strengthen his father's legs. I shared with Aldair our worry that due to his father's advanced age and medical comorbidities he may not return to his previous level of functioning/strength. Aldair is still hopeful his dad with improve with appropriate care. We discussed Mr Tamayo's code status at length. Aldair understands that these are aggressive interventions which could cause added suffering (broken ribs, further debility), but continues to request his father be a FULL code for now. He tells me he will speak to his father regarding this and let us know if they would like this changed. I shared my worry with Aldair that his father might not be able to process this information and incorporate it in a meaningful way. I further explained that I worry his father would be in very tough shape (if he survived these aggressive interventions) and if living in a nursing facility requiring 24 hour care would not be an acceptable quality of life for him, then I would not recommend he remain a FULL code.     I next went to meet Mr Tamayo. He was difficult to  "understand, speech was somewhat garbled, unclear if this is baseline for him. He shared that he has been in \"pain,\" but not physical pain. He then stated he has never been treated so poorly in any other hospitals, and that everyone working here is a racist. He further shared that he is a  and has been practicing law for 25 years. He stated he was going to lilia the hospital. I apologized that he feels he has been treated poorly. I attempted to learn more about his life, asked him what he did for work after he returned from the [place] war. 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 He then stated he was tired and closed his eyes.     Prognosis, Goals, & Planning:      Functional Status just prior to hospitalization: 3 (Capable of only limited self-care; needs help with ADLs; in bed/chair >50% of waking hours)      Prognosis, Goals, and/or Advance Care Planning were addressed today: Yes        Summary/Comments:       Patient's decision making preferences: unable to assess          Patient has decision-making capacity today for complex decisions: No            I have concerns about the patient/family's health literacy today: No           Patient has a completed Health Care Directive: No.       Code status: Full Code    Coping, Meaning, & Spirituality:   Mood, coping, and/or meaning in the context of serious illness were addressed today: Yes  Summary/Comments:     Social:     [facility]    Low family / caregivers: not , has at least two sons (Aldair in [place], another son in the [facility]), when asked how many children Mr Tamayo has, Aldair stated, \"do you really need to know that?\"    History of Present Illness:  History gathered today from: patient, family/loved ones, medical chart, medical team members    Adopted from H&P  Mr. Tamayo is a 89-year-old male with a past medical history significant for dementia, diabetes, hypertension, who presents to the Emergency Department at " [facility] from his assisted living facility with concerns of left facial droop that began 2 days ago, on Thursday.  History is obtained through discussion with the ED physician, as well as the patient, who, however, is unable to provide much history.  Per report, there was noticed a left facial droop on Thursday, which did not resolve today, so prompted a visit to the ED.  The patient was reportedly recently taken off Coumadin and has an INR that is just above 1.  No reported history of trauma or falls.  The patient was noted to be a little more confused than normal as well.  Upon presentation, the patient was a bit hypoxic in the ED at [facility], but this improved with nasal cannula oxygen.  He was reportedly recently started on Levaquin and Flagyl, though the exact duration at this point is unknown nor is the last dose known at this point.  The patient was having a little bit more difficulty speaking, per report from the ED, but this also improved after getting nasal cannula oxygen.  Head CT showed evidence of a 3 mm left-sided subdural hematoma, which after discussing with the neurosurgeons at [facility] recommended transfer to this facility for further evaluation.  Troponin was also elevated to 0.239, but on repeat, it come down to 0.188.  The patient denied any chest pain at that point and he denies any chest pain currently at [facility].      I went upstairs to see the patient and he is lying in bed.  It was reported that he was having hypertension issues en route.  The blood pressure here is 140 systolic.  The patient is answering yes and no questions, but is not very conversant, otherwise.  Unclear how far off his baseline this might be.  The patient denies any chest pain, shortness of breath, headache or any other pain at this point.  He is unable to tell me exactly where he is.  Labs were otherwise fairly unremarkable at [facility], except for an elevated creatinine of 2.68, elevated troponin, and elevated  BNP.  COVID swab was negative and UA was bland.     Key Palliative Symptom Data:  We are not helping to manage these symptoms currently in this patient.      ROS:  Comprehensive ROS is reviewed and is negative except as per HPI.     Past Medical History:  Past Medical History:   Diagnosis Date     CAD (coronary artery disease)      Dementia (H)      Diabetes mellitus type II      Essential hypertension      Osteoporosis      Paranoid schizophrenia      Pulmonary emphysema         Past Surgical History:  Past Surgical History:   Procedure Laterality Date     CATARACT REMOVAL Bilateral          Family History:  No family history on file.      Allergies:  Allergies   Allergen Reactions     Crustaceans      Loxapine      Nifedipine      Shellfish Allergy      Sulfa Drugs      Sulfamethoxazole-Trimethoprim      Trimethoprim         Medications:  I have reviewed this patient's medication profile and medications from this hospitalization.   Current Facility-Administered Medications Ordered in Epic   Medication Dose Route Frequency Last Rate Last Admin     acetaminophen (TYLENOL) Suppository 650 mg  650 mg Rectal Q4H PRN         acetaminophen (TYLENOL) tablet 650 mg  650 mg Oral Q4H PRN   650 mg at 01/24/21 1559     albuterol (PROAIR HFA/PROVENTIL HFA/VENTOLIN HFA) 108 (90 Base) MCG/ACT inhaler 2 puff  2 puff Inhalation Q4H PRN         albuterol (PROVENTIL) neb solution 2.5 mg  2.5 mg Nebulization Q6H PRN         atorvastatin (LIPITOR) tablet 80 mg  80 mg Oral Daily   80 mg at 01/25/21 0846     bisacodyl (DULCOLAX) Suppository 10 mg  10 mg Rectal Daily PRN         carboxymethylcellulose PF (REFRESH LIQUIGEL) 1 % ophthalmic gel 2 drop  2 drop Both Eyes Q4H PRN         donepezil (ARICEPT) tablet 5 mg  5 mg Oral At Bedtime   5 mg at 01/24/21 2121     famotidine (PEPCID) tablet 10 mg  10 mg Oral Daily   10 mg at 01/25/21 0847     fluticasone-vilanterol (BREO ELLIPTA) 200-25 MCG/INH inhaler 1 puff  1 puff Inhalation Daily   1  puff at 01/25/21 0905     guaiFENesin (ROBITUSSIN) 20 mg/mL solution 5 mL  5 mL Oral At Bedtime   5 mL at 01/24/21 2121     haloperidol (HALDOL) tablet 0.5 mg  0.5 mg Oral TID   0.5 mg at 01/25/21 0846     haloperidol (HALDOL) tablet 1 mg  1 mg Oral Daily         hydrALAZINE (APRESOLINE) injection 10-20 mg  10-20 mg Intravenous Q4H PRN         labetalol (NORMODYNE/TRANDATE) injection 10 mg  10 mg Intravenous Q2H PRN   10 mg at 01/24/21 0533     levofloxacin (LEVAQUIN) tablet 500 mg  500 mg Oral Q48H   500 mg at 01/25/21 0846     lidocaine (LMX4) cream   Topical Q1H PRN         lidocaine 1 % 0.1-1 mL  0.1-1 mL Other Q1H PRN         melatonin tablet 1 mg  1 mg Oral At Bedtime PRN         metoprolol tartrate (LOPRESSOR) tablet 50 mg  50 mg Oral BID   50 mg at 01/25/21 0846     metroNIDAZOLE (FLAGYL) infusion 500 mg  500 mg Intravenous Q12H 100 mL/hr at 01/25/21 0753 500 mg at 01/25/21 0753     nitroGLYcerin (NITROSTAT) sublingual tablet 0.4 mg  0.4 mg Sublingual Q5 Min PRN         OLANZapine (zyPREXA) tablet 10 mg  10 mg Oral At Bedtime   10 mg at 01/24/21 2121     ondansetron (ZOFRAN-ODT) ODT tab 4 mg  4 mg Oral Q6H PRN        Or     ondansetron (ZOFRAN) injection 4 mg  4 mg Intravenous Q6H PRN         polyethylene glycol (MIRALAX) Packet 17 g  17 g Oral Daily PRN         prochlorperazine (COMPAZINE) injection 5 mg  5 mg Intravenous Q6H PRN        Or     prochlorperazine (COMPAZINE) tablet 5 mg  5 mg Oral Q6H PRN        Or     prochlorperazine (COMPAZINE) suppository 12.5 mg  12.5 mg Rectal Q12H PRN         senna-docusate (SENOKOT-S/PERICOLACE) 8.6-50 MG per tablet 1 tablet  1 tablet Oral BID PRN        Or     senna-docusate (SENOKOT-S/PERICOLACE) 8.6-50 MG per tablet 2 tablet  2 tablet Oral BID PRN         sennosides (SENOKOT) tablet 1 tablet  1 tablet Oral QAM   1 tablet at 01/25/21 0846     sertraline (ZOLOFT) tablet 150 mg  150 mg Oral Daily   150 mg at 01/25/21 0846     sodium chloride (PF) 0.9% PF flush 3 mL  3  mL Intracatheter Q8H   3 mL at 21 0443     sodium chloride (PF) 0.9% PF flush 3 mL  3 mL Intracatheter q1 min prn         sodium chloride 0.9% infusion   Intravenous Continuous         tamsulosin (FLOMAX) capsule 0.4 mg  0.4 mg Oral Daily   0.4 mg at 21 0846     umeclidinium (INCRUSE ELLIPTA) 62.5 MCG/INH inhaler 1 puff  1 puff Inhalation Daily   1 puff at 21 0855     No current [facility]-ordered outpatient medications on file.       Physical Exam:  Vital Signs: Temp: 97.5  F (36.4  C) Temp src: Axillary BP: 128/68 Pulse: 88   Resp: 18 SpO2: 93 % O2 Device: None (Room air) Oxygen Delivery: 1 LPM  Vitals:    21 1223   Weight: 61 kg (134 lb 7.7 oz)     CONSTITUTIONAL: NAD, A&O to self and place, not situation. Agitated.  HEENT: NCAT, dry MM  CARDIOVASCULAR: exam deferred   RESPIRATORY: NL respiratory effort on oxygen via NC  GASTROINTESTINAL: exam deferred   MUSCULOSKELETAL: No extremity edema. Moving freely in bed   SKIN: Warm and intact. No concerning lesions or rashes on exposed skin surfaces   NEUROLOGIC: Appropriately responsive during interview  PSYCH: Affect congruent     Data reviewed:  Recent imaging reviewed, my comments on pertinents:    CT head  IMPRESSION:      1. Redemonstrated subdural hematoma involving the left occipital lobe and left paramedian posterior falx, not significantly changed from the prior exam dated 2021.    Recent lab data reviewed, my comments on pertinents:   Results for orders placed or performed during the hospital encounter of 21 (from the past 24 hour(s))   EEG Routine    Narrative    EEG Routine Result    EEG DATE: 21  EEG LO  EEG DAY#: 1  EEG SOURCE FILE DURATION: 23 min.    PATIENT INFORMATION: Hubert Tamayo is a 89 year old year old male who   presents with confusion and left facial droop EEG is being done to   evaluate for seizure activity.     MEDICATIONS: see chart      TECHNICAL SUMMARY: This routine EEG was recorded from 10-20  scalp   electrodes placed according to the 10-20 international system. Additional   electrodes were used for referencing, EKG, and to record from other   cerebral regions as appropriate.    BACKGROUND ACTIVITY:  The background activity consists of intermixed   polymorphic delta and theta rhythms ranging from 3-6 Hx. The posterior   dominant rhythm attenuated with eye opening. Background waxed and waned   during course of recording.       ACTIVATION PROCEDURE: None.      No sharp wave or paroxysmal abnormalities occurred during the recording.     IMPRESSION: This electroencephalogram is abnormal due to the presence of   moderate slowing which is consistent with generalized encephalopathy.    This is consistent with toxic/metabolic encephalopathy or effects of   chronic neurodegenerative brain condition.    No electrographic seizures or epileptiform discharges were recorded.   Clinical correlation is advised.       Sherry Mckay MD   TSH   Result Value Ref Range    TSH 1.44 0.40 - 4.00 mU/L   CBC with platelets   Result Value Ref Range    WBC 11.3 (H) 4.0 - 11.0 10e9/L    RBC Count 3.22 (L) 4.4 - 5.9 10e12/L    Hemoglobin 9.7 (L) 13.3 - 17.7 g/dL    Hematocrit 30.6 (L) 40.0 - 53.0 %    MCV 95 78 - 100 fl    MCH 30.1 26.5 - 33.0 pg    MCHC 31.7 31.5 - 36.5 g/dL    RDW 15.9 (H) 10.0 - 15.0 %    Platelet Count 161 150 - 450 10e9/L   Basic metabolic panel   Result Value Ref Range    Sodium 141 133 - 144 mmol/L    Potassium 4.5 3.4 - 5.3 mmol/L    Chloride 111 (H) 94 - 109 mmol/L    Carbon Dioxide 24 20 - 32 mmol/L    Anion Gap 6 3 - 14 mmol/L    Glucose 92 70 - 99 mg/dL    Urea Nitrogen 54 (H) 7 - 30 mg/dL    Creatinine 2.70 (H) 0.66 - 1.25 mg/dL    GFR Estimate 20 (L) >60 mL/min/[1.73_m2]    GFR Estimate If Black 23 (L) >60 mL/min/[1.73_m2]    Calcium 9.5 8.5 - 10.1 mg/dL

## 2021-01-25 NOTE — PROGRESS NOTES
[facility]    Hospitalist Progress Note    Date of Service (when I saw the patient): 01/25/2021    Assessment & Plan   Hubert Tamayo is a 89 year old male who was admitted on 1/24/2021.  ASSESSMENT AND PLAN:  Mr. Tamayo is a 89-year-old male with a past medical history significant for dementia, hypertension, chronic obstructive pulmonary disease, reported coronary artery disease who presents to the Emergency Department with left facial droop and found to have a left subdural hematoma.      1.  Left subdural hematoma:  3 mm noted on CT and MRI:  Most likely traumatic as pt has h/o frequent recurrent falls . HAd fallen 7times since November at care facility  Repeat CT head showed stable bleed  Conservative management per Neurosurgery and Neurology     2. LEft sided facila droop:  No stroke seen on MRI   facial droop improved today     3. 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 Aspiration PNeumonia:  On dysphagia diet per speech path   On levaquin 2/5 days   Metronidazole 2out of 7days     4. Elevated troponin due to most likley due to his CKD with Cr of 2.54 and freqeunt falls and subdural hematoma   Pt asymptomatic   Echo was of poor quality   With the subdural hematoma anticoagulation was contraindicated. Pt had previous h/o GI bleed and was taken off of blood thinners as per discussion with ben Ledezma   Continue metoprolol and statin for his heart     5. H/O Dementia and PAranoid Schizophremia:  Pt was very somnolent yesterday and with his CKD zyprexa reduced from 20mg at bedtime to 10mg at bedtime   Continued PTA hladol 0.5mg tID and 1mg at 2PM  Pt more alert and awake with rambling speech today     6. Goals of care discussion:  Ben ledezma was called and updated by me  and discussed with him about switching pt's code status from Full code to DNR/DNI with patient's advanced age, frequent falls, Dementia, aspiration Pneumonia and CKD   Palliative care consulted for  goals of care as well and discussed with son and goals remain restorative including FULL CODE for now            DVT Prophylaxis: Anti-embolisim stockings (TEDs) and ambulation   Code Status: Full Code    Disposition: Expected discharge in 1-2days . Needs TCU .  consulted     Discussed with patient, RN and palliative care today     Lilliana Mar MD  [phone] (P)      Interval History   REsting comfortably in bed. Rambling illogical speech. More alert and awake today. No facial droop noted today     -Data reviewed today: I reviewed all new labs and imaging results over the last 24 hours. I personally reviewed no images or EKG's today.    Physical Exam   Temp: 97.5  F (36.4  C) Temp src: Axillary BP: 128/68 Pulse: 88   Resp: 18 SpO2: 93 % O2 Device: None (Room air) Oxygen Delivery: 1 LPM  Vitals:    01/24/21 1223   Weight: 61 kg (134 lb 7.7 oz)     Vital Signs with Ranges  Temp:  [97.5  F (36.4  C)-98.4  F (36.9  C)] 97.5  F (36.4  C)  Pulse:  [67-91] 88  Resp:  [16-18] 18  BP: (128-147)/(68-96) 128/68  SpO2:  [93 %-100 %] 93 %  I/O last 3 completed shifts:  In: 250 [P.O.:250]  Out: -     Constitutional: Awake, alert, cooperative, no apparent distress, very frail elderly man . Pleasantly confused   Respiratory: Clear to auscultation bilaterally, no crackles or wheezing  Cardiovascular: Regular rate and rhythm, normal S1 and S2, and no murmur noted  GI: Normal bowel sounds, soft, non-distended, non-tender  Skin/Integumen: No rashes, no cyanosis, no edema  Other:     Medications       atorvastatin  80 mg Oral Daily     donepezil  5 mg Oral At Bedtime     famotidine  10 mg Oral Daily     fluticasone-vilanterol  1 puff Inhalation Daily     guaiFENesin  5 mL Oral At Bedtime     haloperidol  0.5 mg Oral TID     haloperidol  1 mg Oral Daily     levofloxacin  500 mg Oral Q48H     metoprolol tartrate  50 mg Oral BID     metroNIDAZOLE  500 mg Intravenous Q12H     OLANZapine  10 mg Oral At Bedtime     sennosides   1 tablet Oral QAM     sertraline  150 mg Oral Daily     sodium chloride (PF)  3 mL Intracatheter Q8H     tamsulosin  0.4 mg Oral Daily     umeclidinium  1 puff Inhalation Daily       Data   Recent Labs   Lab 01/25/21  0902 01/24/21  1159 01/24/21  0610 01/24/21  0030 01/23/21  1757 01/23/21  1757   WBC 11.3*  --  14.1*  --   --  15.2*   HGB 9.7*  --  10.1*  --   --  9.8*   MCV 95  --  93  --   --  95     --  161  --   --  144*   INR  --   --   --   --   --  1.18*     --  137  --   --  136   POTASSIUM 4.5  --  4.6  --   --  4.8   CHLORIDE 111*  --  106  --   --  104   CO2 24  --  25  --   --  30   BUN 54*  --  56*  --   --  60*   CR 2.70*  --  2.54*  --   --  2.68*   ANIONGAP 6  --  6  --   --  2*   RAMON 9.5  --  9.9  --   --  9.9   GLC 92  --  140*  --   --  106*   ALBUMIN  --   --   --   --   --  2.7*   PROTTOTAL  --   --   --   --   --  7.8   BILITOTAL  --   --   --   --   --  0.4   ALKPHOS  --   --   --   --   --  134   ALT  --   --   --   --   --  18   AST  --   --   --   --   --  44   TROPI  --  0.292* 0.160* 0.181*   < > 0.239*    < > = values in this interval not displayed.       No results found for this or any previous visit (from the past 24 hour(s)).

## 2021-01-25 NOTE — PLAN OF CARE
Pt here with SDH. Alert, oriented to self and place, not always following commands. Generalized weakness. VSS. Tele NSR. On a DD1 diet with thin liquids. Needs assistance with eating. Takes pills crushed with applesauce. Turned and repositioned. Pt scoring green on the Aggression Stop Light Tool.

## 2021-01-25 NOTE — PROGRESS NOTES
Brief Palliative Care Team Note.    Consult received, chart reviewed and discussed with pt's bedside RN and Dr Mar. VM left for pt's son Aldair, will await a return call. Please do not hesitate to contact our team if more urgent needs arise. Thank you.    AREN [facility]  Contact information available via [facility] Paging/Directory

## 2021-01-25 NOTE — PROGRESS NOTES
"[facility]    Neurosurgery Progress Note    Date of Service (when I saw the patient): 01/25/2021     Assessment & Plan   Hubert Tamayo is a 89 year old male who was admitted on 1/24/2021. He presented with a subdural hematoma overlying the left occipital lobe and left parafalcine posteriorly. A repeat scan was stable. Today he was seen lying in bed. He is alert and oriented and follows commands. He denies headache, dizziness, nausea/vomiting, and vision changes. He moves all extremities equally.    Active Problems:    SDH (subdural hematoma) (H)    Assessment: left occipital and left paramedian posterior falx subdural hematoma    Plan:   -No plan for surgical intervention  -Appreciate assistance from other services    I have discussed the following assessment and plan Dr. Rivers who is in agreement with initial plan and will follow up with further consultation recommendations.    Katalina Adams, AGUS  [facility] Neurosurgery  [facility]  [address]  [address]  [place] [address]    Tel [phone]  Pager [phone]      Interval History   Stable. Gradually improving.    Physical Exam   Temp: 97.7  F (36.5  C) Temp src: Axillary BP: (!) 140/75 Pulse: 67   Resp: 18 SpO2: 100 % O2 Device: Nasal cannula Oxygen Delivery: 1 LPM  Vitals:    01/24/21 1223   Weight: 134 lb 7.7 oz (61 kg)     Vital Signs with Ranges  Temp:  [97.7  F (36.5  C)-98.4  F (36.9  C)] 97.7  F (36.5  C)  Pulse:  [] 67  Resp:  [16-18] 18  BP: (131-198)/() 140/75  SpO2:  [97 %-100 %] 100 %  I/O last 3 completed shifts:  In: 250 [P.O.:250]  Out: -      , Blood pressure (!) 140/75, pulse 67, temperature 97.7  F (36.5  C), temperature source Axillary, resp. rate 18, height 5' 8\" (1.727 m), weight 134 lb 7.7 oz (61 kg), SpO2 100 %.  134 lbs 7.69 oz  HEENT:  Normocephalic.  PERRLA.    Heart:  No peripheral edema  Lungs:  No SOB  Skin:  Warm and dry, good capillary " refill.  Extremities:  Good radial and dorsalis pedis pulses bilaterally, no edema, cyanosis or clubbing.    NEUROLOGICAL EXAMINATION:   Mental status:  Alert and follows commands.  Cranial nerves:  II-XII intact.   Motor:  Strength is 5/5 throughout the upper and lower extremities    Medications       atorvastatin  80 mg Oral Daily     donepezil  5 mg Oral At Bedtime     famotidine  10 mg Oral Daily     fluticasone-vilanterol  1 puff Inhalation Daily     guaiFENesin  5 mL Oral At Bedtime     haloperidol  0.5 mg Oral TID     haloperidol  1 mg Oral Daily     levofloxacin  500 mg Oral Q48H     metoprolol tartrate  50 mg Oral BID     metroNIDAZOLE  500 mg Intravenous Q12H     OLANZapine  10 mg Oral At Bedtime     sennosides  1 tablet Oral QAM     sertraline  150 mg Oral Daily     sodium chloride (PF)  3 mL Intracatheter Q8H     tamsulosin  0.4 mg Oral Daily     umeclidinium  1 puff Inhalation Daily       Data     CBC RESULTS:   Recent Labs   Lab Test 01/25/21  0902   WBC 11.3*   RBC 3.22*   HGB 9.7*   HCT 30.6*   MCV 95   MCH 30.1   MCHC 31.7   RDW 15.9*        Basic Metabolic Panel:  Lab Results   Component Value Date     01/24/2021      Lab Results   Component Value Date    POTASSIUM 4.6 01/24/2021     Lab Results   Component Value Date    CHLORIDE 106 01/24/2021     Lab Results   Component Value Date    RAMON 9.9 01/24/2021     Lab Results   Component Value Date    CO2 25 01/24/2021     Lab Results   Component Value Date    BUN 56 01/24/2021     Lab Results   Component Value Date    CR 2.54 01/24/2021     Lab Results   Component Value Date     01/24/2021     INR:  Lab Results   Component Value Date    INR 1.18 01/23/2021    INR 1.77 11/10/2019    INR 1.23 10/31/2019    INR 3.08 10/30/2019    INR >10.00 10/29/2019    INR 1.95 09/18/2019    INR 2.05 09/17/2019    INR 2.32 09/16/2019    INR 2.81 09/15/2019    INR 4.91 09/14/2019    INR 3.39 06/02/2019    INR 2.76 11/21/2010

## 2021-01-26 ENCOUNTER — APPOINTMENT (OUTPATIENT)
Dept: SPEECH THERAPY | Facility: CLINIC | Age: 86
DRG: 085 | End: 2021-01-26
Attending: INTERNAL MEDICINE
Payer: MEDICARE

## 2021-01-26 ENCOUNTER — AMBULATORY - HEALTHEAST (OUTPATIENT)
Dept: OTHER | Facility: CLINIC | Age: 86
End: 2021-01-26

## 2021-01-26 ENCOUNTER — DOCUMENTATION ONLY (OUTPATIENT)
Dept: OTHER | Facility: CLINIC | Age: 86
End: 2021-01-26

## 2021-01-26 LAB
ANION GAP SERPL CALCULATED.3IONS-SCNC: 6 MMOL/L (ref 3–14)
BUN SERPL-MCNC: 60 MG/DL (ref 7–30)
CALCIUM SERPL-MCNC: 9.5 MG/DL (ref 8.5–10.1)
CHLORIDE SERPL-SCNC: 110 MMOL/L (ref 94–109)
CO2 SERPL-SCNC: 23 MMOL/L (ref 20–32)
CREAT SERPL-MCNC: 2.2 MG/DL (ref 0.66–1.25)
GFR SERPL CREATININE-BSD FRML MDRD: 25 ML/MIN/{1.73_M2}
GLUCOSE SERPL-MCNC: 89 MG/DL (ref 70–99)
POTASSIUM SERPL-SCNC: 4.2 MMOL/L (ref 3.4–5.3)
SODIUM SERPL-SCNC: 139 MMOL/L (ref 133–144)

## 2021-01-26 PROCEDURE — 36415 COLL VENOUS BLD VENIPUNCTURE: CPT | Performed by: INTERNAL MEDICINE

## 2021-01-26 PROCEDURE — 80048 BASIC METABOLIC PNL TOTAL CA: CPT | Performed by: INTERNAL MEDICINE

## 2021-01-26 PROCEDURE — 92526 ORAL FUNCTION THERAPY: CPT | Mod: GN

## 2021-01-26 PROCEDURE — 120N000001 HC R&B MED SURG/OB

## 2021-01-26 PROCEDURE — 250N000011 HC RX IP 250 OP 636: Performed by: INTERNAL MEDICINE

## 2021-01-26 PROCEDURE — 99233 SBSQ HOSP IP/OBS HIGH 50: CPT | Performed by: INTERNAL MEDICINE

## 2021-01-26 PROCEDURE — 250N000013 HC RX MED GY IP 250 OP 250 PS 637: Performed by: INTERNAL MEDICINE

## 2021-01-26 RX ORDER — OLANZAPINE 5 MG/1
15 TABLET ORAL AT BEDTIME
Status: DISCONTINUED | OUTPATIENT
Start: 2021-01-26 | End: 2021-01-27 | Stop reason: HOSPADM

## 2021-01-26 RX ADMIN — GUAIFENESIN 5 ML: 200 SOLUTION ORAL at 21:22

## 2021-01-26 RX ADMIN — METOPROLOL TARTRATE 50 MG: 50 TABLET, FILM COATED ORAL at 08:40

## 2021-01-26 RX ADMIN — METOPROLOL TARTRATE 50 MG: 50 TABLET, FILM COATED ORAL at 21:22

## 2021-01-26 RX ADMIN — LABETALOL HYDROCHLORIDE 10 MG: 5 INJECTION, SOLUTION INTRAVENOUS at 16:57

## 2021-01-26 RX ADMIN — METRONIDAZOLE 500 MG: 500 INJECTION, SOLUTION INTRAVENOUS at 09:02

## 2021-01-26 RX ADMIN — DONEPEZIL HYDROCHLORIDE 5 MG: 5 TABLET, FILM COATED ORAL at 21:23

## 2021-01-26 RX ADMIN — HALOPERIDOL 1 MG: 0.5 TABLET ORAL at 13:51

## 2021-01-26 RX ADMIN — STANDARDIZED SENNA CONCENTRATE 1 TABLET: 8.6 TABLET ORAL at 10:09

## 2021-01-26 RX ADMIN — HALOPERIDOL 0.5 MG: 0.5 TABLET ORAL at 16:57

## 2021-01-26 RX ADMIN — HALOPERIDOL 0.5 MG: 0.5 TABLET ORAL at 21:23

## 2021-01-26 RX ADMIN — FLUTICASONE FUROATE AND VILANTEROL TRIFENATATE 1 PUFF: 200; 25 POWDER RESPIRATORY (INHALATION) at 09:03

## 2021-01-26 RX ADMIN — OLANZAPINE 15 MG: 5 TABLET, FILM COATED ORAL at 21:22

## 2021-01-26 RX ADMIN — FAMOTIDINE 10 MG: 10 TABLET ORAL at 08:42

## 2021-01-26 RX ADMIN — UMECLIDINIUM 1 PUFF: 62.5 AEROSOL, POWDER ORAL at 09:03

## 2021-01-26 RX ADMIN — ATORVASTATIN CALCIUM 80 MG: 80 TABLET, FILM COATED ORAL at 08:40

## 2021-01-26 RX ADMIN — SERTRALINE HYDROCHLORIDE 150 MG: 100 TABLET ORAL at 08:39

## 2021-01-26 RX ADMIN — HALOPERIDOL 0.5 MG: 0.5 TABLET ORAL at 08:42

## 2021-01-26 RX ADMIN — METRONIDAZOLE 500 MG: 500 INJECTION, SOLUTION INTRAVENOUS at 19:41

## 2021-01-26 ASSESSMENT — ACTIVITIES OF DAILY LIVING (ADL)
ADLS_ACUITY_SCORE: 29

## 2021-01-26 NOTE — PLAN OF CARE
"BP (!) 172/102 (BP Location: Right arm)   Pulse 72   Temp 98.1  F (36.7  C) (Oral)   Resp 16   Ht 1.727 m (5' 8\")   Wt 61 kg (134 lb 7.7 oz)   SpO2 98%   BMI 20.45 kg/m        Nursing shift note  -VSS ex HTN, labetalol given  -Pleasant at times  -Turned and repo'd  -Has productive cough  -Asking him to turn cough and deep breath helps    Neuros:  -Disoriented to time and place.  -no other neuro deficits  -Facial droop seems artifical per doctors note    Behavior & Aggression Tool color:  Green    Pt's belongings:     (Belongings from admission day present in pt's room)    Home medications:   None      "

## 2021-01-26 NOTE — PLAN OF CARE
Pt slept little throughout the night, making frequent requests to leave. Pt remained hemodynamically stable. NS gtt continues.

## 2021-01-26 NOTE — PLAN OF CARE
Pt here with SDH. Alert, oriented to self and place. Generalized weakness. Neuro's intact. VSS. Tele NSR. On a DD1 diet with thin liquids. Needs assistance with eating. Takes pills crushed with applesauce. Turned and repositioned. Incontinent of bowel and bladder. Pt scoring green on the Aggression Stop Light Tool.

## 2021-01-26 NOTE — PROGRESS NOTES
[facility]    Neurosurgery  Daily Note    Assessment & Plan   Hubert Tamayo is a 89 year old male who was admitted on 1/24/2021. He presented with a subdural hematoma overlying the left occipital lobe and left parafalcine posteriorly. A repeat scan was stable.  He is alert and oriented. Follows commands.   No facial droop noted.     No surgical intervention.   Follow-up with NSG in 4 weeks with head CT prior.     Jin Worthy    Interval History   Stable.  Doing well.  Improving slowly.  Pain is reasonably controlled.  No fevers.     Physical Exam   Temp: 97.9  F (36.6  C) Temp src: Oral BP: (!) 153/87 Pulse: 72   Resp: 16 SpO2: 94 % O2 Device: None (Room air)    Vitals:    01/24/21 1223   Weight: 61 kg (134 lb 7.7 oz)     Vital Signs with Ranges  Temp:  [97.5  F (36.4  C)-98.5  F (36.9  C)] 97.9  F (36.6  C)  Pulse:  [72-96] 72  Resp:  [16-18] 16  BP: (128-159)/(68-88) 153/87  SpO2:  [93 %-96 %] 94 %  I/O last 3 completed shifts:  In: 460 [P.O.:460]  Out: -     Alert and oriented.  Moves all extremities equally.        Medications     sodium chloride 50 mL/hr at 01/25/21 1623        atorvastatin  80 mg Oral Daily     donepezil  5 mg Oral At Bedtime     famotidine  10 mg Oral Daily     fluticasone-vilanterol  1 puff Inhalation Daily     guaiFENesin  5 mL Oral At Bedtime     haloperidol  0.5 mg Oral TID     haloperidol  1 mg Oral Daily     levofloxacin  500 mg Oral Q48H     metoprolol tartrate  50 mg Oral BID     metroNIDAZOLE  500 mg Intravenous Q12H     OLANZapine  10 mg Oral At Bedtime     sennosides  1 tablet Oral QAM     sertraline  150 mg Oral Daily     sodium chloride (PF)  3 mL Intracatheter Q8H     tamsulosin  0.4 mg Oral Daily     umeclidinium  1 puff Inhalation Daily           Jin Worthy PA-C  [facility] Neurosurgery  [facility]  [address] [place] [address]  [address]  [place] [address]    Tel [phone]  Pager [phone]

## 2021-01-26 NOTE — PLAN OF CARE
Nursing shift note  Pt here with SDH, SLY, pneumonia, history of falls. A&O x 2, disorientated to place and situation. Inconsistent with commands. Neuros- left facial droop . VSS. Tele NSR. DD1 puree diet, thin liquids. Takes pills crushed with applesauce.  IV running 50 ml/hr. Up with lift. Denies pain.  Plan pending, will most likely discharge to TCU.   Behavior & Aggression Tool color:  Pt scoring  yellow on the Aggression Stop Light Tool.    Pt's belongings:   Belongings from admission day present in pt's room      Home medications: (Y)   (N)   (N/A)

## 2021-01-26 NOTE — PLAN OF CARE
PT-  Pt refused PT this PM.  Pt was agitated and stated he wasn't going anywhere.  Encouraged mobility but pt refused.

## 2021-01-26 NOTE — CONSULTS
Care Management Initial Consult    General Information  Assessment completed with: Aldair Alexander-son  Type of CM/SW Visit: Initial Assessment    Primary Care Provider verified and updated as needed: No   Readmission within the last 30 days:        Reason for Consult: discharge planning  Advance Care Planning:     None on file; son reports no document has been completed       Communication Assessment  Patient's communication style: spoken language (English or Bilingual)             Cognitive  Cognitive/Neuro/Behavioral: .WDL except, orientation  Level of Consciousness: alert  Arousal Level: opens eyes spontaneously  Orientation: disoriented to, time, situation  Mood/Behavior: calm, cooperative  Best Language: 0 - No aphasia  Speech: spontaneous    Living Environment:   People in home: alone     Current living Arrangements: house      Able to return to prior arrangements:  TBD       Family/Social Support:  Care provided by: self  Provides care for: no one, unable/limited ability to care for self  Marital Status: Single  Children          Description of Support System: Supportive, Involved    Support Assessment: Adequate family and caregiver support, Adequate social supports    Current Resources:   Skilled Home Care Services:  N//A  Community Resources:    Equipment currently used at home:    Supplies currently used at home:  N/A    Employment/Financial:  Employment Status:    Retired      Financial Concerns:   Pt has [facility] and VA benefit          Lifestyle & Psychosocial Needs:        Socioeconomic History     Marital status: Single     Spouse name: Not on file     Number of children: Not on file     Years of education: Not on file     Highest education level: Not on file     Tobacco Use     Smoking status: Former Smoker     Packs/day: 0.00     Smokeless tobacco: Never Used   Substance and Sexual Activity     Alcohol use: Not Currently     Drug use: Never       Functional Status:  Prior to admission patient needed  assistance:  Son reports pt was living independently in his [place] home prior to a fall last November. Pt has been in TCU since then, has reportedly not been improving w/therapy. It is unclear if pt would ever be able to return to his home.             Mental Health Status:        Not addressed; pt has reported paranoid schizophrenia.    Chemical Dependency Status:           Not addressed     Values/Beliefs:  Spiritual, Cultural Beliefs, Yazidism Practices, Values that affect care:    Undesignated             Additional Information:    SW reviewed chart and placed call to pt's son, Aladir, as pt has reported dementia. SW introduced self and role. Son reports pt has been at [facility] since a fall he sustained last November. (Pt was hospitalized at that time at [facility]). Pt has a home in [place], where his niece had been staying with him, however, niece is no longer living there now.   Son states today he does not feel pt has been receiving adequate therapy at [facility]. We reviewed current therapy recommendation for TCU. SW updated son that [facility] has decided pt would not be appropriate to return to their TCU, but they would recommend pt move into one of their Memory Care LTC facilities that have a VA contract. Son appeared to understand this. We reviewed the Medicare ratings for the two [place] facilities that had been mentioned: [facility] [place] and [facility] [place]. Since [facility] is a 2 star rating, son would not want pt to be considered for this facility. JENNIFER also mentioned [facility] has a Memory Care TCU and Memory Care LTC and does have a VA contract. Son stated he would need to discuss w/another family member these options and will call SW back, asking that SW not send any referrals until he calls.    SW discussed w/physician that pt may be medically ready tomorrow. Physician will update son.    Jessenia  MARIO Macedo   [facility]  [phone]      UPDATE@5565: JENNIFER spoke w/son who agrees to either [facility] or [facility], as he does not want pt to return to a TCU under Medicare, since pt reportedly had already used up his Medicare days paid at 100% and would have to resume 20% OOP costs thru day 100. (there has not been a 60 day break to begin a new benefit period).   JENNIFER sent referrals thru DOD and also left a VM for admissions at both facilities.     UPDATE@4284: Son called to state they would prefer [facility] over [facility], stating [facility] is a very large facility which they would not want.   Xochitl from [facility] stated they may not have an available LTC bed at this time, but [facility] [place] (4 star rating) may. JENNIFER discussed w/son who agrees to let liaison reach out to [facility]. JENNIFER left VM for Xochitl w/this request who will forward her placement request to [facility] (Per Xochitl, this is not a Memory Care LTC however)

## 2021-01-26 NOTE — PROGRESS NOTES
[facility]    Hospitalist Progress Note    Date of Service (when I saw the patient): 01/26/2021    Assessment & Plan   Hubert Tamayo is a 89 year old male who was admitted on 1/24/2021.  ASSESSMENT AND PLAN:  Mr. Tamayo is a 89-year-old male with a past medical history significant for dementia, hypertension, chronic obstructive pulmonary disease, reported coronary artery disease who presents to the Emergency Department with left facial droop and found to have a left subdural hematoma.      1.  Left subdural hematoma:  3 mm noted on CT and MRI with no mass effect or midline shift :  Most likely traumatic as pt has h/o frequent recurrent falls . Had fallen 7times since November at care facility  Repeat CT head showed stable bleed  Conservative management per Neurosurgery and Neurology   Follow-up with NSG in 4 weeks with head CT prior.        2. LEft sided facila droop:  No stroke seen on MRI on admission   facial droop improved most likely from sagging skin with reduced skin texture   Whn lays on his side his face looks like he has a facial droop due to saggy skin around mouth and when is looking straight no facial droop appreciated      3. 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 Stop IVF today   4. Aspiration PNeumonia:  On dysphagia diet per speech path   On levaquin 3/5 days   Metronidazole 3out of 7days     4. Elevated troponin due to most likley due to his CKD with Cr of 2.54 and freqeunt falls and subdural hematoma   Pt asymptomatic   Echo was of poor quality   With the subdural hematoma anticoagulation was contraindicated. Pt had previous h/o GI bleed and was taken off of blood thinners as per discussion with son Aldair   Continue metoprolol and statin for his heart     5. H/O Dementia and PAranoid Schizophremia:  Pt was very somnolent yesterday and with his CKD zyprexa reduced from 20mg at bedtime to 15mg at bedtime   Continued  PTA hladol 0.5mg tID and 1mg at 2PM  Pt more alert and awake with rambling speech today     6. Goals of care discussion:  Son brisa was called and updated by me  and discussed with him about switching pt's code status from Full code to DNR/DNI with patient's advanced age, frequent falls, Dementia, aspiration Pneumonia and CKD   Palliative care consulted for goals of care as well and discussed with son and goals remain restorative including FULL CODE for now            DVT Prophylaxis: Anti-embolisim stockings (TEDs) and ambulation   Code Status: Full Code    Disposition: . Needs TCU . When TCU bed is available .   consulted     Discussed with patient, RN and his son today     Lilliana Mar MD  [phone] (P)      Interval History   REsting comfortably in bed. Rambling illogical speech. More alert and awake today. No facial droop noted today     -Data reviewed today: I reviewed all new labs and imaging results over the last 24 hours. I personally reviewed no images or EKG's today.    Physical Exam   Temp: 98.2  F (36.8  C) Temp src: Oral BP: (!) 164/95 Pulse: 70   Resp: 16 SpO2: 97 % O2 Device: None (Room air)    Vitals:    01/24/21 1223   Weight: 61 kg (134 lb 7.7 oz)     Vital Signs with Ranges  Temp:  [97.6  F (36.4  C)-98.5  F (36.9  C)] 98.2  F (36.8  C)  Pulse:  [70-96] 70  Resp:  [16-17] 16  BP: (132-164)/(79-95) 164/95  SpO2:  [94 %-97 %] 97 %  I/O last 3 completed shifts:  In: 460 [P.O.:460]  Out: -     Constitutional: Awake, alert, cooperative, no apparent distress, very frail elderly man . Pleasantly confused   Respiratory: Clear to auscultation bilaterally, no crackles or wheezing  Cardiovascular: Regular rate and rhythm, normal S1 and S2, and no murmur noted  GI: Normal bowel sounds, soft, non-distended, non-tender  Skin/Integumen: No rashes, no cyanosis, no edema  Other:     Medications       atorvastatin  80 mg Oral Daily     donepezil  5 mg Oral At Bedtime     famotidine  10 mg Oral  Daily     fluticasone-vilanterol  1 puff Inhalation Daily     guaiFENesin  5 mL Oral At Bedtime     haloperidol  0.5 mg Oral TID     haloperidol  1 mg Oral Daily     levofloxacin  500 mg Oral Q48H     metoprolol tartrate  50 mg Oral BID     metroNIDAZOLE  500 mg Intravenous Q12H     OLANZapine  15 mg Oral At Bedtime     sennosides  1 tablet Oral QAM     sertraline  150 mg Oral Daily     sodium chloride (PF)  3 mL Intracatheter Q8H     tamsulosin  0.4 mg Oral Daily     umeclidinium  1 puff Inhalation Daily       Data   Recent Labs   Lab 01/26/21  0901 01/25/21  0902 01/24/21  1159 01/24/21  0610 01/24/21  0030 01/23/21  1757 01/23/21 1757   WBC  --  11.3*  --  14.1*  --   --  15.2*   HGB  --  9.7*  --  10.1*  --   --  9.8*   MCV  --  95  --  93  --   --  95   PLT  --  161  --  161  --   --  144*   INR  --   --   --   --   --   --  1.18*    141  --  137  --   --  136   POTASSIUM 4.2 4.5  --  4.6  --   --  4.8   CHLORIDE 110* 111*  --  106  --   --  104   CO2 23 24 -- 25  --   --  30   BUN 60* 54*  --  56*  --   --  60*   CR 2.20* 2.70*  --  2.54*  --   --  2.68*   ANIONGAP 6 6  --  6  --   --  2*   RAMON 9.5 9.5  --  9.9  --   --  9.9   GLC 89 92  --  140*  --   --  106*   ALBUMIN  --   --   --   --   --   --  2.7*   PROTTOTAL  --   --   --   --   --   --  7.8   BILITOTAL  --   --   --   --   --   --  0.4   ALKPHOS  --   --   --   --   --   --  134   ALT  --   --   --   --   --   --  18   AST  --   --   --   --   --   --  44   TROPI  --   --  0.292* 0.160* 0.181*   < > 0.239*    < > = values in this interval not displayed.       No results found for this or any previous visit (from the past 24 hour(s)).

## 2021-01-27 ENCOUNTER — APPOINTMENT (OUTPATIENT)
Dept: OCCUPATIONAL THERAPY | Facility: CLINIC | Age: 86
DRG: 085 | End: 2021-01-27
Attending: INTERNAL MEDICINE
Payer: MEDICARE

## 2021-01-27 VITALS
DIASTOLIC BLOOD PRESSURE: 91 MMHG | BODY MASS INDEX: 20.38 KG/M2 | HEART RATE: 74 BPM | HEIGHT: 68 IN | RESPIRATION RATE: 16 BRPM | OXYGEN SATURATION: 96 % | WEIGHT: 134.48 LBS | SYSTOLIC BLOOD PRESSURE: 161 MMHG | TEMPERATURE: 98.2 F

## 2021-01-27 PROCEDURE — 97530 THERAPEUTIC ACTIVITIES: CPT | Mod: GO | Performed by: OCCUPATIONAL THERAPIST

## 2021-01-27 PROCEDURE — 99239 HOSP IP/OBS DSCHRG MGMT >30: CPT | Performed by: HOSPITALIST

## 2021-01-27 PROCEDURE — 97535 SELF CARE MNGMENT TRAINING: CPT | Mod: GO | Performed by: OCCUPATIONAL THERAPIST

## 2021-01-27 PROCEDURE — 250N000013 HC RX MED GY IP 250 OP 250 PS 637: Performed by: INTERNAL MEDICINE

## 2021-01-27 PROCEDURE — 250N000011 HC RX IP 250 OP 636: Performed by: INTERNAL MEDICINE

## 2021-01-27 RX ORDER — OLANZAPINE 15 MG/1
15 TABLET ORAL AT BEDTIME
DISCHARGE
Start: 2021-01-27

## 2021-01-27 RX ORDER — METRONIDAZOLE 500 MG/1
500 TABLET ORAL 2 TIMES DAILY
DISCHARGE
Start: 2021-01-27 | End: 2021-01-30

## 2021-01-27 RX ORDER — LEVOFLOXACIN 500 MG/1
500 TABLET, FILM COATED ORAL
DISCHARGE
Start: 2021-01-29 | End: 2021-02-01

## 2021-01-27 RX ADMIN — FAMOTIDINE 10 MG: 10 TABLET ORAL at 08:29

## 2021-01-27 RX ADMIN — SERTRALINE HYDROCHLORIDE 150 MG: 100 TABLET ORAL at 08:29

## 2021-01-27 RX ADMIN — UMECLIDINIUM 1 PUFF: 62.5 AEROSOL, POWDER ORAL at 08:30

## 2021-01-27 RX ADMIN — METOPROLOL TARTRATE 50 MG: 50 TABLET, FILM COATED ORAL at 08:30

## 2021-01-27 RX ADMIN — FLUTICASONE FUROATE AND VILANTEROL TRIFENATATE 1 PUFF: 200; 25 POWDER RESPIRATORY (INHALATION) at 08:30

## 2021-01-27 RX ADMIN — HALOPERIDOL 0.5 MG: 0.5 TABLET ORAL at 17:02

## 2021-01-27 RX ADMIN — METRONIDAZOLE 500 MG: 500 INJECTION, SOLUTION INTRAVENOUS at 08:31

## 2021-01-27 RX ADMIN — STANDARDIZED SENNA CONCENTRATE 1 TABLET: 8.6 TABLET ORAL at 08:30

## 2021-01-27 RX ADMIN — LEVOFLOXACIN 500 MG: 500 TABLET, FILM COATED ORAL at 08:30

## 2021-01-27 RX ADMIN — ACETAMINOPHEN 650 MG: 325 TABLET, FILM COATED ORAL at 11:37

## 2021-01-27 RX ADMIN — HALOPERIDOL 1 MG: 0.5 TABLET ORAL at 13:13

## 2021-01-27 RX ADMIN — LABETALOL HYDROCHLORIDE 10 MG: 5 INJECTION, SOLUTION INTRAVENOUS at 04:57

## 2021-01-27 RX ADMIN — ATORVASTATIN CALCIUM 80 MG: 80 TABLET, FILM COATED ORAL at 08:30

## 2021-01-27 RX ADMIN — TAMSULOSIN HYDROCHLORIDE 0.4 MG: 0.4 CAPSULE ORAL at 08:29

## 2021-01-27 ASSESSMENT — ACTIVITIES OF DAILY LIVING (ADL)
ADLS_ACUITY_SCORE: 29

## 2021-01-27 NOTE — PROGRESS NOTES
[facility]    Neurosurgery  Daily Note    Assessment & Plan   Hubert Tamayo is a 89 year old male who was admitted on 1/24/2021. He presented with a subdural hematoma overlying the left occipital lobe and left parafalcine posteriorly. A repeat scan was stable. Today he was seen lying in bed. He is alert and oriented. Denies headache, dizziness, nausea, vision/speech changes, or weakness. Able to move all extremities and follows commands.     Plan:  - No surgical intervention recommended  - Follow up with NSG in 4 weeks with repeat head CT prior  - Discharge pending LTC placement   - NSG will sign off. Please contact with any further questions or concerns.    Discussed with Dr. Piper.    Martha Barraza, AGUS  [facility]  [facility]  [address] [place] [address]  Tel [phone]  Pager [phone]      Interval History   Stable     Physical Exam   Temp: 98.2  F (36.8  C) Temp src: Axillary BP: (!) 156/83 Pulse: 64   Resp: 20 SpO2: 97 % O2 Device: None (Room air)    Vitals:    01/24/21 1223   Weight: 134 lb 7.7 oz (61 kg)     Vital Signs with Ranges  Temp:  [97.6  F (36.4  C)-98.2  F (36.8  C)] 98.2  F (36.8  C)  Pulse:  [64-87] 64  Resp:  [16-22] 20  BP: (136-179)/() 156/83  SpO2:  [97 %-100 %] 97 %  I/O last 3 completed shifts:  In: 440 [P.O.:440]  Out: 400 [Urine:400]    Mental status:  Alert and Oriented x 3, following commands   Cranial nerves:  II-XII intact.   Motor:  Moves all extremities. Strength is 5/5 throughout the upper and lower extremities    Medications        atorvastatin  80 mg Oral Daily     donepezil  5 mg Oral At Bedtime     famotidine  10 mg Oral Daily     fluticasone-vilanterol  1 puff Inhalation Daily     guaiFENesin  5 mL Oral At Bedtime     haloperidol  0.5 mg Oral TID     haloperidol  1 mg Oral Daily     levofloxacin  500 mg Oral Q48H     metoprolol tartrate  50 mg Oral BID     metroNIDAZOLE  500 mg  Intravenous Q12H     OLANZapine  15 mg Oral At Bedtime     sennosides  1 tablet Oral QAM     sertraline  150 mg Oral Daily     sodium chloride (PF)  3 mL Intracatheter Q8H     tamsulosin  0.4 mg Oral Daily     umeclidinium  1 puff Inhalation Daily       Martha Barraza [facility]   [facility]  [address] [place] [address] [address]  [place] [address]  Tel [phone]  Pager [phone]

## 2021-01-27 NOTE — PROGRESS NOTES
Patient seen and examined    - no acute issues overnight  - planned for discharge to SNF today; neurosurgery cleared for d/c     Please see discharge summary for details

## 2021-01-27 NOTE — PROGRESS NOTES
Physical Therapy Discharge Summary    Reason for therapy discharge:    Discharged to transitional care facility.    Progress towards therapy goal(s). See goals on Care Plan in [facility] electronic health record for goal details.  Goals not met.  Barriers to achieving goals:   limited tolerance for therapy.    Therapy recommendation(s):    Continued therapy is recommended.  Rationale/Recommendations:  Below baseline per flowsheet..

## 2021-01-27 NOTE — PROGRESS NOTES
Discharge criteria met. Discharge instructions reviewed with pt briefly. Pt unable to understanding fully due to impaired cognitive status. Questions answered. PIV removed. All personal belongs given back to pt. Pt left the unit via stretcher in stable condition accompanied with EMS.

## 2021-01-27 NOTE — PROGRESS NOTES
Care Management Discharge Note    Discharge Date: 01/27/21  Expected Time of Departure: 17:30 today.    Discharge Disposition: Transitional Care, Long Term Care,.  Referrals are out.  Xochitl with [facility] called and informed JENNIFER that they have a [facility] approved bed for pt at [facility] in [place]. Pt's family had requested [facility] but they do not have openings.  JENNIFER contacted son, Aldair to discuss this available bed and he is in agreement with discharge to [facility] later today.  Aldair concerned that the bed hold be stopped at [facility]. This relayed to Xochitl and she indicates that the bed hold will be discontinued.    Discharge Services:  Inpatient rehab and LTC.  Discharge DME:      Discharge Transportation: health plan transportation by stretcher due to dementia and need for supervision  JENNIFER called and scheduled a stretcher ride for 17:30 via stretcher for pt. PCS form completed and faxed to E..    Private pay costs discussed:  Pt has VA benefits and Xochitl Pierre indicates that [facility] is a VA contracted facility and pt's stay is covered.    PAS Confirmation Code:  N/A as pt came from TCU  Patient/family educated on Medicare website which has current facility and service quality ratings: no    Education Provided on the Discharge Plan:  Yes  Persons Notified of Discharge Plans:CTRN,RN,HUC,BB, hospitalist.  Family in Agreement with the Plan: yes    Handoff Referral Completed: No    Additional Information:  Discharge orders are in [facility] and sent to [facility] via [facility]  Son Aldair states that pt needs to eat before his am medication otherwise he will get sick.    MARIO Mandujano  [facility]  [phone]

## 2021-01-27 NOTE — DISCHARGE SUMMARY
[facility]  Discharge Summary        Hubert Tamayo MRN# [id number]   YOB: 1929 Age: 89 year old     Date of Admission:  1/24/2021  Date of Discharge:  1/27/2021  Admitting Physician:  Vlad Moise DO  Discharge Physician: Mick Brown MD  Discharging Service: Hospitalist     Primary Provider: Carolee Bales  Primary Care Physician Phone Number: [phone]         Discharge Diagnoses/Problem Oriented Hospital Course (Providers):    Hubert Tamayo was admitted on 1/24/2021 by Vlad Moise DO and I would refer you to their history and physical.  The following problems were addressed during his hospitalization:    Hubert Tamayo is a 89 year old male with PMH of dementia, hypertension, chronic obstructive pulmonary disease, reported CAD who presented to ED with left facial droop and found to have a left subdural hematoma, admitted on 1/24/2021      Left subdural hematoma:    - 3 mm noted on CT and MRI with no mass effect or midline shift  - most likely traumatic as pt has h/o frequent recurrent falls, per family has had fallen 7times since November at care facility  - repeat CT head showed stable bleed  - Conservative management per Neurosurgery and Neurology   - to follow-up with NSG in 4 weeks with head CT prior.     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 Pt had previous h/o GI bleed and was taken off of blood thinners as per discussion with son Aldair   Continue metoprolol and statin     H/O Dementia and PAranoid Schizophremia:  - noted with intermittent somnolence this admission and PTA zyprexa reduced from 20mg at bedtime to 15mg at bedtime   Continued PTA hladol 0.5mg tID and 1mg at 2PM  - mentation improved and stable; more alert and awake on day of discharge     Goals of care discussion:  - goals of care discussion held by previous hospitalist given advanced age, frequent falls, Dementia, aspiration Pneumonia and CKD  - consulted by Palliative care as well  - goals remain restorative including FULL CODE for now       Code Status: Full Code     Disposition: Being discharged to [facility]         Pending Results:        Unresulted Labs Ordered in the Past 30 Days of this Admission     No orders found from 12/25/2020 to 1/25/2021.               Discharge Instructions and Follow-Up:      Follow-up Appointments     Follow Up and recommended labs and tests      Follow up with Nursing home physician.      Follow up with Neurosurgery in 4 weeks with repeat Head CT (without   contrast)                  Discharge Disposition:      Discharged to long-term care facility         Discharge Medications:        Current Discharge Medication List      CONTINUE these medications which have CHANGED    Details   levofloxacin (LEVAQUIN) 500 MG tablet Take 1 tablet (500 mg) by mouth every 48 hours for 3 days    Associated Diagnoses: Aspiration pneumonia, unspecified aspiration pneumonia type, unspecified laterality, unspecified part of lung (H)      metroNIDAZOLE (FLAGYL) 500 MG tablet Take 1 tablet (500 mg) by mouth 2 times daily for 3 days For 7 days    Associated Diagnoses: Aspiration pneumonia, unspecified aspiration pneumonia type, unspecified laterality, unspecified part of lung (H)      OLANZapine (ZYPREXA) 15 MG  tablet Take 1 tablet (15 mg) by mouth At Bedtime  Qty:      Associated Diagnoses: Dementia with behavioral disturbance, unspecified dementia type (H)         CONTINUE these medications which have NOT CHANGED    Details   acetaminophen (TYLENOL) 325 MG tablet Take 650 mg by mouth 4 times daily      atorvastatin (LIPITOR) 80 MG tablet Take 80 mg by mouth daily      cholecalciferol 1000 units TABS Take 1,000 Units by mouth daily      donepezil (ARICEPT) 5 MG tablet Take 5 mg by mouth At Bedtime      famotidine (PEPCID) 10 MG tablet Take 10 mg by mouth daily      fluticasone-salmeterol (ADVAIR) 500-50 MCG/DOSE inhaler Inhale 1 puff into the lungs 2 times daily Rinse mouth after use.      guaiFENesin (ROBITUSSIN) 100 MG/5ML SYRP Take 5 mLs by mouth At Bedtime      !! haloperidol (HALDOL) 0.5 MG tablet Take 0.5 mg by mouth 3 times daily      !! haloperidol (HALDOL) 1 MG tablet Take 1 mg by mouth daily at 2 pm      magnesium 250 MG tablet Take 1 tablet by mouth daily Patient takes on their own. Patient wants to buy from [facility] pharmacy.      Menthol-Methyl Salicylate (ICY HOT EXTRA STRENGTH) 10-30 % CREA Externally apply topically 3 times daily Apply to bilateral thighs      metoprolol tartrate (LOPRESSOR) 50 MG tablet Take 1 tablet (50 mg) by mouth 2 times daily  Qty: 60 tablet, Refills: 1    Associated Diagnoses: NSTEMI (non-ST elevated myocardial infarction) (H)      Nutritional Supplements (ENSURE COMPLETE PO) Take 1 Bottle by mouth 2 times daily (with meals)      !! senna (SENOKOT) 8.6 MG tablet Take 1 tablet by mouth every morning      sertraline (ZOLOFT) 100 MG tablet Take 1.5 tablets (150 mg) by mouth daily  Qty: 30 tablet, Refills: 0    Associated Diagnoses: Paranoid schizophrenia (H)      Skin Protectants, Misc. (EUCERIN) cream Apply topically daily Apply to areas of dry skin topically every day ideally within 3 minutes after bath or shower.      tamsulosin (FLOMAX) 0.4 MG capsule Take 0.4 mg by mouth daily       Tiotropium Bromide Monohydrate (SPIRIVA HANDIHALER IN) Inhale 1 capsule into the lungs daily       albuterol (PROAIR HFA/PROVENTIL HFA/VENTOLIN HFA) 108 (90 Base) MCG/ACT inhaler Inhale 2 puffs into the lungs every 4 hours as needed for shortness of breath / dyspnea or wheezing    Comments: Pharmacy may dispense brand covered by insurance (Proair, or proventil or ventolin or generic albuterol inhaler)      albuterol (PROVENTIL) (2.5 MG/3ML) 0.083% neb solution Take 2.5 mg by nebulization every 6 hours as needed for shortness of breath / dyspnea or wheezing      camphor-menthol (DERMASARRA) 0.5-0.5 % external lotion Apply topically 2 times daily as needed (itching) Apply thin layer topically bid prn itching, apply after Eucerin.      carboxymethylcellulose (REFRESH PLUS) 0.5 % SOLN ophthalmic solution Place 2 drops into both eyes every 4 hours as needed for dry eyes 6 times per day prn*      nitroGLYcerin (NITROSTAT) 0.4 MG sublingual tablet Place 0.4 mg under the tongue every 5 minutes as needed for chest pain For chest pain place 1 tablet under the tongue every 5 minutes for 3 doses. If symptoms persist 5 minutes after 1st dose call 911.      !! senna (SENOKOT) 8.6 MG tablet Take 1 tablet by mouth every evening as needed for constipation       !! - Potential duplicate medications found. Please discuss with provider.      STOP taking these medications       aspirin (ASA) 81 MG chewable tablet Comments:   Reason for Stopping:         traMADol (ULTRAM) 50 MG tablet Comments:   Reason for Stopping:                    Allergies:         Allergies   Allergen Reactions     Crustaceans      Loxapine      Nifedipine      Shellfish Allergy      Sulfa Drugs      Sulfamethoxazole-Trimethoprim      Trimethoprim             Consultations This Hospital Stay:      Consultation during this admission received from neurology and neurosurgery         Condition and Physical Exam on Discharge:      Discharge condition: Stable  "  Discharge vitals: Blood pressure (!) 156/83, pulse 64, temperature 98.2  F (36.8  C), temperature source Axillary, resp. rate 20, height 1.727 m (5' 8\"), weight 61 kg (134 lb 7.7 oz), SpO2 97 %.     Constitutional: Alert, awake, cooperative, answers questions appropriately, looks fatigued, frail   HEENT: Pupils equal and reactive to light and accomodation, EOMI intact   Oral cavity: Moist mucosa   Cardiovascular: Normal s1 s2, regular rate and rhythm, no murmur   Lungs: B/l clear to auscultation, no wheezes or crepitations   Abdomen: Soft, nt, nd, no guarding, rigidity or rebound; BS +   LE : No edema   Musculoskeletal/Neuro: Moving all extremities equally       Psychiatry: normal mood and affect                 Discharge Orders for Skilled Facility (from Discharge Orders):        After Care Instructions     Activity - Up ad cara      Advance Diet as Tolerated      Follow this diet upon discharge: Orders Placed This Encounter      Snacks/Supplements Adult: Boost Plus; With Meals      Snacks/Supplements Adult: Boost Breeze; Between Meals      Combination Diet Mechanical/Dental Soft Diet; Thin Liquids (water, ice chips, juice, milk, gelatin, ice cream, etc)         Fall precautions      General info for SNF      Length of Stay Estimate: Long Term Care  Condition at Discharge: Stable  Level of care:skilled   Rehabilitation Potential: Good  Admission H&P remains valid and up-to-date: Yes  Recent Chemotherapy: N/A  Use Nursing Home Standing Orders: Yes         Mantoux instructions      Give two-step Mantoux (PPD) Per Facility Policy Yes                    Rehab orders for Skilled Facility (from Discharge Orders):      Referrals     Future Labs/Procedures    Occupational Therapy Adult Consult     Comments:    Evaluate and treat as clinically indicated.    Reason:  Deconditioning, dementia    Physical Therapy Adult Consult     Comments:    Evaluate and treat as clinically indicated.    Reason:  Deconditioning    Speech " Language Path Adult Consult     Comments:    Evaluate and treat as clinically indicated.    Reason:  Dysphagia             Discharge Time:      Greater than 30 minutes.        Image Results From This Hospital Stay (For Non-EPIC Providers):        Results for orders placed or performed during the hospital encounter of 21   CT Head w/o Contrast    Narrative    EXAM: CT HEAD W/O CONTRAST  LOCATION: [facility]  DATE/TIME: 2021 6:53 AM    INDICATION: Subdural hematoma. Headache.  COMPARISON: CT and dated 2021  TECHNIQUE: Routine CT Head without IV contrast. Multiplanar reformats. Dose reduction techniques were used.    FINDINGS:   INTRACRANIAL CONTENTS: Redemonstrated thin subdural hematoma involving the left occipital lobe with tracking along the posterior falx measuring up to 2-3 mm and unchanged prior exam. No CT evidence of acute infarct. Sequelae of mild chronic   microangiopathy. Mild cerebral volume loss without hydrocephalus. No extra-axial fluid collections.  Patent basal cisterns.     VISUALIZED ORBITS/SINUSES/MASTOIDS: Postoperative change of bilateral lenses, otherwise the orbits are unremarkable. The visualized paranasal sinuses and temporal bone structures are well-aerated.     BONES/SOFT TISSUES: The calvarium and skull base are unremarkable.       Impression    IMPRESSION:     1. Redemonstrated subdural hematoma involving the left occipital lobe and left paramedian posterior falx, not significantly changed from the prior exam dated 2021.   Echo Limited    Narrative    [id number]  [id number]  [id number]  [id number]^RICARDO^KASHIF           [facility]  Echocardiography Laboratory  [address] [place] [address]        Name: VEDA RUSHING  MRN: [id number]  : 1929  Study Date: 2021 11:13 AM  Age: 89 yrs  Gender: Male  Patient Location: [facility]  Reason For Study: CAD  Ordering Physician: KASHIF SILVA  Referring Physician: MICHELLE BETH  MICKY  Performed By: Omero Mixon     BSA: 1.8 m2  Height: 68 in  Weight: 143 lb  HR: 90  BP: 140/98 mmHg  _____________________________________________________________________________  __        Procedure  Limited Portable Echo Adult. Optison (NDC #4564-0471) given intravenously.  _____________________________________________________________________________  __        Interpretation Summary     Extremely poor image quality  Left ventricular systolic function is normal.  Due to the poor quality of the echocardiogram, an assessment of left  ventricular ejection fraction cannot be made.  Regional wall motion abnormalities cannot be excluded due to limited  visualization.  _____________________________________________________________________________  __        Left Ventricle  The left ventricular cavity is small. Left ventricular systolic function is  normal. Due to the poor quality of the echocardiogram, an assessment of left  ventricular ejection fraction cannot be made. Regional wall motion  abnormalities cannot be excluded due to limited visualization.     Right Ventricle  Right ventricular function cannot be assessed due to poor image quality.     Atria  The left atrium is not well visualized. Right atrium not well visualized.     Mitral Valve  The mitral valve is not well visualized. There is trace mitral regurgitation.        Tricuspid Valve  The tricuspid valve is not well visualized. The right ventricular systolic  pressure is approximated at 23.4 mmHg plus the right atrial pressure.     Aortic Valve  The aortic valve is not well visualized. The aortic valve is trileaflet. No  aortic stenosis is present.     Pulmonic Valve  The pulmonic valve is not well visualized. Normal pulmonic valve velocity.     Vessels  Normal size aorta. The inferior vena cava is normal.     Pericardium  There is no pericardial effusion.     _____________________________________________________________________________  __  MMode/2D  Measurements & Calculations  IVSd: 1.1 cm  LVIDd: 3.3 cm  LVIDs: 1.6 cm  LVPWd: 1.0 cm  FS: 52.0 %  LV mass(C)d: 102.0 grams  LV mass(C)dI: 57.5 grams/m2     Ao root diam: 3.6 cm  asc Aorta Diam: 3.2 cm  LVOT diam: 2.0 cm  LVOT area: 3.3 cm2  RWT: 0.61        Doppler Measurements & Calculations  PA acc time: 0.08 sec  TR max diamond: 241.6 cm/sec  TR max P.4 mmHg           _____________________________________________________________________________  __           Report approved by: Gloria Pandya 2021 02:55 PM              Most Recent Lab Results In EPIC (For Non-EPIC Providers):    Most Recent 3 CBC's:  Recent Labs   Lab Test 21  0902 21  0610 21  1757   WBC 11.3* 14.1* 15.2*   HGB 9.7* 10.1* 9.8*   MCV 95 93 95    161 144*      Most Recent 3 BMP's:  Recent Labs   Lab Test 21  0901 21  0902 21  0610    141 137   POTASSIUM 4.2 4.5 4.6   CHLORIDE 110* 111* 106   CO2    BUN 60* 54* 56*   CR 2.20* 2.70* 2.54*   ANIONGAP 6 6 6   RAMON 9.5 9.5 9.9   GLC 89 92 140*     Most Recent 3 Troponin's:  Recent Labs   Lab Test 21  1159 21  0610 21  0030   TROPI 0.292* 0.160* 0.181*     Most Recent 3 INR's:  Recent Labs   Lab Test 21  1757 11/10/19  1630 10/31/19  0611   INR 1.18* 1.77* 1.23*     Most Recent 2 LFT's:  Recent Labs   Lab Test 21  1757 20  0517   AST 44 41   ALT 18 35   ALKPHOS 134 106   BILITOTAL 0.4 0.5     Most Recent Cholesterol Panel:  Recent Labs   Lab Test 10/31/19  0611   CHOL 77   LDL 14   HDL 42   TRIG 103     Most Recent 6 Bacteria Isolates From Any Culture (See EPIC Reports for Culture Details):  Recent Labs   Lab Test 19  1215 11/10/19  1641 11/10/19  1630   CULT Light growth  Candida albicans / dubliniensis  Candida albicans and Candida dubliniensis are not routinely speciated  Susceptibility testing not routinely done  *  Light growth  Coagulase negative Staphylococcus  Susceptibility  testing not routinely done  * No growth No growth     Most Recent TSH, T4 and HgbA1c:  Recent Labs   Lab Test 01/25/21  0902   TSH 1.44

## 2021-01-27 NOTE — PLAN OF CARE
Occupational Therapy Discharge Summary    Reason for therapy discharge:  Discharged to [facility].    Progress towards therapy goal(s). See goals on Care Plan in [facility] electronic health record for goal details.  Goals not met.  Barriers to achieving goals:   limited tolerance for therapy and discharge from facility.    Therapy recommendation(s):  Continued OT recommended. Pt. would  benefit from continued OT intervention to maximize safety/indep. W/ ADL's/mobility, as currently is well below baseline.

## 2021-01-27 NOTE — PLAN OF CARE
Pt here with SDH and aspiration pneumonia. Oriented x2. VS with elevated SBP reduced x1 with IV Labetalol. Refused tele. Dental soft diet, thin liquids. Takes pills crushed in applesauce. Inc of urine x1 and continent x1. Denies pain. Pt scoring green on the Aggression Stop Light Tool. Plan for possible discharge to LTC.   Belongings from admission are present in room

## 2021-01-28 ENCOUNTER — TELEPHONE (OUTPATIENT)
Dept: NEUROSURGERY | Facility: CLINIC | Age: 86
End: 2021-01-28

## 2021-01-28 NOTE — TELEPHONE ENCOUNTER
Spoke with patients son regarding follow up instructions.  Family will connect with the care center in [place], [place] to have them call the [facility] regarding the scheduling of a virtual video appt with patient, and the CT prior.

## 2021-02-24 ENCOUNTER — APPOINTMENT (OUTPATIENT)
Dept: CT IMAGING | Facility: CLINIC | Age: 86
DRG: 312 | End: 2021-02-24
Attending: EMERGENCY MEDICINE
Payer: MEDICARE

## 2021-02-24 ENCOUNTER — HOSPITAL ENCOUNTER (INPATIENT)
Facility: CLINIC | Age: 86
LOS: 3 days | Discharge: NURSING, REHAB OR LONG-TERM CARE | DRG: 312 | End: 2021-02-27
Attending: EMERGENCY MEDICINE | Admitting: HOSPITALIST
Payer: MEDICARE

## 2021-02-24 ENCOUNTER — APPOINTMENT (OUTPATIENT)
Dept: GENERAL RADIOLOGY | Facility: CLINIC | Age: 86
DRG: 312 | End: 2021-02-24
Attending: EMERGENCY MEDICINE
Payer: MEDICARE

## 2021-02-24 DIAGNOSIS — R40.4 EPISODE OF UNRESPONSIVENESS: Primary | ICD-10-CM

## 2021-02-24 DIAGNOSIS — I46.9 CARDIAC ARREST (H): ICD-10-CM

## 2021-02-24 LAB
ALBUMIN SERPL-MCNC: 3 G/DL (ref 3.4–5)
ALP SERPL-CCNC: 123 U/L (ref 40–150)
ALT SERPL W P-5'-P-CCNC: 18 U/L (ref 0–70)
ANION GAP SERPL CALCULATED.3IONS-SCNC: 6 MMOL/L (ref 3–14)
AST SERPL W P-5'-P-CCNC: 23 U/L (ref 0–45)
BASOPHILS # BLD AUTO: 0 10E9/L (ref 0–0.2)
BASOPHILS NFR BLD AUTO: 0.2 %
BILIRUB SERPL-MCNC: 0.9 MG/DL (ref 0.2–1.3)
BUN SERPL-MCNC: 42 MG/DL (ref 7–30)
CALCIUM SERPL-MCNC: 9.4 MG/DL (ref 8.5–10.1)
CHLORIDE SERPL-SCNC: 98 MMOL/L (ref 94–109)
CO2 SERPL-SCNC: 31 MMOL/L (ref 20–32)
CREAT SERPL-MCNC: 2.48 MG/DL (ref 0.66–1.25)
DIFFERENTIAL METHOD BLD: ABNORMAL
EOSINOPHIL # BLD AUTO: 0.1 10E9/L (ref 0–0.7)
EOSINOPHIL NFR BLD AUTO: 0.7 %
ERYTHROCYTE [DISTWIDTH] IN BLOOD BY AUTOMATED COUNT: 15.5 % (ref 10–15)
GFR SERPL CREATININE-BSD FRML MDRD: 22 ML/MIN/{1.73_M2}
GLUCOSE BLDC GLUCOMTR-MCNC: 99 MG/DL (ref 70–99)
GLUCOSE SERPL-MCNC: 86 MG/DL (ref 70–99)
HCT VFR BLD AUTO: 34.5 % (ref 40–53)
HGB BLD-MCNC: 11.1 G/DL (ref 13.3–17.7)
IMM GRANULOCYTES # BLD: 0.1 10E9/L (ref 0–0.4)
IMM GRANULOCYTES NFR BLD: 0.5 %
INR PPP: 1.07 (ref 0.86–1.14)
LABORATORY COMMENT REPORT: NORMAL
LYMPHOCYTES # BLD AUTO: 1.9 10E9/L (ref 0.8–5.3)
LYMPHOCYTES NFR BLD AUTO: 10.4 %
MAGNESIUM SERPL-MCNC: 2.5 MG/DL (ref 1.6–2.3)
MCH RBC QN AUTO: 29.9 PG (ref 26.5–33)
MCHC RBC AUTO-ENTMCNC: 32.2 G/DL (ref 31.5–36.5)
MCV RBC AUTO: 93 FL (ref 78–100)
MONOCYTES # BLD AUTO: 2.2 10E9/L (ref 0–1.3)
MONOCYTES NFR BLD AUTO: 12.1 %
NEUTROPHILS # BLD AUTO: 13.5 10E9/L (ref 1.6–8.3)
NEUTROPHILS NFR BLD AUTO: 76.1 %
NRBC # BLD AUTO: 0 10*3/UL
NRBC BLD AUTO-RTO: 0 /100
PLATELET # BLD AUTO: 139 10E9/L (ref 150–450)
POTASSIUM SERPL-SCNC: 3.5 MMOL/L (ref 3.4–5.3)
PROT SERPL-MCNC: 7.6 G/DL (ref 6.8–8.8)
RBC # BLD AUTO: 3.71 10E12/L (ref 4.4–5.9)
SARS-COV-2 RNA RESP QL NAA+PROBE: NEGATIVE
SODIUM SERPL-SCNC: 135 MMOL/L (ref 133–144)
SPECIMEN SOURCE: NORMAL
TROPONIN I SERPL-MCNC: 0.02 UG/L (ref 0–0.04)
TROPONIN I SERPL-MCNC: <0.015 UG/L (ref 0–0.04)
TROPONIN I SERPL-MCNC: <0.015 UG/L (ref 0–0.04)
WBC # BLD AUTO: 17.8 10E9/L (ref 4–11)

## 2021-02-24 PROCEDURE — 85025 COMPLETE CBC W/AUTO DIFF WBC: CPT | Performed by: EMERGENCY MEDICINE

## 2021-02-24 PROCEDURE — 83735 ASSAY OF MAGNESIUM: CPT | Performed by: EMERGENCY MEDICINE

## 2021-02-24 PROCEDURE — 99223 1ST HOSP IP/OBS HIGH 75: CPT | Mod: AI | Performed by: HOSPITALIST

## 2021-02-24 PROCEDURE — 71045 X-RAY EXAM CHEST 1 VIEW: CPT

## 2021-02-24 PROCEDURE — 5A12012 PERFORMANCE OF CARDIAC OUTPUT, SINGLE, MANUAL: ICD-10-PCS | Performed by: EMERGENCY MEDICINE

## 2021-02-24 PROCEDURE — 84484 ASSAY OF TROPONIN QUANT: CPT | Performed by: EMERGENCY MEDICINE

## 2021-02-24 PROCEDURE — 250N000011 HC RX IP 250 OP 636: Performed by: EMERGENCY MEDICINE

## 2021-02-24 PROCEDURE — 96374 THER/PROPH/DIAG INJ IV PUSH: CPT

## 2021-02-24 PROCEDURE — 120N000001 HC R&B MED SURG/OB

## 2021-02-24 PROCEDURE — C9803 HOPD COVID-19 SPEC COLLECT: HCPCS

## 2021-02-24 PROCEDURE — 84484 ASSAY OF TROPONIN QUANT: CPT | Performed by: HOSPITALIST

## 2021-02-24 PROCEDURE — 999N000040 HC STATISTIC CONSULT NO CHARGE VASC ACCESS

## 2021-02-24 PROCEDURE — 80053 COMPREHEN METABOLIC PANEL: CPT | Performed by: EMERGENCY MEDICINE

## 2021-02-24 PROCEDURE — 999N000127 HC STATISTIC PERIPHERAL IV START W US GUIDANCE

## 2021-02-24 PROCEDURE — 258N000003 HC RX IP 258 OP 636: Performed by: HOSPITALIST

## 2021-02-24 PROCEDURE — 99291 CRITICAL CARE FIRST HOUR: CPT | Mod: 25

## 2021-02-24 PROCEDURE — 85610 PROTHROMBIN TIME: CPT | Performed by: EMERGENCY MEDICINE

## 2021-02-24 PROCEDURE — 999N001017 HC STATISTIC GLUCOSE BY METER IP

## 2021-02-24 PROCEDURE — 70450 CT HEAD/BRAIN W/O DYE: CPT | Mod: ME

## 2021-02-24 PROCEDURE — 272N000240 HC CARDIOVERT/DEFIB/PACER SUPP

## 2021-02-24 PROCEDURE — 87635 SARS-COV-2 COVID-19 AMP PRB: CPT | Performed by: EMERGENCY MEDICINE

## 2021-02-24 PROCEDURE — 36415 COLL VENOUS BLD VENIPUNCTURE: CPT | Performed by: HOSPITALIST

## 2021-02-24 PROCEDURE — 99292 CRITICAL CARE ADDL 30 MIN: CPT

## 2021-02-24 RX ORDER — ALBUTEROL SULFATE 0.83 MG/ML
2.5 SOLUTION RESPIRATORY (INHALATION) EVERY 6 HOURS PRN
Status: DISCONTINUED | OUTPATIENT
Start: 2021-02-24 | End: 2021-02-27 | Stop reason: HOSPADM

## 2021-02-24 RX ORDER — ACETAMINOPHEN 325 MG/1
650 TABLET ORAL 4 TIMES DAILY
Status: DISCONTINUED | OUTPATIENT
Start: 2021-02-24 | End: 2021-02-27 | Stop reason: HOSPADM

## 2021-02-24 RX ORDER — OLANZAPINE 5 MG/1
15 TABLET ORAL AT BEDTIME
Status: DISCONTINUED | OUTPATIENT
Start: 2021-02-24 | End: 2021-02-27 | Stop reason: HOSPADM

## 2021-02-24 RX ORDER — AMOXICILLIN 250 MG
1 CAPSULE ORAL 2 TIMES DAILY PRN
Status: DISCONTINUED | OUTPATIENT
Start: 2021-02-24 | End: 2021-02-27 | Stop reason: HOSPADM

## 2021-02-24 RX ORDER — SODIUM CHLORIDE 9 MG/ML
INJECTION, SOLUTION INTRAVENOUS CONTINUOUS
Status: DISCONTINUED | OUTPATIENT
Start: 2021-02-24 | End: 2021-02-27 | Stop reason: HOSPADM

## 2021-02-24 RX ORDER — NITROGLYCERIN 0.4 MG/1
0.4 TABLET SUBLINGUAL EVERY 5 MIN PRN
Status: DISCONTINUED | OUTPATIENT
Start: 2021-02-24 | End: 2021-02-27 | Stop reason: HOSPADM

## 2021-02-24 RX ORDER — HYDRALAZINE HYDROCHLORIDE 20 MG/ML
10 INJECTION INTRAMUSCULAR; INTRAVENOUS EVERY 4 HOURS PRN
Status: DISCONTINUED | OUTPATIENT
Start: 2021-02-24 | End: 2021-02-27 | Stop reason: HOSPADM

## 2021-02-24 RX ORDER — PROCHLORPERAZINE MALEATE 5 MG
5 TABLET ORAL EVERY 6 HOURS PRN
Status: DISCONTINUED | OUTPATIENT
Start: 2021-02-24 | End: 2021-02-27 | Stop reason: HOSPADM

## 2021-02-24 RX ORDER — BISACODYL 10 MG
10 SUPPOSITORY, RECTAL RECTAL DAILY PRN
Status: DISCONTINUED | OUTPATIENT
Start: 2021-02-24 | End: 2021-02-27 | Stop reason: HOSPADM

## 2021-02-24 RX ORDER — FAMOTIDINE 10 MG
10 TABLET ORAL DAILY
Status: DISCONTINUED | OUTPATIENT
Start: 2021-02-25 | End: 2021-02-27 | Stop reason: HOSPADM

## 2021-02-24 RX ORDER — ONDANSETRON 4 MG/1
4 TABLET, ORALLY DISINTEGRATING ORAL EVERY 6 HOURS PRN
Status: DISCONTINUED | OUTPATIENT
Start: 2021-02-24 | End: 2021-02-27 | Stop reason: HOSPADM

## 2021-02-24 RX ORDER — ATORVASTATIN CALCIUM 40 MG/1
80 TABLET, FILM COATED ORAL DAILY
Status: DISCONTINUED | OUTPATIENT
Start: 2021-02-25 | End: 2021-02-27 | Stop reason: HOSPADM

## 2021-02-24 RX ORDER — TAMSULOSIN HYDROCHLORIDE 0.4 MG/1
0.4 CAPSULE ORAL DAILY
Status: DISCONTINUED | OUTPATIENT
Start: 2021-02-25 | End: 2021-02-27 | Stop reason: HOSPADM

## 2021-02-24 RX ORDER — ALBUTEROL SULFATE 90 UG/1
2 AEROSOL, METERED RESPIRATORY (INHALATION) EVERY 4 HOURS PRN
Status: DISCONTINUED | OUTPATIENT
Start: 2021-02-24 | End: 2021-02-27 | Stop reason: HOSPADM

## 2021-02-24 RX ORDER — PROCHLORPERAZINE 25 MG
12.5 SUPPOSITORY, RECTAL RECTAL EVERY 12 HOURS PRN
Status: DISCONTINUED | OUTPATIENT
Start: 2021-02-24 | End: 2021-02-27 | Stop reason: HOSPADM

## 2021-02-24 RX ORDER — AMOXICILLIN 250 MG
2 CAPSULE ORAL 2 TIMES DAILY PRN
Status: DISCONTINUED | OUTPATIENT
Start: 2021-02-24 | End: 2021-02-27 | Stop reason: HOSPADM

## 2021-02-24 RX ORDER — NALOXONE HYDROCHLORIDE 0.4 MG/ML
0.4 INJECTION, SOLUTION INTRAMUSCULAR; INTRAVENOUS; SUBCUTANEOUS ONCE
Status: COMPLETED | OUTPATIENT
Start: 2021-02-24 | End: 2021-02-24

## 2021-02-24 RX ORDER — HALOPERIDOL 1 MG/1
1 TABLET ORAL DAILY
Status: DISCONTINUED | OUTPATIENT
Start: 2021-02-25 | End: 2021-02-27 | Stop reason: HOSPADM

## 2021-02-24 RX ORDER — HALOPERIDOL 0.5 MG/1
0.5 TABLET ORAL 3 TIMES DAILY
Status: DISCONTINUED | OUTPATIENT
Start: 2021-02-24 | End: 2021-02-27 | Stop reason: HOSPADM

## 2021-02-24 RX ORDER — DONEPEZIL HYDROCHLORIDE 5 MG/1
5 TABLET, FILM COATED ORAL AT BEDTIME
Status: DISCONTINUED | OUTPATIENT
Start: 2021-02-24 | End: 2021-02-27 | Stop reason: HOSPADM

## 2021-02-24 RX ORDER — ONDANSETRON 2 MG/ML
4 INJECTION INTRAMUSCULAR; INTRAVENOUS EVERY 6 HOURS PRN
Status: DISCONTINUED | OUTPATIENT
Start: 2021-02-24 | End: 2021-02-27 | Stop reason: HOSPADM

## 2021-02-24 RX ORDER — LIDOCAINE 40 MG/G
CREAM TOPICAL
Status: DISCONTINUED | OUTPATIENT
Start: 2021-02-24 | End: 2021-02-27 | Stop reason: HOSPADM

## 2021-02-24 RX ADMIN — SODIUM CHLORIDE: 9 INJECTION, SOLUTION INTRAVENOUS at 18:25

## 2021-02-24 RX ADMIN — NALOXONE HYDROCHLORIDE 0.4 MG: 0.4 INJECTION, SOLUTION INTRAMUSCULAR; INTRAVENOUS; SUBCUTANEOUS at 14:18

## 2021-02-24 ASSESSMENT — ACTIVITIES OF DAILY LIVING (ADL): ADLS_ACUITY_SCORE: 27

## 2021-02-24 NOTE — PHARMACY-ADMISSION MEDICATION HISTORY
Pharmacy Medication History  Admission medication history interview status for the 2/24/2021  admission is complete. See EPIC admission navigator for prior to admission medications     Location of Interview: Phone  Medication history sources: Merly gomez [phone]    Significant changes made to the medication list:  none    In the past week, patient estimated taking medication this percent of the time: greater than 90%    Additional medication history information:   none    Medication reconciliation completed by provider prior to medication history? No    Time spent in this activity: 20min    Prior to Admission medications    Medication Sig Last Dose Taking? Auth Provider   acetaminophen (TYLENOL) 325 MG tablet Take 650 mg by mouth 4 times daily 2/24/2021 at Unknown time Yes Unknown, Entered By History   albuterol (PROAIR HFA/PROVENTIL HFA/VENTOLIN HFA) 108 (90 Base) MCG/ACT inhaler Inhale 2 puffs into the lungs every 4 hours as needed for shortness of breath / dyspnea or wheezing prn Yes Unknown, Entered By History   albuterol (PROVENTIL) (2.5 MG/3ML) 0.083% neb solution Take 2.5 mg by nebulization every 6 hours as needed for shortness of breath / dyspnea or wheezing prn Yes Unknown, Entered By History   atorvastatin (LIPITOR) 80 MG tablet Take 80 mg by mouth daily 2/24/2021 at Unknown time Yes Unknown, Entered By History   camphor-menthol (DERMASARRA) 0.5-0.5 % external lotion Apply topically 2 times daily as needed (itching) Apply thin layer topically bid prn itching, apply after Eucerin. prn Yes Unknown, Entered By History   carboxymethylcellulose (REFRESH PLUS) 0.5 % SOLN ophthalmic solution Place 2 drops into both eyes every 4 hours as needed for dry eyes 6 times per day prn* prn Yes Unknown, Entered By History   cholecalciferol 1000 units TABS Take 1,000 Units by mouth daily 2/24/2021 at Unknown time Yes Unknown, Entered By History   donepezil (ARICEPT) 5 MG tablet Take 5 mg by mouth At  Bedtime 2/22/2021 at Unknown time Yes Unknown, Entered By History   famotidine (PEPCID) 10 MG tablet Take 10 mg by mouth daily 2/24/2021 at Unknown time Yes Unknown, Entered By History   fluticasone-salmeterol (ADVAIR) 500-50 MCG/DOSE inhaler Inhale 1 puff into the lungs 2 times daily Rinse mouth after use. Past Week at Unknown time Yes Unknown, Entered By History   guaiFENesin (ROBITUSSIN) 100 MG/5ML SYRP Take 5 mLs by mouth At Bedtime 2/23/2021 at Unknown time Yes Unknown, Entered By History   haloperidol (HALDOL) 0.5 MG tablet Take 0.5 mg by mouth 3 times daily 2/24/2021 at Unknown time Yes Unknown, Entered By History   haloperidol (HALDOL) 1 MG tablet Take 1 mg by mouth daily at 2 pm 2/23/2021 at Unknown time Yes Unknown, Entered By History   magnesium 250 MG tablet Take 1 tablet by mouth daily Patient takes on their own. Patient [facility] from [facility] pharmacy. 2/24/2021 at Unknown time Yes Unknown, Entered By History   Menthol-Methyl Salicylate (ICY HOT EXTRA STRENGTH) 10-30 % CREA Externally apply topically 3 times daily as needed Apply to bilateral thighs  prn Yes Unknown, Entered By History   metoprolol tartrate (LOPRESSOR) 50 MG tablet Take 1 tablet (50 mg) by mouth 2 times daily 2/24/2021 at Unknown time Yes Vamshi Fischer MD   nitroGLYcerin (NITROSTAT) 0.4 MG sublingual tablet Place 0.4 mg under the tongue every 5 minutes as needed for chest pain For chest pain place 1 tablet under the tongue every 5 minutes for 3 doses. If symptoms persist 5 minutes after 1st dose call 911. prn Yes Unknown, Entered By History   Nutritional Supplements (ENSURE COMPLETE PO) Take 1 Bottle by mouth 2 times daily (with meals) Past Week at Unknown time Yes Unknown, Entered By History   OLANZapine (ZYPREXA) 15 MG tablet Take 1 tablet (15 mg) by mouth At Bedtime 2/23/2021 at Unknown time Yes Mick Brown MD   senna (SENOKOT) 8.6 MG tablet Take 1 tablet by mouth every morning 2/24/2021 at Unknown time Yes  Unknown, Entered By History   senna (SENOKOT) 8.6 MG tablet Take 1 tablet by mouth every evening as needed for constipation  Yes Unknown, Entered By History   sertraline (ZOLOFT) 100 MG tablet Take 1.5 tablets (150 mg) by mouth daily 2/24/2021 at Unknown time Yes Todd Velarde MD   tamsulosin (FLOMAX) 0.4 MG capsule Take 0.4 mg by mouth daily 2/24/2021 at Unknown time Yes Reported, Patient   Tiotropium Bromide Monohydrate (SPIRIVA HANDIHALER IN) Inhale 1 capsule into the lungs daily  2/24/2021 at Unknown time Yes Reported, Patient   Skin Protectants, Misc. (EUCERIN) cream Apply topically daily Apply to areas of dry skin topically every day ideally within 3 minutes after bath or shower.   Unknown, Entered By History       The information provided in this note is only as accurate as the sources available at the time of update(s)

## 2021-02-24 NOTE — ED PROVIDER NOTES
History     Chief Complaint:  Unresponsive and Altered Mental Status     The history is provided by a caregiver (& hospital staff). The history is limited by the condition of the patient.      Hubert Tamayo is a 89 year old male with history of NSTEMI, type II diabetes, Coronary Artery Disease, DVT, subdural hematoma (1/23/2021), dementia, paranoid schizophrenia, who presents for evaluation of unresponsive and altered mental status. Per report, the patient was brought over from checking into CT imaging in a wheelchair after being unresponsive and concerns for cardiac arrest. Upon arrival here, he became responsive but appearing altered and confused. His CNA reported that today they took a transport van to the hospital for his scheduled repeat head CT and he was talking en route while in his wheelchair. When they arrived to the hospital, the patient was reportedly sleeping but had a pulse and she checked him into CT where they found him to be unresponsive. There are no known narcotic use and he lives in a nursing facility. The patient received his second COVID-19 vaccine 2 days ago. Of note, the patient is full code.    Review of Systems   Unable to perform ROS: Mental status change     Allergies:  Crustaceans  Loxapine  Nifedipine  Shellfish Allergy  Sulfa Drugs  Sulfamethoxazole-Trimethoprim  Trimethoprim    Medications:  Albuterol  atorvastatin  Donepezil  Pepcid   Advair inhaler   Haldol   metoprolol tartrate   Nitroglycerin   Zyprexa  senna   sertraline   tamsulosin   Tiotropium Bromide Monohydrate     Past Medical History:    Coronary Artery Disease   Dementia    Diabetes mellitus type II   Essential hypertension   Osteoporosis   Paranoid schizophrenia   Pulmonary emphysema   Subdural hematoma   Morphea   Generalized weakness   DVT  SLY   Anemia   Venous thromboembolism   Acute respiratory failure with hypoxia   NSTEMI  Lumbar transverse process fracture     Past Surgical History:    Cataracts     Social  History:  The patient was accompanied to the ED by CNA.  Patient lives in a nursing home.  PCP: Carolee Bales     Physical Exam     Patient Vitals for the past 24 hrs:   BP Temp Temp src Pulse Resp SpO2   02/24/21 1359 -- 97.7  F (36.5  C) Oral -- -- --   02/24/21 1345 (!) 147/71 -- -- 56 12 100 %   02/24/21 1330 (!) 149/74 -- -- 55 14 100 %   02/24/21 1228 (!) 149/84 -- -- 56 11 100 %     Physical Exam    Constitutional: Elderly thin black male in wheelchair, unresponsive.   HENT: No signs of trauma.   Eyes: Pupils are pinpoint.    Neck: Normal range of motion. No JVD present. No cervical adenopathy.  Cardiovascular: absent heart sounds.  no peripheral pulse.No murmur heard.  Pulmonary/Chest: Decreased breath sounds. No wheezes, rhonchi or rales.  Abdominal: Soft. No mass. No rebound or guarding.   Musculoskeletal: No edema.   Lymphadenopathy: No lymphadenopathy.   Neurological: Unresponsive. No facial asymmetry. No spontaneous movement.   Skin: Skin is warm and dry. No rash noted. No erythema.     Emergency Department Course     ECG:  ECG taken at 1226  Normal sinus rhythm  Left axis deviation   T wave abnormality, consider inferolateral ischemia   Rate 60 bpm. DE interval 176 ms. QRS duration 84 ms. QT/QTc 430/430 ms. P-R-T axes 63 -48 258.    Imaging:    XR Chest Port 1 View  Pacer pads over the mid chest. Emphysema. Slight increased  linear interstitial opacities in the left lung base, either secondary  to edema and/or atelectasis superimposed on fibrotic changes. Normal  heart size. No pleural effusion or pneumothorax.  COLLIN MÁRQUEZ MD  Reading per radiology    CT Head w/o Contrast  Diffuse cerebral volume loss and cerebral white matter  changes consistent with chronic small vessel ischemic disease. No  evidence for acute intracranial pathology.  RADHA HENDRIX MD  Reading per radiology      Laboratory:    Symptomatic Influenza A/B & SARS-CoV2 (COVID-19) Virus PCR Multiplex: Pending      CBC: WBC 17.8, HGB  11.1,   CMP: BUN 42, GFR 25, Albumin 3.0, o/w WNL (Creatinine 2.48)    Troponin (Collected 1250): 0.021    INR: 1.07      Glucose by meter (Collected 1226): 99      Interventions:  1418 Narcan 0.4 mg IV    Emergency Department Course:    1221 Patient arrived in stabilization room in a wheel chair from CT imaging with reports of cardiac arrest. I performed an exam of the patient as documented above. CPR started briefly for a few seconds and patient became responsive.     1222 Cardiac pads placed.     1224 Patient's CNA arrived in room to provide further history.     1346 Patient rechecked and updated.      1354 I spoke with Dr. Brown of the hospitalist service from [facility] regarding patient's presentation, findings, and plan of care.     Disposition:  The patient was admitted to the hospital under the care of Dr. Brown.     Impression & Plan      Covid-19  Hubert Tamayo was evaluated during a global COVID-19 pandemic, which necessitated consideration that the patient might be at risk for infection with the SARS-CoV-2 virus that causes COVID-19.   Applicable protocols for evaluation were followed during the patient's care.   COVID-19 was considered as part of the patient's evaluation. The plan for testing is:  a test was obtained during this visit.    Medical Decision Making:  Hubert Tamayo is a 89 year old male who came in by [facility] to get a follow up CT for subdural hematoma. Unfortunately, at the triage desk he was found to be pulseless and rushed back to the stabilization room where he was assisted from the wheelchair to the bed. I was assisting in this and did not feel an initial pulse. He was also unresponsive and apneic. As we placed him on oxygen and to initiate respirations, he started breathing. Also once this started I was mell to feel a weak femoral pulse. Over the night several minutes the patient was breathing better, his pulse ox improved, his pulse improved, and his blood pressure  was good. He started to slowly become responsive. We did obtain a CT scan to rule out an acute bleed and this was ok. Portable chest xray was unremarkable. Labs here have returned with an elevated white count, elevated creatinine, but is unremarkable otherwise. The patient has been discussed with hospitalist. This was a pulseless apneic event, likely a cardiac arrest. He is not on narcotics and it is unlikely a narcotic overdose. Also unlikely that this was a seizure. He doesn't appear to be septic or toxic. He is responding more and more. He will be admitted for further evaluation and treatment.     Critical Care time was 35 minutes for this patient excluding procedures.     Diagnosis:    ICD-10-CM    1. Cardiac arrest (H)  I46.9 INR     Symptomatic SARS-CoV-2 COVID-19 Virus (Coronavirus) by PCR     Scribe Disclosure:  I, Orla Severson, am serving as a scribe at 12:24 PM on 2/24/2021 to document services personally performed by Franki Garcia MD based on my observations and the provider's statements to me.     [facility] EMERGENCY DEPT         Franki Garcia MD  02/24/21 1957

## 2021-02-24 NOTE — PROGRESS NOTES
RECEIVING UNIT ED HANDOFF REVIEW    ED Nurse Handoff Report was reviewed by: Leah Link RN on February 24, 2021 at 4:41 PM

## 2021-02-24 NOTE — H&P
[facility]  History and Physical   Hospitalist  Mick Brown MD       Hubert Tamayo MRN# [id number]   YOB: 1929 Age: 89 year old      Date of Admission:  2/24/2021         Assessment and Plan:   Hubert Tamayo is a 89 year old male with PMH significant for dementia, hypertension, chronic obstructive pulmonary disease, reported CAD, recent admission for subdural hematoma who was brought to ED after a brief episode of unresponsiveness and pulselessness while on his way for CT head (as a routine follow-up of his recent subdural hematoma).    He was recently in hospital from 1/24 to 1/27 for subdural hematoma (3mm). He was evaluated by neurosurgery at that time and recommended conservative management and follow-up with repeat head CT. During the same admission he was also noted with left-sided facial droop (MRI negative for stroke). He also was noted with intermittent somnolence during that hospitalization and his PTA Zyprexa was decreased.     Altered mental status  brief episode of unresponsiveness  brief episode of pulselessness, probable syncope  chronic left facial droop  recent admission for subdural hematoma (1/24 to 1/27)  -will admit as inpatient  -although initial concern for cardiac arrest, he never required any CPR and had pulse with slight improvement in his responsiveness by the time he came to the ED  -he follows simple commands and is conversant but still very somnolent and mumbles  -initial workup mostly unremarkable; CT head with nothing acute; chest x-ray unremarkable except emphysema changes; WBC 17.8-- likely stress response; troponin I within normal limits at 0.021; EKG noted with normal sinus rhythm, slightly T inversions inferior - lateral leads  -as noted above, during recent hospitalization he was noted with left-sided facial droop (MRI was negative for stroke) and he also was noted with intermittent somnolence during that hospitalization and his PTA Zyprexa  was decreased  -pinpoint pupils on examination, no significant response to Narcan  -probable syncope, will monitor on telemetry; follow serial troponins; will obtain echocardiogram and given the initial concern for cardiac arrest will consult cardiology  -will consult neurology as well; to follow up with NSG on discharge   -will hold any narcotics/sedatives; will hold off on PTA Haldol, Zyprexa, Zoloft    FEN/nutrition  H/o aspiration pneumonia  -per family, he has had very poor PO intake at the nursing home  -clinically looks very dehydrated  -will start normal saline at 75 ML per hour  -will keep NPO given altered mental status  -will have speech language pathology evaluation and will consult nutrition    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:  - baseline Cr around 2 to 2.4; upto 2.7 last admission  - on presentation creatinine 2.48 , near baseline but clinically very dehydrated   -will monitor BMP with IV hydration      H/O Dementia and PAranoid Schizophremia:  - noted with intermittent somnolence last hospitalization and also currently with altered mentation   -will hold off on PTA Haldol, Zyprexa, Zoloft, can resume gradually if mentation improves     Goals of care discussion  code status:  - had goals of care discussion with his son Aldair over the phone ; goals remains restorative   -full code    COVID status: low risk, results pending from 2/24; he has received two doses of COVID vaccination    # DVT prophylaxis: OSCAR santana             Primary Care Physician:   Carolee Bales [phone]         Chief Complaint:     Episode of unresponsiveness and pulselessness    History is limited from the patient due to his dementia and altered mental status; history reviewed from chart review, ED report and his son Aldair over the phone         History of Present Illness:     Hubert Tamayo is a 89 year old male with PMH significant for dementia, hypertension, chronic obstructive pulmonary disease, reported CAD, recent admission for subdural hematoma  who was brought to ED after a brief episode of unresponsiveness and pulselessness while on his way for CT head (as a routine follow-up of his recent subdural hematoma).    He was recently in hospital from 1/24 to 1/27 for subdural hematoma (3mm). He was evaluated by neurosurgery at that time and recommended conservative management and follow-up with repeat head CT. During the same admission he was also noted with left-sided facial droop (MRI negative for stroke). He also was noted with intermittent somnolence during that hospitalization and his PTA Zyprexa was decreased.    While on his way to the CT, he became unresponsive in the medivan; he was brought to the ED and at the  he was noted to be without pulse; he was quickly wheeled into the Stab room. By the time he was evaluated by the ED provider, he was becoming more responsive and had a pulse. He never required any CPR. Telemetry was noted with sinus bradycardia.    The patient himself remained somnolent , follow simple commands , conversant but mumbles ; denies any active complaints .    In ED patient was seen by Dr Ambrosio; initial workup mostly unremarkable; CT head with nothing acute; chest x-ray unremarkable except emphysema changes; WBC 17.8; troponin I within normal limits at 0.021; EKG noted with normal sinus rhythm, slightly T inversions inferior - lateral leads     Hospitalist was requested admission for further evaluation.               Past Medical History:     Recent hospitalization for left subdural hematoma  Dementia and paranoid schizophrenia  Hypertension  COPD  left facial droop  CKD stage IV-5          Past Surgical History:     Past Surgical History:   Procedure Laterality Date     CATARACT REMOVAL Bilateral               Home Medications:     Prior to Admission Medications   Prescriptions Last Dose Informant Patient Reported? Taking?   Menthol-Methyl Salicylate (ICY HOT EXTRA STRENGTH) 10-30 % CREA prn Nursing Home Yes Yes   Sig:  Externally apply topically 3 times daily as needed Apply to bilateral thighs    Nutritional Supplements (ENSURE COMPLETE PO) Past Week at Unknown time Nursing Home Yes Yes   Sig: Take 1 Bottle by mouth 2 times daily (with meals)   OLANZapine (ZYPREXA) 15 MG tablet 2/23/2021 at Unknown time  No Yes   Sig: Take 1 tablet (15 mg) by mouth At Bedtime   Skin Protectants, Misc. (EUCERIN) cream  Nursing Home Yes No   Sig: Apply topically daily Apply to areas of dry skin topically every day ideally within 3 minutes after bath or shower.   Tiotropium Bromide Monohydrate (SPIRIVA HANDIHALER IN) 2/24/2021 at Unknown time Nursing Home Yes Yes   Sig: Inhale 1 capsule into the lungs daily    acetaminophen (TYLENOL) 325 MG tablet 2/24/2021 at Unknown time Nursing Home Yes Yes   Sig: Take 650 mg by mouth 4 times daily   albuterol (PROAIR HFA/PROVENTIL HFA/VENTOLIN HFA) 108 (90 Base) MCG/ACT inhaler prn Nursing Home Yes Yes   Sig: Inhale 2 puffs into the lungs every 4 hours as needed for shortness of breath / dyspnea or wheezing   albuterol (PROVENTIL) (2.5 MG/3ML) 0.083% neb solution prn Nursing Home Yes Yes   Sig: Take 2.5 mg by nebulization every 6 hours as needed for shortness of breath / dyspnea or wheezing   atorvastatin (LIPITOR) 80 MG tablet 2/24/2021 at Unknown time Nursing Home Yes Yes   Sig: Take 80 mg by mouth daily   camphor-menthol (DERMASARRA) 0.5-0.5 % external lotion prn Nursing Home Yes Yes   Sig: Apply topically 2 times daily as needed (itching) Apply thin layer topically bid prn itching, apply after Eucerin.   carboxymethylcellulose (REFRESH PLUS) 0.5 % SOLN ophthalmic solution prn Nursing Home Yes Yes   Sig: Place 2 drops into both eyes every 4 hours as needed for dry eyes 6 times per day prn*   cholecalciferol 1000 units TABS 2/24/2021 at Unknown time Nursing Home Yes Yes   Sig: Take 1,000 Units by mouth daily   donepezil (ARICEPT) 5 MG tablet 2/22/2021 at Unknown time Nursing Home Yes Yes   Sig: Take 5 mg by  mouth At Bedtime   famotidine (PEPCID) 10 MG tablet 2/24/2021 at Unknown time  Yes Yes   Sig: Take 10 mg by mouth daily   fluticasone-salmeterol (ADVAIR) 500-50 MCG/DOSE inhaler Past Week at Unknown time Nursing Home Yes Yes   Sig: Inhale 1 puff into the lungs 2 times daily Rinse mouth after use.   guaiFENesin (ROBITUSSIN) 100 MG/5ML SYRP 2/23/2021 at Unknown time Nursing Home Yes Yes   Sig: Take 5 mLs by mouth At Bedtime   haloperidol (HALDOL) 0.5 MG tablet 2/24/2021 at Unknown time Nursing Home Yes Yes   Sig: Take 0.5 mg by mouth 3 times daily   haloperidol (HALDOL) 1 MG tablet 2/23/2021 at Unknown time Nursing Home Yes Yes   Sig: Take 1 mg by mouth daily at 2 pm   magnesium 250 MG tablet 2/24/2021 at Unknown time Nursing Home Yes Yes   Sig: Take 1 tablet by mouth daily Patient takes on their own. Patient wants to buy from [facility] pharmacy.   metoprolol tartrate (LOPRESSOR) 50 MG tablet 2/24/2021 at Unknown time group home No Yes   Sig: Take 1 tablet (50 mg) by mouth 2 times daily   nitroGLYcerin (NITROSTAT) 0.4 MG sublingual tablet prn Nursing Home Yes Yes   Sig: Place 0.4 mg under the tongue every 5 minutes as needed for chest pain For chest pain place 1 tablet under the tongue every 5 minutes for 3 doses. If symptoms persist 5 minutes after 1st dose call 911.   senna (SENOKOT) 8.6 MG tablet 2/24/2021 at Unknown time Nursing Home Yes Yes   Sig: Take 1 tablet by mouth every morning   senna (SENOKOT) 8.6 MG tablet  Nursing Home Yes Yes   Sig: Take 1 tablet by mouth every evening as needed for constipation   sertraline (ZOLOFT) 100 MG tablet 2/24/2021 at Unknown time group home No Yes   Sig: Take 1.5 tablets (150 mg) by mouth daily   tamsulosin (FLOMAX) 0.4 MG capsule 2/24/2021 at Unknown time Nursing Home Yes Yes   Sig: Take 0.4 mg by mouth daily      Facility-Administered Medications: None            Allergies:     Allergies   Allergen Reactions     Crustaceans      Loxapine      Nifedipine      Shellfish  Allergy      Sulfa Drugs      Sulfamethoxazole-Trimethoprim      Trimethoprim             Social History:   Hubert Tamayo  reports that he has quit smoking. He smoked 0.00 packs per day. He has never used smokeless tobacco. He reports previous alcohol use. He reports that he does not use drugs.              Family History:   Hubert Tamayo family history is not on file.    Family history was reviewed by myself and not pertinent to current presentation.           Review of Systems:   A limited 10 point Review of Systems was done and were negative other than noted in the HPI.             Physical Exam:   Blood pressure (!) 147/71, pulse 56, temperature 97.7  F (36.5  C), temperature source Oral, resp. rate 12, SpO2 100 %.  0 lbs 0 oz        Constitutional:  awake; follows simple commands and is conversant but still very somnolent and mumbles; oriented X 2; lying comfortably in bed in no apparent distress   HEENT:  pinpoint pupils ; , EOMI intact; neck supple no raised JVD or rigidity ; noted left facial droop    Oral cavity:  very dry oral mucosa   Cardiovascular: Normal s1 s2, regular rate and rhythm, no murmur   Lungs: B/l clear to auscultation, no wheezes or crepitations   Abdomen: Soft, nt, nd, no guarding, rigidity or rebound; BS +   LE : No edema   Musculoskeletal: Power 5/5 in all extremities; moving all extremities equally    Neuro: No focal neurological deficits noted apart from the left facial droop    Psychiatry: normal mood and affect  Skin: No obvious skin rashes or ulcers             Data:   All new lab and imaging data was reviewed in Epic.   Significant labs and imagings include:    CMP notable for BUN 42, creatinine 2.48  troponin 0.021  blood glucose 86  CBC with WBC 17.8, hemoglobin 11.1, platelet 139  chest x-ray: unremarkable, emphysema changes  CT head reviewed: nothing acute  EKG reviewed by me shows normal sinus rhythm, slight T inversions inferior-lateral leads             Mick Brown,  MD  Hospitalist

## 2021-02-24 NOTE — ED NOTES
[facility]  ED Nurse Handoff Report    ED Chief complaint: Altered Mental Status      ED Diagnosis:   Final diagnoses:   Cardiac arrest (H)       Code Status: Full Code    Allergies:   Allergies   Allergen Reactions     Crustaceans      Loxapine      Nifedipine      Shellfish Allergy      Sulfa Drugs      Sulfamethoxazole-Trimethoprim      Trimethoprim        Patient Story: Pt brought in from triage to be unresponsive. Pt with aide from facility, pt sleeping on way in. Pt had recent subdural hematoma. Presents for repeat CT.   Focused Assessment:  Neuro-Altered LOC. Pt usually alert and verbal,  Today is less responsive    Treatments and/or interventions provided: CT, labs, and Narcan  Patient's response to treatments and/or interventions: +Unchanged    To be done/followed up on inpatient unit:  Cont to monitor mentation    Does this patient have any cognitive concerns?: None    Activity level - Baseline/Home:  Stand with Assist and Wheelchair  Activity Level - Current:   Stand with Assist and Wheelchair    Patient's Preferred language: English   Needed?: No    Isolation: None  Infection: Not Applicable  Patient tested for COVID 19 prior to admission: YES  Bariatric?: No    Vital Signs:   Vitals:    02/24/21 1228 02/24/21 1330 02/24/21 1345 02/24/21 1359   BP: (!) 149/84 (!) 149/74 (!) 147/71    Pulse: 56 55 56    Resp: 11 14 12    Temp:    97.7  F (36.5  C)   TempSrc:    Oral   SpO2: 100% 100% 100%        Cardiac Rhythm:     Was the PSS-3 completed:   Yes  What interventions are required if any?               Family Comments: Available via phone  OBS brochure/video discussed/provided to patient/family: Yes              Name of person given brochure if not patient: Na              Relationship to patient: NA    For the majority of the shift this patient's behavior was Green.   Behavioral interventions performed were NA.    ED NURSE PHONE NUMBER: [id number]

## 2021-02-24 NOTE — ED TRIAGE NOTES
Pt brought in from triage to be unresponsive. Pt with aide from facility, pt sleeping on way in. Pt had recent subdural hematoma. Presents for repeat CT.

## 2021-02-25 ENCOUNTER — HOSPITAL ENCOUNTER (OUTPATIENT)
Dept: NEUROLOGY | Facility: CLINIC | Age: 86
Discharge: HOME | End: 2021-02-25
Attending: PSYCHIATRY & NEUROLOGY | Admitting: PSYCHIATRY & NEUROLOGY
Payer: MEDICARE

## 2021-02-25 ENCOUNTER — APPOINTMENT (OUTPATIENT)
Dept: SPEECH THERAPY | Facility: CLINIC | Age: 86
DRG: 312 | End: 2021-02-25
Attending: HOSPITALIST
Payer: MEDICARE

## 2021-02-25 ENCOUNTER — APPOINTMENT (OUTPATIENT)
Dept: CARDIOLOGY | Facility: CLINIC | Age: 86
DRG: 312 | End: 2021-02-25
Attending: HOSPITALIST
Payer: MEDICARE

## 2021-02-25 ENCOUNTER — APPOINTMENT (OUTPATIENT)
Dept: PHYSICAL THERAPY | Facility: CLINIC | Age: 86
DRG: 312 | End: 2021-02-25
Attending: HOSPITALIST
Payer: MEDICARE

## 2021-02-25 ENCOUNTER — APPOINTMENT (OUTPATIENT)
Dept: OCCUPATIONAL THERAPY | Facility: CLINIC | Age: 86
DRG: 312 | End: 2021-02-25
Attending: HOSPITALIST
Payer: MEDICARE

## 2021-02-25 PROCEDURE — 120N000001 HC R&B MED SURG/OB

## 2021-02-25 PROCEDURE — 99233 SBSQ HOSP IP/OBS HIGH 50: CPT | Performed by: INTERNAL MEDICINE

## 2021-02-25 PROCEDURE — 999N000208 ECHOCARDIOGRAM COMPLETE

## 2021-02-25 PROCEDURE — 97162 PT EVAL MOD COMPLEX 30 MIN: CPT | Mod: GP

## 2021-02-25 PROCEDURE — 93010 ELECTROCARDIOGRAM REPORT: CPT | Performed by: INTERNAL MEDICINE

## 2021-02-25 PROCEDURE — 92610 EVALUATE SWALLOWING FUNCTION: CPT | Mod: GN

## 2021-02-25 PROCEDURE — 255N000002 HC RX 255 OP 636: Performed by: HOSPITALIST

## 2021-02-25 PROCEDURE — 97167 OT EVAL HIGH COMPLEX 60 MIN: CPT | Mod: GO

## 2021-02-25 PROCEDURE — 250N000013 HC RX MED GY IP 250 OP 250 PS 637: Performed by: HOSPITALIST

## 2021-02-25 PROCEDURE — 99222 1ST HOSP IP/OBS MODERATE 55: CPT | Mod: 25 | Performed by: INTERNAL MEDICINE

## 2021-02-25 PROCEDURE — 95816 EEG AWAKE AND DROWSY: CPT

## 2021-02-25 PROCEDURE — 93306 TTE W/DOPPLER COMPLETE: CPT | Mod: 26 | Performed by: INTERNAL MEDICINE

## 2021-02-25 PROCEDURE — 97530 THERAPEUTIC ACTIVITIES: CPT | Mod: GP

## 2021-02-25 PROCEDURE — 250N000013 HC RX MED GY IP 250 OP 250 PS 637: Performed by: INTERNAL MEDICINE

## 2021-02-25 PROCEDURE — 93005 ELECTROCARDIOGRAM TRACING: CPT

## 2021-02-25 RX ORDER — METOPROLOL TARTRATE 50 MG
50 TABLET ORAL 2 TIMES DAILY
Status: DISCONTINUED | OUTPATIENT
Start: 2021-02-25 | End: 2021-02-27 | Stop reason: HOSPADM

## 2021-02-25 RX ADMIN — HALOPERIDOL 0.5 MG: 0.5 TABLET ORAL at 21:28

## 2021-02-25 RX ADMIN — DONEPEZIL HYDROCHLORIDE 5 MG: 5 TABLET, FILM COATED ORAL at 21:28

## 2021-02-25 RX ADMIN — FAMOTIDINE 10 MG: 10 TABLET ORAL at 09:36

## 2021-02-25 RX ADMIN — ACETAMINOPHEN 650 MG: 325 TABLET, FILM COATED ORAL at 18:42

## 2021-02-25 RX ADMIN — METOPROLOL TARTRATE 50 MG: 50 TABLET, FILM COATED ORAL at 21:28

## 2021-02-25 RX ADMIN — HUMAN ALBUMIN MICROSPHERES AND PERFLUTREN 9 ML: 10; .22 INJECTION, SOLUTION INTRAVENOUS at 08:59

## 2021-02-25 RX ADMIN — OLANZAPINE 15 MG: 5 TABLET, FILM COATED ORAL at 21:28

## 2021-02-25 RX ADMIN — TAMSULOSIN HYDROCHLORIDE 0.4 MG: 0.4 CAPSULE ORAL at 09:36

## 2021-02-25 RX ADMIN — ATORVASTATIN CALCIUM 80 MG: 40 TABLET, FILM COATED ORAL at 09:36

## 2021-02-25 RX ADMIN — ACETAMINOPHEN 650 MG: 325 TABLET, FILM COATED ORAL at 21:28

## 2021-02-25 RX ADMIN — HALOPERIDOL 0.5 MG: 0.5 TABLET ORAL at 17:00

## 2021-02-25 RX ADMIN — ACETAMINOPHEN 650 MG: 325 TABLET, FILM COATED ORAL at 09:35

## 2021-02-25 ASSESSMENT — ACTIVITIES OF DAILY LIVING (ADL)
ADLS_ACUITY_SCORE: 28
ADLS_ACUITY_SCORE: 27
ADLS_ACUITY_SCORE: 28
ADLS_ACUITY_SCORE: 27
ADLS_ACUITY_SCORE: 27
ADLS_ACUITY_SCORE: 28

## 2021-02-25 NOTE — CONSULTS
[facility]    Cardiology Consultation     Date of Admission:  2/24/2021    Assessment & Plan     This is a 89 year old male with PMH significant for dementia, hypertension, recurrent DVT, chronic obstructive pulmonary disease, reported CAD, recent admission for subdural hematoma who during routine head CT became unresponsive and pulseless in the [facility] so brought to ER, self resolved without needing CPR. Telemetry was with sinus bradycadia that resolved and now normal rates. No events on telemetry noted, no recurrent bradycardia, heart block or arrhythmias.      In the ER, initial workup mostly unremarkable; CT head with nothing acute; chest x-ray unremarkable except emphysema changes; WBC 17.8; troponin I within normal limits at 0.021; EKG noted with normal sinus rhythm, slightly T inversions inferior - lateral leads.    1. Syncope: differential includes pulseless electrical activity (from underlying medical cause), vasovagal with profound hypotension, versus high grade heart block or other arrhythmogenic causes  -echocardiogram  -serial troponins  -monitor on telemetry. If no events will likely place event monitor at discharge     2. CAD s/p NSTEMI medically managed   -trend troponins     3. Recurrent DVT: would assess for PE based on his presentation    4. HLD; continue atorvastatin     Lyric Alvarado MD    Code Status    Full Code    Reason for Consult     Unresponsiveness, bradycardia     Primary Care Physician   *Carolee Bales    Chief Complaint   Syncope    History of Present Illness     This is a 89 year old male with PMH significant for dementia, hypertension, recurrent DVT, chronic obstructive pulmonary disease, reported CAD, recent admission for subdural hematoma who during routine head CT became unresponsive and pulseless in the [facility] so brought to ER, self resolved without needing CPR. He was brought to the ER during which he became responsive. Telemetry was with sinus  bradycadia that resolved and now normal rates.     In the ER, initial workup mostly unremarkable; CT head with nothing acute; chest x-ray unremarkable except emphysema changes; WBC 17.8; troponin I within normal limits at 0.021; EKG noted with normal sinus rhythm, slightly T inversions inferior - lateral leads.    His cardiac history was reviewed. In late 2019 he had NSTEMI in the setting of severe anemia and thought to be type II demand supply mismatch. He was on chronic anticoagulation for his DVT which was stopped and he was switched to plavix for NSTEMI medical management. He never underwent coronary angiogram. He had an echocardiogram in 2020 that showed normal EF and no valvular disease. He has not in the past been seen in our outpatient cardiology clinic but saw a PA in cardiology at [facility] who recommended follow up on an as needed basis. [facility] every 2 weeks.     Patient is a poor historian and unable to fully provide history to me. Most information obtained from chart review.        Past Medical History   I have reviewed this patient's medical history and updated it with pertinent information if needed.   Past Medical History:   Diagnosis Date     CAD (coronary artery disease)      Dementia (H)      Diabetes mellitus type II      Essential hypertension      Osteoporosis      Paranoid schizophrenia      Pulmonary emphysema        Past Surgical History   I have reviewed this patient's surgical history and updated it with pertinent information if needed.  Past Surgical History:   Procedure Laterality Date     CATARACT REMOVAL Bilateral        Prior to Admission Medications   Prior to Admission Medications   Prescriptions Last Dose Informant Patient Reported? Taking?   Menthol-Methyl Salicylate (ICY HOT EXTRA STRENGTH) 10-30 % CREA prn Nursing Home Yes Yes   Sig: Externally apply topically 3 times daily as needed Apply to bilateral thighs     Nutritional Supplements (ENSURE COMPLETE PO) Past Week at Unknown time Nursing Home Yes Yes   Sig: Take 1 Bottle by mouth 2 times daily (with meals)   OLANZapine (ZYPREXA) 15 MG tablet 2/23/2021 at Unknown time  No Yes   Sig: Take 1 tablet (15 mg) by mouth At Bedtime   Skin Protectants, Misc. (EUCERIN) cream  Nursing Home Yes No   Sig: Apply topically daily Apply to areas of dry skin topically every day ideally within 3 minutes after bath or shower.   Tiotropium Bromide Monohydrate (SPIRIVA HANDIHALER IN) 2/24/2021 at Unknown time Nursing Home Yes Yes   Sig: Inhale 1 capsule into the lungs daily    acetaminophen (TYLENOL) 325 MG tablet 2/24/2021 at Unknown time Nursing Home Yes Yes   Sig: Take 650 mg by mouth 4 times daily   albuterol (PROAIR HFA/PROVENTIL HFA/VENTOLIN HFA) 108 (90 Base) MCG/ACT inhaler prn Nursing Home Yes Yes   Sig: Inhale 2 puffs into the lungs every 4 hours as needed for shortness of breath / dyspnea or wheezing   albuterol (PROVENTIL) (2.5 MG/3ML) 0.083% neb solution prn Nursing Home Yes Yes   Sig: Take 2.5 mg by nebulization every 6 hours as needed for shortness of breath / dyspnea or wheezing   atorvastatin (LIPITOR) 80 MG tablet 2/24/2021 at Unknown time Nursing Home Yes Yes   Sig: Take 80 mg by mouth daily   camphor-menthol (DERMASARRA) 0.5-0.5 % external lotion prn Nursing Home Yes Yes   Sig: Apply topically 2 times daily as needed (itching) Apply thin layer topically bid prn itching, apply after Eucerin.   carboxymethylcellulose (REFRESH PLUS) 0.5 % SOLN ophthalmic solution prn Nursing Home Yes Yes   Sig: Place 2 drops into both eyes every 4 hours as needed for dry eyes 6 times per day prn*   cholecalciferol 1000 units TABS 2/24/2021 at Unknown time Nursing Home Yes Yes   Sig: Take 1,000 Units by mouth daily   donepezil (ARICEPT) 5 MG tablet 2/22/2021 at Unknown time Nursing Home Yes Yes   Sig: Take 5 mg by mouth At Bedtime   famotidine (PEPCID) 10 MG tablet 2/24/2021 at Unknown time   Yes Yes   Sig: Take 10 mg by mouth daily   fluticasone-salmeterol (ADVAIR) 500-50 MCG/DOSE inhaler Past Week at Unknown time Nursing Home Yes Yes   Sig: Inhale 1 puff into the lungs 2 times daily Rinse mouth after use.   guaiFENesin (ROBITUSSIN) 100 MG/5ML SYRP 2/23/2021 at Unknown time Nursing Home Yes Yes   Sig: Take 5 mLs by mouth At Bedtime   haloperidol (HALDOL) 0.5 MG tablet 2/24/2021 at Unknown time Nursing Home Yes Yes   Sig: Take 0.5 mg by mouth 3 times daily   haloperidol (HALDOL) 1 MG tablet 2/23/2021 at Unknown time Nursing Home Yes Yes   Sig: Take 1 mg by mouth daily at 2 pm   magnesium 250 MG tablet 2/24/2021 at Unknown time Nursing Home Yes Yes   Sig: Take 1 tablet by mouth daily Patient takes on their own. Patient wants to buy from [facility] pharmacy.   metoprolol tartrate (LOPRESSOR) 50 MG tablet 2/24/2021 at Unknown time longterm No Yes   Sig: Take 1 tablet (50 mg) by mouth 2 times daily   nitroGLYcerin (NITROSTAT) 0.4 MG sublingual tablet prn Nursing Home Yes Yes   Sig: Place 0.4 mg under the tongue every 5 minutes as needed for chest pain For chest pain place 1 tablet under the tongue every 5 minutes for 3 doses. If symptoms persist 5 minutes after 1st dose call 911.   senna (SENOKOT) 8.6 MG tablet 2/24/2021 at Unknown time Nursing Home Yes Yes   Sig: Take 1 tablet by mouth every morning   senna (SENOKOT) 8.6 MG tablet  Nursing Home Yes Yes   Sig: Take 1 tablet by mouth every evening as needed for constipation   sertraline (ZOLOFT) 100 MG tablet 2/24/2021 at Unknown time longterm No Yes   Sig: Take 1.5 tablets (150 mg) by mouth daily   tamsulosin (FLOMAX) 0.4 MG capsule 2/24/2021 at Unknown time Nursing Home Yes Yes   Sig: Take 0.4 mg by mouth daily      Facility-Administered Medications: None     Allergies   Allergies   Allergen Reactions     Crustaceans      Loxapine      Nifedipine      Shellfish Allergy      Sulfa Drugs      Sulfamethoxazole-Trimethoprim      Trimethoprim        Social  History   I have reviewed this patient's social history and updated it with pertinent information if needed. Hubert Tamayo  reports that he has quit smoking. He smoked 0.00 packs per day. He has never used smokeless tobacco. He reports previous alcohol use. He reports that he does not use drugs.    Family History   I have reviewed this patient's family history and updated it with pertinent information if needed.   No family history on file.    Review of Systems   The 10 point Review of Systems is negative other than noted in the HPI or here.     Physical Exam   Temp: 97.3  F (36.3  C) Temp src: Oral BP: (!) 168/90 Pulse: 75   Resp: 16 SpO2: 96 % O2 Device: None (Room air)    Vital Signs with Ranges  Temp:  [97.3  F (36.3  C)-97.7  F (36.5  C)] 97.3  F (36.3  C)  Pulse:  [54-75] 75  Resp:  [9-16] 16  BP: (140-168)/(71-97) 168/90  SpO2:  [95 %-100 %] 96 %  123 lbs 6.4 oz    Gen: NAD, alert but minimally cooperative  HEENT: MMM, EOMI  Lungs: CTAB  ABD: soft, nttp  CV: RRR, no mrg  EXT: no edema, wwp, 2+ Dp pulses    Data   Results for orders placed or performed during the hospital encounter of 02/24/21 (from the past 24 hour(s))   Nutrition Services Adult IP Consult    Narrative    Nuvia Brink RD, LD     2/25/2021 11:46 AM  CLINICAL NUTRITION SERVICES  -  ASSESSMENT NOTE    Recommendations Ordered by Registered Dietitian (RD):   - Boost Plus BID between meals   - Encourage adequate and balance meals TID   Malnutrition: (2/25)   % Weight Loss:  > 5% in 1 month (severe malnutrition)  % Intake:  </= 75% for >/= 1 month (severe malnutrition)  Subcutaneous Fat Loss:  Orbital region severe depletion and Upper   arm region moderate depletion  Muscle Loss:  Temporal region moderate depletion, Clavicle bone   region moderate depletion, Acromion bone region moderate   depletion and Dorsal hand region moderate depletion  Fluid Retention:  None noted    Malnutrition Diagnosis: Severe malnutrition  In Context of:  Acute illness or  "injury  Chronic illness or disease     REASON FOR ASSESSMENT  Hubert Tamayo is a 89 year old male seen by Registered Dietitian   for Patient/Family Request    NUTRITION HISTORY  - Information obtained from chart review and patient report.   - Chart review indicates recent admission for SDH 1/24-1/27. Hubert   then transferred to a TCU/LTC facility.   - Poor PO reported while at facility.     - Hubert is a very pleasant patient who provides some accurate   information regarding weight trending, however once further into   conversation tended to mumble and bring up irrelevant details.   - He states that he is eating poorly.   - Shellfish allergy       CURRENT NUTRITION ORDERS  Diet Order:     Dysphagia Diet Level 1 + Thin Liquids (per SLP)    Current Intake/Tolerance:  Pt had consumed 100% of a portion of hot cereal and pureed   berries prior to visit. Asked if he would be interested in   shakes, but he felt that due to this lack of teeth he might not   be able to tolerate?   Appetite per patient is \"poor\" and has been poor.     NUTRITION FOCUSED PHYSICAL ASSESSMENT FOR DIAGNOSING   MALNUTRITION)  Yes         Observed:    Muscle wasting (refer to documentation in Malnutrition section)   and Subcutaneous fat loss (refer to documentation in Malnutrition   section)    Obtained from Chart/Interdisciplinary Team:  Williams - Nutrition 1; Total 12  Last BM not recorded     ANTHROPOMETRICS  Height: 5' 8\"  Weight: 123 lbs 6.4 oz (56 kg)   Body mass index is 18.76 kg/m .  Weight Status:  Normal BMI  IBW: 70 kg   % IBW: 80%  Weight History: 11# loss indicated since last admission (8.2%).   Patient endorsed significant weight loss in an unknown period of   time.   Wt Readings from Last 10 Encounters:   02/24/21 56 kg (123 lb 6.4 oz)   01/24/21 61 kg (134 lb 7.7 oz)   11/18/20 65.2 kg (143 lb 12.8 oz)   06/27/20 65.3 kg (144 lb)   11/18/19 64.2 kg (141 lb 8 oz)   10/29/19 64.9 kg (143 lb)   09/15/19 68.5 kg (151 lb 1.6 oz) " "      LABS  Labs reviewed  BUN 42 (H), Cr 2.48 (H)  No results found for: A1C    MEDICATIONS  Medications reviewed  NaCl IVF @ 75 mL/hr     ASSESSED NUTRITION NEEDS PER APPROVED PRACTICE GUIDELINES:  Dosing Weight 56 kg   Estimated Energy Needs: 7520-1286+ kcals (25-30+ Kcal/Kg)  Justification: maintenance and underweight  Estimated Protein Needs: 67-84 grams protein (1.2-1.5 g pro/Kg)  Justification: Repletion  Estimated Fluid Needs: 1 mL/kcal  Justification: maintenance    MALNUTRITION:  % Weight Loss:  > 5% in 1 month (severe malnutrition)  % Intake:  </= 75% for >/= 1 month (severe malnutrition)  Subcutaneous Fat Loss:  Orbital region severe depletion and Upper   arm region moderate depletion  Muscle Loss:  Temporal region moderate depletion, Clavicle bone   region moderate depletion, Acromion bone region moderate   depletion and Dorsal hand region moderate depletion  Fluid Retention:  None noted    Malnutrition Diagnosis: Severe malnutrition  In Context of:  Acute illness or injury  Chronic illness or disease    NUTRITION DIAGNOSIS:  Malnutrition related to inadequate oral intake due to poor   appetite as evidenced by reported \"poor\" appetite for at least   the past 1 month, weight loss of 8.2% in the past 1 month.       NUTRITION INTERVENTIONS  Recommendations / Nutrition Prescription  Diet per SLP  Boost Plus BID between meals  Encourage adequate and balance meals TID    Implementation  Nutrition education: Per Provider order if indicated   Medical Food Supplement: As above      Nutrition Goals  Intake of at least 50% meals TID + 1-2 supplements daily.       MONITORING AND EVALUATION:  Progress towards goals will be monitored and evaluated per   protocol and Practice Guidelines    Nuvia Brink RD, LD  [facility], [address], [facility]   Pager: [phone]  Weekend Pager: [phone]     Neurology IP Consult: General (non-stroke/non-ICU); Patient to be seen: Routine - within 24 hours; episode of unresponsiveness; " recent SDH; Consultant may enter orders: Yes; Requesting provider? Hospitalist (if different from attending physician)    Narrative    Neetu Reese MD     2/25/2021  1:39 PM  [facility]    Neurology Consultation     Date of Admission:  2/24/2021      Assessment & Plan   Hubert Tamayo is a 89 year old male with PMH significant for   dementia, hypertension, chronic obstructive pulmonary disease,   reported CAD, recent admission for subdural hematoma  who was   admitted on 2/24/2021. I was asked to see the patient for   syncopal episode.    ddx cardiac related vs seizure.  No convulsion was seen, it was   witnessed by [facility] staff.  Thought to be pulseless for a time,   but no hr issues in the ER.  Prolonged confusion/somnulence after   the event.  No history of seizures that I see.  Given his   baseline confusion/dementia and age, the prolonged confusion   afterwards is equivocal in meaning.  Risk that this was a seizure   is low/moderate.    --CTH nothing acute- no SDH seen on most recent CTH  --EEG pending.  --recent MRI 1-23-21 showed thin L subdural, otherwise nothing   acute.  I do not think he needs another one at this time.    --As per hospitalist for cardiac workup for this likely syncopal   event.  --Continue PTA dementia and schizophrenia meds.    --Covid neg      I discussed the above diagnosis, assessment, and further testing   with the patient.      Neetu Reese MD  [facility]  Office Phone [phone]            Code Status    Full Code        Reason for Consult   Reason for consult: I was asked by Dr. Brown to evaluate this   patient for loc episode.      Chief Complaint   Doesn't want to be here    History is obtained from the patient and the electronic medical   chart.    History of Present Illness   Hubert Tamayo is a 89 year old male with with PMH significant for   dementia, hypertension, chronic obstructive pulmonary disease,   reported CAD,  recent admission for subdural hematoma , who   presents with a loss of consciousness episode.    He recently was found to have a 3 mm subdural hematoma and was   going for repeat scan CT head for that finding.  During that   time, he had an episode of [facility]responsiveness that [facility].  Per reports, the providers could not find   a pulse.  The patient was unresponsive.  No convulsion was seen.    The patient was taken to the ER, where he appeared asleep, with   stable vitals.  He continued to appear rather somnolent in the   ER, but gradually improved mentation.  He has no known history of   seizures per patient and per the chart.    The patient himself is unable to give me history.  He repeatedly   talks about being in [place] and being in [employer], and he   was upset that people denied that he ever was in [employer] in   the past.  It was difficult to keep him on task and he was unable   to answer recent medical history questions.  He states that he   does not member anything about the event.    Past Medical History   I have reviewed this patient's medical history and updated it   with pertinent information if needed.   Past Medical History:   Diagnosis Date     CAD (coronary artery disease)      Dementia (H)      Diabetes mellitus type II      Essential hypertension      Osteoporosis      Paranoid schizophrenia      Pulmonary emphysema        Past Surgical History   I have reviewed this patient's surgical history and updated it   with pertinent information if needed.  Past Surgical History:   Procedure Laterality Date     CATARACT REMOVAL Bilateral        Prior to Admission Medications   Prior to Admission Medications   Prescriptions Last Dose Informant Patient Reported? Taking?   Menthol-Methyl Salicylate (ICY HOT EXTRA STRENGTH) 10-30 % CREA   prn Nursing Home Yes Yes   Sig: Externally apply topically 3 times daily as needed Apply to   bilateral thighs    Nutritional  Supplements (ENSURE COMPLETE PO) Past Week at Unknown   time Nursing Home Yes Yes   Sig: Take 1 Bottle by mouth 2 times daily (with meals)   OLANZapine (ZYPREXA) 15 MG tablet 2/23/2021 at Unknown time  No   Yes   Sig: Take 1 tablet (15 mg) by mouth At Bedtime   Skin Protectants, Misc. (EUCERIN) cream  Nursing Home Yes No   Sig: Apply topically daily Apply to areas of dry skin topically   every day ideally within 3 minutes after bath or shower.   Tiotropium Bromide Monohydrate (SPIRIVA HANDIHALER IN) 2/24/2021   at Unknown time Nursing Home Yes Yes   Sig: Inhale 1 capsule into the lungs daily    acetaminophen (TYLENOL) 325 MG tablet 2/24/2021 at Unknown time   Nursing Home Yes Yes   Sig: Take 650 mg by mouth 4 times daily   albuterol (PROAIR HFA/PROVENTIL HFA/VENTOLIN HFA) 108 (90 Base)   MCG/ACT inhaler prn Nursing Home Yes Yes   Sig: Inhale 2 puffs into the lungs every 4 hours as needed for   shortness of breath / dyspnea or wheezing   albuterol (PROVENTIL) (2.5 MG/3ML) 0.083% neb solution prn   Nursing Home Yes Yes   Sig: Take 2.5 mg by nebulization every 6 hours as needed for   shortness of breath / dyspnea or wheezing   atorvastatin (LIPITOR) 80 MG tablet 2/24/2021 at Unknown time   Nursing Home Yes Yes   Sig: Take 80 mg by mouth daily   camphor-menthol (DERMASARRA) 0.5-0.5 % external lotion prn   Nursing Home Yes Yes   Sig: Apply topically 2 times daily as needed (itching) Apply thin   layer topically bid prn itching, apply after Eucerin.   carboxymethylcellulose (REFRESH PLUS) 0.5 % SOLN ophthalmic   solution prn Nursing Home Yes Yes   Sig: Place 2 drops into both eyes every 4 hours as needed for dry   eyes 6 times per day prn*   cholecalciferol 1000 units TABS 2/24/2021 at Unknown time Nursing   Home Yes Yes   Sig: Take 1,000 Units by mouth daily   donepezil (ARICEPT) 5 MG tablet 2/22/2021 at Unknown time Nursing   Home Yes Yes   Sig: Take 5 mg by mouth At Bedtime   famotidine (PEPCID) 10 MG tablet 2/24/2021  at Unknown time  Yes   Yes   Sig: Take 10 mg by mouth daily   fluticasone-salmeterol (ADVAIR) 500-50 MCG/DOSE inhaler Past Week   at Unknown time Nursing Home Yes Yes   Sig: Inhale 1 puff into the lungs 2 times daily Rinse mouth after   use.   guaiFENesin (ROBITUSSIN) 100 MG/5ML SYRP 2/23/2021 at Unknown   time Nursing Home Yes Yes   Sig: Take 5 mLs by mouth At Bedtime   haloperidol (HALDOL) 0.5 MG tablet 2/24/2021 at Unknown time   Nursing Home Yes Yes   Sig: Take 0.5 mg by mouth 3 times daily   haloperidol (HALDOL) 1 MG tablet 2/23/2021 at Unknown time   Nursing Home Yes Yes   Sig: Take 1 mg by mouth daily at 2 pm   magnesium 250 MG tablet 2/24/2021 at Unknown time Nursing Home   Yes Yes   Sig: Take 1 tablet by mouth daily Patient takes on their own.   Patient wants to buy from non-[facility] pharmacy.   metoprolol tartrate (LOPRESSOR) 50 MG tablet 2/24/2021 at Unknown   time Nursing Home No Yes   Sig: Take 1 tablet (50 mg) by mouth 2 times daily   nitroGLYcerin (NITROSTAT) 0.4 MG sublingual tablet prn Nursing   Home Yes Yes   Sig: Place 0.4 mg under the tongue every 5 minutes as needed for   chest pain For chest pain place 1 tablet under the tongue every 5   minutes for 3 doses. If symptoms persist 5 minutes after 1st dose   call 911.   senna (SENOKOT) 8.6 MG tablet 2/24/2021 at Unknown time Nursing   Home Yes Yes   Sig: Take 1 tablet by mouth every morning   senna (SENOKOT) 8.6 MG tablet  Nursing Home Yes Yes   Sig: Take 1 tablet by mouth every evening as needed for   constipation   sertraline (ZOLOFT) 100 MG tablet 2/24/2021 at Unknown time   Nursing Home No Yes   Sig: Take 1.5 tablets (150 mg) by mouth daily   tamsulosin (FLOMAX) 0.4 MG capsule 2/24/2021 at Unknown time   Nursing Home Yes Yes   Sig: Take 0.4 mg by mouth daily      Facility-Administered Medications: None     Allergies   Allergies   Allergen Reactions     Crustaceans      Loxapine      Nifedipine      Shellfish Allergy      Sulfa Drugs       "Sulfamethoxazole-Trimethoprim      Trimethoprim        Social History   I have reviewed this patient's social history and updated it with   pertinent information if needed. Hubert Tamayo  reports that he has   quit smoking. He smoked 0.00 packs per day. He has never used   smokeless tobacco. He reports previous alcohol use. He reports   that he does not use drugs.    Family History   I have reviewed this patient's family history and updated it with   pertinent information if needed.   None pertinent    Review of Systems   The 10 point Review of Systems is negative other than noted in   the HPI. As well as I was able.    Physical Exam   Temp: 97.3  F (36.3  C) Temp src: Oral BP: (!) 168/90 Pulse: 75     Resp: 16 SpO2: 96 % O2 Device: None (Room air)    Vital Signs with Ranges  Temp:  [97.3  F (36.3  C)-97.7  F (36.5  C)] 97.3  F (36.3  C)  Pulse:  [54-75] 75  Resp:  [9-16] 16  BP: (140-168)/(71-97) 168/90  SpO2:  [95 %-100 %] 96 %  123 lbs 6.4 oz    General Appearance:  No acute distress  Neuro:       Mental Status Exam:    Awake alert, somewhat agitated and   irritable.  I believe he was able to tell me he was at the   hospital, but kept on saying the \"[facility]\" possibly joking.    He was unable to give me the date.  Did not know what city he was   in.  He was pulling on IV lines and was not cooperative with the   EEG.  He was able to follow commands with no apraxia noted.       Cranial Nerves:   Cranial nerves II through XII examined and   intact.  Left facial weakness that had been noted earlier is not   noted on this exam.           Motor:   5 out of 5 bilateral upper extremities and   bilateral lower extremities           Reflexes: Trace throughout, downgoing toes, no clonus at the   ankles       Sensory: Normal light touch x4                   Coordination:   Finger-nose-finger mild dysmetria   bilaterally       Gait: Unable- patient usually wheelchair bound per pt.  Neck: no nuchal rigidity. No carotid " bruits.    Extremities: No clubbing, no cyanosis, no edema  CV: RRR nl s1, s2  Resp: CTAB        Data   Results for orders placed or performed during the hospital   encounter of 02/24/21 (from the past 24 hour(s))   Glucose by meter   Result Value Ref Range    Glucose 99 70 - 99 mg/dL   CBC with platelets differential   Result Value Ref Range    WBC 17.8 (H) 4.0 - 11.0 10e9/L    RBC Count 3.71 (L) 4.4 - 5.9 10e12/L    Hemoglobin 11.1 (L) 13.3 - 17.7 g/dL    Hematocrit 34.5 (L) 40.0 - 53.0 %    MCV 93 78 - 100 fl    MCH 29.9 26.5 - 33.0 pg    MCHC 32.2 31.5 - 36.5 g/dL    RDW 15.5 (H) 10.0 - 15.0 %    Platelet Count 139 (L) 150 - 450 10e9/L    Diff Method Automated Method     % Neutrophils 76.1 %    % Lymphocytes 10.4 %    % Monocytes 12.1 %    % Eosinophils 0.7 %    % Basophils 0.2 %    % Immature Granulocytes 0.5 %    Nucleated RBCs 0 0 /100    Absolute Neutrophil 13.5 (H) 1.6 - 8.3 10e9/L    Absolute Lymphocytes 1.9 0.8 - 5.3 10e9/L    Absolute Monocytes 2.2 (H) 0.0 - 1.3 10e9/L    Absolute Eosinophils 0.1 0.0 - 0.7 10e9/L    Absolute Basophils 0.0 0.0 - 0.2 10e9/L    Abs Immature Granulocytes 0.1 0 - 0.4 10e9/L    Absolute Nucleated RBC 0.0    INR   Result Value Ref Range    INR 1.07 0.86 - 1.14   Comprehensive metabolic panel   Result Value Ref Range    Sodium 135 133 - 144 mmol/L    Potassium 3.5 3.4 - 5.3 mmol/L    Chloride 98 94 - 109 mmol/L    Carbon Dioxide 31 20 - 32 mmol/L    Anion Gap 6 3 - 14 mmol/L    Glucose 86 70 - 99 mg/dL    Urea Nitrogen 42 (H) 7 - 30 mg/dL    Creatinine 2.48 (H) 0.66 - 1.25 mg/dL    GFR Estimate 22 (L) >60 mL/min/[1.73_m2]    GFR Estimate If Black 25 (L) >60 mL/min/[1.73_m2]    Calcium 9.4 8.5 - 10.1 mg/dL    Bilirubin Total 0.9 0.2 - 1.3 mg/dL    Albumin 3.0 (L) 3.4 - 5.0 g/dL    Protein Total 7.6 6.8 - 8.8 g/dL    Alkaline Phosphatase 123 40 - 150 U/L    ALT 18 0 - 70 U/L    AST 23 0 - 45 U/L   Troponin I   Result Value Ref Range    Troponin I ES 0.021 0.000 - 0.045 ug/L    Magnesium   Result Value Ref Range    Magnesium 2.5 (H) 1.6 - 2.3 mg/dL   CT Head w/o Contrast    Narrative    CT OF THE HEAD WITHOUT CONTRAST February 24, 2021 1:06 PM     HISTORY: Trauma, rule out head injury. Mental status change,   unknown  cause.    TECHNIQUE: 5 mm thick axial CT images of the head were acquired  without IV contrast material. Radiation dose for this scan was   reduced  using automated exposure control, adjustment of the mA and/or kV  according to patient size, or iterative reconstruction technique.    COMPARISON: Head CT 1/24/2021.    FINDINGS: There is moderate diffuse cerebral volume loss. There   are  subtle patchy areas of decreased density in the cerebral white   matter  bilaterally that are consistent with sequela of chronic small   vessel  ischemic disease.    The ventricles and basal cisterns are within normal limits in  configuration given the degree of cerebral volume loss. There is   no  midline shift. There are no extra-axial fluid collections.    No intracranial hemorrhage, mass or recent infarct.    The visualized paranasal sinuses are well-aerated. There is no  mastoiditis. There are no fractures of the visualized bones.      Impression    IMPRESSION: Diffuse cerebral volume loss and cerebral white   matter  changes consistent with chronic small vessel ischemic disease. No  evidence for acute intracranial pathology.        RADHA HENDRIX MD   Symptomatic SARS-CoV-2 COVID-19 Virus (Coronavirus) by PCR    Specimen: Nasopharyngeal   Result Value Ref Range    SARS-CoV-2 Virus Specimen Source Nasopharyngeal     SARS-CoV-2 PCR Result NEGATIVE     SARS-CoV-2 PCR Comment (Note)    XR Chest Port 1 View    Narrative    XR CHEST PORT 1 VW 2/24/2021 1:53 PM    HISTORY: altered loc    COMPARISON: 1/23/2021      Impression    IMPRESSION: Pacer pads over the mid chest. Emphysema. Slight   increased  linear interstitial opacities in the left lung base, either   secondary  to edema and/or  atelectasis superimposed on fibrotic changes.   Normal  heart size. No pleural effusion or pneumothorax.    COLLIN MÁRQUEZ MD   Troponin I   Result Value Ref Range    Troponin I ES <0.015 0.000 - 0.045 ug/L   Troponin I   Result Value Ref Range    Troponin I ES <0.015 0.000 - 0.045 ug/L   EKG 12-lead, tracing only   Result Value Ref Range    Interpretation ECG Click View Image link to view waveform and   result            Imaging and procedures:  I personally reviewed these images.    EEG: pending     Care Management / Social Work IP Consult    Narrative    Zena Barkley LSW     2/25/2021  1:39 PM  Care Management Initial Consult    General Information  Assessment completed with: Family, GODFREY-chart review, Aldair  Type of CM/SW Visit: Initial Assessment    Primary Care Provider verified and updated as needed:     Readmission within the last 30 days: current reason for admission   unrelated to previous admission   Return Category: New Diagnosis  Reason for Consult: discharge   planning  Advance Care Planning:            Communication Assessment  Patient's communication style: spoken language (English or   Bilingual)    Hearing Difficulty or Deaf: no   Wear Glasses or Blind: yes    Cognitive  Cognitive/Neuro/Behavioral: .WDL except(pt refused noon   assessment)  Level of Consciousness: somnolent  Arousal Level:   arouses to voice, arouses to touch/gentle shaking  Orientation:   disoriented to, place, time, situation  Mood/Behavior:   cooperative     Speech: garbled, illogical    Living Environment:   People in home: facility resident     Current living Arrangements: other (see comments)(TCU)  Name of   Facility: [facility]   Able to return to prior arrangements:         Family/Social Support:  Care provided by: self, homecare agency, child(tommy)  Provides care for: no one, unable/limited ability to care for   self  Marital Status: Single             Description of Support System:           Current Resources:    Patient receiving home care services:       Community Resources:    Equipment currently used at home: wheelchair, manual  Supplies currently used at home:      Employment/Financial:  Employment Status: retired        Financial Concerns: No concerns identified           Lifestyle & Psychosocial Needs:        Socioeconomic History     Marital status: Single     Spouse name: Not on file     Number of children: Not on file     Years of education: Not on file     Highest education level: Not on file     Tobacco Use     Smoking status: Former Smoker     Packs/day: 0.00     Smokeless tobacco: Never Used   Substance and Sexual Activity     Alcohol use: Not Currently     Drug use: Never       Functional Status:  Prior to admission patient needed assistance:              Mental Health Status:          Chemical Dependency Status:                Values/Beliefs:  Spiritual, Cultural Beliefs, Yazidi Practices, Values that   affect care:                 Additional Information:  Case Management consult for discharge planning. Patient admitted   on 2/24/21 for unresponsive episode with a tenative discharge   date TBD. Writer reviewed chart and placed a call to patient's   son Aldair. [facility] to [facility]. He would like patient to   return. When discussing transportation Aldair stated that there   is transportation that is covered by [facility]. Call placed to [place]   with Ignacio who confirms that patient has a bed hold. She   reported that she reached out to the facility regarding   tranpsortaiton but the transprotation that was used from   facility, included an aide that rode with patient. Writer   confirmed that when patient was discharge in January, [facility]   zach was who transported him via stretcher. Aldair would   like to be updated with plan.    Will continue to follow    MARIO Oliveira         Troponin I   Result Value Ref Range    Troponin I  ES <0.015 0.000 - 0.045 ug/L   Troponin I   Result Value Ref Range    Troponin I ES <0.015 0.000 - 0.045 ug/L   EKG 12-lead, tracing only   Result Value Ref Range    Interpretation ECG Click View Image link to view waveform and result    Echocardiogram Complete    Narrative    [id number]  [id number]  [id number]  [id number]^ESTUARDO^BECKY^ARNULFO           [facility]  Echocardiography Laboratory  [address] [place] [address]        Name: VEDA RUSHING  MRN: [id number]  : 1929  Study Date: 2021 08:34 AM  Age: 89 yrs  Gender: Male  Patient Location: [facility]  Reason For Study: Syncope  Ordering Physician: BECKY MARTE  Referring Physician: Carolee Bales  Performed By: Loreto Guardado     BSA: 1.7 m2  Height: 68 in  Weight: 123 lb  HR: 58  BP: 146/85 mmHg  _____________________________________________________________________________  __        Procedure  Limited Portable Echo Adult. Optison (NDC #[id number]) given intravenously.  _____________________________________________________________________________  __        Interpretation Summary     The left ventricle is normal in size.  Left ventricular systolic function is normal.  The visual ejection fraction is estimated at 55-60%.  Regional wall motion abnormalities cannot be excluded due to limited  visualization.  Inadequate evaluation of cardiac valves. No significant AS is suspected.  Technically difficult, suboptimal study.  _____________________________________________________________________________  __        Left Ventricle  The left ventricle is normal in size. Left ventricular systolic function is  normal. The visual ejection fraction is estimated at 55-60%. Left ventricular  diastolic function is not assessable. Regional wall motion abnormalities  cannot be excluded due to limited visualization.     Right Ventricle  The right ventricle is normal in structure, function and size.     Atria  The left atrium is not well visualized. Right  atrium not well visualized.     Mitral Valve  There is no mitral regurgitation noted. Evaluation of regurgitation is  inadequate.        Tricuspid Valve  The tricuspid valve is not well visualized. Right ventricular systolic  pressure could not be approximated due to inadequate tricuspid regurgitation.     Aortic Valve  The aortic valve is trileaflet with aortic valve sclerosis. No hemodynamically  significant valvular aortic stenosis.     Pulmonic Valve  There is trace pulmonic valvular regurgitation.     Vessels  The aortic root is normal size.     Pericardium  There is no pericardial effusion.     _____________________________________________________________________________  __  MMode/2D Measurements & Calculations  IVSd: 0.81 cm  LVIDd: 4.2 cm  LVIDs: 3.0 cm  LVPWd: 0.96 cm  FS: 28.2 %  LV mass(C)d: 113.9 grams  LV mass(C)dI: 68.5 grams/m2     Ao root diam: 3.5 cm  LA dimension: 2.4 cm  LA/Ao: 0.67  RWT: 0.46        Doppler Measurements & Calculations  PA acc time: 0.05 sec           _____________________________________________________________________________  __           Report approved by: Gloria Urena 02/25/2021 01:25 PM      EEG Routine    Narrative    EEG Routine Result    EEG DATE: 2/25/21  EEG LOG: [facility]  portable  EEG DAY#: 0  EEG SOURCE FILE DURATION: 20 min    PATIENT INFORMATION: Hubert Tamayo is a 89 year old year old male with a   loss of consciousness episode.     TECHNICAL SUMMARY: This routine EEG was recorded from 22 scalp electrodes   placed according to the 10-20 international system. Additional electrodes   were used for referencing, EKG, and to record from other cerebral regions   as appropriate.    BACKGROUND ACTIVITY:  During wakefulness, the background activity consists   of synchronous and symmetric, well-modulated, 7-8 Hz posterior dominant   rhythm. The posterior dominant rhythm attenuated with eye opening. During   drowsiness, the background activity waxed and waned and there  were periods   of theta slowing and attenuation of the posterior dominant rhythm.   The patient was agitated throughout the recording, causing significant   muscle and motion artifact at times.    Sleep did not occur.    ACTIVATION PROCEDURE: None.    ICTAL: No clinical events or electrographic seizures were recorded.    IMPRESSION:   This is a normal routine EEG in the awake state.      Neetu Reese MD  [facility] Neurology  Office # [phone]

## 2021-02-25 NOTE — UTILIZATION REVIEW
"  Admission Status; Secondary Review Determination         Under the authority of the Utilization Management Committee, the utilization review process indicated a secondary review on the above patient.  The review outcome is based on review of the medical records, discussions with staff, and applying clinical experience noted on the date of the review.        (X)      Inpatient Status Appropriate - This patient's medical care is consistent with medical management for inpatient care and reasonable inpatient medical practice.      () Observation Status Appropriate - This patient does not meet hospital inpatient criteria and is placed in observation status. If this patient's primary payer is Medicare and was admitted as an inpatient, Condition Code 44 should be used and patient status changed to \"observation\".   () Admission Status NOT Appropriate - This patient's medical care is not consistent with medical management for Inpatient or Observation Status.          RATIONALE FOR DETERMINATION     89 year old male with PMH significant for dementia, hypertension, chronic obstructive pulmonary disease, reported CAD, recent admission (1/24-1/27) for subdural hematoma who was brought to ED after a brief episode of unresponsiveness and pulselessness while on his way for CT head (as a routine follow-up of his recent subdural hematoma). Patient had bradycardia with T wave inversions in the inferolateral leads.  Patient continued to be very somnolent.  WBC is elevated.  Patient will have telemetry, serial troponins, cardiac echo, and a cardiology consult.  Patient is paranoid, and will be medically managed.  He continues with an altered mental status from his baseline.  Appropriate for Inpatient status.    The severity of illness, intensity of service provided, expected LOS and risk for adverse outcome make the care complex, high risk and appropriate for hospital admission.        The information on this document is developed by " the utilization review team in order for the business office to ensure compliance.  This only denotes the appropriateness of proper admission status and does not reflect the quality of care rendered.         The definitions of Inpatient Status and Observation Status used in making the determination above are those provided in the CMS Coverage Manual, Chapter 1 and Chapter 6, section 70.4.      Sincerely,     Andrea Nettles MD  Physician Advisor  Utilization Review/ Case Management  [facility].

## 2021-02-25 NOTE — CONSULTS
[facility]    Neurology Consultation     Date of Admission:  2/24/2021      Assessment & Plan   Hubert Tamayo is a 89 year old male with PMH significant for dementia, hypertension, chronic obstructive pulmonary disease, reported CAD, recent admission for subdural hematoma  who was admitted on 2/24/2021. I was asked to see the patient for syncopal episode.    ddx cardiac related vs seizure.  No convulsion was seen, it was witnessed by medivan staff.  Thought to be pulseless for a time, but no hr issues in the ER.  Prolonged confusion/somnulence after the event.  No history of seizures that I see.  Given his baseline confusion/dementia and age, the prolonged confusion afterwards is equivocal in meaning.  Risk that this was a seizure is low/moderate.    --CTH nothing acute- no SDH seen on most recent CTH  --EEG pending.  --recent MRI 1-23-21 showed thin L subdural, otherwise nothing acute.  I do not think he needs another one at this time.    --As per hospitalist for cardiac workup for this likely syncopal event.  --Continue PTA dementia and schizophrenia meds.    --Covid neg      I discussed the above diagnosis, assessment, and further testing with the patient.      Neetu Reese MD  [facility] Neurology  Office Phone [phone]            Code Status    Full Code        Reason for Consult   Reason for consult: I was asked by Dr. Brown to evaluate this patient for loc episode.      Chief Complaint   Doesn't want to be here    History is obtained from the patient and the electronic medical chart.    History of Present Illness   Hubert Tamayo is a 89 year old male with with PMH significant for dementia, hypertension, chronic obstructive pulmonary disease, reported CAD, recent admission for subdural hematoma , who presents with a loss of consciousness episode.    He recently was found to have a 3 mm subdural hematoma and was going for repeat scan CT head for that finding.  During that  time, he had an episode of unresponsiveness that was witnessed by the medivan providers.  Per reports, the providers could not find a pulse.  The patient was unresponsive.  No convulsion was seen.  The patient was taken to the ER, where he appeared asleep, with stable vitals.  He continued to appear rather somnolent in the ER, but gradually improved mentation.  He has no known history of seizures per patient and per the chart.    The patient himself is unable to give me history.  He repeatedly talks about being in [place] and being in [employer], and he was upset that people denied that he ever was in the [employer] in the past.  It was difficult to keep him on task and he was unable to answer recent medical history questions.  He states that he does not member anything about the event.    Past Medical History   I have reviewed this patient's medical history and updated it with pertinent information if needed.   Past Medical History:   Diagnosis Date     CAD (coronary artery disease)      Dementia (H)      Diabetes mellitus type II      Essential hypertension      Osteoporosis      Paranoid schizophrenia      Pulmonary emphysema        Past Surgical History   I have reviewed this patient's surgical history and updated it with pertinent information if needed.  Past Surgical History:   Procedure Laterality Date     CATARACT REMOVAL Bilateral        Prior to Admission Medications   Prior to Admission Medications   Prescriptions Last Dose Informant Patient Reported? Taking?   Menthol-Methyl Salicylate (ICY HOT EXTRA STRENGTH) 10-30 % CREA prn Nursing Home Yes Yes   Sig: Externally apply topically 3 times daily as needed Apply to bilateral thighs    Nutritional Supplements (ENSURE COMPLETE PO) Past Week at Unknown time Nursing Home Yes Yes   Sig: Take 1 Bottle by mouth 2 times daily (with meals)   OLANZapine (ZYPREXA) 15 MG tablet 2/23/2021 at Unknown time  No Yes   Sig: Take 1 tablet (15 mg) by mouth At Bedtime   Skin  Protectants, Misc. (EUCERIN) cream  Nursing Home Yes No   Sig: Apply topically daily Apply to areas of dry skin topically every day ideally within 3 minutes after bath or shower.   Tiotropium Bromide Monohydrate (SPIRIVA HANDIHALER IN) 2/24/2021 at Unknown time Nursing Home Yes Yes   Sig: Inhale 1 capsule into the lungs daily    acetaminophen (TYLENOL) 325 MG tablet 2/24/2021 at Unknown time Nursing Home Yes Yes   Sig: Take 650 mg by mouth 4 times daily   albuterol (PROAIR HFA/PROVENTIL HFA/VENTOLIN HFA) 108 (90 Base) MCG/ACT inhaler prn Nursing Home Yes Yes   Sig: Inhale 2 puffs into the lungs every 4 hours as needed for shortness of breath / dyspnea or wheezing   albuterol (PROVENTIL) (2.5 MG/3ML) 0.083% neb solution prn Nursing Home Yes Yes   Sig: Take 2.5 mg by nebulization every 6 hours as needed for shortness of breath / dyspnea or wheezing   atorvastatin (LIPITOR) 80 MG tablet 2/24/2021 at Unknown time Nursing Home Yes Yes   Sig: Take 80 mg by mouth daily   camphor-menthol (DERMASARRA) 0.5-0.5 % external lotion prn Nursing Home Yes Yes   Sig: Apply topically 2 times daily as needed (itching) Apply thin layer topically bid prn itching, apply after Eucerin.   carboxymethylcellulose (REFRESH PLUS) 0.5 % SOLN ophthalmic solution prn Nursing Home Yes Yes   Sig: Place 2 drops into both eyes every 4 hours as needed for dry eyes 6 times per day prn*   cholecalciferol 1000 units TABS 2/24/2021 at Unknown time Nursing Home Yes Yes   Sig: Take 1,000 Units by mouth daily   donepezil (ARICEPT) 5 MG tablet 2/22/2021 at Unknown time Nursing Home Yes Yes   Sig: Take 5 mg by mouth At Bedtime   famotidine (PEPCID) 10 MG tablet 2/24/2021 at Unknown time  Yes Yes   Sig: Take 10 mg by mouth daily   fluticasone-salmeterol (ADVAIR) 500-50 MCG/DOSE inhaler Past Week at Unknown time Nursing Home Yes Yes   Sig: Inhale 1 puff into the lungs 2 times daily Rinse mouth after use.   guaiFENesin (ROBITUSSIN) 100 MG/5ML SYRP 2/23/2021 at  Unknown time Nursing Home Yes Yes   Sig: Take 5 mLs by mouth At Bedtime   haloperidol (HALDOL) 0.5 MG tablet 2/24/2021 at Unknown time Nursing Home Yes Yes   Sig: Take 0.5 mg by mouth 3 times daily   haloperidol (HALDOL) 1 MG tablet 2/23/2021 at Unknown time Nursing Home Yes Yes   Sig: Take 1 mg by mouth daily at 2 pm   magnesium 250 MG tablet 2/24/2021 at Unknown time Nursing Home Yes Yes   Sig: Take 1 tablet by mouth daily Patient takes on their own. Patient wants to buy from [facility] pharmacy.   metoprolol tartrate (LOPRESSOR) 50 MG tablet 2/24/2021 at Unknown time alf No Yes   Sig: Take 1 tablet (50 mg) by mouth 2 times daily   nitroGLYcerin (NITROSTAT) 0.4 MG sublingual tablet prn Nursing Home Yes Yes   Sig: Place 0.4 mg under the tongue every 5 minutes as needed for chest pain For chest pain place 1 tablet under the tongue every 5 minutes for 3 doses. If symptoms persist 5 minutes after 1st dose call 911.   senna (SENOKOT) 8.6 MG tablet 2/24/2021 at Unknown time Nursing Home Yes Yes   Sig: Take 1 tablet by mouth every morning   senna (SENOKOT) 8.6 MG tablet  Nursing Home Yes Yes   Sig: Take 1 tablet by mouth every evening as needed for constipation   sertraline (ZOLOFT) 100 MG tablet 2/24/2021 at Unknown time alf No Yes   Sig: Take 1.5 tablets (150 mg) by mouth daily   tamsulosin (FLOMAX) 0.4 MG capsule 2/24/2021 at Unknown time Nursing Home Yes Yes   Sig: Take 0.4 mg by mouth daily      Facility-Administered Medications: None     Allergies   Allergies   Allergen Reactions     Crustaceans      Loxapine      Nifedipine      Shellfish Allergy      Sulfa Drugs      Sulfamethoxazole-Trimethoprim      Trimethoprim        Social History   I have reviewed this patient's social history and updated it with pertinent information if needed. Hubert Murrietad  reports that he has quit smoking. He smoked 0.00 packs per day. He has never used smokeless tobacco. He reports previous alcohol use. He reports that he  "does not use drugs.    Family History   I have reviewed this patient's family history and updated it with pertinent information if needed.   None pertinent    Review of Systems   The 10 point Review of Systems is negative other than noted in the HPI. As well as I was able.    Physical Exam   Temp: 97.3  F (36.3  C) Temp src: Oral BP: (!) 168/90 Pulse: 75   Resp: 16 SpO2: 96 % O2 Device: None (Room air)    Vital Signs with Ranges  Temp:  [97.3  F (36.3  C)-97.7  F (36.5  C)] 97.3  F (36.3  C)  Pulse:  [54-75] 75  Resp:  [9-16] 16  BP: (140-168)/(71-97) 168/90  SpO2:  [95 %-100 %] 96 %  123 lbs 6.4 oz    General Appearance:  No acute distress  Neuro:       Mental Status Exam:    Awake alert, somewhat agitated and irritable.  I believe he was able to tell me he was at the hospital, but kept on saying the \"[facility]\" possibly joking.  He was unable to give me the date.  Did not know what city he was in.  He was pulling on IV lines and was not cooperative with the EEG.  He was able to follow commands with no apraxia noted.       Cranial Nerves:   Cranial nerves II through XII examined and intact.  Left facial weakness that had been noted earlier is not noted on this exam.           Motor:   5 out of 5 bilateral upper extremities and bilateral lower extremities           Reflexes: Trace throughout, downgoing toes, no clonus at the ankles       Sensory: Normal light touch x4                   Coordination:   Finger-nose-finger mild dysmetria bilaterally       Gait: Unable- patient usually wheelchair bound per pt.  Neck: no nuchal rigidity. No carotid bruits.    Extremities: No clubbing, no cyanosis, no edema  CV: RRR nl s1, s2  Resp: CTAB        Data   Results for orders placed or performed during the hospital encounter of 02/24/21 (from the past 24 hour(s))   Glucose by meter   Result Value Ref Range    Glucose 99 70 - 99 mg/dL   CBC with platelets differential   Result Value Ref Range    WBC 17.8 (H) 4.0 - 11.0 10e9/L "    RBC Count 3.71 (L) 4.4 - 5.9 10e12/L    Hemoglobin 11.1 (L) 13.3 - 17.7 g/dL    Hematocrit 34.5 (L) 40.0 - 53.0 %    MCV 93 78 - 100 fl    MCH 29.9 26.5 - 33.0 pg    MCHC 32.2 31.5 - 36.5 g/dL    RDW 15.5 (H) 10.0 - 15.0 %    Platelet Count 139 (L) 150 - 450 10e9/L    Diff Method Automated Method     % Neutrophils 76.1 %    % Lymphocytes 10.4 %    % Monocytes 12.1 %    % Eosinophils 0.7 %    % Basophils 0.2 %    % Immature Granulocytes 0.5 %    Nucleated RBCs 0 0 /100    Absolute Neutrophil 13.5 (H) 1.6 - 8.3 10e9/L    Absolute Lymphocytes 1.9 0.8 - 5.3 10e9/L    Absolute Monocytes 2.2 (H) 0.0 - 1.3 10e9/L    Absolute Eosinophils 0.1 0.0 - 0.7 10e9/L    Absolute Basophils 0.0 0.0 - 0.2 10e9/L    Abs Immature Granulocytes 0.1 0 - 0.4 10e9/L    Absolute Nucleated RBC 0.0    INR   Result Value Ref Range    INR 1.07 0.86 - 1.14   Comprehensive metabolic panel   Result Value Ref Range    Sodium 135 133 - 144 mmol/L    Potassium 3.5 3.4 - 5.3 mmol/L    Chloride 98 94 - 109 mmol/L    Carbon Dioxide 31 20 - 32 mmol/L    Anion Gap 6 3 - 14 mmol/L    Glucose 86 70 - 99 mg/dL    Urea Nitrogen 42 (H) 7 - 30 mg/dL    Creatinine 2.48 (H) 0.66 - 1.25 mg/dL    GFR Estimate 22 (L) >60 mL/min/[1.73_m2]    GFR Estimate If Black 25 (L) >60 mL/min/[1.73_m2]    Calcium 9.4 8.5 - 10.1 mg/dL    Bilirubin Total 0.9 0.2 - 1.3 mg/dL    Albumin 3.0 (L) 3.4 - 5.0 g/dL    Protein Total 7.6 6.8 - 8.8 g/dL    Alkaline Phosphatase 123 40 - 150 U/L    ALT 18 0 - 70 U/L    AST 23 0 - 45 U/L   Troponin I   Result Value Ref Range    Troponin I ES 0.021 0.000 - 0.045 ug/L   Magnesium   Result Value Ref Range    Magnesium 2.5 (H) 1.6 - 2.3 mg/dL   CT Head w/o Contrast    Narrative    CT OF THE HEAD WITHOUT CONTRAST February 24, 2021 1:06 PM     HISTORY: Trauma, rule out head injury. Mental status change, unknown  cause.    TECHNIQUE: 5 mm thick axial CT images of the head were acquired  without IV contrast material. Radiation dose for this scan was  reduced  using automated exposure control, adjustment of the mA and/or kV  according to patient size, or iterative reconstruction technique.    COMPARISON: Head CT 1/24/2021.    FINDINGS: There is moderate diffuse cerebral volume loss. There are  subtle patchy areas of decreased density in the cerebral white matter  bilaterally that are consistent with sequela of chronic small vessel  ischemic disease.    The ventricles and basal cisterns are within normal limits in  configuration given the degree of cerebral volume loss. There is no  midline shift. There are no extra-axial fluid collections.    No intracranial hemorrhage, mass or recent infarct.    The visualized paranasal sinuses are well-aerated. There is no  mastoiditis. There are no fractures of the visualized bones.      Impression    IMPRESSION: Diffuse cerebral volume loss and cerebral white matter  changes consistent with chronic small vessel ischemic disease. No  evidence for acute intracranial pathology.        RADHA HENDRIX MD   Symptomatic SARS-CoV-2 COVID-19 Virus (Coronavirus) by PCR    Specimen: Nasopharyngeal   Result Value Ref Range    SARS-CoV-2 Virus Specimen Source Nasopharyngeal     SARS-CoV-2 PCR Result NEGATIVE     SARS-CoV-2 PCR Comment (Note)    XR Chest Port 1 View    Narrative    XR CHEST PORT 1 VW 2/24/2021 1:53 PM    HISTORY: altered loc    COMPARISON: 1/23/2021      Impression    IMPRESSION: Pacer pads over the mid chest. Emphysema. Slight increased  linear interstitial opacities in the left lung base, either secondary  to edema and/or atelectasis superimposed on fibrotic changes. Normal  heart size. No pleural effusion or pneumothorax.    COLLIN MÁRQUEZ MD   Troponin I   Result Value Ref Range    Troponin I ES <0.015 0.000 - 0.045 ug/L   Troponin I   Result Value Ref Range    Troponin I ES <0.015 0.000 - 0.045 ug/L   EKG 12-lead, tracing only   Result Value Ref Range    Interpretation ECG Click View Image link to view waveform and  result            Imaging and procedures:  I personally reviewed these images.    EEG: pending

## 2021-02-25 NOTE — PROGRESS NOTES
Bedside Swallow Evaluation      02/25/21 1000   General Information   Onset of Illness/Injury or Date of Surgery 02/24/21   Referring Physician Mick Brown MD   Patient/Family Therapy Goal Statement (SLP) Patient could not state   Pertinent History of Current Problem Hubert Tamayo is a 89 year old male with PMH significant for dementia, hypertension, chronic obstructive pulmonary disease, reported CAD, recent admission for subdural hematoma who was brought to ED after a brief episode of unresponsiveness and pulselessness while on his way for CT head (as a routine follow-up of his recent subdural hematoma. He was recently in hospital from 1/24 to 1/27 for subdural hematoma (3mm). He was evaluated by neurosurgery at that time and recommended conservative management and follow-up with repeat head CT. During the same admission he was also noted with left-sided facial droop (MRI negative for stroke). He also was noted with intermittent somnolence during that hospitalization and his PTA Zyprexa was decreased.    General Observations Confused in bed, some nonsensical communication,   Past History of Dysphagia Pt was seen on 1/24/21 by SLP at [facility]. The following recommendations were provided:        Present no   Type of Evaluation   Type of Evaluation Swallow Evaluation   Oral Motor   Oral Musculature anomalies present   Structural Abnormalities present   Mucosal Quality adequate   Dentition (Oral Motor)   Dentition (Oral Motor) edentulous   Facial Symmetry (Oral Motor)   Facial Symmetry (Oral Motor) WNL   Lip Function (Oral Motor)   Lip Range of Motion (Oral Motor) unable/difficult to assess   Tongue Function (Oral Motor)   Tongue ROM (Oral Motor) WNL   Jaw Function (Oral Motor)   Jaw Function (Oral Motor) not tested   Cough/Swallow/Gag Reflex (Oral Motor)   Volitional Throat Clear/Cough (Oral Motor) reduced strength   Volitional Swallow (Oral Motor) WNL   Vocal Quality/Secretion Management  (Oral Motor)   Vocal Quality (Oral Motor) WFL   Secretion Management (Oral Motor) WNL   General Swallowing Observations   Current Diet/Method of Nutritional Intake (General Swallowing Observations, NIS) NPO   Respiratory Support (General Swallowing Observations) none   Swallowing Evaluation Clinical swallow evaluation   Clinical Swallow Evaluation   Feeding Assistance dependent   Additional evaluation(s) completed today No   Clinical Swallow Evaluation Textures Trialed Thin Liquids;Puree Textures   Clinical Swallow Eval: Thin Liquid Texture Trial   Mode of Presentation, Thin Liquids cup;self-fed   Volume of Liquid or Food Presented 2 oz   Oral Phase of Swallow WFL   Pharyngeal Phase of Swallow intact   Diagnostic Statement some bolus holding with x1 repeated swallows. No overt s/sx of aspiration   Clinical Swallow Evaluation: Puree Solid Texture Trial   Mode of Presentation, Puree spoon;self-fed   Volume of Puree Presented 4 oz   Oral Phase, Puree WFL   Pharyngeal Phase, Puree intact   Diagnostic Statement Pt consumed pudding with no concerns with safety or function, difficulty self-feeding   Esophageal Phase of Swallow   Patient reports or presents with symptoms of esophageal dysphagia No   Swallowing Recommendations   Diet Consistency Recommendations dysphagia level 1 (pureed);thin liquids   Supervision Level for Intake 1:1 supervision needed   Mode of Delivery Recommendations bolus size, small;no straws;slow rate of intake   Swallowing Maneuver Recommendations alternate food and liquid intake   Recommended Feeding/Eating Techniques (Swallow Eval) maintain upright posture during/after eating for 30 minutes   Medication Administration Recommendations, Swallowing (SLP) Whole 1 at a time   Instrumental Assessment Recommendations instrumental evaluation not recommended at this time   Comment, Swallowing Recommendations Patient tolerated thin liquids with mild oral deficits and no overt s/sx of aspiration. He consumed  entire cup of purees with no difficulties. No teeth so did not participate in further trials.   General Therapy Interventions   Planned Therapy Interventions Dysphagia Treatment   Dysphagia treatment Modified diet education;Instruction of safe swallow strategies;Compensatory strategies for swallowing   SLP Therapy Assessment/Plan   Criteria for Skilled Therapeutic Interventions Met (SLP Eval) yes;treatment indicated   SLP Diagnosis Mild-moderate oral pharyngeal dysphagia    Rehab Potential (SLP Eval) good, to achieve stated therapy goals   Therapy Frequency (SLP Eval) 3 times/wk   Predicted Duration of Therapy Intervention (SLP Eval) 1 week   Comment, Therapy Assessment/Plan (SLP) Patient presents with mild-moderate oral pharyngeal dysphagia secondary to AMS. Pt without teeth impacting advanced texture trials. Pt tolerated thin and pureed consistency with assistance and support for PO and small bites. Recommend dysphagia diet level 1 with thin liquids. Strict swallow precautions: no straws, upright for all PO, alternate consistencies, slow intake, and small bites/sips.   Therapy Plan Review/Discharge Plan (SLP)   Therapy Plan Review (SLP) evaluation/treatment results reviewed;care plan/treatment goals reviewed;risks/benefits reviewed;current/potential barriers reviewed;participants voiced agreement with care plan;participants included;patient   SLP Discharge Planning    SLP Discharge Recommendation (DC Rec) Transitional Care Facility   SLP Rationale for DC Rec Patient below baseline, will likely require assist and ongoing SLP upon discharge to return to least restrictive diet.    SLP Brief overview of current status  Recommend 1. Dysphagia Diet Level 1 with thin liquids. 2. 1:1 supervision and assist. must be fully upright and alert for PO intake, encourage small bites and alternation of liquids and solids.     Total Evaluation Time   Total Evaluation Time (Minutes) 25

## 2021-02-25 NOTE — CONSULTS
Care Management Initial Consult    General Information  Assessment completed with: Family, GODFREY-chart reviewAldair  Type of CM/SW Visit: Initial Assessment    Primary Care Provider verified and updated as needed:     Readmission within the last 30 days: current reason for admission unrelated to previous admission   Return Category: New Diagnosis  Reason for Consult: discharge planning  Advance Care Planning:            Communication Assessment  Patient's communication style: spoken language (English or Bilingual)    Hearing Difficulty or Deaf: no   Wear Glasses or Blind: yes    Cognitive  Cognitive/Neuro/Behavioral: .WDL except(pt refused noon assessment)  Level of Consciousness: somnolent  Arousal Level: arouses to voice, arouses to touch/gentle shaking  Orientation: disoriented to, place, time, situation  Mood/Behavior: cooperative     Speech: garbled, illogical    Living Environment:   People in home: facility resident     Current living Arrangements: other (see comments)(TCU)  Name of Facility: [facility]   Able to return to prior arrangements:         Family/Social Support:  Care provided by: self, homecare agency, child(tommy)  Provides care for: no one, unable/limited ability to care for self  Marital Status: Single             Description of Support System:           Current Resources:   Patient receiving home care services:       Community Resources:    Equipment currently used at home: wheelchair, manual  Supplies currently used at home:      Employment/Financial:  Employment Status: retired        Financial Concerns: No concerns identified           Lifestyle & Psychosocial Needs:        Socioeconomic History     Marital status: Single     Spouse name: Not on file     Number of children: Not on file     Years of education: Not on file     Highest education level: Not on file     Tobacco Use     Smoking status: Former Smoker     Packs/day: 0.00     Smokeless tobacco: Never Used   Substance and Sexual  Activity     Alcohol use: Not Currently     Drug use: Never       Functional Status:  Prior to admission patient needed assistance:              Mental Health Status:          Chemical Dependency Status:                Values/Beliefs:  Spiritual, Cultural Beliefs, Protestant Practices, Values that affect care:                 Additional Information:  Case Management consult for discharge planning. Patient admitted on 2/24/21 for unresponsive episode with a tenative discharge date TBD. Writer reviewed chart and placed a call to patient's son Aldair. Aldair confirmed that patient had been at [facility] when he was transported to [facility]. He would like patient to return. When discussing transportation Aldair stated that there is [facility]. Call placed to [place] with [facility] who confirms that patient has a bed hold. She reported that she reached out to the facility regarding tranpsortaiton but the transprotation that was used from facility, included an aide that rode with patient. Writer confirmed that when patient was discharge in January, [facility] was who transported him via stretcher. Aldair would like to be updated with plan.    Will continue to follow    MARIO Oliveira

## 2021-02-25 NOTE — PLAN OF CARE
VSS. Neuro's intact except for pinpoint pupils and a slight left facial droop. Garbled speech. Disoriented x3. Started to fully wake up at about midnight. He is paranoid, talking to himself in his room, and pulling on telemetry leads. IV is infusing. Trops negative. Tele SB. External condom cath with adequate urine output. NPO except ice chips.

## 2021-02-25 NOTE — PROVIDER NOTIFICATION
MD Notification    Notified Person: MD    Notified Person Name: Reece    Notification Date/Time: 02/25/21 @ 1:08 PM     Notification Interaction: web paged MD    Purpose of Notification: FYI - Patient refused AM labs and pulled out IV - not allowing staff to place another one.     Orders Received:    Comments:    @ 3:04 PM re-paged MD - Are you OK with no IV access? Also do you want to continue Q4H neuro checks? Patient declined noon assessment.    Orders Received:  OK for no IV access tonight. Change neuro checks to Q8H.

## 2021-02-25 NOTE — CONSULTS
"CLINICAL NUTRITION SERVICES  -  ASSESSMENT NOTE    Recommendations Ordered by Registered Dietitian (RD):   - Boost Plus BID between meals   - Encourage adequate and balance meals TID   Malnutrition: (2/25)   % Weight Loss:  > 5% in 1 month (severe malnutrition)  % Intake:  </= 75% for >/= 1 month (severe malnutrition)  Subcutaneous Fat Loss:  Orbital region severe depletion and Upper arm region moderate depletion  Muscle Loss:  Temporal region moderate depletion, Clavicle bone region moderate depletion, Acromion bone region moderate depletion and Dorsal hand region moderate depletion  Fluid Retention:  None noted    Malnutrition Diagnosis: Severe malnutrition  In Context of:  Acute illness or injury  Chronic illness or disease     REASON FOR ASSESSMENT  Hubert Tamayo is a 89 year old male seen by Registered Dietitian for Patient/Family Request    NUTRITION HISTORY  - Information obtained from chart review and patient report.   - Chart review indicates recent admission for SDH 1/24-1/27. Hubert then transferred to a TCU/LTC facility.   - Poor PO reported while at facility.     - Hubert is a very pleasant patient who provides some accurate information regarding weight trending, however once further into conversation tended to mumble and bring up irrelevant details.   - He states that he is eating poorly.   - Shellfish allergy       CURRENT NUTRITION ORDERS  Diet Order:     Dysphagia Diet Level 1 + Thin Liquids (per SLP)    Current Intake/Tolerance:  Pt had consumed 100% of a portion of hot cereal and pureed berries prior to visit. Asked if he would be interested in shakes, but he felt that due to this lack of teeth he might not be able to tolerate?   Appetite per patient is \"poor\" and has been poor.     NUTRITION FOCUSED PHYSICAL ASSESSMENT FOR DIAGNOSING MALNUTRITION)  Yes         Observed:    Muscle wasting (refer to documentation in Malnutrition section) and Subcutaneous fat loss (refer to documentation in Malnutrition " "section)    Obtained from Chart/Interdisciplinary Team:  Williams - Nutrition 1; Total 12  Last BM not recorded     ANTHROPOMETRICS  Height: 5' 8\"  Weight: 123 lbs 6.4 oz (56 kg)   Body mass index is 18.76 kg/m .  Weight Status:  Normal BMI  IBW: 70 kg   % IBW: 80%  Weight History: 11# loss indicated since last admission (8.2%). Patient endorsed significant weight loss in an unknown period of time.   Wt Readings from Last 10 Encounters:   02/24/21 56 kg (123 lb 6.4 oz)   01/24/21 61 kg (134 lb 7.7 oz)   11/18/20 65.2 kg (143 lb 12.8 oz)   06/27/20 65.3 kg (144 lb)   11/18/19 64.2 kg (141 lb 8 oz)   10/29/19 64.9 kg (143 lb)   09/15/19 68.5 kg (151 lb 1.6 oz)       LABS  Labs reviewed  BUN 42 (H), Cr 2.48 (H)  No results found for: A1C    MEDICATIONS  Medications reviewed  NaCl IVF @ 75 mL/hr     ASSESSED NUTRITION NEEDS PER APPROVED PRACTICE GUIDELINES:  Dosing Weight 56 kg   Estimated Energy Needs: 3682-5662+ kcals (25-30+ Kcal/Kg)  Justification: maintenance and underweight  Estimated Protein Needs: 67-84 grams protein (1.2-1.5 g pro/Kg)  Justification: Repletion  Estimated Fluid Needs: 1 mL/kcal  Justification: maintenance    MALNUTRITION:  % Weight Loss:  > 5% in 1 month (severe malnutrition)  % Intake:  </= 75% for >/= 1 month (severe malnutrition)  Subcutaneous Fat Loss:  Orbital region severe depletion and Upper arm region moderate depletion  Muscle Loss:  Temporal region moderate depletion, Clavicle bone region moderate depletion, Acromion bone region moderate depletion and Dorsal hand region moderate depletion  Fluid Retention:  None noted    Malnutrition Diagnosis: Severe malnutrition  In Context of:  Acute illness or injury  Chronic illness or disease    NUTRITION DIAGNOSIS:  Malnutrition related to inadequate oral intake due to poor appetite as evidenced by reported \"poor\" appetite for at least the past 1 month, weight loss of 8.2% in the past 1 month.       NUTRITION INTERVENTIONS  Recommendations / " Nutrition Prescription  Diet per SLP  Boost Plus BID between meals  Encourage adequate and balance meals TID    Implementation  Nutrition education: Per Provider order if indicated   Medical Food Supplement: As above      Nutrition Goals  Intake of at least 50% meals TID + 1-2 supplements daily.       MONITORING AND EVALUATION:  Progress towards goals will be monitored and evaluated per protocol and Practice Guidelines    Nuvia Brink RD, LD  [facility] [place], 66, 55, [facility]   Pager: [phone]  Weekend Pager: [phone]

## 2021-02-25 NOTE — PROGRESS NOTES
02/25/21 1100   Quick Adds   Type of Visit Initial Occupational Therapy Evaluation   Living Environment   Living Environment Comments Per chart from recent admission, pt admitted from TCU. Pt unable to provide any living history informantion this session.    Self-Care   Current Activity Tolerance fair   Equipment Currently Used at Home wheelchair, manual   Activity/Exercise/Self-Care Comment Pt is poor historian, unable to provide baseine ADL independence. Contacted pt's son, Aldair, however, son unwilling to give baseline information.   Disability/Function   Hearing Difficulty or Deaf no   Concentrating, Remembering or Making Decisions Difficulty yes   Difficulty Communicating yes   Communication difficulty speaking   Communication Management garbled speech   Difficulty Eating/Swallowing yes   Eating/Swallowing Management Per speech: Recommend 1. Dysphagia Diet Level 1 with thin liquids. 2. 1:1 supervision and assist. must be fully upright and alert for PO intake, encourage small bites and alternation of liquids and solids.    Walking or Climbing Stairs Difficulty yes   Walking or Climbing Stairs ambulation difficulty, requires equipment;ambulation difficulty, assistance 1 person   Mobility Management w/c, manual   Dressing/Bathing Difficulty yes   Toileting issues yes   Toileting Management   (condom cath)   Doing Errands Independently Difficulty (such as shopping) yes   Change in Functional Status Since Onset of Current Illness/Injury yes   General Information   Onset of Illness/Injury or Date of Surgery 02/24/21   Referring Physician  Mick Brown MD   Patient/Family Therapy Goal Statement (OT) Pt unable to state OT goal   Additional Occupational Profile Info/Pertinent History of Current Problem Patient is 89 YO M admitted after episode of unresponsiveness during Head CT. He had history of recent admission for SDH from 1/24-1/27/2021. Current work-up has been unremarkable and patient did not require  CPR, episode thought to be syncope per MD notes. PMH: dementia, HTN, COPD, CAD   Existing Precautions/Restrictions fall  (tele, hx of dementia)   Limitations/Impairments safety/cognitive   Left Upper Extremity (Weight-bearing Status) full weight-bearing (FWB)   Right Upper Extremity (Weight-bearing Status) full weight-bearing (FWB)   Left Lower Extremity (Weight-bearing Status) full weight-bearing (FWB)   Right Lower Extremity (Weight-bearing Status) full weight-bearing (FWB)   Cognitive Status Examination   Orientation Status person   Affect/Mental Status (Cognitive) confused   Follows Commands 0-24% accuracy   Safety Deficit safety precautions follow-through/compliance;judgment;impulsivity   Sensory   Sensory Quick Adds No deficits were identified   Pain Assessment   Patient Currently in Pain No   Range of Motion Comprehensive   General Range of Motion bilateral upper extremity ROM WFL   Strength Comprehensive (MMT)   Comment, General Manual Muscle Testing (MMT) Assessment 4+/5 in BUE   Coordination   Coordination Comments unable to fully assess   Bed Mobility   Bed Mobility supine-sit;sit-supine   Supine-Sit [place] (Bed Mobility) minimum assist (75% patient effort)   Sit-Supine [place] (Bed Mobility) minimum assist (75% patient effort)   Balance   Balance Assessment sitting balance: static   Sitting Balance: Static WFL   Clinical Impression   Criteria for Skilled Therapeutic Interventions Met (OT) yes   OT Diagnosis impaired ADL independence   OT Problem List-Impairments impacting ADL problems related to;activity tolerance impaired;cognition;communication;strength   ADL comments/analysis bathing, dressing toileting, transfers,    Assessment of Occupational Performance 3-5 Performance Deficits   Identified Performance Deficits decreased strength, impaired activity tolerance, imparired cognition/safety, impaired functional mobility   Planned Therapy Interventions (OT) ADL  retraining;strengthening;cognition   Clinical Decision Making Complexity (OT) high complexity   Therapy Frequency (OT) 2x/week   Predicted Duration of Therapy 7   Anticipated Equipment Needs Upon Discharge (OT) bathing equipment;gait belt;shower chair;reacher;raised toilet seat   Risk & Benefits of therapy have been explained evaluation/treatment results reviewed;care plan/treatment goals reviewed;risks/benefits reviewed;current/potential barriers reviewed;participants voiced agreement with care plan;participants included;patient   OT Discharge Planning    OT Discharge Recommendation (DC Rec) Long term care facility   OT Rationale for DC Rec Pt with decreaed strength, activity tolerance and impaired congition. Pt will benefit from 24/7 care to maximize safety and independence with ADLs   Total Evaluation Time (Minutes)   Total Evaluation Time (Minutes) 19

## 2021-02-25 NOTE — PROGRESS NOTES
"   02/25/21 0815   Quick Adds   Type of Visit Initial PT Evaluation   Living Environment   People in home alone   Living Environment Comments Per chart from recent admission, patient admitted from TCU.    Self-Care   Usual Activity Tolerance moderate   Current Activity Tolerance fair   Equipment Currently Used at Home wheelchair, manual   Activity/Exercise/Self-Care Comment Patient unable to provide any significant subjective history but repeated several times during session \"I can't walk anymore\"   Disability/Function   Hearing Difficulty or Deaf no   Wear Glasses or Blind yes   Vision Management glasses   Concentrating, Remembering or Making Decisions Difficulty yes   Difficulty Communicating yes   Communication difficulty speaking   Communication Management garbled speech   General Information   Onset of Illness/Injury or Date of Surgery 02/24/21   Referring Physician Mick Brown MD   Patient/Family Therapy Goals Statement (PT) to get discharged   Pertinent History of Current Problem (include personal factors and/or comorbidities that impact the POC) Patient is 89 YO M admitted after episode of unresponsiveness during Head CT. He had history of recent admission for SDH from 1/24-1/27/2021. Current work-up has been unremarkable and patient did not require CPR, episode thought to be syncope per MD notes. PMH: dementia, HTN, COPD, CAD   Existing Precautions/Restrictions fall   Weight-Bearing Status - LUE full weight-bearing   Weight-Bearing Status - RUE full weight-bearing   Weight-Bearing Status - LLE full weight-bearing   Weight-Bearing Status - RLE full weight-bearing   General Observations Activity orders: up ad cara; Discussion with JOHNNY Rendon prior to PT session who reports patient is medically stable and appropriate for PT.    Cognition   Orientation Status (Cognition) oriented to;person   Affect/Mental Status (Cognition) agitated;confused   Follows Commands (Cognition) follows one-step " commands;0-24% accuracy;initiation impaired;physical/tactile prompts required;repetition of directions required;verbal cues/prompting required   Behavioral Issues difficulty managing stress;verbal outbursts;uncooperative;inappropriate language;overwhelmed easily   Safety Deficit (Cognition) severe deficit;ability to follow commands;impulsivity;insight into deficits/self-awareness;judgment;problem-solving   Cognitive Status Comments Patient intermittently agitated, initially re-directable but sitting edge of bed patient was not following any cues to initiate transfers and increased difficulty re-directing patient   Pain Assessment   Patient Currently in Pain No   Integumentary/Edema   Integumentary/Edema no deficits were identifed   Posture    Posture Protracted shoulders;Forward head position   Range of Motion (ROM)   ROM Quick Adds ROM WFL   Strength   Strength Comments L > R LE weakness; R hip flexion 4/5, L hip flexion 3+/5; difficulty following commands for further strength testing   Bed Mobility   Bed Mobility supine-sit;sit-supine   Supine-Sit [place] (Bed Mobility) moderate assist (50% patient effort)   Sit-Supine [place] (Bed Mobility) supervision   Bed Mobility Limitations cognitive deficits   Assistive Device (Bed Mobility) bed rails   Comment (Bed Mobility) Patient required manual guidance to initiate supine to sit bed mobility, mod assist to bring trunk upright   Transfers   Transfer Safety Comments Attempted to initiate transfers but patient verbally and physically resisting.    Gait/Stairs (Locomotion)   Comment (Gait/Stairs) Unable to attempt   Balance   Balance Comments Min assist via hand hold for sitting edge of bed   Sensory Examination   Sensory Perception Comments Unable to obtain accurate assessment due to impaired cognition.    Clinical Impression   Criteria for Skilled Therapeutic Intervention yes, treatment indicated   PT Diagnosis (PT) impaired mobility   Influenced by the  following impairments weakness, impaired cognition, decreased activity tolerance, impaired balance   Functional limitations due to impairments incresaed difficulty with bed mobility and transfers   Clinical Presentation Evolving/Changing   Clinical Presentation Rationale pending further work up, PMH, recent admission, cognition   Clinical Decision Making (Complexity) moderate complexity   Therapy Frequency (PT) 5x/week   Predicted Duration of Therapy Intervention (days/wks) 1 week   Planned Therapy Interventions (PT) balance training;bed mobility training;gait training;patient/family education;strengthening;transfer training;wheelchair management/propulsion training   Anticipated Equipment Needs at Discharge (PT)   (Has his manual wheelchair in room)   Risk & Benefits of therapy have been explained evaluation/treatment results reviewed;care plan/treatment goals reviewed;risks/benefits reviewed;current/potential barriers reviewed;participants voiced agreement with care plan;participants included;patient   Clinical Impression Comments Patient uncooperative for majority of session, reporting fear of transfers upon multiple attempts to initiate   PT Discharge Planning    PT Discharge Recommendation (DC Rec) Transitional Care Facility   PT Rationale for DC Rec Patient with limited cooperation during mobility, he would need further skilled PT intervention to progress mobility; If patient lives in a LTC facility he would likely be able to return there at discharge if they area able to manage his current level of +2 assist   PT Brief overview of current status  Supine > SIt Mod A, Sit to supine SBA; Uncooperative with transfers   Total Evaluation Time   Total Evaluation Time (Minutes) 14

## 2021-02-25 NOTE — PROGRESS NOTES
Routine EEG  completed at patient's bedside. Procedure explained to patient prior to testing.   Ordered by Dr Mares

## 2021-02-25 NOTE — PROGRESS NOTES
[facility]    Hospitalist Progress Note    Brief Summary:  Hubert Tamayo is a 89 year old male with PMH significant for dementia, hypertension, chronic obstructive pulmonary disease, reported CAD, recent admission for subdural hematoma who was brought to ED after a brief episode of unresponsiveness and pulselessness while on his way for CT head (as a routine follow-up of his recent subdural hematoma).     He was recently in hospital from 1/24 to 1/27 for subdural hematoma (3mm). He was evaluated by neurosurgery at that time and recommended conservative management and follow-up with repeat head CT. During the same admission he was also noted with left-sided facial droop (MRI negative for stroke). He also was noted with intermittent somnolence during that hospitalization and his PTA Zyprexa was decreased.     Assessment & Plan        Acute encephalopathy secondary to Syncope now improve.   chronic left facial droop  recent admission for subdural hematoma (1/24 to 1/27)  Admitted with episode of unresponsiveness and most likely secondary to Syncope is now resolved  At this time, remain hemodynamically stable.   -although initial concern for cardiac arrest, he never required any CPR and had pulse with slight improvement in his responsiveness by the time he came to the ED  He is awake and alert and pleasantly confused   CT head shows diffuse cerebral volume loss and small vessel ischemic changes, no acute hemorrhage or infarct. no SDH on this study. chest x-ray unremarkable except emphysema changes; WBC 17.8-- likely stress response;  serial troponin remain negative.   No EKG available to review on admission, will do the EKG while here and keep him on telemetry   Echo order and shows Normal EF of 55-60%, technically difficult and sub optimal study     -agree with holding any narcotics/sedatives; PTA Haldol, Zyprexa, Zoloft     FEN/nutrition  H/o aspiration pneumonia  -per family, he has had very  poor PO intake at the nursing home  -clinically looks very dehydrated on admission, started on IV fluids.   Now on dyphagia diet, will stop the IV fluids later today.      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   -will hold off on PTA Haldol, Zyprexa, Zoloft,, will resume now as on occasion aggitated.        DVT Prophylaxis: Pneumatic Compression Devices  Code Status: Full Code    Disposition: Expected discharge tomorrow if remain stable.     Hemant Newell MD  Text Page  (7am - 6pm)    Interval History   Patient awake and alert and laying in bed, pleasantly confused.   Remain comfortable.     No other significant event overnight.     -Data reviewed today: I reviewed all new labs and imaging results over the last 24 hours. I personally reviewed no images or EKG's today.    Physical Exam   Temp: 97.3  F (36.3  C) Temp src: Oral BP: (!) 168/90 Pulse: 75   Resp: 16 SpO2: 96 % O2 Device: None (Room air)    Vitals:    02/24/21 2319   Weight: 56 kg (123 lb 6.4 oz)     Vital Signs with Ranges  Temp:  [97.3  F (36.3  C)-97.5  F (36.4  C)] 97.3  F (36.3  C)  Pulse:  [54-75] 75  Resp:  [9-16] 16  BP: (146-168)/(76-97) 168/90  SpO2:  [95 %-100 %] 96 %  I/O last 3 completed shifts:  In: 928.75 [P.O.:60; I.V.:868.75]  Out: 200 [Urine:200]    Constitutional: awake, alert,  no apparent distress, and appears stated age  Eyes: Lids and lashes normal, pupils equal, round and reactive to light, extra ocular muscles intact, sclera clear, conjunctiva normal  Respiratory: No increased work of breathing, good air exchange, clear to auscultation bilaterally, no crackles or wheezing  Cardiovascular: Normal apical impulse, regular rate and rhythm, normal S1 and S2, no S3 or S4, and no murmur noted  GI: No scars, normal bowel sounds, soft, non-distended,  non-tender, no masses palpated, no hepatosplenomegally  Musculoskeletal: no lower extremity pitting edema present  Neurologic: no focal deficit.     Medications     sodium chloride 75 mL/hr at 21 1825       acetaminophen  650 mg Oral 4x Daily     atorvastatin  80 mg Oral Daily     donepezil  5 mg Oral At Bedtime     famotidine  10 mg Oral Daily     fluticasone-vilanterol  1 puff Inhalation Daily     [Held by provider] haloperidol  0.5 mg Oral TID     [Held by provider] haloperidol  1 mg Oral Daily     [Held by provider] OLANZapine  15 mg Oral At Bedtime     [Held by provider] sertraline  150 mg Oral Daily     tamsulosin  0.4 mg Oral Daily     umeclidinium  1 puff Inhalation Daily       Data   Recent Labs   Lab 21  1250   WBC  --   --  17.8*   HGB  --   --  11.1*   MCV  --   --  93   PLT  --   --  139*   INR  --   --  1.07   NA  --   --  135   POTASSIUM  --   --  3.5   CHLORIDE  --   --  98   CO2  --   --  31   BUN  --   --  42*   CR  --   --  2.48*   ANIONGAP  --   --  6   RAMON  --   --  9.4   GLC  --   --  86   ALBUMIN  --   --  3.0*   PROTTOTAL  --   --  7.6   BILITOTAL  --   --  0.9   ALKPHOS  --   --  123   ALT  --   --  18   AST  --   --  23   TROPI <0.015 <0.015 0.021       Recent Results (from the past 24 hour(s))   Echocardiogram Complete    Narrative    [id number]  [id number]  [id number]  [id number]^[email or web address]^BECKY^ARNULFO           [facility]  Echocardiography Laboratory  [address] [place], [place] [address]        Name: VEDA RUSHING  MRN: [id number]  : 1929  Study Date: 2021 08:34 AM  Age: 89 yrs  Gender: Male  Patient Location: [facility]  Reason For Study: Syncope  Ordering Physician: BECKY MARTE  Referring Physician: Carolee Bales  Performed By: Loreto Guardado     BSA: 1.7 m2  Height: 68 in  Weight: 123 lb  HR: 58  BP: 146/85 mmHg  _____________________________________________________________________________  __        Procedure  Limited  Portable Echo Adult. Optison (NDC #[id number]) given intravenously.  _____________________________________________________________________________  __        Interpretation Summary     The left ventricle is normal in size.  Left ventricular systolic function is normal.  The visual ejection fraction is estimated at 55-60%.  Regional wall motion abnormalities cannot be excluded due to limited  visualization.  Inadequate evaluation of cardiac valves. No significant AS is suspected.  Technically difficult, suboptimal study.  _____________________________________________________________________________  __        Left Ventricle  The left ventricle is normal in size. Left ventricular systolic function is  normal. The visual ejection fraction is estimated at 55-60%. Left ventricular  diastolic function is not assessable. Regional wall motion abnormalities  cannot be excluded due to limited visualization.     Right Ventricle  The right ventricle is normal in structure, function and size.     Atria  The left atrium is not well visualized. Right atrium not well visualized.     Mitral Valve  There is no mitral regurgitation noted. Evaluation of regurgitation is  inadequate.        Tricuspid Valve  The tricuspid valve is not well visualized. Right ventricular systolic  pressure could not be approximated due to inadequate tricuspid regurgitation.     Aortic Valve  The aortic valve is trileaflet with aortic valve sclerosis. No hemodynamically  significant valvular aortic stenosis.     Pulmonic Valve  There is trace pulmonic valvular regurgitation.     Vessels  The aortic root is normal size.     Pericardium  There is no pericardial effusion.     _____________________________________________________________________________  __  MMode/2D Measurements & Calculations  IVSd: 0.81 cm  LVIDd: 4.2 cm  LVIDs: 3.0 cm  LVPWd: 0.96 cm  FS: 28.2 %  LV mass(C)d: 113.9 grams  LV mass(C)dI: 68.5 grams/m2     Ao root diam: 3.5 cm  LA dimension:  2.4 cm  LA/Ao: 0.67  RWT: 0.46        Doppler Measurements & Calculations  PA acc time: 0.05 sec           _____________________________________________________________________________  __           Report approved by: Gloria Urena 02/25/2021 01:25 PM

## 2021-02-26 LAB
ALBUMIN SERPL-MCNC: 2.8 G/DL (ref 3.4–5)
ANION GAP SERPL CALCULATED.3IONS-SCNC: 2 MMOL/L (ref 3–14)
BASOPHILS # BLD AUTO: 0 10E9/L (ref 0–0.2)
BASOPHILS NFR BLD AUTO: 0.3 %
BUN SERPL-MCNC: 36 MG/DL (ref 7–30)
CALCIUM SERPL-MCNC: 9.4 MG/DL (ref 8.5–10.1)
CHLORIDE SERPL-SCNC: 104 MMOL/L (ref 94–109)
CO2 SERPL-SCNC: 29 MMOL/L (ref 20–32)
CREAT SERPL-MCNC: 1.99 MG/DL (ref 0.66–1.25)
DIFFERENTIAL METHOD BLD: ABNORMAL
EOSINOPHIL # BLD AUTO: 0.1 10E9/L (ref 0–0.7)
EOSINOPHIL NFR BLD AUTO: 1.4 %
ERYTHROCYTE [DISTWIDTH] IN BLOOD BY AUTOMATED COUNT: 15.1 % (ref 10–15)
GFR SERPL CREATININE-BSD FRML MDRD: 28 ML/MIN/{1.73_M2}
GLUCOSE SERPL-MCNC: 89 MG/DL (ref 70–99)
HCT VFR BLD AUTO: 33.6 % (ref 40–53)
HGB BLD-MCNC: 11 G/DL (ref 13.3–17.7)
IMM GRANULOCYTES # BLD: 0 10E9/L (ref 0–0.4)
IMM GRANULOCYTES NFR BLD: 0.4 %
LYMPHOCYTES # BLD AUTO: 1.2 10E9/L (ref 0.8–5.3)
LYMPHOCYTES NFR BLD AUTO: 13.3 %
MCH RBC QN AUTO: 30.1 PG (ref 26.5–33)
MCHC RBC AUTO-ENTMCNC: 32.7 G/DL (ref 31.5–36.5)
MCV RBC AUTO: 92 FL (ref 78–100)
MONOCYTES # BLD AUTO: 1 10E9/L (ref 0–1.3)
MONOCYTES NFR BLD AUTO: 10.2 %
NEUTROPHILS # BLD AUTO: 6.9 10E9/L (ref 1.6–8.3)
NEUTROPHILS NFR BLD AUTO: 74.4 %
NRBC # BLD AUTO: 0 10*3/UL
NRBC BLD AUTO-RTO: 0 /100
PHOSPHATE SERPL-MCNC: 2.9 MG/DL (ref 2.5–4.5)
PLATELET # BLD AUTO: 125 10E9/L (ref 150–450)
POTASSIUM SERPL-SCNC: 3.9 MMOL/L (ref 3.4–5.3)
RBC # BLD AUTO: 3.66 10E12/L (ref 4.4–5.9)
SODIUM SERPL-SCNC: 135 MMOL/L (ref 133–144)
WBC # BLD AUTO: 9.3 10E9/L (ref 4–11)

## 2021-02-26 PROCEDURE — 99233 SBSQ HOSP IP/OBS HIGH 50: CPT | Performed by: INTERNAL MEDICINE

## 2021-02-26 PROCEDURE — 36415 COLL VENOUS BLD VENIPUNCTURE: CPT | Performed by: INTERNAL MEDICINE

## 2021-02-26 PROCEDURE — 250N000013 HC RX MED GY IP 250 OP 250 PS 637: Performed by: HOSPITALIST

## 2021-02-26 PROCEDURE — 85025 COMPLETE CBC W/AUTO DIFF WBC: CPT | Performed by: INTERNAL MEDICINE

## 2021-02-26 PROCEDURE — 99232 SBSQ HOSP IP/OBS MODERATE 35: CPT | Performed by: INTERNAL MEDICINE

## 2021-02-26 PROCEDURE — 80069 RENAL FUNCTION PANEL: CPT | Performed by: INTERNAL MEDICINE

## 2021-02-26 PROCEDURE — 120N000001 HC R&B MED SURG/OB

## 2021-02-26 PROCEDURE — 250N000013 HC RX MED GY IP 250 OP 250 PS 637: Performed by: INTERNAL MEDICINE

## 2021-02-26 RX ADMIN — ACETAMINOPHEN 650 MG: 325 TABLET, FILM COATED ORAL at 09:39

## 2021-02-26 RX ADMIN — OLANZAPINE 15 MG: 5 TABLET, FILM COATED ORAL at 21:16

## 2021-02-26 RX ADMIN — HALOPERIDOL 0.5 MG: 0.5 TABLET ORAL at 21:16

## 2021-02-26 RX ADMIN — HALOPERIDOL 0.5 MG: 0.5 TABLET ORAL at 16:35

## 2021-02-26 RX ADMIN — ATORVASTATIN CALCIUM 80 MG: 40 TABLET, FILM COATED ORAL at 09:42

## 2021-02-26 RX ADMIN — METOPROLOL TARTRATE 50 MG: 50 TABLET, FILM COATED ORAL at 21:15

## 2021-02-26 RX ADMIN — FLUTICASONE FUROATE AND VILANTEROL TRIFENATATE 1 PUFF: 200; 25 POWDER RESPIRATORY (INHALATION) at 09:44

## 2021-02-26 RX ADMIN — DONEPEZIL HYDROCHLORIDE 5 MG: 5 TABLET, FILM COATED ORAL at 21:16

## 2021-02-26 RX ADMIN — ACETAMINOPHEN 650 MG: 325 TABLET, FILM COATED ORAL at 21:15

## 2021-02-26 RX ADMIN — ACETAMINOPHEN 650 MG: 325 TABLET, FILM COATED ORAL at 13:46

## 2021-02-26 RX ADMIN — FAMOTIDINE 10 MG: 10 TABLET ORAL at 09:42

## 2021-02-26 RX ADMIN — UMECLIDINIUM 1 PUFF: 62.5 AEROSOL, POWDER ORAL at 09:43

## 2021-02-26 RX ADMIN — TAMSULOSIN HYDROCHLORIDE 0.4 MG: 0.4 CAPSULE ORAL at 09:42

## 2021-02-26 RX ADMIN — SERTRALINE HYDROCHLORIDE 150 MG: 100 TABLET ORAL at 09:41

## 2021-02-26 RX ADMIN — HALOPERIDOL 0.5 MG: 0.5 TABLET ORAL at 09:40

## 2021-02-26 RX ADMIN — METOPROLOL TARTRATE 50 MG: 50 TABLET, FILM COATED ORAL at 09:40

## 2021-02-26 ASSESSMENT — ACTIVITIES OF DAILY LIVING (ADL)
ADLS_ACUITY_SCORE: 24
ADLS_ACUITY_SCORE: 24
ADLS_ACUITY_SCORE: 30
ADLS_ACUITY_SCORE: 28
ADLS_ACUITY_SCORE: 24
ADLS_ACUITY_SCORE: 22

## 2021-02-26 NOTE — PROGRESS NOTES
[facility]    Hospitalist Progress Note    Brief Summary:  Hubert Tamayo is a 89 year old male with PMH significant for dementia, hypertension, chronic obstructive pulmonary disease, reported CAD, recent admission for subdural hematoma who was brought to ED after a brief episode of unresponsiveness and pulselessness while on his way for CT head (as a routine follow-up of his recent subdural hematoma).     He was recently in hospital from 1/24 to 1/27 for subdural hematoma (3mm). He was evaluated by neurosurgery at that time and recommended conservative management and follow-up with repeat head CT. During the same admission he was also noted with left-sided facial droop (MRI negative for stroke). He also was noted with intermittent somnolence during that hospitalization and his PTA Zyprexa was decreased.     Assessment & Plan        Acute encephalopathy secondary to Syncope now improve.   chronic left facial droop  recent admission for subdural hematoma (1/24 to 1/27)  Admitted with episode of unresponsiveness and most likely secondary to Syncope is now resolved  At this time, remain hemodynamically stable.   -although initial concern for cardiac arrest, he never required any CPR and had pulse with slight improvement in his responsiveness by the time he came to the ED  He is awake and alert and pleasantly confused   CT head shows diffuse cerebral volume loss and small vessel ischemic changes, no acute hemorrhage or infarct. no SDH on this study. chest x-ray unremarkable except emphysema changes; WBC 17.8-- likely stress response;  serial troponin remain negative.   No EKG available to review on admission, EKG reviewed  Cardiology is consulted and following. Re-evaluation today pending.   Echo order and shows Normal EF of 55-60%, technically difficult and sub optimal study     -agree with holding any narcotics/sedatives; PTA Haldol, Zyprexa, Zoloft     FEN/nutrition  H/o aspiration  pneumonia  -per family, he has had very poor PO intake at the nursing home  -clinically looks very dehydrated on admission, started on IV fluids.   Now on dyphagia diet, IV fluids now discontinued.      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   -will hold off on PTA Haldol, Zyprexa, Zoloft,, will resume now as on occasion aggitated.     Overall stable at this time.   Can go back to care facility once clear by cardiology.        DVT Prophylaxis: Pneumatic Compression Devices  Code Status: Full Code    Disposition: Expected discharge once clear by cardiology.     Hemant Newell MD  Text Page  (7am - 6pm)    Interval History   Patient remain in his usual state. Has some overnight tachycardia.     No other significant event overnight.       -Data reviewed today: I reviewed all new labs and imaging results over the last 24 hours. I personally reviewed no images or EKG's today.    Physical Exam   Temp: 96.2  F (35.7  C) Temp src: Axillary BP: 132/79 Pulse: 59   Resp: 16 SpO2: 95 % O2 Device: None (Room air)    Vitals:    02/24/21 2319 02/26/21 0630   Weight: 56 kg (123 lb 6.4 oz) 56.7 kg (125 lb 1.6 oz)     Vital Signs with Ranges  Temp:  [96.2  F (35.7  C)-97.6  F (36.4  C)] 96.2  F (35.7  C)  Pulse:  [59-80] 59  Resp:  [16-17] 16  BP: (132-177)/() 132/79  SpO2:  [94 %-98 %] 95 %  I/O last 3 completed shifts:  In: 600 [P.O.:600]  Out: 475 [Urine:475]    Constitutional: awake, alert,  no apparent distress, and appears stated age, pleasantly confused, having his breakfast  Eyes: Lids and lashes normal, pupils equal, round and reactive to light, extra ocular muscles intact, sclera clear, conjunctiva normal  Respiratory: No increased work of breathing, good air exchange, clear to auscultation bilaterally, no crackles or  wheezing  Cardiovascular: Normal apical impulse, regular rate and rhythm, normal S1 and S2, no S3 or S4, and no murmur noted  GI: No scars, normal bowel sounds, soft, non-distended, non-tender, no masses palpated, no hepatosplenomegally  Musculoskeletal: no lower extremity pitting edema present  Neurologic: no focal deficit.     Medications     sodium chloride Stopped (02/25/21 1000)       acetaminophen  650 mg Oral 4x Daily     atorvastatin  80 mg Oral Daily     donepezil  5 mg Oral At Bedtime     famotidine  10 mg Oral Daily     fluticasone-vilanterol  1 puff Inhalation Daily     haloperidol  0.5 mg Oral TID     [Held by provider] haloperidol  1 mg Oral Daily     metoprolol tartrate  50 mg Oral BID     OLANZapine  15 mg Oral At Bedtime     sertraline  150 mg Oral Daily     tamsulosin  0.4 mg Oral Daily     umeclidinium  1 puff Inhalation Daily       Data   Recent Labs   Lab 02/26/21  0653 02/24/21  2228 02/24/21  1927 02/24/21  1250   WBC 9.3  --   --  17.8*   HGB 11.0*  --   --  11.1*   MCV 92  --   --  93   *  --   --  139*   INR  --   --   --  1.07     --   --  135   POTASSIUM 3.9  --   --  3.5   CHLORIDE 104  --   --  98   CO2 29  --   --  31   BUN 36*  --   --  42*   CR 1.99*  --   --  2.48*   ANIONGAP 2*  --   --  6   RAMON 9.4  --   --  9.4   GLC 89  --   --  86   ALBUMIN 2.8*  --   --  3.0*   PROTTOTAL  --   --   --  7.6   BILITOTAL  --   --   --  0.9   ALKPHOS  --   --   --  123   ALT  --   --   --  18   AST  --   --   --  23   TROPI  --  <0.015 <0.015 0.021       No results found for this or any previous visit (from the past 24 hour(s)).

## 2021-02-26 NOTE — PROGRESS NOTES
[facility]  Cardiology Progress Note  Date of Service: 02/26/2021    Assessment & Plan   Hubert Tamayo is a 89 y/o male with past medical history significant for dementia, hypertension, chronic obstructive pulmonary disease, recurrent DVT, CKD stage 4-5, hx of aspiration pneumonia with recent admission for subdural hematoma who present after becoming unresponsive and pulseless in the [facility]. His symptoms resolved with needing CPR. He was admitted and Cardiology consulted. No events noted on telemetry.    COVID negative 02/24    Assessment:    1. Syncope    Pulseless electrical activity reported. No CPR.    Echocardiogram showed LVEF 55-60%.    No cardiac event noted on telemetry    CT head showed chronic small vessel ischemic disease, no evidence for acute intracranial bleed    2. CAD     [PTA: Atorvastatin 80 mg po daily]    NSTEMI 2019 medically managed    Troponin negative x 2    Started on metoprolol tartrate 50 mg po BID    3. Hypertension     s -170 s, continue metoprolol tartrate 50 mg po BID. Consider adding lisinopril?    4. Recurrent DVT    5. Hyperlipidemia    [PTA: Atorvastatin 80 mg daily]    6. CKD stage 4-5    Baseline Cr 2-2.4    Cr. 2.48 on 02/25,    Today 1.99.    Plan:  1. No further event seen on telemetry.  2. Event monitor at discharge  3. Cardiology will sign off  4. [facility] (asked care coordinator to assist will follow).     AREN VALENCIA CNP  Pager:  [phone]   (7am - 5pm, M-F)    Interval History   No events noted on telemetry    Physical Exam   Temp: 96.2  F (35.7  C) Temp src: Axillary BP: (!) 177/111 Pulse: 80   Resp: 16 SpO2: 94 % O2 Device: None (Room air)    Vitals:    02/24/21 2319 02/26/21 0630   Weight: 56 kg (123 lb 6.4 oz) 56.7 kg (125 lb 1.6 oz)       Constitutional:   Frail   Skin:   Warm and dry   Head:   Nontraumatic   Neck:   no JVD   Lungs:   symmetric, clear to auscultation    Cardiovascular:   regular rate and rhythm   Abdomen:   Benign    Extremities and Back:   No edema   Neurological:   Grossly nonfocal     Medications     sodium chloride Stopped (02/25/21 1000)       acetaminophen  650 mg Oral 4x Daily     atorvastatin  80 mg Oral Daily     donepezil  5 mg Oral At Bedtime     famotidine  10 mg Oral Daily     fluticasone-vilanterol  1 puff Inhalation Daily     haloperidol  0.5 mg Oral TID     [Held by provider] haloperidol  1 mg Oral Daily     metoprolol tartrate  50 mg Oral BID     OLANZapine  15 mg Oral At Bedtime     sertraline  150 mg Oral Daily     tamsulosin  0.4 mg Oral Daily     umeclidinium  1 puff Inhalation Daily       Data     Most Recent 3 CBC's:  Recent Labs   Lab Test 02/26/21  0653 02/24/21  1250 01/25/21  0902   WBC 9.3 17.8* 11.3*   HGB 11.0* 11.1* 9.7*   MCV 92 93 95   * 139* 161     Most Recent 3 BMP's:  Recent Labs   Lab Test 02/26/21  0653 02/24/21  1250 01/26/21  0901    135 139   POTASSIUM 3.9 3.5 4.2   CHLORIDE 104 98 110*   CO2 29 31 23   BUN 36* 42* 60*   CR 1.99* 2.48* 2.20*   ANIONGAP 2* 6 6   RAMON 9.4 9.4 9.5   GLC 89 86 89     Most Recent 3 Troponin's:  Recent Labs   Lab Test 02/24/21  2228 02/24/21  1927 02/24/21  1250   TROPI <0.015 <0.015 0.021     Most Recent 3 BNP's:  Recent Labs   Lab Test 01/23/21  1757 11/10/19  1630 10/29/19  1521   NTBNPI 2,874* 976 944     Most Recent D-dimer:No lab results found.  Most Recent Cholesterol Panel:  Recent Labs   Lab Test 10/31/19  0611   CHOL 77   LDL 14   HDL 42   TRIG 103

## 2021-02-26 NOTE — PROGRESS NOTES
"Care Management Follow Up    Length of Stay (days): 2    Expected Discharge Date: 02/26/21     Concerns to be Addressed: discharge planning     Patient plan of care discussed at interdisciplinary rounds: Yes    Anticipated Discharge Disposition: Transitional Care     Anticipated Discharge Services:  TCU  Anticipated Discharge DME:  none    Patient/family educated on Medicare website which has current facility and service quality ratings: no  Education Provided on the Discharge Plan:    Patient/Family in Agreement with the Plan:  Spoke with chemo Quinteros regarding discharge.  Left a VM with transport time.      Referrals Placed by CM/JENNIFER:  transportation  Private pay costs discussed: NA    Additional Information:  - Noted Cardiology singed off today. Pt is a resident at [facility].  Left a message for Mireya at [phone] regarding discharge. Pt was transported to an appointment here at [facility] via his [facility].  Spoke with Fab at [phone].  Due to pt not returning after his appointment they are still able to transport the pt at discharge. There is no need for another \"SMP\". A 1600 ride has been arranged for Saturday 2/27 back to [facility]. Should transport need to be changed or cancelled please contact the  on Duty at [phone].     Spoke with Yusra in Admission, JENNIFER Horne and the RN Manager Eugenia regarding this discharge. [facility] would like to receive orders in the AM of his return. Please Fax orders to [phone].  They also want the nurse to call [phone] and ask for Enmanuel and give report prior to discharge.      Updated Charge nurse on discharge plans.     Maria G Bello RN BAN  Inpatient Care Coordination  [address] [address]  [email or web address]   Office: [phone]  Fax: [phone]          "

## 2021-02-26 NOTE — PLAN OF CARE
A&O to self. Agitated at times, somnolent.  VSS on RA ex HTN. Tele: SR. A2, Turn/Repo, refuses at times. DD1 thin liquid diet. Pills whole. Denies pain, managed with scheduled Tylenol. Neuros: Slight left facial droop, pupils 1mm, garbled speech. CMS intact. LS clear. Voiding via external cath. No IV access, MD aware. Discharge pending. Continue to monitor.

## 2021-02-26 NOTE — PLAN OF CARE
Neuro: alert to self only, can be impulsive at time, speech garbled, and pre-existing left facial droop noted  CV/Rhythm: SR  Resp/02: room air   GI/:  external cath in place, attends on   Skin/Incisions/Sites: intact   Activity/Falls Risk: up with 2 and walker GB, FALLS risk, own WC at bedside   Lines/Drains/IVs: no IV access- MD aware   Labs/BGM: n/a   Test/Procedures: n/a   VS/Pain: stable, none   DC Plan: cards signed off, needs event monitor at discharge, probable discharge back to facility tomorrow.  Other: continue to monitor.

## 2021-02-26 NOTE — PLAN OF CARE
PT: Per discussion with RN, patient just returned to supine after sitting up in chair. Will cancel session to allow patient to rest.

## 2021-02-26 NOTE — PLAN OF CARE
A&O x self. VS stable on room air. Patient has not been out of bed today, assist x2 for bed mobility and repositioning. Neuros unchanged, patient declined noon assessment. Tolerating DD1 with thin liquids. Tele monitored, SR; patient had one self limiting tachy run around 10:20 AM - see paper chart. External male catheter in place, adequate urine output. Possible discharge tomorrow if uneventful night.

## 2021-02-27 ENCOUNTER — APPOINTMENT (OUTPATIENT)
Dept: CARDIOLOGY | Facility: CLINIC | Age: 86
DRG: 312 | End: 2021-02-27
Attending: NURSE PRACTITIONER
Payer: MEDICARE

## 2021-02-27 VITALS
WEIGHT: 125.1 LBS | OXYGEN SATURATION: 95 % | SYSTOLIC BLOOD PRESSURE: 173 MMHG | DIASTOLIC BLOOD PRESSURE: 100 MMHG | BODY MASS INDEX: 19.02 KG/M2 | RESPIRATION RATE: 16 BRPM | TEMPERATURE: 97.7 F | HEART RATE: 56 BPM

## 2021-02-27 PROCEDURE — 93270 REMOTE 30 DAY ECG REV/REPORT: CPT

## 2021-02-27 PROCEDURE — 99239 HOSP IP/OBS DSCHRG MGMT >30: CPT | Performed by: INTERNAL MEDICINE

## 2021-02-27 PROCEDURE — 250N000013 HC RX MED GY IP 250 OP 250 PS 637: Performed by: INTERNAL MEDICINE

## 2021-02-27 PROCEDURE — 250N000013 HC RX MED GY IP 250 OP 250 PS 637: Performed by: HOSPITALIST

## 2021-02-27 PROCEDURE — 93272 ECG/REVIEW INTERPRET ONLY: CPT | Performed by: INTERNAL MEDICINE

## 2021-02-27 RX ADMIN — ACETAMINOPHEN 650 MG: 325 TABLET, FILM COATED ORAL at 09:50

## 2021-02-27 RX ADMIN — ATORVASTATIN CALCIUM 80 MG: 40 TABLET, FILM COATED ORAL at 09:50

## 2021-02-27 RX ADMIN — TAMSULOSIN HYDROCHLORIDE 0.4 MG: 0.4 CAPSULE ORAL at 09:50

## 2021-02-27 RX ADMIN — FAMOTIDINE 10 MG: 10 TABLET ORAL at 09:50

## 2021-02-27 RX ADMIN — FLUTICASONE FUROATE AND VILANTEROL TRIFENATATE 1 PUFF: 200; 25 POWDER RESPIRATORY (INHALATION) at 09:51

## 2021-02-27 RX ADMIN — SERTRALINE HYDROCHLORIDE 150 MG: 100 TABLET ORAL at 09:50

## 2021-02-27 RX ADMIN — UMECLIDINIUM 1 PUFF: 62.5 AEROSOL, POWDER ORAL at 09:51

## 2021-02-27 RX ADMIN — METOPROLOL TARTRATE 50 MG: 50 TABLET, FILM COATED ORAL at 09:50

## 2021-02-27 RX ADMIN — HALOPERIDOL 0.5 MG: 0.5 TABLET ORAL at 09:50

## 2021-02-27 ASSESSMENT — ACTIVITIES OF DAILY LIVING (ADL)
ADLS_ACUITY_SCORE: 22
ADLS_ACUITY_SCORE: 27
ADLS_ACUITY_SCORE: 27
ADLS_ACUITY_SCORE: 22
ADLS_ACUITY_SCORE: 22

## 2021-02-27 NOTE — PROGRESS NOTES
[facility]    Internal Medicine Hospitalist Progress Note  02/27/2021  I evaluated patient on the above date.    Skyler Zapata Jr., MD  [phone] (p)  Text Page        Assessment & Plan New actions/orders today (02/27/2021) are underlined.    Hubert Tamayo is a 89 year old male with PMH significant for dementia, paranoid schizophrenia, COPD, HTN and reported CAD, with recent admission (1/24-1/27/2021) for left subdural hematoma, who was brought to ED 2/24/2021 after a brief episode of unresponsiveness and pulselessness while on his way for CT head (as a routine follow-up of his recent subdural hematoma).     Syncope, unclear etiology, question related to PEA or vasovagal or other cardiac etiology.  Brief PEA, unclear etiology.  Brought to ED 2/24/2021 after a brief episode of unresponsiveness and pulselessness while on his way for CT head (as a routine follow-up of his recent subdural hematoma). Initial concern for cardiac arrest; however, he never required any CPR and had pulse with slight improvement in his responsiveness by the time he came to the ED. Head CT head 2/24 showed no acute findings; no SDH on this study. No ECG on admission. ECG 2/25 showed sinus with slightly prolonged QTc (480's), non-specific t-wave changes. Echo 2/25 showed LVEF 55-60%, limited evaluation for WMA's; RV OK. Cardiology consulted. No events on telemetry.  Recent Labs   Lab 02/24/21 2228 02/24/21  1927 02/24/21  1250   TROPI <0.015 <0.015 0.021   \- Discharge on event monitor.  - Follow-up with Cardiology outpatient.    Acute metabolic encephalopathy, suspect secondary to syncope.  Chronic left facial droop.  Recent admission for left subdural hematoma (1/24-1/27).  * Pt was recently in hospital from 1/24 to 1/27 for subdural hematoma (3mm). He was evaluated by neurosurgery at that time and recommended conservative management and follow-up with repeat head CT. During the same admission he was also noted with  left-sided facial droop (MRI negative for stroke). He also was noted with intermittent somnolence during that hospitalization and his PTA Zyprexa was decreased.   * PTA Psychiatry meds initially held. Neurology consulted. EEG 2/25 normal.  - Monitor clinically.     Dementia.  Paranoid schizophrenia.  PTA Psychiatry meds initially held; subsequently restarted.   - Continue donepezil, haloperidol, olanzapine, sertraline.     FEN.  Dysphagia.  H/o aspiration pneumonia.  Per family, he has had very poor PO intake at the nursing home. Clinically appeared very dehydrated on admission and started on IV fluids. Seen by Speech and placed on dysphagia diet.  - Continue DD1 diet with thin liquids.     SLY, suspect prerenal, on CKD stage 4-5.  * Baseline cr around 2 to 2.4; up to 2.7 last admission.  * Cr 2.48 on admit. With fluids, improved to 1.99 on 2/26.  Recent Labs   Lab 02/26/21  0653 02/24/21  1250   CR 1.99* 2.48*   - Monitor BMP.  - Avoid nephrotoxic medications.    COPD.  - Continue Incruse Ellipta and PRN albuterol.    Hypertension (benign essential).  CAD (noted h/o NSTEMI, medically managed).  HLD.  [PTA: metoprolol 50 mg BID.]  - Continue atorvastatin, metoprolol.    BPH.  - Continue tamsulosin.    GERD.  - Continue famotidine.     COVID-19 testing.  COVID-19 PCR Results    COVID-19 PCR Results 6/26/20 7/1/20 7/1/20 11/18/20 11/18/20 11/24/20 1/23/21 2/24/21     2344 2344 0315 0315      COVID-19 Virus PCR to [facility] - Result Not Detected Test received-See reflex to IDDL test SARS CoV2 (COVID-19) Virus RT-PCR  Test received-See reflex to IDDL test SARS CoV2 (COVID-19) Virus RT-PCR       COVID-19 Virus PCR to [facility] - Source Mid-turbinate Nasopharyngeal  Nasopharyngeal       COVID-19 Virus by PCR (External Result)      Not Detected     SARS-CoV-2 Virus Specimen Source   Nasopharyngeal  Nasopharyngeal  Nasopharyngeal Nasopharyngeal   SARS-CoV-2 PCR Result   NEGATIVE  NEGATIVE  NEGATIVE NEGATIVE      Comments are  available for some flowsheets but are not being displayed.         COVID-19 Antibody Results, Testing for Immunity    COVID-19 Antibody Results, Testing for Immunity   No data to display.             Diet: Dysphagia Diet Level 1 Pureed Thin Liquids (water, ice chips, juice, milk gelatin, ice cream, etc) (no straw)  Snacks/Supplements Adult: Boost Plus; Between Meals    Prophylaxis: PCD's, ambulation.   Lebron Catheter: not present  Code Status: Full Code    Disposition Plan   Expected discharge: Today, recommended to transitional care unit.  Entered: Skyler Zapata MD 02/27/2021, 12:45 PM         Interval History   Denies pain.    -Data reviewed today: I reviewed all new labs and imaging over the last 24 hours. I personally reviewed the EKG tracing showing findings as above.    Physical Exam    , Blood pressure (!) 173/100, pulse 56, temperature 97.7  F (36.5  C), temperature source Axillary, resp. rate 16, weight 56.7 kg (125 lb 1.6 oz), SpO2 95 %.  Vitals:    02/24/21 2319 02/26/21 0630   Weight: 56 kg (123 lb 6.4 oz) 56.7 kg (125 lb 1.6 oz)     Vital Signs with Ranges  Temp:  [97.4  F (36.3  C)-98.1  F (36.7  C)] 97.7  F (36.5  C)  Pulse:  [56-69] 56  Resp:  [16-18] 16  BP: (135-173)/() 173/100  SpO2:  [95 %-97 %] 95 %  Patient Vitals for the past 24 hrs:   BP Temp Temp src Pulse Resp SpO2   02/27/21 0737 (!) 173/100 97.7  F (36.5  C) Axillary 56 16 95 %   02/26/21 2326 (!) 165/96 98.1  F (36.7  C) Oral 62 18 97 %   02/26/21 2112 (!) 161/88 97.4  F (36.3  C) Axillary 67 18 97 %   02/26/21 1629 (!) 135/92 -- -- 69 18 97 %     I/O's Last 24 hours  I/O last 3 completed shifts:  In: 758 [P.O.:758]  Out: 825 [Urine:825]    Constitutional: Awake, alert, conversant, follows commands.  Respiratory: Diminished in bases. No crackles or wheezes.  Cardiovascular: RRR, no m/r/g.  GI: Soft, nt, nd, +BS.  Skin/Integumen:   Other:        Data   Recent Labs   Lab 02/26/21  0653 02/24/21  2228 02/24/21  1927  02/24/21  1250   WBC 9.3  --   --  17.8*   HGB 11.0*  --   --  11.1*   MCV 92  --   --  93   *  --   --  139*   INR  --   --   --  1.07     --   --  135   POTASSIUM 3.9  --   --  3.5   CHLORIDE 104  --   --  98   CO2 29  --   --  31   BUN 36*  --   --  42*   CR 1.99*  --   --  2.48*   ANIONGAP 2*  --   --  6   RAMON 9.4  --   --  9.4   GLC 89  --   --  86   ALBUMIN 2.8*  --   --  3.0*   PROTTOTAL  --   --   --  7.6   BILITOTAL  --   --   --  0.9   ALKPHOS  --   --   --  123   ALT  --   --   --  18   AST  --   --   --  23   TROPI  --  <0.015 <0.015 0.021     Recent Labs   Lab Test 02/26/21  0653 02/24/21  1250 02/24/21  1226 01/26/21  0901 01/25/21  0902 01/24/21  1243 01/24/21  0827 01/24/21  0610 01/24/21  0205 11/18/20  2309 11/18/20  2309   GLC 89 86  --  89 92  --   --  140*  --    < >  --    BGM  --   --  99  --   --  115* 144*  --  117*  --  112*    < > = values in this interval not displayed.     No results for input(s): CRP, DD, LDH, FIBR, MAGUI, IL6B in the last 168 hours.      No results found for this or any previous visit (from the past 24 hour(s)).    Medications   All medications were reviewed.    sodium chloride Stopped (02/25/21 1000)       acetaminophen  650 mg Oral 4x Daily     atorvastatin  80 mg Oral Daily     donepezil  5 mg Oral At Bedtime     famotidine  10 mg Oral Daily     fluticasone-vilanterol  1 puff Inhalation Daily     haloperidol  0.5 mg Oral TID     [Held by provider] haloperidol  1 mg Oral Daily     metoprolol tartrate  50 mg Oral BID     OLANZapine  15 mg Oral At Bedtime     sertraline  150 mg Oral Daily     tamsulosin  0.4 mg Oral Daily     umeclidinium  1 puff Inhalation Daily     albuterol, albuterol, bisacodyl, hydrALAZINE, lidocaine 4%, lidocaine (buffered or not buffered), magnesium hydroxide, melatonin, nitroGLYcerin, ondansetron **OR** ondansetron, prochlorperazine **OR** prochlorperazine **OR** prochlorperazine, senna-docusate **OR** senna-docusate, sodium chloride  (PF), sodium chloride (PF)

## 2021-02-27 NOTE — PLAN OF CARE
[id number]: Patient alert to self, restless, agitated. Neuros intact except rambling, garbled speech and baseline slight L facial droop. Assisting pt with repositioning in bed but shifts weight often. External catheter in place, incontinent of stool this shift. Up with assist of 2 when out of bed, weak, unstable on feet. Will discharge back to previous facility (bed held). Event monitor ordered to be put on pt before discharge.

## 2021-02-27 NOTE — PLAN OF CARE
A&O to self. Agitated and in disbelief at times, some paranoid expressions otherwise quiet.  VSS x  HTN on RA. Tele: SR. A2, Turn/Repo, refuses at times and adjusts self. DD1 thin liquid diet. Pills whole. Denies pain, managed with scheduled Tylenol. Neuros: Slight left facial droop, garbled speech at times but has no teeth. CMS intact. LS clear. Voiding external cath. No IV access, MD aware. Updated son, Aldair last evening. Discharge to [facility]

## 2021-02-27 NOTE — PROGRESS NOTES
Care Management Discharge Note    Discharge Date: 02/27/21(1600 Care Cab transport ride back to [facility] [place])       Discharge Disposition: Transitional Care    Discharge Services:  N/A      Discharge DME:  N/A    Discharge Transportation: health plan transportation    Private pay costs discussed: Not applicable    PAS Confirmation Code:  Pt is a return to the same facility, has bed hold  Patient/family educated on Medicare website which has current facility and service quality ratings: no    Education Provided on the Discharge Plan:    Persons Notified of Discharge Plans: Yes  Patient/Family in Agreement with the Plan:  Yes    Handoff Referral Completed: No    Additional Information:  Pt plans to discharge to [facility] which is where he was prior to hospitalization. Pt.'s bedside RN has already completed the requested RN to RN and discharge paperwork has been faxed. This writer did talk with pt.'s son Aldair to discuss the plan and everyone is agreeable to the discharge back to [facility] [place] today, via the return ride arranged as a Care ride back to the [facility] for around 1600.         MARIO Lo

## 2021-02-27 NOTE — PROGRESS NOTES
Care Management Discharge Note    Discharge Date: 02/27/21(1600 Care Cab transport ride back to [facility] [place])       Discharge Disposition: [facility]    Discharge Services:      Discharge DME:      Discharge Transportation: health plan transportation    Private pay costs discussed: Not applicable      Additional Information:   Writer faxed discharge orders to [facility]. Writer also spoke to SonHubert who is in agreement with plan.        Brigida Martinez RN

## 2021-02-27 NOTE — PLAN OF CARE
Patient alert to self only. Up with assist of 2 and walker. Tolerating diet. Incontinent. Denied pain. Patients son updated on discharge plan. Patient discharged to TCU with belongings.

## 2021-02-27 NOTE — DISCHARGE SUMMARY
[facility]  Discharge Summary        Hubert Tamayo MRN# [id number]   YOB: 1929 Age: 89 year old     Date of Admission: 2/24/2021  Date of Discharge: 2/27/2021  Admitting Physician: Mick Brown MD  Discharge Physician: Skyler Zapata MD     Primary Provider: Carolee Bales  Primary Care Physician Phone Number: [phone]         Discharge Diagnoses:   1. Syncope, unclear etiology, question related to PEA or vasovagal or other cardiac etiology.  2. Brief PEA, unclear etiology.  3. Acute metabolic encephalopathy, suspect secondary to syncope.  4. Dysphagia.  5. SLY, suspect prerenal, on CKD stage 4-5.  6. Severe protein-calorie malnutrition.          Other Chronic Medical Problems:      1. Dementia.  2. Paranoid schizophrenia.  3. COPD.  4. Hypertension (benign essential).  5. CAD (noted h/o NSTEMI, medically managed).  6. HLD.  7. BPH.  8. GERD.  9. Chronic left facial droop.  10. Recent admission for left subdural hematoma (1/24-1/27).  11. H/o aspiration pneumonia.       Allergies:         Allergies   Allergen Reactions     Crustaceans      Loxapine      Nifedipine      Shellfish Allergy      Sulfa Drugs      Sulfamethoxazole-Trimethoprim      Trimethoprim            Discharge Medications:        Current Discharge Medication List      CONTINUE these medications which have NOT CHANGED    Details   acetaminophen (TYLENOL) 325 MG tablet Take 650 mg by mouth 4 times daily      albuterol (PROAIR HFA/PROVENTIL HFA/VENTOLIN HFA) 108 (90 Base) MCG/ACT inhaler Inhale 2 puffs into the lungs every 4 hours as needed for shortness of breath / dyspnea or wheezing    Comments: Pharmacy may dispense brand covered by insurance (Proair, or proventil or ventolin or generic albuterol inhaler)      albuterol (PROVENTIL) (2.5 MG/3ML) 0.083% neb solution Take 2.5 mg by nebulization every 6 hours as needed for shortness of breath / dyspnea or wheezing      atorvastatin (LIPITOR) 80 MG  tablet Take 80 mg by mouth daily      camphor-menthol (DERMASARRA) 0.5-0.5 % external lotion Apply topically 2 times daily as needed (itching) Apply thin layer topically bid prn itching, apply after Eucerin.      carboxymethylcellulose (REFRESH PLUS) 0.5 % SOLN ophthalmic solution Place 2 drops into both eyes every 4 hours as needed for dry eyes 6 times per day prn*      cholecalciferol 1000 units TABS Take 1,000 Units by mouth daily      donepezil (ARICEPT) 5 MG tablet Take 5 mg by mouth At Bedtime      famotidine (PEPCID) 10 MG tablet Take 10 mg by mouth daily      fluticasone-salmeterol (ADVAIR) 500-50 MCG/DOSE inhaler Inhale 1 puff into the lungs 2 times daily Rinse mouth after use.      guaiFENesin (ROBITUSSIN) 100 MG/5ML SYRP Take 5 mLs by mouth At Bedtime      !! haloperidol (HALDOL) 0.5 MG tablet Take 0.5 mg by mouth 3 times daily      !! haloperidol (HALDOL) 1 MG tablet Take 1 mg by mouth daily at 2 pm      magnesium 250 MG tablet Take 1 tablet by mouth daily Patient takes on their own. Patient wants to buy from [facility] pharmacy.      Menthol-Methyl Salicylate (ICY HOT EXTRA STRENGTH) 10-30 % CREA Externally apply topically 3 times daily as needed Apply to bilateral thighs       metoprolol tartrate (LOPRESSOR) 50 MG tablet Take 1 tablet (50 mg) by mouth 2 times daily  Qty: 60 tablet, Refills: 1    Associated Diagnoses: NSTEMI (non-ST elevated myocardial infarction) (H)      nitroGLYcerin (NITROSTAT) 0.4 MG sublingual tablet Place 0.4 mg under the tongue every 5 minutes as needed for chest pain For chest pain place 1 tablet under the tongue every 5 minutes for 3 doses. If symptoms persist 5 minutes after 1st dose call 911.      Nutritional Supplements (ENSURE COMPLETE PO) Take 1 Bottle by mouth 2 times daily (with meals)      OLANZapine (ZYPREXA) 15 MG tablet Take 1 tablet (15 mg) by mouth At Bedtime  Qty:      Associated Diagnoses: Dementia with behavioral disturbance, unspecified dementia type (H)      !!  senna (SENOKOT) 8.6 MG tablet Take 1 tablet by mouth every morning      !! senna (SENOKOT) 8.6 MG tablet Take 1 tablet by mouth every evening as needed for constipation      sertraline (ZOLOFT) 100 MG tablet Take 1.5 tablets (150 mg) by mouth daily  Qty: 30 tablet, Refills: 0    Associated Diagnoses: Paranoid schizophrenia (H)      tamsulosin (FLOMAX) 0.4 MG capsule Take 0.4 mg by mouth daily      Tiotropium Bromide Monohydrate (SPIRIVA HANDIHALER IN) Inhale 1 capsule into the lungs daily       Skin Protectants, Misc. (EUCERIN) cream Apply topically daily Apply to areas of dry skin topically every day ideally within 3 minutes after bath or shower.       !! - Potential duplicate medications found. Please discuss with provider.              Discharge Instructions and Follow-Up:      Discharge Orders      General info for SNF    Length of Stay Estimate: Short Term Care: Estimated # of Days <30  Condition at Discharge: Improving  Level of care:skilled   Rehabilitation Potential: Good  Admission H&P remains valid and up-to-date: Yes  Recent Chemotherapy: N/A  Use Nursing Home Standing Orders: Yes     Mantoux instructions    Give two-step Mantoux (PPD) Per Facility Policy Yes     Follow Up and recommended labs and tests    Follow-up with Nursing home physician.  The following labs/tests are recommended: CBC, BMP.  Follow-up with primary care provider after TCU discharge.  Follow-up with [facility] 2-3 weeks.     Reason for your hospital stay    Syncope.  Brief PEA (no CPR needed).  Metabolic encephalopathy.  SLY on CKD.     Activity - Up with nursing assistance     Physical Therapy Adult Consult    Evaluate and treat as clinically indicated.    Reason:  weakness/deconditioning     Occupational Therapy Adult Consult    Evaluate and treat as clinically indicated.    Reason:  Weakness/deconditioning     Speech Language Path Adult Consult    Evaluate and treat as clinically indicated.    Reason:  Dysphagia     Cardiac  Event Monitor Adult Pediatric     Advance Diet as Tolerated    Follow this diet upon discharge: Orders Placed This Encounter      Snacks/Supplements Adult: Boost Plus; Between Meals      Dysphagia Diet Level 1 Pureed Thin Liquids (water, ice chips, juice, milk gelatin, ice cream, etc) (no straw)             Consultations This Hospital Stay:      1. Cardiology.  2. Neurology.        Admission History:      Please see the H&P by Mick Brown MD on 2/24/2021 for complete details. Briefly, Hubert Tamayo is a 89 year old male with PMH significant for dementia, paranoid schizophrenia, COPD, HTN and reported CAD, with recent admission (1/24-1/27/2021) for left subdural hematoma, who was brought to ED 2/24/2021 after a brief episode of unresponsiveness and pulselessness while on his way for CT head (as a routine follow-up of his recent subdural hematoma).        Problem Oriented Hospital Course:        Syncope, unclear etiology, question related to PEA or vasovagal or other cardiac etiology.  Brief PEA, unclear etiology.  Brought to ED 2/24/2021 after a brief episode of unresponsiveness and pulselessness while on his way for CT head (as a routine follow-up of his recent subdural hematoma). Initial concern for cardiac arrest; however, he never required any CPR and had pulse with slight improvement in his responsiveness by the time he came to the ED. Head CT head 2/24 showed no acute findings; no SDH on this study. No ECG on admission. ECG 2/25 showed sinus with slightly prolonged QTc (480's), non-specific t-wave changes. Echo 2/25 showed LVEF 55-60%, limited evaluation for WMA's; RV OK. Cardiology consulted. No events on telemetry.  Recent Labs   Lab 02/24/21 2228 02/24/21  1927 02/24/21  1250   TROPI <0.015 <0.015 0.021   - Discharge on event monitor.  - Follow-up with Cardiology outpatient.    Acute metabolic encephalopathy, suspect secondary to syncope.  Chronic left facial droop.  Recent admission for left  subdural hematoma (1/24-1/27).  * Pt was recently in hospital from 1/24 to 1/27 for subdural hematoma (3mm). He was evaluated by neurosurgery at that time and recommended conservative management and follow-up with repeat head CT. During the same admission he was also noted with left-sided facial droop (MRI negative for stroke). He also was noted with intermittent somnolence during that hospitalization and his PTA Zyprexa was decreased.   * PTA Psychiatry meds initially held. Neurology consulted. EEG 2/25 normal.  - Monitor clinically.     Dementia.  Paranoid schizophrenia.  PTA Psychiatry meds initially held; subsequently restarted.   - Continue donepezil, haloperidol, olanzapine, sertraline.     FEN.  Dysphagia.  H/o aspiration pneumonia.  Per family, he has had very poor PO intake at the nursing home. Clinically appeared very dehydrated on admission and started on IV fluids. Seen by Speech and placed on dysphagia diet.  - Continue DD1 diet with thin liquids.     SLY, suspect prerenal, on CKD stage 4-5.  * Baseline cr around 2 to 2.4; up to 2.7 last admission.  * Cr 2.48 on admit. With fluids, improved to 1.99 on 2/26.  Recent Labs   Lab 02/26/21  0653 02/24/21  1250   CR 1.99* 2.48*   - Monitor BMP.  - Avoid nephrotoxic medications.    COPD.  - Continue Incruse Ellipta and PRN albuterol.    Hypertension (benign essential).  CAD (noted h/o NSTEMI, medically managed).  HLD.  [PTA: metoprolol 50 mg BID.]  - Continue atorvastatin, metoprolol.    Severe protein-calorie malnutrition.  Noted by Nutrition.    BPH.  - Continue tamsulosin.    GERD.  - Continue famotidine.     COVID-19 testing.  COVID-19 PCR Results    COVID-19 PCR Results 6/26/20 7/1/20 7/1/20 11/18/20 11/18/20 11/24/20 1/23/21 2/24/21     2344 2344 0315 0315      COVID-19 Virus PCR to [facility] - Result Not Detected Test received-See reflex to IDDL test SARS CoV2 (COVID-19) Virus RT-PCR  Test received-See reflex to IDDL test SARS CoV2 (COVID-19) Virus RT-PCR        COVID-19 Virus PCR to [facility] - Source Mid-turbinate Nasopharyngeal  Nasopharyngeal       COVID-19 Virus by PCR (External Result)      Not Detected     SARS-CoV-2 Virus Specimen Source   Nasopharyngeal  Nasopharyngeal  Nasopharyngeal Nasopharyngeal   SARS-CoV-2 PCR Result   NEGATIVE  NEGATIVE  NEGATIVE NEGATIVE      Comments are available for some flowsheets but are not being displayed.         COVID-19 Antibody Results, Testing for Immunity    COVID-19 Antibody Results, Testing for Immunity   No data to display.                   Pending Results:        Unresulted Labs Ordered in the Past 30 Days of this Admission     No orders found from 1/25/2021 to 2/25/2021.                Discharge Disposition:      Discharged to TCU.        Discharge Time:      Greater than 35 minutes.        Key Imaging Studies, Lab Findings and Procedures/Surgeries:        Results for orders placed or performed during the hospital encounter of 02/24/21   CT Head w/o Contrast    Narrative    CT OF THE HEAD WITHOUT CONTRAST February 24, 2021 1:06 PM     HISTORY: Trauma, rule out head injury. Mental status change, unknown  cause.    TECHNIQUE: 5 mm thick axial CT images of the head were acquired  without IV contrast material. Radiation dose for this scan was reduced  using automated exposure control, adjustment of the mA and/or kV  according to patient size, or iterative reconstruction technique.    COMPARISON: Head CT 1/24/2021.    FINDINGS: There is moderate diffuse cerebral volume loss. There are  subtle patchy areas of decreased density in the cerebral white matter  bilaterally that are consistent with sequela of chronic small vessel  ischemic disease.    The ventricles and basal cisterns are within normal limits in  configuration given the degree of cerebral volume loss. There is no  midline shift. There are no extra-axial fluid collections.    No intracranial hemorrhage, mass or recent infarct.    The visualized paranasal sinuses  are well-aerated. There is no  mastoiditis. There are no fractures of the visualized bones.      Impression    IMPRESSION: Diffuse cerebral volume loss and cerebral white matter  changes consistent with chronic small vessel ischemic disease. No  evidence for acute intracranial pathology.        RADHA HENDRIX MD   XR Chest Port 1 View    Narrative    XR CHEST PORT 1 VW 2021 1:53 PM    HISTORY: altered loc    COMPARISON: 2021      Impression    IMPRESSION: Pacer pads over the mid chest. Emphysema. Slight increased  linear interstitial opacities in the left lung base, either secondary  to edema and/or atelectasis superimposed on fibrotic changes. Normal  heart size. No pleural effusion or pneumothorax.    COLLIN MÁRQUEZ MD   Echocardiogram Complete    Narrative    [id number]  [id number]  [id number]^ESTUARDO^BECKY^ARNULFO           [facility]  Echocardiography Laboratory  [address] [place] [address]        Name: VEDA RUSHING  MRN: [id number]  : 1929  Study Date: 2021 08:34 AM  Age: 89 yrs  Gender: Male  Patient Location: [facility]  Reason For Study: Syncope  Ordering Physician: BECKY MARTE  Referring Physician: Carolee Bales  Performed By: Loreto Guardado     BSA: 1.7 m2  Height: 68 in  Weight: 123 lb  HR: 58  BP: 146/85 mmHg  _____________________________________________________________________________  __        Procedure  Limited Portable Echo Adult. Optison (NDC #[id number]) given intravenously.  _____________________________________________________________________________  __        Interpretation Summary     The left ventricle is normal in size.  Left ventricular systolic function is normal.  The visual ejection fraction is estimated at 55-60%.  Regional wall motion abnormalities cannot be excluded due to limited  visualization.  Inadequate evaluation of cardiac valves. No significant AS is suspected.  Technically difficult, suboptimal  study.  _____________________________________________________________________________  __        Left Ventricle  The left ventricle is normal in size. Left ventricular systolic function is  normal. The visual ejection fraction is estimated at 55-60%. Left ventricular  diastolic function is not assessable. Regional wall motion abnormalities  cannot be excluded due to limited visualization.     Right Ventricle  The right ventricle is normal in structure, function and size.     Atria  The left atrium is not well visualized. Right atrium not well visualized.     Mitral Valve  There is no mitral regurgitation noted. Evaluation of regurgitation is  inadequate.        Tricuspid Valve  The tricuspid valve is not well visualized. Right ventricular systolic  pressure could not be approximated due to inadequate tricuspid regurgitation.     Aortic Valve  The aortic valve is trileaflet with aortic valve sclerosis. No hemodynamically  significant valvular aortic stenosis.     Pulmonic Valve  There is trace pulmonic valvular regurgitation.     Vessels  The aortic root is normal size.     Pericardium  There is no pericardial effusion.     _____________________________________________________________________________  __  MMode/2D Measurements & Calculations  IVSd: 0.81 cm  LVIDd: 4.2 cm  LVIDs: 3.0 cm  LVPWd: 0.96 cm  FS: 28.2 %  LV mass(C)d: 113.9 grams  LV mass(C)dI: 68.5 grams/m2     Ao root diam: 3.5 cm  LA dimension: 2.4 cm  LA/Ao: 0.67  RWT: 0.46        Doppler Measurements & Calculations  PA acc time: 0.05 sec           _____________________________________________________________________________  __           Report approved by: Gloria Urena 02/25/2021 01:25 PM        EEG 2/25/2021:  EEG Routine Result     EEG DATE: 2/25/21  EEG LOG: [facility]  portable  EEG DAY#: 0  EEG SOURCE FILE DURATION: 20 min     PATIENT INFORMATION: Hubert Tamayo is a 89 year old year old male with a loss of consciousness episode.      TECHNICAL  SUMMARY: This routine EEG was recorded from 22 scalp electrodes placed according to the 10-20 international system. Additional electrodes were used for referencing, EKG, and to record from other cerebral regions as appropriate.     BACKGROUND ACTIVITY:  During wakefulness, the background activity consists of synchronous and symmetric, well-modulated, 7-8 Hz posterior dominant rhythm. The posterior dominant rhythm attenuated with eye opening. During drowsiness, the background activity waxed and waned and there were periods of theta slowing and attenuation of the posterior dominant rhythm.   The patient was agitated throughout the recording, causing significant muscle and motion artifact at times.     Sleep did not occur.     ACTIVATION PROCEDURE: None.     ICTAL: No clinical events or electrographic seizures were recorded.     IMPRESSION:   This is a normal routine EEG in the awake state.

## 2021-02-28 NOTE — PLAN OF CARE
Occupational Therapy Discharge Summary    Reason for therapy discharge:    Discharged to home.    Progress towards therapy goal(s). See goals on Care Plan in [facility] electronic health record for goal details.  Goals not met.  Barriers to achieving goals:   discharge from facility.    Therapy recommendation(s):    No further therapy is recommended.

## 2021-02-28 NOTE — PLAN OF CARE
Speech Language Therapy Discharge Summary    Reason for therapy discharge:    Discharged to long term care facility.    Progress towards therapy goal(s). See goals on Care Plan in [facility] electronic health record for goal details.  Goals not met.  Barriers to achieving goals:   discharge from facility.    Therapy recommendation(s):    Continued therapy is recommended.  Rationale/Recommendations:  Discharged on DDL1 with thin liquids due to no dentition. Recommend ongoing ST for least restrictive diet.

## 2021-02-28 NOTE — PLAN OF CARE
Physical Therapy Discharge Summary    Reason for therapy discharge:    Discharged to long term care facility.    Progress towards therapy goal(s). See goals on Care Plan in [facility] electronic health record for goal details.  Goals not met.  Barriers to achieving goals:   discharge from facility.    Therapy recommendation(s):    No further therapy is recommended.

## 2021-03-01 ENCOUNTER — TELEPHONE (OUTPATIENT)
Dept: CARDIOLOGY | Facility: CLINIC | Age: 86
End: 2021-03-01

## 2021-03-01 NOTE — TELEPHONE ENCOUNTER
Patient was evaluated by cardiology while inpatient for syncope .RN called [facility] to confirm the follow up plan for patient with Care Coordinator. RN confirmed with Care Coordinator via  that patient has a scheduled F/U apt on 3/23/21 with Mary PATTON. RN confirmed with Care Coordinator in  that patient will bring list of daily weights/vitals, last progress note, MAR, active orders, and provider order sheet to appt.                   2/26/21 cardiology progress note  Assessment & Plan     Hubert Tamayo is a 89 y/o male with past medical history significant for dementia, hypertension, chronic obstructive pulmonary disease, recurrent DVT, CKD stage 4-5, hx of aspiration pneumonia with recent admission for subdural hematoma who present after becoming unresponsive and pulseless in the [employer]. His symptoms resolved with needing CPR. He was admitted and Cardiology consulted. No events noted on telemetry.     COVID negative 02/24     Assessment:     1. Syncope    Pulseless electrical activity reported. No CPR.    Echocardiogram showed LVEF 55-60%.    No cardiac event noted on telemetry    CT head showed chronic small vessel ischemic disease, no evidence for acute intracranial bleed     2. CAD     [PTA: Atorvastatin 80 mg po daily]    NSTEMI 2019 medically managed    Troponin negative x 2    Started on metoprolol tartrate 50 mg po BID     3. Hypertension     s -170 s, continue metoprolol tartrate 50 mg po BID. Consider adding lisinopril?     4. Recurrent DVT     5. Hyperlipidemia    [PTA: Atorvastatin 80 mg daily]     6. CKD stage 4-5    Baseline Cr 2-2.4    Cr. 2.48 on 02/25,    Today 1.99.     Plan:  1. No further event seen on telemetry.  2. Event monitor at discharge  3. Cardiology will sign off  4. [facility] (asked care coordinator to assist will follow).      AREN VALENCIA CNP  Pager:  [phone]   (7am - 5pm, M-F)

## 2021-03-02 LAB — INTERPRETATION ECG - MUSE: NORMAL

## 2021-03-03 ENCOUNTER — DOCUMENTATION ONLY (OUTPATIENT)
Dept: CARDIOLOGY | Facility: CLINIC | Age: 86
End: 2021-03-03

## 2021-03-03 ENCOUNTER — TELEPHONE (OUTPATIENT)
Dept: CARDIOLOGY | Facility: CLINIC | Age: 86
End: 2021-03-03

## 2021-03-03 NOTE — TELEPHONE ENCOUNTER
Please let them know that they can discontinue the monitor if it is causing him significant agitation.

## 2021-03-03 NOTE — TELEPHONE ENCOUNTER
----- Message from Carson Cheney sent at 3/3/2021  9:14 AM CST -----  Regarding: Please call antonio pt [phone]  Antonio Freeman RN for this pt called stating the pt keeps taking off his event monitor that Mary order she would like a call back to discuss this.    Thank you,  Renate     Contacted patient's RN to review further. Spoke with Antonio. Antonio states that the patient is removing his event monitor a few times every shift, and they are having a difficult time keeping him from removing it. Antonio also states that he had an incident yesterday where he became difficult to arouse while he was on the toilet. Antonio states that he was not unresponsive, but they did have to sternal rub him to get his attention. Antonio states that he wasn't bearing down for a BM or anything like that, and was back to his normal responsive state after a few minutes. Reviewed chart, only strips that have been received have shown SR. Will route to Mary for review.

## 2021-03-03 NOTE — TELEPHONE ENCOUNTER
Message sent to EP to try and find strips from today. Will wait for response.         JOHNNY Hebert March 3, 2021 2:18 PM

## 2021-03-03 NOTE — TELEPHONE ENCOUNTER
Message back from EP showing that no other strips documented. Called JOHNNY Bello to follow up on if patient would tolerate a Ziopatch monitor versus the 30 day event monitor. Per Eugenia, patient has been very agitated while wearing the monitor and will still take off leads even with redirection. Eugenia states that patient probably won't tolerate the Ziopatch and will likely want to take that off a well. She reports that patient didn't have any further episodes or symptoms of syncope or presyncope. Will route back to [facility] for follow up.     Fax number provided if orders placed: [phone].     JOHNNY Hebert March 3, 2021 3:57 PM

## 2021-03-03 NOTE — TELEPHONE ENCOUNTER
Are we able to get the strips during his episode of unresponsiveness if there were any? Could you ask Eugenia if she thinks he would tolerate a Zio Patch?     AREN Ren, CNP

## 2021-03-03 NOTE — PROGRESS NOTES
Indication: Syncope    30 day event monitor      Baseline strip received 2/27/21    SR with HR 70 BPM    No symptoms reported.     Folder started. Strips filed in folder. Will continue to monitor.

## 2021-03-04 NOTE — TELEPHONE ENCOUNTER
RN called Eugenia and advised her that POOJA Mary is ok with discontinuing monitor if it is causing patient significant agitation. Eugenia advised this RN they have kept monitor off since patient removed it again yesterday. Eugenia will call clinic with any further questions/concerns.

## 2021-03-23 ENCOUNTER — OFFICE VISIT (OUTPATIENT)
Dept: CARDIOLOGY | Facility: CLINIC | Age: 86
End: 2021-03-23
Payer: COMMERCIAL

## 2021-03-23 VITALS — HEART RATE: 88 BPM | SYSTOLIC BLOOD PRESSURE: 179 MMHG | DIASTOLIC BLOOD PRESSURE: 104 MMHG

## 2021-03-23 DIAGNOSIS — R55 SYNCOPE, UNSPECIFIED SYNCOPE TYPE: ICD-10-CM

## 2021-03-23 PROCEDURE — 99213 OFFICE O/P EST LOW 20 MIN: CPT | Performed by: NURSE PRACTITIONER

## 2021-03-23 NOTE — PROGRESS NOTES
Cardiology Clinic Progress Note  Hubert Tamayo MRN# [id number]   YOB: 1929 Age: 89 year old     Primary cardiologist: Dr. Alvarado    Reason for visit: Discharge follow up    History of presenting illness:    Hubert Tamayo, a pleasant 89 year old patient who has a past medical history significant for dementia, paranoid schizophrenia, anemia, hypertension, recurrent DVT, COPD, CAD (presumed with history of non-STEMI, type II in 2019), CKD subdural hematoma (admitted in January 24-27, 2021).    Mr. Tamayo was on his way to a routine head CT (a follow-up post his recent subdural hematoma) and while in route he became unresponsive and pulseless briefly that self resolved and did not require CPR.  He was subsequently brought to the [facility] emergency department and underwent a CT head that showed nothing acute, troponin was within normal limits and EKG revealed slightly inverted T waves inferiorly and laterally.    Cardiology was consulted and an echocardiogram noted normal LV size, function and structure with an LVEF of 55 to 60%.  The study was technically difficult but no significant aortic stenosis was suspected and due to limitation of visualization regional wall motion abnormalities were not able to be ruled out.  During his admission his telemetry revealed sinus bradycardia with no recurrent bradycardia arrhythmias, arrhythmias or heart block during his admission.  He was discharged home on a 30-day event monitor, due to his underlying dementia he was unable to complete wearing a monitor as he continuously pulled it off.    Today Hubert is in clinic with a representative from his care center. He is very agitated and confused. His BP is understandably elevated. He is unable to give a personal history and either is the care assist. They can recount that he has no further syncopal episodes.          Assessment and Plan:     ASSESSMENT:    1. Syncope    Pulseless electrical activity reported.  No CPR.    Echocardiogram showed LVEF 55-60%.    No cardiac event noted on telemetry    CT head showed chronic small vessel ischemic disease, no evidence for acute intracranial bleed    Unable to tolerate event monitor    No episodes since discharge per care assitant    2. Coronary artery disease, presumed    History of non-STEMI in the setting of severe anemia that was thought to be type II demand supply mismatch    No coronary angiogram performed     Medical management    3. Recurrent DVT    Previously on chronic anticoagulation that was discontinued in the setting of the non-STEMI in 2019 and replaced with Plavix.    4. Hyperlipidemia    Atorvastatin 80 mg daily    5. CKD, stage IV-V    Creatine 1.99    PLAN:     1. No further follow up needed in cardiology.             Review of Systems:     Review of Systems:  Skin:  not assessed     Eyes:  not assessed    ENT:  not assessed    Respiratory:  Negative shortness of breath  Cardiovascular:  Negative;chest pain    Gastroenterology: not assessed    Genitourinary:  not assessed    Musculoskeletal:  not assessed    Neurologic:  not assessed    Psychiatric:  not assessed    Heme/Lymph/Imm:  not assessed    Endocrine:  not assessed              Physical Exam:     hysical Exam:  Vitals: BP (!) 179/104   Pulse 88     Constitutional:    thin;cachectic;frail      Skin:  no apparent skin lesions or masses noted        Head:  normocephalic        Eyes:  sclera white pupils unequal      ENT:    poor dentition      Neck:  not assessed this visit        Chest:  clear to auscultation        Cardiac: regular rhythm                  Abdomen:  abdomen soft        Vascular: pulses full and equal                                      Extremities and Back:  no edema        Neurological:      Agitation           Medications:     Current Outpatient Medications   Medication Sig Dispense Refill     acetaminophen (TYLENOL) 325 MG tablet Take 650 mg by mouth 4 times daily       albuterol  (PROVENTIL) (2.5 MG/3ML) 0.083% neb solution Take 2.5 mg by nebulization every 6 hours as needed for shortness of breath / dyspnea or wheezing       atorvastatin (LIPITOR) 80 MG tablet Take 80 mg by mouth daily       camphor-menthol (DERMASARRA) 0.5-0.5 % external lotion Apply topically 2 times daily as needed (itching) Apply thin layer topically bid prn itching, apply after Eucerin.       carboxymethylcellulose (REFRESH PLUS) 0.5 % SOLN ophthalmic solution Place 2 drops into both eyes every 4 hours as needed for dry eyes 6 times per day prn*       cholecalciferol 1000 units TABS Take 1,000 Units by mouth daily       donepezil (ARICEPT) 5 MG tablet Take 5 mg by mouth At Bedtime       famotidine (PEPCID) 10 MG tablet Take 10 mg by mouth daily       fluticasone-salmeterol (ADVAIR) 500-50 MCG/DOSE inhaler Inhale 1 puff into the lungs 2 times daily Rinse mouth after use.       guaiFENesin (ROBITUSSIN) 100 MG/5ML SYRP Take 5 mLs by mouth At Bedtime       haloperidol (HALDOL) 0.5 MG tablet Take 0.5 mg by mouth 3 times daily       magnesium 250 MG tablet Take 1 tablet by mouth daily Patient takes on their own. Patient [facility].       metoprolol tartrate (LOPRESSOR) 50 MG tablet Take 1 tablet (50 mg) by mouth 2 times daily 60 tablet 1     nitroGLYcerin (NITROSTAT) 0.4 MG sublingual tablet Place 0.4 mg under the tongue every 5 minutes as needed for chest pain For chest pain place 1 tablet under the tongue every 5 minutes for 3 doses. If symptoms persist 5 minutes after 1st dose call 911.       OLANZapine (ZYPREXA) 15 MG tablet Take 1 tablet (15 mg) by mouth At Bedtime       senna (SENOKOT) 8.6 MG tablet Take 1 tablet by mouth every evening as needed for constipation       sertraline (ZOLOFT) 100 MG tablet Take 1.5 tablets (150 mg) by mouth daily 30 tablet 0     tamsulosin (FLOMAX) 0.4 MG capsule Take 0.4 mg by mouth daily       albuterol (PROAIR HFA/PROVENTIL HFA/VENTOLIN HFA) 108 (90 Base) MCG/ACT  inhaler Inhale 2 puffs into the lungs every 4 hours as needed for shortness of breath / dyspnea or wheezing       haloperidol (HALDOL) 1 MG tablet Take 1 mg by mouth daily at 2 pm       Menthol-Methyl Salicylate (ICY HOT EXTRA STRENGTH) 10-30 % CREA Externally apply topically 3 times daily as needed Apply to bilateral thighs        Nutritional Supplements (ENSURE COMPLETE PO) Take 1 Bottle by mouth 2 times daily (with meals)       senna (SENOKOT) 8.6 MG tablet Take 1 tablet by mouth every morning       Skin Protectants, Misc. (EUCERIN) cream Apply topically daily Apply to areas of dry skin topically every day ideally within 3 minutes after bath or shower.       Tiotropium Bromide Monohydrate (SPIRIVA HANDIHALER IN) Inhale 1 capsule into the lungs daily          History reviewed. No pertinent family history.    Social History     Socioeconomic History     Marital status: Single     Spouse name: Not on file     Number of children: Not on file     Years of education: Not on file     Highest education level: Not on file   Occupational History     Not on file   Social Needs     Financial resource strain: Not on file     Food insecurity     Worry: Not on file     Inability: Not on file     Transportation needs     Medical: Not on file     Non-medical: Not on file   Tobacco Use     Smoking status: Former Smoker     Packs/day: 0.00     Smokeless tobacco: Never Used   Substance and Sexual Activity     Alcohol use: Not Currently     Drug use: Never     Sexual activity: Not on file   Lifestyle     Physical activity     Days per week: Not on file     Minutes per session: Not on file     Stress: Not on file   Relationships     Social connections     Talks on phone: Not on file     Gets together: Not on file     Attends Latter-day service: Not on file     Active member of club or organization: Not on file     Attends meetings of clubs or organizations: Not on file     Relationship status: Not on file     Intimate partner violence      Fear of current or ex partner: Not on file     Emotionally abused: Not on file     Physically abused: Not on file     Forced sexual activity: Not on file   Other Topics Concern     Not on file   Social History Narrative     Not on file            Past Medical History:     Past Medical History:   Diagnosis Date     CAD (coronary artery disease)      Dementia (H)      Diabetes mellitus type II      Essential hypertension      Osteoporosis      Paranoid schizophrenia      Pulmonary emphysema               Past Surgical History:     Past Surgical History:   Procedure Laterality Date     CATARACT REMOVAL Bilateral               Allergies:   Crustaceans, Loxapine, Nifedipine, Shellfish allergy, Sulfa drugs, Sulfamethoxazole-trimethoprim, and Trimethoprim       Data:   All laboratory data reviewed:    Recent Labs   Lab Test 01/25/21  0902 11/13/19  0846 10/31/19  0611   LDL  --   --  14   HDL  --   --  42   NHDL  --   --  35   CHOL  --   --  77   TRIG  --   --  103   TSH 1.44  --   --    IRON  --  17*  --    FEB  --  285  --    IRONSAT  --  6*  --    MAGUI  --  72  --        Lab Results   Component Value Date    WBC 9.3 02/26/2021    RBC 3.66 (L) 02/26/2021    HGB 11.0 (L) 02/26/2021    HCT 33.6 (L) 02/26/2021    MCV 92 02/26/2021    MCH 30.1 02/26/2021    MCHC 32.7 02/26/2021    RDW 15.1 (H) 02/26/2021     (L) 02/26/2021       Lab Results   Component Value Date     02/26/2021    POTASSIUM 3.9 02/26/2021    CHLORIDE 104 02/26/2021    CO2 29 02/26/2021    ANIONGAP 2 (L) 02/26/2021    GLC 89 02/26/2021    BUN 36 (H) 02/26/2021    CR 1.99 (H) 02/26/2021    GFRESTIMATED 28 (L) 02/26/2021    GFRESTBLACK 33 (L) 02/26/2021    RAMON 9.4 02/26/2021      Lab Results   Component Value Date    AST 23 02/24/2021    ALT 18 02/24/2021       No results found for: A1C    Lab Results   Component Value Date    INR 1.07 02/24/2021    INR 1.18 (H) 01/23/2021         AREN VALENCIA CNP  [facility]  Pager: [phone]  RN  phone: [phone]

## 2021-03-23 NOTE — PATIENT INSTRUCTIONS
Today's Recommendations    1. Continue all medications without changes.  2. No follow up with cardiology needed     Please send a jslyhl message or call [phone] with questions or concerns.     Scheduling number [phone].

## 2021-03-23 NOTE — LETTER
3/23/2021    Carolee Bales MD  [facility] [address]   [facility] [address]    RE: Hubert Tamayo       Dear Colleague,    I had the pleasure of seeing Hubert Tamayo in the [facility].    B/P: Data Unavailable, T: Data Unavailable, P: Data Unavailable, R: Data Unavailable    Cardiology Clinic Progress Note  Hubert Tamayo MRN# [id number]   YOB: 1929 Age: 89 year old     Primary cardiologist: Dr. Alvarado    Reason for visit: Discharge follow up    History of presenting illness:    Hubert Tamayo, a pleasant 89 year old patient who has a past medical history significant for dementia, paranoid schizophrenia, anemia, hypertension, recurrent DVT, COPD, CAD (presumed with history of non-STEMI, type II in 2019), CKD subdural hematoma (admitted in January 24-27, 2021).    Mr. Tamayo was on his way to a routine head CT (a follow-up post his recent subdural hematoma) and while in route he became unresponsive and pulseless briefly that self resolved and did not require CPR.  He was subsequently brought to the [facility] emergency department and underwent a CT head that showed nothing acute, troponin was within normal limits and EKG revealed slightly inverted T waves inferiorly and laterally.    Cardiology was consulted and an echocardiogram noted normal LV size, function and structure with an LVEF of 55 to 60%.  The study was technically difficult but no significant aortic stenosis was suspected and due to limitation of visualization regional wall motion abnormalities were not able to be ruled out.  During his admission his telemetry revealed sinus bradycardia with no recurrent bradycardia arrhythmias, arrhythmias or heart block during his admission.  He was discharged home on a 30-day event monitor, due to his underlying dementia he was unable to complete wearing a monitor as he continuously pulled it off.    Today Hubert is in clinic with a  representative from his care center. He is very agitated and confused. His BP is understandably elevated. He is unable to give a personal history and either is the care assist. They can recount that he has no further syncopal episodes.          Assessment and Plan:     ASSESSMENT:    1. Syncope    Pulseless electrical activity reported. No CPR.    Echocardiogram showed LVEF 55-60%.    No cardiac event noted on telemetry    CT head showed chronic small vessel ischemic disease, no evidence for acute intracranial bleed    Unable to tolerate event monitor    No episodes since discharge per care assitant    2. Coronary artery disease, presumed    History of non-STEMI in the setting of severe anemia that was thought to be type II demand supply mismatch    No coronary angiogram performed     Medical management    3. Recurrent DVT    Previously on chronic anticoagulation that was discontinued in the setting of the non-STEMI in 2019 and replaced with Plavix.    4. Hyperlipidemia    Atorvastatin 80 mg daily    5. CKD, stage IV-V    Creatine 1.99    PLAN:     1. No further follow up needed in cardiology.             Review of Systems:     Review of Systems:  Skin:  not assessed     Eyes:  not assessed    ENT:  not assessed    Respiratory:  Negative shortness of breath  Cardiovascular:  Negative;chest pain    Gastroenterology: not assessed    Genitourinary:  not assessed    Musculoskeletal:  not assessed    Neurologic:  not assessed    Psychiatric:  not assessed    Heme/Lymph/Imm:  not assessed    Endocrine:  not assessed              Physical Exam:     hysical Exam:  Vitals: BP (!) 179/104   Pulse 88     Constitutional:    thin;cachectic;frail      Skin:  no apparent skin lesions or masses noted        Head:  normocephalic        Eyes:  sclera white pupils unequal      ENT:    poor dentition      Neck:  not assessed this visit        Chest:  clear to auscultation        Cardiac: regular rhythm                  Abdomen:   abdomen soft        Vascular: pulses full and equal                                      Extremities and Back:  no edema        Neurological:      Agitation           Medications:     Current Outpatient Medications   Medication Sig Dispense Refill     acetaminophen (TYLENOL) 325 MG tablet Take 650 mg by mouth 4 times daily       albuterol (PROVENTIL) (2.5 MG/3ML) 0.083% neb solution Take 2.5 mg by nebulization every 6 hours as needed for shortness of breath / dyspnea or wheezing       atorvastatin (LIPITOR) 80 MG tablet Take 80 mg by mouth daily       camphor-menthol (DERMASARRA) 0.5-0.5 % external lotion Apply topically 2 times daily as needed (itching) Apply thin layer topically bid prn itching, apply after Eucerin.       carboxymethylcellulose (REFRESH PLUS) 0.5 % SOLN ophthalmic solution Place 2 drops into both eyes every 4 hours as needed for dry eyes 6 times per day prn*       cholecalciferol 1000 units TABS Take 1,000 Units by mouth daily       donepezil (ARICEPT) 5 MG tablet Take 5 mg by mouth At Bedtime       famotidine (PEPCID) 10 MG tablet Take 10 mg by mouth daily       fluticasone-salmeterol (ADVAIR) 500-50 MCG/DOSE inhaler Inhale 1 puff into the lungs 2 times daily Rinse mouth after use.       guaiFENesin (ROBITUSSIN) 100 MG/5ML SYRP Take 5 mLs by mouth At Bedtime       haloperidol (HALDOL) 0.5 MG tablet Take 0.5 mg by mouth 3 times daily       magnesium 250 MG tablet Take 1 tablet by mouth daily Patient takes on their own. Patient [facility] to [facility].       metoprolol tartrate (LOPRESSOR) 50 MG tablet Take 1 tablet (50 mg) by mouth 2 times daily 60 tablet 1     nitroGLYcerin (NITROSTAT) 0.4 MG sublingual tablet Place 0.4 mg under the tongue every 5 minutes as needed for chest pain For chest pain place 1 tablet under the tongue every 5 minutes for 3 doses. If symptoms persist 5 minutes after 1st dose call 911.       OLANZapine (ZYPREXA) 15 MG tablet Take 1 tablet (15 mg) by mouth At  Bedtime       senna (SENOKOT) 8.6 MG tablet Take 1 tablet by mouth every evening as needed for constipation       sertraline (ZOLOFT) 100 MG tablet Take 1.5 tablets (150 mg) by mouth daily 30 tablet 0     tamsulosin (FLOMAX) 0.4 MG capsule Take 0.4 mg by mouth daily       albuterol (PROAIR HFA/PROVENTIL HFA/VENTOLIN HFA) 108 (90 Base) MCG/ACT inhaler Inhale 2 puffs into the lungs every 4 hours as needed for shortness of breath / dyspnea or wheezing       haloperidol (HALDOL) 1 MG tablet Take 1 mg by mouth daily at 2 pm       Menthol-Methyl Salicylate (ICY HOT EXTRA STRENGTH) 10-30 % CREA Externally apply topically 3 times daily as needed Apply to bilateral thighs        Nutritional Supplements (ENSURE COMPLETE PO) Take 1 Bottle by mouth 2 times daily (with meals)       senna (SENOKOT) 8.6 MG tablet Take 1 tablet by mouth every morning       Skin Protectants, Misc. (EUCERIN) cream Apply topically daily Apply to areas of dry skin topically every day ideally within 3 minutes after bath or shower.       Tiotropium Bromide Monohydrate (SPIRIVA HANDIHALER IN) Inhale 1 capsule into the lungs daily          History reviewed. No pertinent family history.    Social History     Socioeconomic History     Marital status: Single     Spouse name: Not on file     Number of children: Not on file     Years of education: Not on file     Highest education level: Not on file   Occupational History     Not on file   Social Needs     Financial resource strain: Not on file     Food insecurity     Worry: Not on file     Inability: Not on file     Transportation needs     Medical: Not on file     Non-medical: Not on file   Tobacco Use     Smoking status: Former Smoker     Packs/day: 0.00     Smokeless tobacco: Never Used   Substance and Sexual Activity     Alcohol use: Not Currently     Drug use: Never     Sexual activity: Not on file   Lifestyle     Physical activity     Days per week: Not on file     Minutes per session: Not on file      Stress: Not on file   Relationships     Social connections     Talks on phone: Not on file     Gets together: Not on file     Attends Taoism service: Not on file     Active member of club or organization: Not on file     Attends meetings of clubs or organizations: Not on file     Relationship status: Not on file     Intimate partner violence     Fear of current or ex partner: Not on file     Emotionally abused: Not on file     Physically abused: Not on file     Forced sexual activity: Not on file   Other Topics Concern     Not on file   Social History Narrative     Not on file            Past Medical History:     Past Medical History:   Diagnosis Date     CAD (coronary artery disease)      Dementia (H)      Diabetes mellitus type II      Essential hypertension      Osteoporosis      Paranoid schizophrenia      Pulmonary emphysema               Past Surgical History:     Past Surgical History:   Procedure Laterality Date     CATARACT REMOVAL Bilateral               Allergies:   Crustaceans, Loxapine, Nifedipine, Shellfish allergy, Sulfa drugs, Sulfamethoxazole-trimethoprim, and Trimethoprim       Data:   All laboratory data reviewed:    Recent Labs   Lab Test 01/25/21  0902 11/13/19  0846 10/31/19  0611   LDL  --   --  14   HDL  --   --  42   NHDL  --   --  35   CHOL  --   --  77   TRIG  --   --  103   TSH 1.44  --   --    IRON  --  17*  --    FEB  --  285  --    IRONSAT  --  6*  --    MAGUI  --  72  --        Lab Results   Component Value Date    WBC 9.3 02/26/2021    RBC 3.66 (L) 02/26/2021    HGB 11.0 (L) 02/26/2021    HCT 33.6 (L) 02/26/2021    MCV 92 02/26/2021    MCH 30.1 02/26/2021    MCHC 32.7 02/26/2021    RDW 15.1 (H) 02/26/2021     (L) 02/26/2021       Lab Results   Component Value Date     02/26/2021    POTASSIUM 3.9 02/26/2021    CHLORIDE 104 02/26/2021    CO2 29 02/26/2021    ANIONGAP 2 (L) 02/26/2021    GLC 89 02/26/2021    BUN 36 (H) 02/26/2021    CR 1.99 (H) 02/26/2021    GFRESTIMATED  28 (L) 02/26/2021    GFRESTBLACK 33 (L) 02/26/2021    RAMON 9.4 02/26/2021      Lab Results   Component Value Date    AST 23 02/24/2021    ALT 18 02/24/2021       No results found for: A1C    Lab Results   Component Value Date    INR 1.07 02/24/2021    INR 1.18 (H) 01/23/2021         AREN VALENCIA [facility]  Pager: [phone]  RN phone: [phone]    cc:   No referring provider defined for this encounter.

## 2025-03-25 NOTE — ED PROVIDER NOTES
Emergency Department Attending Supervision Note  6/2/2019  10:36 PM      I evaluated this patient in conjunction with Sree Coffman PA-C    History, physical, plan reviewed and agree.    No HA or vision loss or tearing neck pain.    Normal ear on exam and neck without bruit.    [facility].          Diagnosis    ICD-10-CM    1. Tinnitus, bilateral H93.13          Vlad Mccurdy MD Stevens, Andrew C, MD  06/02/19 2247       Vlad Mccurdy MD  07/15/19 1942     Routed to pcp to address when in clinic tomorrow.